# Patient Record
Sex: MALE | Race: WHITE | NOT HISPANIC OR LATINO
[De-identification: names, ages, dates, MRNs, and addresses within clinical notes are randomized per-mention and may not be internally consistent; named-entity substitution may affect disease eponyms.]

---

## 2017-02-23 ENCOUNTER — APPOINTMENT (OUTPATIENT)
Dept: NEUROSURGERY | Facility: CLINIC | Age: 72
End: 2017-02-23

## 2017-03-16 ENCOUNTER — APPOINTMENT (OUTPATIENT)
Dept: NEUROSURGERY | Facility: CLINIC | Age: 72
End: 2017-03-16

## 2017-03-16 VITALS
WEIGHT: 200 LBS | HEART RATE: 100 BPM | OXYGEN SATURATION: 96 % | DIASTOLIC BLOOD PRESSURE: 67 MMHG | SYSTOLIC BLOOD PRESSURE: 117 MMHG | HEIGHT: 66 IN | BODY MASS INDEX: 32.14 KG/M2

## 2017-03-16 DIAGNOSIS — I67.2 CEREBRAL ATHEROSCLEROSIS: ICD-10-CM

## 2017-03-16 DIAGNOSIS — I67.9 CEREBROVASCULAR DISEASE, UNSPECIFIED: ICD-10-CM

## 2017-03-16 DIAGNOSIS — I65.23 OCCLUSION AND STENOSIS OF BILATERAL CAROTID ARTERIES: ICD-10-CM

## 2017-03-16 DIAGNOSIS — Z78.9 OTHER SPECIFIED HEALTH STATUS: ICD-10-CM

## 2017-03-16 DIAGNOSIS — Z86.73 PERSONAL HISTORY OF TRANSIENT ISCHEMIC ATTACK (TIA), AND CEREBRAL INFARCTION W/OUT RESIDUAL DEFICITS: ICD-10-CM

## 2017-03-16 DIAGNOSIS — I77.9 DISORDER OF ARTERIES AND ARTERIOLES, UNSPECIFIED: ICD-10-CM

## 2017-03-20 PROBLEM — I77.9 BILATERAL CAROTID ARTERY DISEASE: Status: ACTIVE | Noted: 2017-03-20

## 2017-03-20 PROBLEM — Z78.9 NON-SMOKER: Status: ACTIVE | Noted: 2017-03-16

## 2017-03-20 PROBLEM — I65.23 BILATERAL CAROTID ARTERY STENOSIS: Status: ACTIVE | Noted: 2017-03-20

## 2017-03-20 PROBLEM — Z78.9 DOES NOT USE ILLICIT DRUGS: Status: ACTIVE | Noted: 2017-03-16

## 2017-03-20 RX ORDER — COLD-HOT PACK
125 MCG EACH MISCELLANEOUS
Refills: 0 | Status: ACTIVE | COMMUNITY

## 2017-03-20 RX ORDER — NIFEDIPINE 60 MG/1
60 TABLET, FILM COATED, EXTENDED RELEASE ORAL
Refills: 0 | Status: ACTIVE | COMMUNITY

## 2017-03-20 RX ORDER — PANTOPRAZOLE 40 MG/1
40 TABLET, DELAYED RELEASE ORAL
Refills: 0 | Status: ACTIVE | COMMUNITY

## 2017-03-20 RX ORDER — LOSARTAN POTASSIUM 50 MG/1
50 TABLET, FILM COATED ORAL
Refills: 0 | Status: ACTIVE | COMMUNITY

## 2017-03-21 PROBLEM — I67.2 INTRACRANIAL ATHEROSCLEROSIS: Status: ACTIVE | Noted: 2017-03-21

## 2017-03-21 PROBLEM — I67.9 INTRACRANIAL VASCULAR STENOSIS: Status: ACTIVE | Noted: 2017-03-21

## 2017-03-30 ENCOUNTER — OUTPATIENT (OUTPATIENT)
Dept: OUTPATIENT SERVICES | Facility: HOSPITAL | Age: 72
LOS: 1 days | Discharge: HOME | End: 2017-03-30

## 2017-04-12 ENCOUNTER — FORM ENCOUNTER (OUTPATIENT)
Age: 72
End: 2017-04-12

## 2017-04-13 ENCOUNTER — OUTPATIENT (OUTPATIENT)
Dept: OUTPATIENT SERVICES | Facility: HOSPITAL | Age: 72
LOS: 1 days | End: 2017-04-13
Payer: SELF-PAY

## 2017-04-13 ENCOUNTER — FORM ENCOUNTER (OUTPATIENT)
Age: 72
End: 2017-04-13

## 2017-04-13 PROCEDURE — 78607: CPT | Mod: 26

## 2017-04-14 PROCEDURE — 78803 RP LOCLZJ TUM SPECT 1 AREA: CPT

## 2017-04-14 PROCEDURE — A9557: CPT

## 2017-04-14 PROCEDURE — 78607: CPT | Mod: 26

## 2017-04-14 RX ORDER — ACETAZOLAMIDE 250 MG/1
1000 TABLET ORAL ONCE
Qty: 0 | Refills: 0 | Status: DISCONTINUED | OUTPATIENT
Start: 2017-04-14 | End: 2017-04-28

## 2017-05-08 ENCOUNTER — FORM ENCOUNTER (OUTPATIENT)
Age: 72
End: 2017-05-08

## 2017-05-09 ENCOUNTER — OUTPATIENT (OUTPATIENT)
Dept: OUTPATIENT SERVICES | Facility: HOSPITAL | Age: 72
LOS: 1 days | End: 2017-05-09
Payer: COMMERCIAL

## 2017-05-09 PROCEDURE — 70553 MRI BRAIN STEM W/O & W/DYE: CPT | Mod: 26

## 2017-05-09 PROCEDURE — 70553 MRI BRAIN STEM W/O & W/DYE: CPT

## 2017-05-09 PROCEDURE — 70544 MR ANGIOGRAPHY HEAD W/O DYE: CPT

## 2017-05-09 PROCEDURE — 70544 MR ANGIOGRAPHY HEAD W/O DYE: CPT | Mod: 26,59

## 2017-05-09 PROCEDURE — A9585: CPT

## 2017-05-11 RX ORDER — CHLORHEXIDINE GLUCONATE 213 G/1000ML
1 SOLUTION TOPICAL ONCE
Qty: 0 | Refills: 0 | Status: DISCONTINUED | OUTPATIENT
Start: 2017-05-12 | End: 2017-05-12

## 2017-05-12 ENCOUNTER — OUTPATIENT (OUTPATIENT)
Dept: OUTPATIENT SERVICES | Facility: HOSPITAL | Age: 72
LOS: 1 days | Discharge: NURSING, REHAB OR LONG-TERM CARE | End: 2017-05-12
Payer: COMMERCIAL

## 2017-05-12 DIAGNOSIS — I66.3 OCCLUSION AND STENOSIS OF CEREBELLAR ARTERIES: ICD-10-CM

## 2017-05-12 DIAGNOSIS — Z53.9 PROCEDURE AND TREATMENT NOT CARRIED OUT, UNSPECIFIED REASON: ICD-10-CM

## 2017-05-12 LAB
PA ADP PRP-ACNC: 160 PRU — LOW (ref 194–418)
PLATELET RESPONSE ASPIRIN RESULT: 537 ARU — SIGNIFICANT CHANGE UP (ref 350–700)

## 2017-05-12 PROCEDURE — 36224 PLACE CATH CAROTD ART: CPT | Mod: 74

## 2017-05-12 PROCEDURE — C1769: CPT

## 2017-05-12 PROCEDURE — C1725: CPT

## 2017-05-12 PROCEDURE — C1760: CPT

## 2017-05-12 PROCEDURE — 85576 BLOOD PLATELET AGGREGATION: CPT

## 2017-05-12 PROCEDURE — C1894: CPT

## 2017-05-12 RX ORDER — SODIUM CHLORIDE 9 MG/ML
1000 INJECTION INTRAMUSCULAR; INTRAVENOUS; SUBCUTANEOUS
Qty: 0 | Refills: 0 | Status: DISCONTINUED | OUTPATIENT
Start: 2017-05-12 | End: 2017-05-12

## 2017-05-12 RX ADMIN — SODIUM CHLORIDE 75 MILLILITER(S): 9 INJECTION INTRAMUSCULAR; INTRAVENOUS; SUBCUTANEOUS at 12:39

## 2017-05-12 NOTE — BRIEF OPERATIVE NOTE - OPERATION/FINDINGS
MAC sedation, using a 5 fr sheath right CFA, cerebral angiogram was performed.  Findings c/w stenosis of right ICA , B/L MCA's   Full report to follow.  Patient tolerated procedure well, no new neurological deficit, hemodynamically stable.  Right groin/vasc access site: Perclose device, manual compression.  Hemostasis obtained, no hematoma.  Patient was transferred to PACU and will go home later today.  Above d/w Dr. Rajput

## 2017-05-18 RX ORDER — FUROSEMIDE 40 MG/1
40 TABLET ORAL
Refills: 0 | Status: DISCONTINUED | COMMUNITY
End: 2017-05-18

## 2017-05-18 RX ORDER — ATORVASTATIN CALCIUM 40 MG/1
40 TABLET, FILM COATED ORAL
Refills: 0 | Status: ACTIVE | COMMUNITY

## 2017-05-18 RX ORDER — INSULIN GLARGINE 300 U/ML
INJECTION, SOLUTION SUBCUTANEOUS
Refills: 0 | Status: ACTIVE | COMMUNITY

## 2017-05-18 RX ORDER — FLUOXETINE HYDROCHLORIDE 60 MG/1
TABLET ORAL
Refills: 0 | Status: ACTIVE | COMMUNITY

## 2017-05-18 RX ORDER — METFORMIN HYDROCHLORIDE 850 MG/1
850 TABLET, COATED ORAL TWICE DAILY
Refills: 0 | Status: ACTIVE | COMMUNITY

## 2017-05-18 RX ORDER — ASPIRIN 325 MG/1
325 TABLET ORAL
Refills: 0 | Status: ACTIVE | COMMUNITY

## 2017-05-18 RX ORDER — INSULIN LISPRO 100 [IU]/ML
INJECTION, SOLUTION INTRAVENOUS; SUBCUTANEOUS
Refills: 0 | Status: ACTIVE | COMMUNITY

## 2017-05-21 ENCOUNTER — INPATIENT (INPATIENT)
Facility: HOSPITAL | Age: 72
LOS: 21 days | Discharge: HOME HEALTH | DRG: 37 | End: 2017-06-12
Attending: NEUROLOGICAL SURGERY | Admitting: NEUROLOGICAL SURGERY
Payer: COMMERCIAL

## 2017-05-21 VITALS
TEMPERATURE: 97 F | SYSTOLIC BLOOD PRESSURE: 113 MMHG | HEART RATE: 79 BPM | OXYGEN SATURATION: 98 % | RESPIRATION RATE: 18 BRPM | DIASTOLIC BLOOD PRESSURE: 61 MMHG

## 2017-05-21 DIAGNOSIS — Z96.89 PRESENCE OF OTHER SPECIFIED FUNCTIONAL IMPLANTS: Chronic | ICD-10-CM

## 2017-05-21 DIAGNOSIS — Z90.49 ACQUIRED ABSENCE OF OTHER SPECIFIED PARTS OF DIGESTIVE TRACT: Chronic | ICD-10-CM

## 2017-05-21 DIAGNOSIS — Z98.890 OTHER SPECIFIED POSTPROCEDURAL STATES: Chronic | ICD-10-CM

## 2017-05-21 LAB
ALBUMIN SERPL ELPH-MCNC: 4 G/DL — SIGNIFICANT CHANGE UP (ref 3.3–5)
ALP SERPL-CCNC: 90 U/L — SIGNIFICANT CHANGE UP (ref 40–120)
ALT FLD-CCNC: 16 U/L — SIGNIFICANT CHANGE UP (ref 10–45)
ANION GAP SERPL CALC-SCNC: 17 MMOL/L — SIGNIFICANT CHANGE UP (ref 5–17)
APPEARANCE UR: CLEAR — SIGNIFICANT CHANGE UP
APTT BLD: 32.4 SEC — SIGNIFICANT CHANGE UP (ref 27.5–37.4)
AST SERPL-CCNC: 16 U/L — SIGNIFICANT CHANGE UP (ref 10–40)
BASOPHILS NFR BLD AUTO: 0.9 % — SIGNIFICANT CHANGE UP (ref 0–2)
BILIRUB SERPL-MCNC: 0.2 MG/DL — SIGNIFICANT CHANGE UP (ref 0.2–1.2)
BILIRUB UR-MCNC: NEGATIVE — SIGNIFICANT CHANGE UP
BUN SERPL-MCNC: 23 MG/DL — SIGNIFICANT CHANGE UP (ref 7–23)
CALCIUM SERPL-MCNC: 9.4 MG/DL — SIGNIFICANT CHANGE UP (ref 8.4–10.5)
CHLORIDE SERPL-SCNC: 98 MMOL/L — SIGNIFICANT CHANGE UP (ref 96–108)
CO2 SERPL-SCNC: 22 MMOL/L — SIGNIFICANT CHANGE UP (ref 22–31)
COLOR SPEC: YELLOW — SIGNIFICANT CHANGE UP
CREAT SERPL-MCNC: 1.3 MG/DL — SIGNIFICANT CHANGE UP (ref 0.5–1.3)
DIFF PNL FLD: NEGATIVE — SIGNIFICANT CHANGE UP
EOSINOPHIL NFR BLD AUTO: 5.4 % — SIGNIFICANT CHANGE UP (ref 0–6)
GLUCOSE SERPL-MCNC: 93 MG/DL — SIGNIFICANT CHANGE UP (ref 70–99)
GLUCOSE UR QL: NEGATIVE — SIGNIFICANT CHANGE UP
HCT VFR BLD CALC: 37.7 % — LOW (ref 39–50)
HGB BLD-MCNC: 12.8 G/DL — LOW (ref 13–17)
INR BLD: 1.02 — SIGNIFICANT CHANGE UP (ref 0.88–1.16)
KETONES UR-MCNC: NEGATIVE — SIGNIFICANT CHANGE UP
LEUKOCYTE ESTERASE UR-ACNC: NEGATIVE — SIGNIFICANT CHANGE UP
LYMPHOCYTES # BLD AUTO: 18.2 % — SIGNIFICANT CHANGE UP (ref 13–44)
MCHC RBC-ENTMCNC: 29.2 PG — SIGNIFICANT CHANGE UP (ref 27–34)
MCHC RBC-ENTMCNC: 34 G/DL — SIGNIFICANT CHANGE UP (ref 32–36)
MCV RBC AUTO: 85.9 FL — SIGNIFICANT CHANGE UP (ref 80–100)
MONOCYTES NFR BLD AUTO: 10.5 % — SIGNIFICANT CHANGE UP (ref 2–14)
NEUTROPHILS NFR BLD AUTO: 65 % — SIGNIFICANT CHANGE UP (ref 43–77)
NITRITE UR-MCNC: NEGATIVE — SIGNIFICANT CHANGE UP
PH UR: 5.5 — SIGNIFICANT CHANGE UP (ref 5–8)
PLATELET # BLD AUTO: 302 K/UL — SIGNIFICANT CHANGE UP (ref 150–400)
POTASSIUM SERPL-MCNC: 3.6 MMOL/L — SIGNIFICANT CHANGE UP (ref 3.5–5.3)
POTASSIUM SERPL-SCNC: 3.6 MMOL/L — SIGNIFICANT CHANGE UP (ref 3.5–5.3)
PROT SERPL-MCNC: 7.7 G/DL — SIGNIFICANT CHANGE UP (ref 6–8.3)
PROT UR-MCNC: NEGATIVE MG/DL — SIGNIFICANT CHANGE UP
PROTHROM AB SERPL-ACNC: 11.3 SEC — SIGNIFICANT CHANGE UP (ref 9.8–12.7)
RBC # BLD: 4.39 M/UL — SIGNIFICANT CHANGE UP (ref 4.2–5.8)
RBC # FLD: 13.4 % — SIGNIFICANT CHANGE UP (ref 10.3–16.9)
SODIUM SERPL-SCNC: 137 MMOL/L — SIGNIFICANT CHANGE UP (ref 135–145)
SP GR SPEC: 1.02 — SIGNIFICANT CHANGE UP (ref 1–1.03)
UROBILINOGEN FLD QL: 0.2 E.U./DL — SIGNIFICANT CHANGE UP
WBC # BLD: 11.1 K/UL — HIGH (ref 3.8–10.5)
WBC # FLD AUTO: 11.1 K/UL — HIGH (ref 3.8–10.5)

## 2017-05-21 PROCEDURE — 71010: CPT | Mod: 26

## 2017-05-21 PROCEDURE — 93010 ELECTROCARDIOGRAM REPORT: CPT | Mod: NC

## 2017-05-21 PROCEDURE — 99285 EMERGENCY DEPT VISIT HI MDM: CPT | Mod: 25

## 2017-05-21 PROCEDURE — 70450 CT HEAD/BRAIN W/O DYE: CPT | Mod: 26

## 2017-05-21 RX ORDER — ACETAMINOPHEN 500 MG
650 TABLET ORAL EVERY 6 HOURS
Qty: 0 | Refills: 0 | Status: DISCONTINUED | OUTPATIENT
Start: 2017-05-21 | End: 2017-05-23

## 2017-05-21 RX ORDER — ATORVASTATIN CALCIUM 80 MG/1
40 TABLET, FILM COATED ORAL DAILY
Qty: 0 | Refills: 0 | Status: DISCONTINUED | OUTPATIENT
Start: 2017-05-21 | End: 2017-05-22

## 2017-05-21 RX ORDER — ASPIRIN/CALCIUM CARB/MAGNESIUM 324 MG
325 TABLET ORAL DAILY
Qty: 0 | Refills: 0 | Status: DISCONTINUED | OUTPATIENT
Start: 2017-05-21 | End: 2017-05-23

## 2017-05-21 RX ORDER — FLUOXETINE HCL 10 MG
20 CAPSULE ORAL DAILY
Qty: 0 | Refills: 0 | Status: DISCONTINUED | OUTPATIENT
Start: 2017-05-21 | End: 2017-05-23

## 2017-05-21 RX ORDER — DEXTROSE 50 % IN WATER 50 %
1 SYRINGE (ML) INTRAVENOUS ONCE
Qty: 0 | Refills: 0 | Status: DISCONTINUED | OUTPATIENT
Start: 2017-05-21 | End: 2017-05-23

## 2017-05-21 RX ORDER — DEXTROSE 50 % IN WATER 50 %
12.5 SYRINGE (ML) INTRAVENOUS ONCE
Qty: 0 | Refills: 0 | Status: DISCONTINUED | OUTPATIENT
Start: 2017-05-21 | End: 2017-05-23

## 2017-05-21 RX ORDER — SODIUM CHLORIDE 9 MG/ML
1000 INJECTION, SOLUTION INTRAVENOUS
Qty: 0 | Refills: 0 | Status: DISCONTINUED | OUTPATIENT
Start: 2017-05-21 | End: 2017-05-23

## 2017-05-21 RX ORDER — LOSARTAN POTASSIUM 100 MG/1
50 TABLET, FILM COATED ORAL DAILY
Qty: 0 | Refills: 0 | Status: DISCONTINUED | OUTPATIENT
Start: 2017-05-21 | End: 2017-05-23

## 2017-05-21 RX ORDER — GLUCAGON INJECTION, SOLUTION 0.5 MG/.1ML
1 INJECTION, SOLUTION SUBCUTANEOUS ONCE
Qty: 0 | Refills: 0 | Status: DISCONTINUED | OUTPATIENT
Start: 2017-05-21 | End: 2017-05-23

## 2017-05-21 RX ORDER — FLUOXETINE HCL 10 MG
20 CAPSULE ORAL DAILY
Qty: 0 | Refills: 0 | Status: DISCONTINUED | OUTPATIENT
Start: 2017-05-21 | End: 2017-05-21

## 2017-05-21 RX ORDER — INSULIN LISPRO 100/ML
VIAL (ML) SUBCUTANEOUS
Qty: 0 | Refills: 0 | Status: DISCONTINUED | OUTPATIENT
Start: 2017-05-21 | End: 2017-05-23

## 2017-05-21 RX ORDER — SODIUM CHLORIDE 9 MG/ML
1000 INJECTION INTRAMUSCULAR; INTRAVENOUS; SUBCUTANEOUS
Qty: 0 | Refills: 0 | Status: DISCONTINUED | OUTPATIENT
Start: 2017-05-22 | End: 2017-05-22

## 2017-05-21 RX ORDER — PANTOPRAZOLE SODIUM 20 MG/1
40 TABLET, DELAYED RELEASE ORAL
Qty: 0 | Refills: 0 | Status: DISCONTINUED | OUTPATIENT
Start: 2017-05-21 | End: 2017-05-23

## 2017-05-21 RX ADMIN — Medication 325 MILLIGRAM(S): at 22:43

## 2017-05-21 RX ADMIN — ATORVASTATIN CALCIUM 40 MILLIGRAM(S): 80 TABLET, FILM COATED ORAL at 22:44

## 2017-05-21 RX ADMIN — PANTOPRAZOLE SODIUM 40 MILLIGRAM(S): 20 TABLET, DELAYED RELEASE ORAL at 22:43

## 2017-05-21 RX ADMIN — LOSARTAN POTASSIUM 50 MILLIGRAM(S): 100 TABLET, FILM COATED ORAL at 22:42

## 2017-05-21 RX ADMIN — Medication 20 MILLIGRAM(S): at 22:43

## 2017-05-21 NOTE — ED ADULT NURSE REASSESSMENT NOTE - NS ED NURSE REASSESS COMMENT FT1
pt completed ct of head. pt nad. denies pain or discomfort. vss. R sided weakness at baseline. speech returned to baseline as per pt. pt report given to floor rn.

## 2017-05-21 NOTE — H&P ADULT - PSH
H/O umbilical hernia repair    History of appendectomy    History of lumbar surgery    History of penile implant

## 2017-05-21 NOTE — H&P ADULT - ASSESSMENT
72 y/o male multiple co morbidities and recent CVAs with known Left MCA stenosis with worsening aphasia, right hemiplegic presents to ED for Lt STA-MCA bypass procedure tomorrow  -admit to Dr. Rajput SDU [facility] bed  -NPO after midnight  -tylenol prn pain  -q2h neuro checks  -consent for procedure  -obtain pre op labs  -SCDs only for now, can continue ASA  -as above d/w Dr. Rajput 70 y/o male multiple co morbidities and recent CVAs with known Left MCA stenosis with worsening aphasia, right hemiplegic presents to ED for Lt STA-MCA bypass procedure tomorrow  -[facility]put [facility]  -obtain f/u CTH in light of reported worsening symptoms  -NPO after midnight  -tylenol prn pain  -q2h neuro checks  -consent for procedure  -obtain pre op labs  -SCDs only for now, can continue ASA  -as above d/w Dr. Rajput

## 2017-05-21 NOTE — H&P ADULT - NSHPPHYSICALEXAM_GEN_ALL_CORE
PHYSICAL EXAM:      Constitutional:    Eyes:    ENMT:    Neck:    Breasts:    Back:    Respiratory:    Cardiovascular:    Gastrointestinal:    Genitourinary:    Rectal:    Extremities:    Vascular:    Neurological:    Skin:    Lymph Nodes:    Musculoskeletal:    Psychiatric: PHYSICAL EXAM:      Constitutional: awake, alert appears comfortable    Eyes: EOMI, PERRL    ENMT: tongue midline, face symmetric    Back: old lumbar surgical scar    Respiratory: patent, comfortable on RA    Cardiovascular: RRR    Gastrointestinal: soft, NTND    Extremities: w/w/p    Vascular: distal 2+ pulses intact b/l    Neurological: oriented to self, person, hospital but unable to report exact location or date which family sts is baseline  RUE contracted with muscle atrophy, but able to flex at the wrist,  strength 1/5, RLE 4/5 throughout otherwise motor exam 5/5 throughout  sensation intact b/l  gait testing deferred

## 2017-05-21 NOTE — ED PROVIDER NOTE - MEDICAL DECISION MAKING DETAILS
pt with worsening CVA symtoms, out of stroke code window, stroke team made aware , pt admitted to neurosurgery due to plans for bypass of known stenotic MCA.

## 2017-05-21 NOTE — ED ADULT NURSE NOTE - OBJECTIVE STATEMENT
received pt to room 17. A&Ox3, presents to ed for c.o worsening slurred speech since this am. hx stroke sept 2016, R sided residual and baseline slurred speech. as per wife at bedside, pt noted to be disoriented since am with worsening speech. pt denies pain. denies recent fall or trauma. no cp, no sob. no ha, no blurry vision. nsr on cm. vss. iv placed, labs drawn. awaiting md lopez.

## 2017-05-21 NOTE — H&P ADULT - PMH
Cerebrovascular accident (CVA) due to stenosis of left middle cerebral artery    Controlled type 2 diabetes mellitus without complication, unspecified long term insulin use status    Coronary artery disease without angina pectoris, unspecified vessel or lesion type, unspecified whether native or transplanted heart    Depression, unspecified depression type    Essential hypertension

## 2017-05-21 NOTE — PROGRESS NOTE ADULT - SUBJECTIVE AND OBJECTIVE BOX
Surgery: Left STA-MCA bypass  Consent: Signed by HCP                       No Known Allergies        T(C): 36.2, Max: 36.2 ( @ 15:43)  HR: 66 (66 - 81)  BP: 139/64 (113/61 - 139/64)  RR: 18 (18 - 18)  SpO2: 95% (95% - 99%)  Wt(kg): --          137  |  98  |  23  ----------------------------<  93  3.6   |  22  |  1.30    Ca    9.4      21 May 2017 16:35    TPro  7.7  /  Alb  4.0  /  TBili  0.2  /  DBili  x   /  AST  16  /  ALT  16  /  AlkPhos  90      CBC Full  -  ( 21 May 2017 16:35 )  WBC Count : 11.1 K/uL  Hemoglobin : 12.8 g/dL  Hematocrit : 37.7 %  Platelet Count - Automated : 302 K/uL  Mean Cell Volume : 85.9 fL  Mean Cell Hemoglobin : 29.2 pg  Mean Cell Hemoglobin Concentration : 34.0 g/dL  Auto Neutrophil # : x  Auto Lymphocyte # : x  Auto Monocyte # : x  Auto Eosinophil # : x  Auto Basophil # : x  Auto Neutrophil % : 65.0 %  Auto Lymphocyte % : 18.2 %  Auto Monocyte % : 10.5 %  Auto Eosinophil % : 5.4 %  Auto Basophil % : 0.9 %    PT/INR - ( 21 May 2017 16:35 )   PT: 11.3 sec;   INR: 1.02          PTT - ( 21 May 2017 16:35 )  PTT:32.4 sec    Pregnancy test:  Type & Screen (in past 72hrs):    CXR:pending  EKG:NSR  ECHO:LVEF 55%  Medical Clearances:outpatient clearance  Other Clearances:     Last dose of antiplatelet/anticoagulation dru/21: aspirin    Implanted Devices (pacemaker, drug pump...etc):  []YES   [x] NO                  If yes --> EPS consulted to interrogate/adjust device:                 Assessment:72yo male w/ MCA stenosis preop for L STA-MCA bypass    Plan:NPO p MN  con't aspirin  f/u CXR  D/W

## 2017-05-21 NOTE — ED ADULT TRIAGE NOTE - CHIEF COMPLAINT QUOTE
Pt's spouse states "he had multiple stroke in the past, last one was 9/2016. But I noticed his speech got worse this morning."

## 2017-05-21 NOTE — H&P ADULT - NSHPREVIEWOFSYSTEMS_GEN_ALL_CORE
REVIEW OF SYSTEMS      General:	disabled, required wheel chair assistance  	  Ophthalmologic: denies blurry vision, or changes in vision  	  ENMT: denies sore throat or ear pain 	    Respiratory and Thorax: denies shortness of breath or difficulty breathing  	  Cardiovascular: denies chest pain or palpitations	    Gastrointestinal: denies nausea vomiting or diarrhea		    Musculoskeletal: denies any muscular or joint pain	    Neurological: dense right hemiplegia as stated above in HPI, denies seizures, paresthesias or new weakness	    Hematology/Lymphatics: denies anemia	    Endocrine: DM 2

## 2017-05-21 NOTE — ED PROVIDER NOTE - CONSTITUTIONAL, MLM
normal... non toxic appearing, well nourished, awake, alert, oriented to person, place, time/situation and in no apparent distress.

## 2017-05-21 NOTE — ED PROVIDER NOTE - OBJECTIVE STATEMENT
72 yo male with ho of 3 strokes in past, awoke this morning at 11 am after going to bed at 9 pm, pt and wife report he has worsening of his chronic deficits which include rt sided numbness weakness and speech difficulty, reports symptoms have been consistently worse all day, no headache, no new symptoms.

## 2017-05-21 NOTE — H&P ADULT - NSHPLABSRESULTS_GEN_ALL_CORE
1. Extensive longstanding ischemic changes to the cerebral hemispheres,   left more than right.     2. Subacute infarction of left frontal and parietal yao-Rolandic brain   with findings of Wallerian degeneration along the corticospinal tract.    3. Small more acute infarction in the right parietal periventricular   white matter. This finding was communicated to MARIA VICTORIA Rey at the   conclusion of the study.    *MRA HEAD*    1. Multiple sites of moderate to severe intracranial arterial stenosis,   presumably secondary to atherosclerotic disease. This is similar to a CTA   from 3/19/2016 given differencesin technique. The left M1 segment shows   severe stenosis; right M1 and left A2 segment with moderate to severe   stenosis, left supraclinoid ICA with moderate stenosis. Mild-moderate   narrowing of the distal basilar artery and bilateral PCAs, left more than   right.    2. NOVA results show diminished flow in the left ICA relative to the   right. There is diminished flow in the bilateral MCAs. Complete NOVA   results are published in PACS for review. 1. Extensive longstanding ischemic changes to the cerebral hemispheres,   left more than right.     2. Subacute infarction of left frontal and parietal yao-Rolandic brain   with findings of Wallerian degeneration along the corticospinal tract.    3. Small more acute infarction in the right parietal periventricular   white matter. This finding was communicated to MARIA VICTORIA Rey at the   conclusion of the study.    *MRA HEAD*    1. Multiple sites of moderate to severe intracranial arterial stenosis,   presumably secondary to atherosclerotic disease. This is similar to a CTA   from 3/19/2016 given differences in technique. The left M1 segment shows   severe stenosis; right M1 and left A2 segment with moderate to severe   stenosis, left supraclinoid ICA with moderate stenosis. Mild-moderate   narrowing of the distal basilar artery and bilateral PCAs, left more than   right.    2. NOVA results show diminished flow in the left ICA relative to the   right. There is diminished flow in the bilateral MCAs. Complete NOVA   results are published in PACS for review.

## 2017-05-21 NOTE — ED PROVIDER NOTE - NEUROLOGICAL, MLM
Alert and oriented, + pt with sensory and motor deficit at rt arm and leg, + dysarthria as well as aphasia, pt answered  when asked where he was aware it came out wrong .

## 2017-05-21 NOTE — H&P ADULT - HISTORY OF PRESENT ILLNESS
70 y/o male pmhx HTN, CAD, multiple CVA (last 3/2016) DM, known to NSX service with recent angio procedure revealing worsening carotid stenosis planned for elective Left STA-MCA bypass this week but has been experiencing worsening in his speech over the past few days. Per the patients wife, patient has expereinced a gradual decline since March 2016 with symptoms associated with increased confusion and speech difficulty. 70 y/o male pmhx HTN, CAD, multiple CVA (last 3/2016) DM, known to NSX service with recent angio procedure revealing worsening carotid stenosis planned for elective Left STA-MCA bypass this week but has been experiencing worsening in his speech over the past few days. Per the patients wife, patient has expereinced a gradual decline since March 2016 with symptoms associated with increased confusion and speech difficulty. Denies any headaches, nausea, vomiting or right side deficits. Denies any numbness tingling or decreased sensation.  Pt at baseline using hand crutch and one person assistance to ambulate. Denies any medications for pain or improvement of symptoms. Denies any recent trauma or falls.

## 2017-05-22 DIAGNOSIS — Z01.818 ENCOUNTER FOR OTHER PREPROCEDURAL EXAMINATION: ICD-10-CM

## 2017-05-22 DIAGNOSIS — I10 ESSENTIAL (PRIMARY) HYPERTENSION: ICD-10-CM

## 2017-05-22 DIAGNOSIS — E78.5 HYPERLIPIDEMIA, UNSPECIFIED: ICD-10-CM

## 2017-05-22 DIAGNOSIS — I63.9 CEREBRAL INFARCTION, UNSPECIFIED: ICD-10-CM

## 2017-05-22 DIAGNOSIS — I25.10 ATHEROSCLEROTIC HEART DISEASE OF NATIVE CORONARY ARTERY WITHOUT ANGINA PECTORIS: ICD-10-CM

## 2017-05-22 DIAGNOSIS — E11.9 TYPE 2 DIABETES MELLITUS WITHOUT COMPLICATIONS: ICD-10-CM

## 2017-05-22 LAB
CULTURE RESULTS: NO GROWTH — SIGNIFICANT CHANGE UP
EXTRA BLUE TOP TUBE: SIGNIFICANT CHANGE UP
SPECIMEN SOURCE: SIGNIFICANT CHANGE UP

## 2017-05-22 PROCEDURE — 93010 ELECTROCARDIOGRAM REPORT: CPT

## 2017-05-22 PROCEDURE — 99223 1ST HOSP IP/OBS HIGH 75: CPT | Mod: GC

## 2017-05-22 RX ORDER — ATORVASTATIN CALCIUM 80 MG/1
40 TABLET, FILM COATED ORAL AT BEDTIME
Qty: 0 | Refills: 0 | Status: DISCONTINUED | OUTPATIENT
Start: 2017-05-22 | End: 2017-05-23

## 2017-05-22 RX ADMIN — LOSARTAN POTASSIUM 50 MILLIGRAM(S): 100 TABLET, FILM COATED ORAL at 06:09

## 2017-05-22 RX ADMIN — Medication 20 MILLIGRAM(S): at 13:07

## 2017-05-22 RX ADMIN — Medication 325 MILLIGRAM(S): at 13:07

## 2017-05-22 RX ADMIN — ATORVASTATIN CALCIUM 40 MILLIGRAM(S): 80 TABLET, FILM COATED ORAL at 21:27

## 2017-05-22 RX ADMIN — Medication 2: at 19:09

## 2017-05-22 RX ADMIN — PANTOPRAZOLE SODIUM 40 MILLIGRAM(S): 20 TABLET, DELAYED RELEASE ORAL at 06:09

## 2017-05-22 RX ADMIN — Medication 2: at 13:06

## 2017-05-22 NOTE — CONSULT NOTE ADULT - PROBLEM SELECTOR RECOMMENDATION 6
The patient's medical condition is optimized for surgery.  There is no contraindication for surgery.  There is no clinical evidence neither of angina, decompensated CHF, arrhthymias, nor valvular disease.   There is no limitation of exercise capacity.  MET is 4 .  ASA class is 2 .  Farley cardiac risk factor is high  .  DVT prophylaxis is indicated.  Pain control.  Early mobilization.  Avoid fluid overload.

## 2017-05-22 NOTE — CONSULT NOTE ADULT - SUBJECTIVE AND OBJECTIVE BOX
Patient is a 71y old  Male who presents with a chief complaint of worsening slurred speech (21 May 2017 16:56)      HPI:  70 y/o male pmhx HTN, CAD, multiple CVA (last 3/2016) DM, known to NSX service with recent angio procedure revealing worsening carotid stenosis planned for elective Left STA-MCA bypass this week but has been experiencing worsening in his speech over the past few days. Per the patients wife, patient has expereinced a gradual decline since 2016 with symptoms associated with increased confusion and speech difficulty. Denies any headaches, nausea, vomiting or right side deficits. Denies any numbness tingling or decreased sensation.  Pt at baseline using hand crutch and one person assistance to ambulate. Denies any medications for pain or improvement of symptoms. Denies any recent trauma or falls. (21 May 2017 16:56)      PAST MEDICAL & SURGICAL HISTORY:  Cerebrovascular accident (CVA) due to stenosis of left middle cerebral artery  Coronary artery disease without angina pectoris, unspecified vessel or lesion type, unspecified whether native or transplanted heart  Depression, unspecified depression type  Essential hypertension  Controlled type 2 diabetes mellitus without complication, unspecified long term insulin use status  History of penile implant  History of lumbar surgery  H/O umbilical hernia repair  History of appendectomy      FAMILY HISTORY:  No pertinent family history in first degree relatives      SOCIAL HISTORY:  Smoking Status: [ ] Current, [ ] Former, [x ] Never  Pack Years:    MEDICATIONS:  Pulmonary:    Antimicrobials:    Anticoagulants:    Onc:    GI/:  pantoprazole    Tablet 40milliGRAM(s) Oral before breakfast    Endocrine:  insulin lispro (HumaLOG) corrective regimen sliding scale  SubCutaneous Before meals and at bedtime  dextrose Gel 1Dose(s) Oral once PRN  dextrose 50% Injectable 12.5Gram(s) IV Push once  glucagon  Injectable 1milliGRAM(s) IntraMuscular once PRN  atorvastatin 40milliGRAM(s) Oral at bedtime    Cardiac:  losartan 50milliGRAM(s) Oral daily    Other Medications:  aspirin 325milliGRAM(s) Oral daily  FLUoxetine 20milliGRAM(s) Oral daily  acetaminophen   Tablet. 650milliGRAM(s) Oral every 6 hours PRN  dextrose 5%. 1000milliLiter(s) IV Continuous <Continuous>      Allergies    No Known Allergies    Intolerances        Vital Signs Last 24 Hrs  T(C): 36.3, Max: 36.8 (05 @ 14:10)  T(F): 97.3, Max: 98.2 ( @ 14:10)  HR: 72 (68 - 758)  BP: 128/59 (128/59 - 181/81)  BP(mean): 83 (83 - 121)  RR: 16 (16 - 20)  SpO2: 96% (93% - 96%)  I & Os for 24h ending  @ 07:00  =============================================  IN: 850 ml / OUT: 1000 ml / NET: -150 ml    I & Os for current day (as of  @ 22:57)  =============================================  IN: 75 ml / OUT: 1000 ml / NET: -925 ml        LABS:      CBC Full  -  ( 21 May 2017 16:35 )  WBC Count : 11.1 K/uL  Hemoglobin : 12.8 g/dL  Hematocrit : 37.7 %  Platelet Count - Automated : 302 K/uL  Mean Cell Volume : 85.9 fL  Mean Cell Hemoglobin : 29.2 pg  Mean Cell Hemoglobin Concentration : 34.0 g/dL  Auto Neutrophil # : x  Auto Lymphocyte # : x  Auto Monocyte # : x  Auto Eosinophil # : x  Auto Basophil # : x  Auto Neutrophil % : 65.0 %  Auto Lymphocyte % : 18.2 %  Auto Monocyte % : 10.5 %  Auto Eosinophil % : 5.4 %  Auto Basophil % : 0.9 %        137  |  98  |  23  ----------------------------<  93  3.6   |  22  |  1.30    Ca    9.4      21 May 2017 16:35    TPro  7.7  /  Alb  4.0  /  TBili  0.2  /  DBili  x   /  AST  16  /  ALT  16  /  AlkPhos  90      PT/INR - ( 21 May 2017 16:35 )   PT: 11.3 sec;   INR: 1.02          PTT - ( 21 May 2017 16:35 )  PTT:32.4 sec      Urinalysis Basic - ( 21 May 2017 17:12 )    Color: Yellow / Appearance: Clear / S.025 / pH: x  Gluc: x / Ketone: NEGATIVE  / Bili: NEGATIVE / Urobili: 0.2 E.U./dL   Blood: x / Protein: NEGATIVE mg/dL / Nitrite: NEGATIVE   Leuk Esterase: NEGATIVE / RBC: x / WBC x   Sq Epi: x / Non Sq Epi: x / Bacteria: x      Patient is a 71y old  Male who presents with a chief complaint of worsening slurred speech (21 May 2017 16:56)      HPI:  70 y/o male pmhx HTN, CAD, multiple CVA (last 3/2016) DM, known to NSX service with recent angio procedure revealing worsening carotid stenosis planned for elective Left STA-MCA bypass this week but has been experiencing worsening in his speech over the past few days. Per the patients wife, patient has expereinced a gradual decline since 2016 with symptoms associated with increased confusion and speech difficulty. Denies any headaches, nausea, vomiting or right side deficits. Denies any numbness tingling or decreased sensation.  Pt at baseline using hand crutch and one person assistance to ambulate. Denies any medications for pain or improvement of symptoms. Denies any recent trauma or falls. (21 May 2017 16:56)      PAST MEDICAL & SURGICAL HISTORY:  Cerebrovascular accident (CVA) due to stenosis of left middle cerebral artery  Coronary artery disease without angina pectoris, unspecified vessel or lesion type, unspecified whether native or transplanted heart  Depression, unspecified depression type  Essential hypertension  Controlled type 2 diabetes mellitus without complication, unspecified long term insulin use status  History of penile implant  History of lumbar surgery  H/O umbilical hernia repair  History of appendectomy      FAMILY HISTORY:  No pertinent family history in first degree relatives      SOCIAL HISTORY:  Smoking Status: [ ] Current, [ ] Former, [ ] Never  Pack Years:    MEDICATIONS:  Pulmonary:    Antimicrobials:    Anticoagulants:    Onc:    GI/:  pantoprazole    Tablet 40milliGRAM(s) Oral before breakfast    Endocrine:  insulin lispro (HumaLOG) corrective regimen sliding scale  SubCutaneous Before meals and at bedtime  dextrose Gel 1Dose(s) Oral once PRN  dextrose 50% Injectable 12.5Gram(s) IV Push once  glucagon  Injectable 1milliGRAM(s) IntraMuscular once PRN  atorvastatin 40milliGRAM(s) Oral at bedtime    Cardiac:  losartan 50milliGRAM(s) Oral daily    Other Medications:  aspirin 325milliGRAM(s) Oral daily  FLUoxetine 20milliGRAM(s) Oral daily  acetaminophen   Tablet. 650milliGRAM(s) Oral every 6 hours PRN  dextrose 5%. 1000milliLiter(s) IV Continuous <Continuous>      Allergies    No Known Allergies    Intolerances        Vital Signs Last 24 Hrs  T(C): 36.3, Max: 36.8 ( @ 14:10)  T(F): 97.3, Max: 98.2 ( @ 14:10)  HR: 72 (68 - 758)  BP: 128/59 (128/59 - 181/81)  BP(mean): 83 (83 - 121)  RR: 16 (16 - 20)  SpO2: 96% (93% - 96%)  I & Os for 24h ending  @ 07:00  =============================================  IN: 850 ml / OUT: 1000 ml / NET: -150 ml    I & Os for current day (as of  @ 22:57)  =============================================  IN: 75 ml / OUT: 1000 ml / NET: -925 ml        LABS:      CBC Full  -  ( 21 May 2017 16:35 )  WBC Count : 11.1 K/uL  Hemoglobin : 12.8 g/dL  Hematocrit : 37.7 %  Platelet Count - Automated : 302 K/uL  Mean Cell Volume : 85.9 fL  Mean Cell Hemoglobin : 29.2 pg  Mean Cell Hemoglobin Concentration : 34.0 g/dL  Auto Neutrophil # : x  Auto Lymphocyte # : x  Auto Monocyte # : x  Auto Eosinophil # : x  Auto Basophil # : x  Auto Neutrophil % : 65.0 %  Auto Lymphocyte % : 18.2 %  Auto Monocyte % : 10.5 %  Auto Eosinophil % : 5.4 %  Auto Basophil % : 0.9 %        137  |  98  |  23  ----------------------------<  93  3.6   |  22  |  1.30    Ca    9.4      21 May 2017 16:35    TPro  7.7  /  Alb  4.0  /  TBili  0.2  /  DBili  x   /  AST  16  /  ALT  16  /  AlkPhos  90  05-21    PT/INR - ( 21 May 2017 16:35 )   PT: 11.3 sec;   INR: 1.02          PTT - ( 21 May 2017 16:35 )  PTT:32.4 sec      Urinalysis Basic - ( 21 May 2017 17:12 )    Color: Yellow / Appearance: Clear / S.025 / pH: x  Gluc: x / Ketone: NEGATIVE  / Bili: NEGATIVE / Urobili: 0.2 E.U./dL   Blood: x / Protein: NEGATIVE mg/dL / Nitrite: NEGATIVE   Leuk Esterase: NEGATIVE / RBC: x / WBC x   Sq Epi: x / Non Sq Epi: x / Bacteria: x      CXR normal    Ventricular Rate 70 BPM    Atrial Rate 70 BPM    P-R Interval 178 ms    QRS Duration 84 ms    Q-T Interval 430 ms    QTC Calculation(Bezet) 464 ms    P Axis 37 degrees    R Axis -19 degrees    T Axis 72 degrees    Diagnosis Line Normal sinus rhythm  Septal infarct , age undetermined            RADIOLOGY & ADDITIONAL STUDIES (The following images were personally reviewed):

## 2017-05-22 NOTE — PROGRESS NOTE ADULT - SUBJECTIVE AND OBJECTIVE BOX
HPI:  72 y/o male pmhx HTN, CAD, multiple CVA (last 3/2016) DM, known to NSX service with recent angio procedure revealing worsening carotid stenosis planned for elective Left STA-MCA bypass this week but has been experiencing worsening in his speech over the past few days. Per the patients wife, patient has expereinced a gradual decline since 2016 with symptoms associated with increased confusion and speech difficulty. Denies any headaches, nausea, vomiting or right side deficits. Denies any numbness tingling or decreased sensation.  Pt at baseline using hand crutch and one person assistance to ambulate. Denies any medications for pain or improvement of symptoms. Denies any recent trauma or falls. (21 May 2017 16:56)    patient seen and examined at the bedside  no acute events since admission  patient denies any new pain, headaches, weakness or worsening in speech  OR deferred today, plan for tomorrow    OVERNIGHT EVENTS:  Vital Signs Last 24 Hrs  T(C): 36.2, Max: 36.2 (05- @ 15:43)  T(F): 97.1, Max: 97.2 (05-21 @ 15:43)  HR: 758 (66 - 758)  BP: 181/81 (113/61 - 181/81)  BP(mean): --  RR: 16 (16 - 20)  SpO2: 95% (95% - 99%)    I&O's Summary  I & Os for 24h ending 22 May 2017 07:00  =============================================  IN: 850 ml / OUT: 1000 ml / NET: -150 ml    I & Os for current day (as of 22 May 2017 07:59)  =============================================  IN: 75 ml / OUT: 0 ml / NET: 75 ml      PHYSICAL EXAM:  Neurological:  awake, alert comfortable  EOMI, PERRL  aphasia stable  RUE contracted, spastic o/w at baseline motor unchanged    TUBES/LINES:  pivs      DIET:  [] NPO  [x] Mechanical  [] Tube feeds    LABS:                        12.8   11.1  )-----------( 302      ( 21 May 2017 16:35 )             37.7     05-21    137  |  98  |  23  ----------------------------<  93  3.6   |  22  |  1.30    Ca    9.4      21 May 2017 16:35    TPro  7.7  /  Alb  4.0  /  TBili  0.2  /  DBili  x   /  AST  16  /  ALT  16  /  AlkPhos  90  05-21    PT/INR - ( 21 May 2017 16:35 )   PT: 11.3 sec;   INR: 1.02          PTT - ( 21 May 2017 16:35 )  PTT:32.4 sec  Urinalysis Basic - ( 21 May 2017 17:12 )    Color: Yellow / Appearance: Clear / S.025 / pH: x  Gluc: x / Ketone: NEGATIVE  / Bili: NEGATIVE / Urobili: 0.2 E.U./dL   Blood: x / Protein: NEGATIVE mg/dL / Nitrite: NEGATIVE   Leuk Esterase: NEGATIVE / RBC: x / WBC x   Sq Epi: x / Non Sq Epi: x / Bacteria: x          CAPILLARY BLOOD GLUCOSE  124 (22 May 2017 06:40)  127 (21 May 2017 22:40)      Drug Levels: [] N/A    CSF Analysis: [] N/A      Allergies    No Known Allergies    Intolerances      MEDICATIONS:  Antibiotics:    Neuro:  aspirin 325milliGRAM(s) Oral daily  FLUoxetine 20milliGRAM(s) Oral daily  acetaminophen   Tablet. 650milliGRAM(s) Oral every 6 hours PRN    Anticoagulation:    OTHER:  losartan 50milliGRAM(s) Oral daily  atorvastatin Oral Tab/Cap - Peds 40milliGRAM(s) Oral daily  pantoprazole    Tablet 40milliGRAM(s) Oral before breakfast  insulin lispro (HumaLOG) corrective regimen sliding scale  SubCutaneous Before meals and at bedtime  dextrose Gel 1Dose(s) Oral once PRN  dextrose 50% Injectable 12.5Gram(s) IV Push once  glucagon  Injectable 1milliGRAM(s) IntraMuscular once PRN    IVF:  dextrose 5%. 1000milliLiter(s) IV Continuous <Continuous>    CULTURES:    RADIOLOGY & ADDITIONAL TESTS:      ASSESSMENT:  71y Male admitted yesterday in preparation for left crani and STA-MCA bypass procedure tentatively scheduled for tomorrow neuro stable    PLAN:  NEURO:  q2h neuro checks  prn tylenol  cont asa    CARDIOVASCULAR:  stable  f/u Dr. Araujo pre op clearance  echo and ekg reviewed in paper chart    PULMONARY:  comfortable on RA  enc IS use    RENAL:  HLIV    GI:  DM diet as tolerated   bowel regimen  PPI    HEME:  h/h stable    ID:  afeb  no abx    ENDO:  ISS     DVT PROPHYLAXIS:  [x] Venodynes                                [] Heparin/Lovenox    DISPOSITION:  SDU status  d/w Dr. Rajput  pt/ot pending

## 2017-05-23 LAB
ALBUMIN SERPL ELPH-MCNC: 3.8 G/DL — SIGNIFICANT CHANGE UP (ref 3.3–5)
ALP SERPL-CCNC: 46 U/L — SIGNIFICANT CHANGE UP (ref 40–120)
ALT FLD-CCNC: 6 U/L — LOW (ref 10–45)
ANION GAP SERPL CALC-SCNC: 16 MMOL/L — SIGNIFICANT CHANGE UP (ref 5–17)
APPEARANCE UR: CLEAR — SIGNIFICANT CHANGE UP
APTT BLD: 29.3 SEC — SIGNIFICANT CHANGE UP (ref 27.5–37.4)
AST SERPL-CCNC: 12 U/L — SIGNIFICANT CHANGE UP (ref 10–40)
BASE EXCESS BLDA CALC-SCNC: -1.8 MMOL/L — SIGNIFICANT CHANGE UP (ref -2–3)
BASE EXCESS BLDA CALC-SCNC: -4.9 MMOL/L — LOW (ref -2–3)
BASE EXCESS BLDA CALC-SCNC: -6.6 MMOL/L — LOW (ref -2–3)
BASE EXCESS BLDA CALC-SCNC: -6.7 MMOL/L — LOW (ref -2–3)
BASE EXCESS BLDA CALC-SCNC: -7.4 MMOL/L — LOW (ref -2–3)
BASE EXCESS BLDA CALC-SCNC: -7.9 MMOL/L — LOW (ref -2–3)
BASOPHILS NFR BLD AUTO: 0.4 % — SIGNIFICANT CHANGE UP (ref 0–2)
BILIRUB SERPL-MCNC: 0.3 MG/DL — SIGNIFICANT CHANGE UP (ref 0.2–1.2)
BILIRUB UR-MCNC: NEGATIVE — SIGNIFICANT CHANGE UP
BUN SERPL-MCNC: 12 MG/DL — SIGNIFICANT CHANGE UP (ref 7–23)
CA-I BLDA-SCNC: 0.88 MMOL/L — LOW (ref 1.1–1.3)
CA-I BLDA-SCNC: 0.89 MMOL/L — LOW (ref 1.1–1.3)
CA-I BLDA-SCNC: 0.95 MMOL/L — LOW (ref 1.1–1.3)
CA-I BLDA-SCNC: 0.99 MMOL/L — LOW (ref 1.1–1.3)
CA-I BLDA-SCNC: 1.05 MMOL/L — LOW (ref 1.1–1.3)
CALCIUM SERPL-MCNC: 8.3 MG/DL — LOW (ref 8.4–10.5)
CHLORIDE SERPL-SCNC: 106 MMOL/L — SIGNIFICANT CHANGE UP (ref 96–108)
CO2 SERPL-SCNC: 19 MMOL/L — LOW (ref 22–31)
COHGB MFR BLDA: 0.1 % — SIGNIFICANT CHANGE UP
COHGB MFR BLDA: 0.3 % — SIGNIFICANT CHANGE UP
COHGB MFR BLDA: 0.6 % — SIGNIFICANT CHANGE UP
COLOR SPEC: YELLOW — SIGNIFICANT CHANGE UP
CREAT SERPL-MCNC: 1.3 MG/DL — SIGNIFICANT CHANGE UP (ref 0.5–1.3)
DIFF PNL FLD: (no result)
EOSINOPHIL NFR BLD AUTO: 2.8 % — SIGNIFICANT CHANGE UP (ref 0–6)
GAS PNL BLDA: SIGNIFICANT CHANGE UP
GLUCOSE SERPL-MCNC: 160 MG/DL — HIGH (ref 70–99)
GLUCOSE UR QL: NEGATIVE — SIGNIFICANT CHANGE UP
HCO3 BLDA-SCNC: 17 MMOL/L — LOW (ref 21–28)
HCO3 BLDA-SCNC: 19 MMOL/L — LOW (ref 21–28)
HCO3 BLDA-SCNC: 20 MMOL/L — LOW (ref 21–28)
HCO3 BLDA-SCNC: 21 MMOL/L — SIGNIFICANT CHANGE UP (ref 21–28)
HCT VFR BLD CALC: 28 % — LOW (ref 39–50)
HGB BLD-MCNC: 9.5 G/DL — LOW (ref 13–17)
HGB BLDA-MCNC: 10.4 G/DL — LOW (ref 13–17)
HGB BLDA-MCNC: 10.7 G/DL — LOW (ref 13–17)
HGB BLDA-MCNC: 10.8 G/DL — LOW (ref 13–17)
HGB BLDA-MCNC: 12.5 G/DL — LOW (ref 13–17)
HGB BLDA-MCNC: 13.1 G/DL — SIGNIFICANT CHANGE UP (ref 13–17)
INR BLD: 1.28 — HIGH (ref 0.88–1.16)
KETONES UR-MCNC: NEGATIVE — SIGNIFICANT CHANGE UP
LACTATE SERPL-SCNC: 2 MMOL/L — SIGNIFICANT CHANGE UP (ref 0.5–2)
LEUKOCYTE ESTERASE UR-ACNC: NEGATIVE — SIGNIFICANT CHANGE UP
LYMPHOCYTES # BLD AUTO: 8.7 % — LOW (ref 13–44)
MAGNESIUM SERPL-MCNC: 1 MG/DL — CRITICAL LOW (ref 1.6–2.6)
MCHC RBC-ENTMCNC: 28.8 PG — SIGNIFICANT CHANGE UP (ref 27–34)
MCHC RBC-ENTMCNC: 33.9 G/DL — SIGNIFICANT CHANGE UP (ref 32–36)
MCV RBC AUTO: 84.8 FL — SIGNIFICANT CHANGE UP (ref 80–100)
METHGB MFR BLDA: 0.1 % — SIGNIFICANT CHANGE UP
METHGB MFR BLDA: 0.2 % — SIGNIFICANT CHANGE UP
METHGB MFR BLDA: 0.5 % — SIGNIFICANT CHANGE UP
MONOCYTES NFR BLD AUTO: 10.1 % — SIGNIFICANT CHANGE UP (ref 2–14)
NEUTROPHILS NFR BLD AUTO: 78 % — HIGH (ref 43–77)
NITRITE UR-MCNC: NEGATIVE — SIGNIFICANT CHANGE UP
O2 CT VFR BLDA CALC: 15.4 ML/DL — SIGNIFICANT CHANGE UP (ref 15–23)
O2 CT VFR BLDA CALC: 18.3 ML/DL — SIGNIFICANT CHANGE UP (ref 15–23)
O2 CT VFR BLDA CALC: 19.2 ML/DL — SIGNIFICANT CHANGE UP (ref 15–23)
O2 CT VFR BLDA CALC: SIGNIFICANT CHANGE UP (ref 15–23)
O2 CT VFR BLDA CALC: SIGNIFICANT CHANGE UP (ref 15–23)
OXYHGB MFR BLDA: 99 % — SIGNIFICANT CHANGE UP (ref 94–100)
PCO2 BLDA: 29 MMHG — LOW (ref 35–48)
PCO2 BLDA: 31 MMHG — LOW (ref 35–48)
PCO2 BLDA: 36 MMHG — SIGNIFICANT CHANGE UP (ref 35–48)
PCO2 BLDA: 36 MMHG — SIGNIFICANT CHANGE UP (ref 35–48)
PCO2 BLDA: 38 MMHG — SIGNIFICANT CHANGE UP (ref 35–48)
PCO2 BLDA: 44 MMHG — SIGNIFICANT CHANGE UP (ref 35–48)
PH BLDA: 7.26 — LOW (ref 7.35–7.45)
PH BLDA: 7.33 — LOW (ref 7.35–7.45)
PH BLDA: 7.33 — LOW (ref 7.35–7.45)
PH BLDA: 7.34 — LOW (ref 7.35–7.45)
PH BLDA: 7.35 — SIGNIFICANT CHANGE UP (ref 7.35–7.45)
PH BLDA: 7.47 — HIGH (ref 7.35–7.45)
PH UR: 6.5 — SIGNIFICANT CHANGE UP (ref 5–8)
PHOSPHATE SERPL-MCNC: 3.9 MG/DL — SIGNIFICANT CHANGE UP (ref 2.5–4.5)
PLATELET # BLD AUTO: 149 K/UL — LOW (ref 150–400)
PO2 BLDA: 113 MMHG — HIGH (ref 83–108)
PO2 BLDA: 328 MMHG — HIGH (ref 83–108)
PO2 BLDA: 348 MMHG — HIGH (ref 83–108)
PO2 BLDA: 351 MMHG — HIGH (ref 83–108)
PO2 BLDA: 366 MMHG — HIGH (ref 83–108)
PO2 BLDA: 396 MMHG — HIGH (ref 83–108)
POTASSIUM BLDA-SCNC: 3.4 MMOL/L — LOW (ref 3.5–4.9)
POTASSIUM BLDA-SCNC: 3.5 MMOL/L — SIGNIFICANT CHANGE UP (ref 3.5–4.9)
POTASSIUM BLDA-SCNC: 3.6 MMOL/L — SIGNIFICANT CHANGE UP (ref 3.5–4.9)
POTASSIUM BLDA-SCNC: 3.6 MMOL/L — SIGNIFICANT CHANGE UP (ref 3.5–4.9)
POTASSIUM BLDA-SCNC: 3.7 MMOL/L — SIGNIFICANT CHANGE UP (ref 3.5–4.9)
POTASSIUM SERPL-MCNC: 4 MMOL/L — SIGNIFICANT CHANGE UP (ref 3.5–5.3)
POTASSIUM SERPL-SCNC: 4 MMOL/L — SIGNIFICANT CHANGE UP (ref 3.5–5.3)
PROT SERPL-MCNC: 5.4 G/DL — LOW (ref 6–8.3)
PROT UR-MCNC: NEGATIVE MG/DL — SIGNIFICANT CHANGE UP
PROTHROM AB SERPL-ACNC: 14.3 SEC — HIGH (ref 9.8–12.7)
RBC # BLD: 3.3 M/UL — LOW (ref 4.2–5.8)
RBC # FLD: 14 % — SIGNIFICANT CHANGE UP (ref 10.3–16.9)
RH IG SCN BLD-IMP: POSITIVE — SIGNIFICANT CHANGE UP
SAO2 % BLDA: 100 % — SIGNIFICANT CHANGE UP (ref 95–100)
SAO2 % BLDA: 100 % — SIGNIFICANT CHANGE UP (ref 95–100)
SAO2 % BLDA: 98 % — SIGNIFICANT CHANGE UP (ref 95–100)
SAO2 % BLDA: 99 % — SIGNIFICANT CHANGE UP (ref 95–100)
SODIUM BLDA-SCNC: 140 MMOL/L — SIGNIFICANT CHANGE UP (ref 138–146)
SODIUM BLDA-SCNC: 141 MMOL/L — SIGNIFICANT CHANGE UP (ref 138–146)
SODIUM SERPL-SCNC: 141 MMOL/L — SIGNIFICANT CHANGE UP (ref 135–145)
SP GR SPEC: <=1.005 — SIGNIFICANT CHANGE UP (ref 1–1.03)
UROBILINOGEN FLD QL: 0.2 E.U./DL — SIGNIFICANT CHANGE UP
WBC # BLD: 10.5 K/UL — SIGNIFICANT CHANGE UP (ref 3.8–10.5)
WBC # FLD AUTO: 10.5 K/UL — SIGNIFICANT CHANGE UP (ref 3.8–10.5)

## 2017-05-23 PROCEDURE — 61711 FUSION OF SKULL ARTERIES: CPT | Mod: 22

## 2017-05-23 PROCEDURE — 64999 UNLISTED PX NERVOUS SYSTEM: CPT

## 2017-05-23 PROCEDURE — 93888 INTRACRANIAL LIMITED STUDY: CPT | Mod: 26

## 2017-05-23 PROCEDURE — 71010: CPT | Mod: 26

## 2017-05-23 PROCEDURE — 69990 MICROSURGERY ADD-ON: CPT

## 2017-05-23 RX ORDER — ACETAMINOPHEN 500 MG
650 TABLET ORAL EVERY 6 HOURS
Qty: 0 | Refills: 0 | Status: DISCONTINUED | OUTPATIENT
Start: 2017-05-23 | End: 2017-05-23

## 2017-05-23 RX ORDER — CEFAZOLIN SODIUM 1 G
1000 VIAL (EA) INJECTION EVERY 8 HOURS
Qty: 0 | Refills: 0 | Status: COMPLETED | OUTPATIENT
Start: 2017-05-23 | End: 2017-05-24

## 2017-05-23 RX ORDER — ONDANSETRON 8 MG/1
4 TABLET, FILM COATED ORAL EVERY 6 HOURS
Qty: 0 | Refills: 0 | Status: DISCONTINUED | OUTPATIENT
Start: 2017-05-23 | End: 2017-06-12

## 2017-05-23 RX ORDER — ASPIRIN/CALCIUM CARB/MAGNESIUM 324 MG
300 TABLET ORAL ONCE
Qty: 0 | Refills: 0 | Status: COMPLETED | OUTPATIENT
Start: 2017-05-23 | End: 2017-05-23

## 2017-05-23 RX ORDER — GLUCAGON INJECTION, SOLUTION 0.5 MG/.1ML
1 INJECTION, SOLUTION SUBCUTANEOUS ONCE
Qty: 0 | Refills: 0 | Status: DISCONTINUED | OUTPATIENT
Start: 2017-05-23 | End: 2017-06-12

## 2017-05-23 RX ORDER — MAGNESIUM SULFATE 500 MG/ML
2 VIAL (ML) INJECTION
Qty: 0 | Refills: 0 | Status: COMPLETED | OUTPATIENT
Start: 2017-05-23 | End: 2017-05-24

## 2017-05-23 RX ORDER — MAGNESIUM SULFATE 500 MG/ML
2 VIAL (ML) INJECTION
Qty: 0 | Refills: 0 | Status: DISCONTINUED | OUTPATIENT
Start: 2017-05-23 | End: 2017-05-24

## 2017-05-23 RX ORDER — DEXTROSE 50 % IN WATER 50 %
1 SYRINGE (ML) INTRAVENOUS ONCE
Qty: 0 | Refills: 0 | Status: DISCONTINUED | OUTPATIENT
Start: 2017-05-23 | End: 2017-06-12

## 2017-05-23 RX ORDER — LEVETIRACETAM 250 MG/1
500 TABLET, FILM COATED ORAL EVERY 12 HOURS
Qty: 0 | Refills: 0 | Status: DISCONTINUED | OUTPATIENT
Start: 2017-05-23 | End: 2017-05-23

## 2017-05-23 RX ORDER — SODIUM CHLORIDE 9 MG/ML
1000 INJECTION INTRAMUSCULAR; INTRAVENOUS; SUBCUTANEOUS
Qty: 0 | Refills: 0 | Status: DISCONTINUED | OUTPATIENT
Start: 2017-05-23 | End: 2017-05-24

## 2017-05-23 RX ORDER — ATORVASTATIN CALCIUM 80 MG/1
40 TABLET, FILM COATED ORAL AT BEDTIME
Qty: 0 | Refills: 0 | Status: DISCONTINUED | OUTPATIENT
Start: 2017-05-23 | End: 2017-06-12

## 2017-05-23 RX ORDER — SODIUM CHLORIDE 9 MG/ML
1000 INJECTION INTRAMUSCULAR; INTRAVENOUS; SUBCUTANEOUS
Qty: 0 | Refills: 0 | Status: DISCONTINUED | OUTPATIENT
Start: 2017-05-23 | End: 2017-05-23

## 2017-05-23 RX ORDER — SODIUM CHLORIDE 9 MG/ML
1000 INJECTION, SOLUTION INTRAVENOUS
Qty: 0 | Refills: 0 | Status: DISCONTINUED | OUTPATIENT
Start: 2017-05-23 | End: 2017-05-24

## 2017-05-23 RX ORDER — LEVETIRACETAM 250 MG/1
500 TABLET, FILM COATED ORAL EVERY 12 HOURS
Qty: 0 | Refills: 0 | Status: DISCONTINUED | OUTPATIENT
Start: 2017-05-23 | End: 2017-05-26

## 2017-05-23 RX ORDER — ALBUMIN HUMAN 25 %
250 VIAL (ML) INTRAVENOUS
Qty: 0 | Refills: 0 | Status: DISCONTINUED | OUTPATIENT
Start: 2017-05-23 | End: 2017-05-23

## 2017-05-23 RX ORDER — LOSARTAN POTASSIUM 100 MG/1
50 TABLET, FILM COATED ORAL DAILY
Qty: 0 | Refills: 0 | Status: DISCONTINUED | OUTPATIENT
Start: 2017-05-23 | End: 2017-05-23

## 2017-05-23 RX ORDER — DEXTROSE 50 % IN WATER 50 %
12.5 SYRINGE (ML) INTRAVENOUS ONCE
Qty: 0 | Refills: 0 | Status: DISCONTINUED | OUTPATIENT
Start: 2017-05-23 | End: 2017-06-12

## 2017-05-23 RX ORDER — PANTOPRAZOLE SODIUM 20 MG/1
40 TABLET, DELAYED RELEASE ORAL
Qty: 0 | Refills: 0 | Status: DISCONTINUED | OUTPATIENT
Start: 2017-05-23 | End: 2017-05-23

## 2017-05-23 RX ORDER — FLUOXETINE HCL 10 MG
20 CAPSULE ORAL DAILY
Qty: 0 | Refills: 0 | Status: DISCONTINUED | OUTPATIENT
Start: 2017-05-23 | End: 2017-05-31

## 2017-05-23 RX ORDER — CHLORHEXIDINE GLUCONATE 213 G/1000ML
15 SOLUTION TOPICAL
Qty: 0 | Refills: 0 | Status: DISCONTINUED | OUTPATIENT
Start: 2017-05-23 | End: 2017-05-25

## 2017-05-23 RX ORDER — INSULIN LISPRO 100/ML
VIAL (ML) SUBCUTANEOUS
Qty: 0 | Refills: 0 | Status: DISCONTINUED | OUTPATIENT
Start: 2017-05-23 | End: 2017-05-30

## 2017-05-23 RX ORDER — ALBUMIN HUMAN 25 %
50 VIAL (ML) INTRAVENOUS
Qty: 0 | Refills: 0 | Status: DISCONTINUED | OUTPATIENT
Start: 2017-05-23 | End: 2017-05-23

## 2017-05-23 RX ORDER — PANTOPRAZOLE SODIUM 20 MG/1
40 TABLET, DELAYED RELEASE ORAL DAILY
Qty: 0 | Refills: 0 | Status: DISCONTINUED | OUTPATIENT
Start: 2017-05-23 | End: 2017-05-30

## 2017-05-23 RX ORDER — ASPIRIN/CALCIUM CARB/MAGNESIUM 324 MG
325 TABLET ORAL DAILY
Qty: 0 | Refills: 0 | Status: DISCONTINUED | OUTPATIENT
Start: 2017-05-24 | End: 2017-05-25

## 2017-05-23 RX ADMIN — Medication 2: at 23:23

## 2017-05-23 RX ADMIN — LEVETIRACETAM 400 MILLIGRAM(S): 250 TABLET, FILM COATED ORAL at 22:15

## 2017-05-23 RX ADMIN — Medication 50 GRAM(S): at 22:48

## 2017-05-23 RX ADMIN — Medication 300 MILLIGRAM(S): at 22:03

## 2017-05-23 NOTE — PROGRESS NOTE ADULT - SUBJECTIVE AND OBJECTIVE BOX
HPI:  72 y/o male pmhx HTN, CAD, multiple CVA (last 3/2016) DM, known to NSX service with recent angio procedure revealing worsening carotid stenosis planned for elective Left STA-MCA bypass this week but has been experiencing worsening in his speech over the past few days. Per the patients wife, patient has expereinced a gradual decline since March 2016 with symptoms associated with increased confusion and speech difficulty. Denies any headaches, nausea, vomiting or right side deficits. Denies any numbness tingling or decreased sensation.  Pt at baseline using hand crutch and one person assistance to ambulate. Denies any medications for pain or improvement of symptoms. Denies any recent trauma or falls. (21 May 2017 16:56)      PAST MEDICAL & SURGICAL HISTORY:  Cerebrovascular accident (CVA) due to stenosis of left middle cerebral artery  Coronary artery disease without angina pectoris, unspecified vessel or lesion type, unspecified whether native or transplanted heart  Depression, unspecified depression type  Essential hypertension  Controlled type 2 diabetes mellitus without complication, unspecified long term insulin use status  History of penile implant  History of lumbar surgery  H/O umbilical hernia repair  History of appendectomy  Penile implant    Home meds:   Meds: , atorvastatin 40, fluoxetine 20, humalog, losartan 50 daily, metformin 850 daily, nifedipine XL 60 daily, pantoprazole 40 daily    ROS: See HPI    MEDICATIONS  (STANDING):  FLUoxetine 20milliGRAM(s) Oral daily  insulin lispro (HumaLOG) corrective regimen sliding scale  SubCutaneous Before meals and at bedtime  dextrose 5%. 1000milliLiter(s) IV Continuous <Continuous>  dextrose 50% Injectable 12.5Gram(s) IV Push once  atorvastatin 40milliGRAM(s) Oral at bedtime  ceFAZolin   IVPB 1000milliGRAM(s) IV Intermittent every 8 hours  aspirin 325milliGRAM(s) Oral daily  aspirin Suppository 300milliGRAM(s) Rectal once  sodium chloride 0.9%. 1000milliLiter(s) IV Continuous <Continuous>  pantoprazole  Injectable 40milliGRAM(s) IV Push daily  levETIRAcetam  IVPB 500milliGRAM(s) IV Intermittent every 12 hours  chlorhexidine 0.12% Liquid 15milliLiter(s) Swish and Spit two times a day    MEDICATIONS  (PRN):  dextrose Gel 1Dose(s) Oral once PRN Blood Glucose LESS THAN 70 milliGRAM(s)/deciliter  glucagon  Injectable 1milliGRAM(s) IntraMuscular once PRN Glucose LESS THAN 70 milligrams/deciliter  ondansetron Injectable 4milliGRAM(s) IV Push every 6 hours PRN Nausea and/or Vomiting      Allergies    No Known Allergies    Intolerances        SOCIAL HISTORY:  Smoke: Never Smoker  EtOH: occasional    FAMILY HISTORY:  No pertinent family history in first degree relatives      Vital Signs Last 24 Hrs  T(C): 36.3, Max: 36.3 (05-23 @ 03:21)  T(F): 97.4, Max: 97.4 (05-23 @ 03:21)  HR: 75 (72 - 75)  BP: 117/59 (114/51 - 126/58)  BP(mean): 85 (84 - 85)  RR: 16 (16 - 16)  SpO2: 97% (91% - 97%)    PHYSICAL EXAM    Gen: NAD   Neuro:   CV: RRR Reg s1 s2   Pulm: CTAB  Abd: Soft, NT/ND  Ext: No c/c/e     LABS:                RADIOLOGY & ADDITIONAL STUDIES:    Assessment and Plan:  70 yo M h/o HTN, T2DM, HLD, CAD, CVA admitted for elective elective Left STA-MCA  for stenosis with gradually worsening speech over the last several months    Neuro:Off sedation until baseline neuro checks then will resume light sedation s/p sta -mca bypass cont asa 325QD ( 300 stat) Prozac ordered for tomorrow after extubation, Keppra HOB @30, Zofran   CV:BP goal 120-180 lipitor 40 qhs ( home bp meds held) NS @100   Pulm:CMV: 40/540/12/5 chlorhexidene  GI:NPO NGT Protonix  : coude joe placed by urology post op  ID:Ancef( 5/23-)   Endo:ISS  PPx:SCD No SQH 2* bleed risk   Lines: KENNA Kinney( 5/23-) HPI:  70 y/o male pmhx HTN, CAD, multiple CVA (last 3/2016) DM, known to NSX service with recent angio procedure revealing worsening carotid stenosis planned for elective Left STA-MCA bypass this week but has been experiencing worsening in his speech over the past few days. Per the patients wife, patient has expereinced a gradual decline since March 2016 with symptoms associated with increased confusion and speech difficulty. Denies any headaches, nausea, vomiting or right side deficits. Denies any numbness tingling or decreased sensation.  Pt at baseline using hand crutch and one person assistance to ambulate. Denies any medications for pain or improvement of symptoms. Denies any recent trauma or falls. (21 May 2017 16:56)      PAST MEDICAL & SURGICAL HISTORY:  Cerebrovascular accident (CVA) due to stenosis of left middle cerebral artery  Coronary artery disease without angina pectoris, unspecified vessel or lesion type, unspecified whether native or transplanted heart  Depression, unspecified depression type  Essential hypertension  Controlled type 2 diabetes mellitus without complication, unspecified long term insulin use status  History of penile implant  History of lumbar surgery  H/O umbilical hernia repair  History of appendectomy  Penile implant    Home meds:   Meds: , atorvastatin 40, fluoxetine 20, humalog, losartan 50 daily, metformin 850 daily, nifedipine XL 60 daily, pantoprazole 40 daily    ROS: See HPI    MEDICATIONS  (STANDING):  FLUoxetine 20milliGRAM(s) Oral daily  insulin lispro (HumaLOG) corrective regimen sliding scale  SubCutaneous Before meals and at bedtime  dextrose 5%. 1000milliLiter(s) IV Continuous <Continuous>  dextrose 50% Injectable 12.5Gram(s) IV Push once  atorvastatin 40milliGRAM(s) Oral at bedtime  ceFAZolin   IVPB 1000milliGRAM(s) IV Intermittent every 8 hours  aspirin 325milliGRAM(s) Oral daily  aspirin Suppository 300milliGRAM(s) Rectal once  sodium chloride 0.9%. 1000milliLiter(s) IV Continuous <Continuous>  pantoprazole  Injectable 40milliGRAM(s) IV Push daily  levETIRAcetam  IVPB 500milliGRAM(s) IV Intermittent every 12 hours  chlorhexidine 0.12% Liquid 15milliLiter(s) Swish and Spit two times a day    MEDICATIONS  (PRN):  dextrose Gel 1Dose(s) Oral once PRN Blood Glucose LESS THAN 70 milliGRAM(s)/deciliter  glucagon  Injectable 1milliGRAM(s) IntraMuscular once PRN Glucose LESS THAN 70 milligrams/deciliter  ondansetron Injectable 4milliGRAM(s) IV Push every 6 hours PRN Nausea and/or Vomiting      Allergies    No Known Allergies    Intolerances        SOCIAL HISTORY:  Smoke: Never Smoker  EtOH: occasional    FAMILY HISTORY:  No pertinent family history in first degree relatives      Vital Signs Last 24 Hrs  T(C): 36.3, Max: 36.3 (05-23 @ 03:21)  T(F): 97.4, Max: 97.4 (05-23 @ 03:21)  HR: 75 (72 - 75)  BP: 117/59 (114/51 - 126/58)  BP(mean): 85 (84 - 85)  RR: 16 (16 - 16)  SpO2: 97% (91% - 97%)    PHYSICAL EXAM    Gen: NAD   Neuro: + PERRLA,+ Gag, Not responding to sternal rub, moving RUE to pain and lifting off bed with purpose moving BL LE to stimuli and at will LUExt not reacting to painful stimuli. subgaleal drain ss.   CV: RRR Reg s1 s2   Pulm: CTAB  Abd: Soft, NT/ND  Ext: No c/c/e     LABS:                RADIOLOGY & ADDITIONAL STUDIES:    Assessment and Plan:  70 yo M h/o HTN, T2DM, HLD, CAD, CVA admitted for elective elective Left STA-MCA  for stenosis with gradually worsening speech over the last several months    Neuro:Off sedation until baseline neuro checks then will resume light sedation s/p sta -mca bypass cont asa 325QD ( 300 stat) Prozac ordered for tomorrow after extubation, Keppra HOB @30, Zofran   CV:BP goal 120-180 lipitor 40 qhs ( home bp meds held) NS @100   Pulm:CMV: 40/540/12/5 chlorhexidene  GI:NPO NGT Protonix  : coude joe placed by urology post op  ID:Ancef( 5/23-)   Endo:ISS  PPx:SCD No SQH 2* bleed risk   Lines: KENNA Kinney( 5/23-)

## 2017-05-24 DIAGNOSIS — J96.90 RESPIRATORY FAILURE, UNSPECIFIED, UNSPECIFIED WHETHER WITH HYPOXIA OR HYPERCAPNIA: ICD-10-CM

## 2017-05-24 LAB
ANION GAP SERPL CALC-SCNC: 17 MMOL/L — SIGNIFICANT CHANGE UP (ref 5–17)
BUN SERPL-MCNC: 11 MG/DL — SIGNIFICANT CHANGE UP (ref 7–23)
CALCIUM SERPL-MCNC: 8.6 MG/DL — SIGNIFICANT CHANGE UP (ref 8.4–10.5)
CHLORIDE SERPL-SCNC: 103 MMOL/L — SIGNIFICANT CHANGE UP (ref 96–108)
CHOLEST SERPL-MCNC: 65 MG/DL — SIGNIFICANT CHANGE UP (ref 10–199)
CO2 SERPL-SCNC: 20 MMOL/L — LOW (ref 22–31)
CREAT SERPL-MCNC: 1.3 MG/DL — SIGNIFICANT CHANGE UP (ref 0.5–1.3)
GLUCOSE SERPL-MCNC: 226 MG/DL — HIGH (ref 70–99)
HBA1C BLD-MCNC: 6.2 % — HIGH (ref 4–5.6)
HCT VFR BLD CALC: 29.8 % — LOW (ref 39–50)
HDLC SERPL-MCNC: 27 MG/DL — LOW (ref 40–125)
HGB BLD-MCNC: 10.2 G/DL — LOW (ref 13–17)
LIPID PNL WITH DIRECT LDL SERPL: 24 MG/DL — SIGNIFICANT CHANGE UP
MAGNESIUM SERPL-MCNC: 1.8 MG/DL — SIGNIFICANT CHANGE UP (ref 1.6–2.6)
MCHC RBC-ENTMCNC: 28.7 PG — SIGNIFICANT CHANGE UP (ref 27–34)
MCHC RBC-ENTMCNC: 34.2 G/DL — SIGNIFICANT CHANGE UP (ref 32–36)
MCV RBC AUTO: 83.9 FL — SIGNIFICANT CHANGE UP (ref 80–100)
PHOSPHATE SERPL-MCNC: 4 MG/DL — SIGNIFICANT CHANGE UP (ref 2.5–4.5)
PLATELET # BLD AUTO: 156 K/UL — SIGNIFICANT CHANGE UP (ref 150–400)
POTASSIUM SERPL-MCNC: 3.4 MMOL/L — LOW (ref 3.5–5.3)
POTASSIUM SERPL-SCNC: 3.4 MMOL/L — LOW (ref 3.5–5.3)
RBC # BLD: 3.55 M/UL — LOW (ref 4.2–5.8)
RBC # FLD: 13.8 % — SIGNIFICANT CHANGE UP (ref 10.3–16.9)
SODIUM SERPL-SCNC: 140 MMOL/L — SIGNIFICANT CHANGE UP (ref 135–145)
TOTAL CHOLESTEROL/HDL RATIO MEASUREMENT: 2.4 RATIO — LOW (ref 3.4–9.6)
TRIGL SERPL-MCNC: 72 MG/DL — SIGNIFICANT CHANGE UP (ref 10–149)
WBC # BLD: 14.5 K/UL — HIGH (ref 3.8–10.5)
WBC # FLD AUTO: 14.5 K/UL — HIGH (ref 3.8–10.5)

## 2017-05-24 PROCEDURE — 70450 CT HEAD/BRAIN W/O DYE: CPT | Mod: 26

## 2017-05-24 PROCEDURE — 99291 CRITICAL CARE FIRST HOUR: CPT | Mod: 24

## 2017-05-24 PROCEDURE — 93970 EXTREMITY STUDY: CPT | Mod: 26

## 2017-05-24 PROCEDURE — 99233 SBSQ HOSP IP/OBS HIGH 50: CPT | Mod: GC

## 2017-05-24 PROCEDURE — 71010: CPT | Mod: 26

## 2017-05-24 RX ORDER — ACETAMINOPHEN 500 MG
1000 TABLET ORAL ONCE
Qty: 0 | Refills: 0 | Status: COMPLETED | OUTPATIENT
Start: 2017-05-24 | End: 2017-05-24

## 2017-05-24 RX ORDER — POTASSIUM CHLORIDE 20 MEQ
10 PACKET (EA) ORAL ONCE
Qty: 0 | Refills: 0 | Status: COMPLETED | OUTPATIENT
Start: 2017-05-24 | End: 2017-05-24

## 2017-05-24 RX ORDER — MAGNESIUM SULFATE 500 MG/ML
2 VIAL (ML) INJECTION ONCE
Qty: 0 | Refills: 0 | Status: COMPLETED | OUTPATIENT
Start: 2017-05-24 | End: 2017-05-24

## 2017-05-24 RX ORDER — SODIUM CHLORIDE 9 MG/ML
1000 INJECTION, SOLUTION INTRAVENOUS
Qty: 0 | Refills: 0 | Status: DISCONTINUED | OUTPATIENT
Start: 2017-05-24 | End: 2017-05-25

## 2017-05-24 RX ORDER — LABETALOL HCL 100 MG
10 TABLET ORAL
Qty: 0 | Refills: 0 | Status: DISCONTINUED | OUTPATIENT
Start: 2017-05-24 | End: 2017-06-04

## 2017-05-24 RX ORDER — DEXMEDETOMIDINE HYDROCHLORIDE IN 0.9% SODIUM CHLORIDE 4 UG/ML
0.01 INJECTION INTRAVENOUS
Qty: 200 | Refills: 0 | Status: DISCONTINUED | OUTPATIENT
Start: 2017-05-24 | End: 2017-05-24

## 2017-05-24 RX ORDER — INSULIN GLARGINE 100 [IU]/ML
10 INJECTION, SOLUTION SUBCUTANEOUS AT BEDTIME
Qty: 0 | Refills: 0 | Status: DISCONTINUED | OUTPATIENT
Start: 2017-05-24 | End: 2017-05-26

## 2017-05-24 RX ORDER — SODIUM CHLORIDE 9 MG/ML
1000 INJECTION, SOLUTION INTRAVENOUS
Qty: 0 | Refills: 0 | Status: DISCONTINUED | OUTPATIENT
Start: 2017-05-24 | End: 2017-05-24

## 2017-05-24 RX ADMIN — Medication 100 MILLIGRAM(S): at 00:15

## 2017-05-24 RX ADMIN — SODIUM CHLORIDE 50 MILLILITER(S): 9 INJECTION, SOLUTION INTRAVENOUS at 16:16

## 2017-05-24 RX ADMIN — PANTOPRAZOLE SODIUM 40 MILLIGRAM(S): 20 TABLET, DELAYED RELEASE ORAL at 10:17

## 2017-05-24 RX ADMIN — Medication 4: at 05:38

## 2017-05-24 RX ADMIN — Medication 400 MILLIGRAM(S): at 14:40

## 2017-05-24 RX ADMIN — Medication 50 GRAM(S): at 05:46

## 2017-05-24 RX ADMIN — Medication 400 MILLIGRAM(S): at 23:09

## 2017-05-24 RX ADMIN — SODIUM CHLORIDE 50 MILLILITER(S): 9 INJECTION, SOLUTION INTRAVENOUS at 22:02

## 2017-05-24 RX ADMIN — CHLORHEXIDINE GLUCONATE 15 MILLILITER(S): 213 SOLUTION TOPICAL at 05:38

## 2017-05-24 RX ADMIN — INSULIN GLARGINE 10 UNIT(S): 100 INJECTION, SOLUTION SUBCUTANEOUS at 22:02

## 2017-05-24 RX ADMIN — CHLORHEXIDINE GLUCONATE 15 MILLILITER(S): 213 SOLUTION TOPICAL at 17:03

## 2017-05-24 RX ADMIN — DEXMEDETOMIDINE HYDROCHLORIDE IN 0.9% SODIUM CHLORIDE 0.24 MICROGRAM(S)/KG/HR: 4 INJECTION INTRAVENOUS at 10:20

## 2017-05-24 RX ADMIN — LEVETIRACETAM 400 MILLIGRAM(S): 250 TABLET, FILM COATED ORAL at 22:03

## 2017-05-24 RX ADMIN — Medication 1000 MILLIGRAM(S): at 15:30

## 2017-05-24 RX ADMIN — Medication 100 MILLIEQUIVALENT(S): at 07:09

## 2017-05-24 RX ADMIN — Medication 50 GRAM(S): at 00:15

## 2017-05-24 RX ADMIN — Medication 2: at 16:14

## 2017-05-24 RX ADMIN — ONDANSETRON 4 MILLIGRAM(S): 8 TABLET, FILM COATED ORAL at 06:20

## 2017-05-24 RX ADMIN — Medication 1000 MILLIGRAM(S): at 23:25

## 2017-05-24 RX ADMIN — ONDANSETRON 4 MILLIGRAM(S): 8 TABLET, FILM COATED ORAL at 22:09

## 2017-05-24 RX ADMIN — Medication 10 MILLIGRAM(S): at 22:45

## 2017-05-24 RX ADMIN — SODIUM CHLORIDE 100 MILLILITER(S): 9 INJECTION, SOLUTION INTRAVENOUS at 08:54

## 2017-05-24 RX ADMIN — Medication: at 22:03

## 2017-05-24 RX ADMIN — Medication 100 MILLIGRAM(S): at 09:00

## 2017-05-24 RX ADMIN — Medication 4: at 11:33

## 2017-05-24 RX ADMIN — LEVETIRACETAM 400 MILLIGRAM(S): 250 TABLET, FILM COATED ORAL at 10:00

## 2017-05-24 NOTE — PROGRESS NOTE ADULT - SUBJECTIVE AND OBJECTIVE BOX
Neurosurgery Post-Op Check    HPI:70 y/o male pmhx HTN, CAD, multiple CVA (last 3/2016) DM, known to NSX service with recent angio procedure revealing worsening carotid stenosis planned for elective Left STA-MCA bypass this week but has been experiencing worsening in his speech over the past few days. Per the patients wife, patient has expereinced a gradual decline since March 2016 with symptoms associated with increased confusion and speech difficulty. Denies any headaches, nausea, vomiting or right side deficits. Denies any numbness tingling or decreased sensation.  Pt at baseline using hand crutch and one person assistance to ambulate. Denies any medications for pain or improvement of symptoms. Denies any recent trauma or falls.     Sub: Patient remains comatose off sedation.     T(C): 38.1, Max: 38.1 (05-24 @ 00:38)  HR: 82 (73 - 92)  BP: 170/71 (114/51 - 184/85)  RR: 17 (14 - 17)  SpO2: 96% (91% - 99%)  Wt(kg): --    Exam: NAD, Comatose. Intubated on ventilator.  PERRL. EOMI. Left scalp dressings C/D/I w/ minimal spotting. +SAMUEL.  Lungs clear b/l  Heart S1, S2.  Abd soft, non-distended. Hypoactive BS.  Pulses 2+ throughout  Cns limited. Right UE spasticity. Attempting to localize w/ LUE. Not following commands. No eye opening. Some withdrawal in b/l LEs.    CBC Full  -  ( 23 May 2017 21:50 )  WBC Count : 10.5 K/uL  Hemoglobin : 9.5 g/dL  Hematocrit : 28.0 %  Platelet Count - Automated : 149 K/uL  Mean Cell Volume : 84.8 fL  Mean Cell Hemoglobin : 28.8 pg  Mean Cell Hemoglobin Concentration : 33.9 g/dL      05-23    141  |  106  |  12  ----------------------------<  160<H>  4.0   |  19<L>  |  1.30    Ca    8.3<L>      23 May 2017 21:50  Phos  3.9     05-23  Mg     1.0     05-23    TPro  5.4<L>  /  Alb  3.8  /  TBili  0.3  /  DBili  x   /  AST  12  /  ALT  6<L>  /  AlkPhos  46  05-23    PT/INR - ( 23 May 2017 21:49 )   PT: 14.3 sec;   INR: 1.28          PTT - ( 23 May 2017 21:49 )  PTT:29.3 sec    MEDICATIONS  (STANDING):  FLUoxetine 20milliGRAM(s) Oral daily  insulin lispro (HumaLOG) corrective regimen sliding scale  SubCutaneous Before meals and at bedtime  dextrose 5%. 1000milliLiter(s) IV Continuous <Continuous>  dextrose 50% Injectable 12.5Gram(s) IV Push once  atorvastatin 40milliGRAM(s) Oral at bedtime  ceFAZolin   IVPB 1000milliGRAM(s) IV Intermittent every 8 hours  aspirin 325milliGRAM(s) Oral daily  sodium chloride 0.9%. 1000milliLiter(s) IV Continuous <Continuous>  pantoprazole  Injectable 40milliGRAM(s) IV Push daily  levETIRAcetam  IVPB 500milliGRAM(s) IV Intermittent every 12 hours  chlorhexidine 0.12% Liquid 15milliLiter(s) Swish and Spit two times a day    MEDICATIONS  (PRN):  dextrose Gel 1Dose(s) Oral once PRN Blood Glucose LESS THAN 70 milliGRAM(s)/deciliter  glucagon  Injectable 1milliGRAM(s) IntraMuscular once PRN Glucose LESS THAN 70 milligrams/deciliter  ondansetron Injectable 4milliGRAM(s) IV Push every 6 hours PRN Nausea and/or Vomiting        Assessment/Plan: 71 year old male w/ multiple medical problems now s/p Left STA to MCA bypass POD #0    Neuro: Continue Keppra, monitor close neuro checks,  Tylenol for pain PRN,  CT Head in AM    CV: -180s,  Labs reviewed, Magnesium replaced, AM labs    Pulm: Continue ventilator support, AM CXR    GI: NPO/IVF, PPI, gi prophylaxis    : joe placed in SICU, >1100c output.    ID: afebrile; ancef x2 doses    Endo: ISS    PPX: SCDs    Dispo: Continue close neuro monitoring,  CT Head in AM or sooner if indicated  D/w Dr. Fulton, Dr. Rajput

## 2017-05-24 NOTE — PROGRESS NOTE ADULT - SUBJECTIVE AND OBJECTIVE BOX
=================================  NEUROCRITICAL CARE ATTENDING NOTE  =================================    RISHI HERRERA   MRN-[id number]  Summary:  71M with Hypertension CAD multiple CVA Diabetes Mellitus, recent angio noted worsening carotid stenosis.  Presented with worsening speech, confusion.  Admitted 05/21, underwent L STA-MCA bypass on 05/23, given 2 units PRBC intraoperatively; marginal flow at end of bypass procedure  Overnight Events: admitted to NSICU after procedure    PAST MEDICAL & SURGICAL HISTORY: Cerebrovascular accident (CVA) due to stenosis of left middle cerebral artery Coronary artery disease without angina pectoris, unspecified vessel or lesion type, unspecified whether native or transplanted heart Depression, unspecified depression type Essential hypertension Controlled type 2 diabetes mellitus without complication, unspecified long term insulin use status History of penile implant History of lumbar surgery H/O umbilical hernia repair History of appendectomy  NKDA  Home meds?    PHYSICAL EXAMINATION  T(C): , Max: 38.1 (05-24 @ 00:38) HR: 66 (66 - 98) BP: 189/78 (145/70 - 189/78) RR: 20 (10 - 22) SpO2: 97% (95% - 99%)  NEUROLOGIC EXAMINATION:  Patient is   GENERAL:  intubated, not in cardiorespiratory distress  EENT: anicteric  CARDIOVASC:  (+) S1 S2, normal rate and regular rhythm  PULMONARY:  clear to auscultation bilaterally  ABDOMEN:  soft, nontender, with normoactive bowel sounds  EXTREMITIES:  no edema  SKIN:  no rash    I & Os for 24h ending 05-24-17  IN: 1207.3 ml / OUT: 4815 ml / NET: -3607.7 ml    LABS:  CAPILLARY BLOOD GLUCOSE 220 (24 May 2017 05:00) 181 (23 May 2017 23:00) 135 (23 May 2017 17:38) 137 (23 May 2017 14:53) 146 (23 May 2017 11:32) 138 (23 May 2017 08:53)                        10.2   14.5  )-----------( 156      ( 24 May 2017 04:13 )             29.8     140  |  103  |  11  ----------------------------<  226<H>  3.4<L>   |  20<L>  |  1.30    Ca    8.6      24 May 2017 04:13  Phos  4.0     05-24  Mg     1.8     05-24    TPro  5.4<L>  /  Alb  3.8  /  TBili  0.3  /  DBili  x   /  AST  12  /  ALT  6<L>  /  AlkPhos  46  05-23    Bacteriology:  CSF studies:  EEG:  Neuroimaging: CT 05/21 chronic WM changes.  05/09 MRI head - extensive longstanding ischemic changes, L>R, subacute infarction L frontal and parietal periRolandic brain, acute infarction R parietal perivent WM.  Multiple sites of mod to severe intracranial arterial stenosis, severe L M1 stenosis, mod to severe R M1 and LA2 stenosis, mod L supraclinoid ICA stenosis, mildto mod narrowing distal basilar artery and bilateral PCAs L>R; NOVA: diminished flow L ICA bilateral MCAs  Other imaging:    MEDICATIONS: fluoxetine 20mg daily mod ISS atorvastatin 40mg HS cefazolin 1g q8h asa 325 daily pantoprazole 40 IV daily levetiracetam 500 IV q12h peridex     IV FLUIDS:  DRIPS: dexmedetomidine  DIET:  Lines / Drains: subgaleal SAMUEL drain    CODE STATUS:  full code                       GOALS OF CARE:  aggressive                      DISPOSITION:  ICU =================================  NEUROCRITICAL CARE ATTENDING NOTE  =================================    RISHI HERRERA   [id number]  Summary:  71M with Hypertension CAD multiple CVA Diabetes Mellitus, recent angio noted worsening carotid stenosis.  Presented with worsening speech, confusion.  Admitted 05/21, underwent L STA-MCA bypass on 05/23, given 2 units PRBC intraoperatively; marginal flow at end of bypass procedure  Overnight Events: admitted to NSICU after procedure    PAST MEDICAL & SURGICAL HISTORY: Cerebrovascular accident (CVA) due to stenosis of left middle cerebral artery Coronary artery disease without angina pectoris, unspecified vessel or lesion type, unspecified whether native or transplanted heart Depression, unspecified depression type Essential hypertension Controlled type 2 diabetes mellitus without complication, unspecified long term insulin use status History of penile implant History of lumbar surgery H/O umbilical hernia repair History of appendectomy  NKDA  Home meds?    PHYSICAL EXAMINATION  T(C): , Max: 38.1 (05-24 @ 00:38) HR: 66 (66 - 98) BP: 189/78 (145/70 - 189/78) RR: 20 (10 - 22) SpO2: 97% (95% - 99%)  NEUROLOGIC EXAMINATION:  Patient is sedated, off sedation agitated, restless, not following commands, pupils 3mm reactive to light, R UE spastic, ?localizes L UE, withdraws to noxious  B LE, R UE 0/5  GENERAL:  intubated, not in cardiorespiratory distress  EENT: anicteric  CARDIOVASC:  (+) S1 S2, normal rate and regular rhythm  PULMONARY:  clear to auscultation bilaterally  ABDOMEN:  soft, nontender, with normoactive bowel sounds  EXTREMITIES:  no edema  SKIN:  no rash    I & Os for 24h ending 05-24-17  IN: 1207.3 ml / OUT: 4815 ml / NET: -3607.7 ml    LABS:  CAPILLARY BLOOD GLUCOSE 220 (24 May 2017 05:00) 181 (23 May 2017 23:00) 135 (23 May 2017 17:38) 137 (23 May 2017 14:53) 146 (23 May 2017 11:32) 138 (23 May 2017 08:53)                        10.2   14.5  )-----------( 156      ( 24 May 2017 04:13 )             29.8     140  |  103  |  11  ----------------------------<  226<H>  3.4<L>   |  20<L>  |  1.30    Ca    8.6      24 May 2017 04:13  Phos  4.0     05-24  Mg     1.8     05-24    TPro  5.4<L>  /  Alb  3.8  /  TBili  0.3  /  DBili  x   /  AST  12  /  ALT  6<L>  /  AlkPhos  46  05-23    Bacteriology:  CSF studies:  EEG:  Neuroimaging: CT 05/21 chronic WM changes.  05/09 MRI head - extensive longstanding ischemic changes, L>R, subacute infarction L frontal and parietal periRolandic brain, acute infarction R parietal perivent WM.  Multiple sites of mod to severe intracranial arterial stenosis, severe L M1 stenosis, mod to severe R M1 and LA2 stenosis, mod L supraclinoid ICA stenosis, mildto mod narrowing distal basilar artery and bilateral PCAs L>R; NOVA: diminished flow L ICA bilateral MCAs  Other imaging:    MEDICATIONS: fluoxetine 20mg daily mod ISS atorvastatin 40mg HS cefazolin 1g q8h asa 325 daily pantoprazole 40 IV daily levetiracetam 500 IV q12h peridex     IV FLUIDS:  DRIPS: dexmedetomidine  DIET:  Lines / Drains: subgaleal SAMUEL drain, restraints, Guerrero    CODE STATUS:  full code                       GOALS OF CARE:  aggressive                      DISPOSITION:  ICU

## 2017-05-24 NOTE — DIETITIAN INITIAL EVALUATION ADULT. - NS AS NUTRI INTERV ENTERAL NUTRITION
Glucerna 1.2 at 70 mL x 24 hr (route per MD) = 1680 mL TV, 2016 kcal, 101 g protein, & 1352 mL fluid

## 2017-05-24 NOTE — PROGRESS NOTE ADULT - SUBJECTIVE AND OBJECTIVE BOX
Interval Events: reviewed  Patient seen and examined at bedside.    Patient is a 71y old  Male who presents with a chief complaint of worsening slurred speech (21 May 2017 16:56)    Patient was agitated overnight on Precedex. Patient is on present and it agitated  PAST MEDICAL & SURGICAL HISTORY:  Cerebrovascular accident (CVA) due to stenosis of left middle cerebral artery  Coronary artery disease without angina pectoris, unspecified vessel or lesion type, unspecified whether native or transplanted heart  Depression, unspecified depression type  Essential hypertension  Controlled type 2 diabetes mellitus without complication, unspecified long term insulin use status  History of penile implant  History of lumbar surgery  H/O umbilical hernia repair  History of appendectomy      MEDICATIONS:  Pulmonary:    Antimicrobials:    Anticoagulants:    Cardiac:      Allergies    No Known Allergies    Intolerances        Vital Signs Last 24 Hrs  T(C): 37.5, Max: 38.1 (05-24 @ 00:38)  T(F): 99.5, Max: 100.5 (05-24 @ 00:38)  HR: 109 (58 - 118)  BP: 141/85 (124/57 - 189/78)  BP(mean): 107 (87 - 132)  RR: 25 (10 - 27)  SpO2: 96% (94% - 100%)  I & Os for 24h ending 05-24 @ 07:00  =============================================  IN: 1207.3 ml / OUT: 4815 ml / NET: -3607.7 ml    I & Os for current day (as of 05-24 @ 21:31)  =============================================  IN: 1170.9 ml / OUT: 2155 ml / NET: -984.1 ml    Mode: CPAP with PS  FiO2: 40  PEEP: 5  MAP: 7.7  PIP: 16      LABS:  ABG - ( 23 May 2017 21:48 )  pH: 7.34  /  pCO2: 38    /  pO2: 113   / HCO3: 20    / Base Excess: -4.9  /  SaO2: 98                  CBC Full  -  ( 24 May 2017 04:13 )  WBC Count : 14.5 K/uL  Hemoglobin : 10.2 g/dL  Hematocrit : 29.8 %  Platelet Count - Automated : 156 K/uL  Mean Cell Volume : 83.9 fL  Mean Cell Hemoglobin : 28.7 pg  Mean Cell Hemoglobin Concentration : 34.2 g/dL  Auto Neutrophil # : x  Auto Lymphocyte # : x  Auto Monocyte # : x  Auto Eosinophil # : x  Auto Basophil # : x  Auto Neutrophil % : x  Auto Lymphocyte % : x  Auto Monocyte % : x  Auto Eosinophil % : x  Auto Basophil % : x    05-24    140  |  103  |  11  ----------------------------<  226<H>  3.4<L>   |  20<L>  |  1.30    Ca    8.6      24 May 2017 04:13  Phos  4.0     05-24  Mg     1.8     05-24    TPro  5.4<L>  /  Alb  3.8  /  TBili  0.3  /  DBili  x   /  AST  12  /  ALT  6<L>  /  AlkPhos  46  05-23    PT/INR - ( 23 May 2017 21:49 )   PT: 14.3 sec;   INR: 1.28          PTT - ( 23 May 2017 21:49 )  PTT:29.3 sec      Urinalysis Basic - ( 23 May 2017 21:50 )    Color: Yellow / Appearance: Clear / SG: <=1.005 / pH: x  Gluc: x / Ketone: NEGATIVE  / Bili: NEGATIVE / Urobili: 0.2 E.U./dL   Blood: x / Protein: NEGATIVE mg/dL / Nitrite: NEGATIVE   Leuk Esterase: NEGATIVE / RBC: Many /HPF / WBC < 5 /HPF   Sq Epi: x / Non Sq Epi: Rare /HPF / Bacteria: Present /HPF                  RADIOLOGY & ADDITIONAL STUDIES (The following images were personally reviewed):  Guerrero:                            Yes         Urine output:               Yes          DVT prophylaxis:         Yes          Flattus:                          Yes         Bowel movement:               No

## 2017-05-24 NOTE — PROGRESS NOTE ADULT - SUBJECTIVE AND OBJECTIVE BOX
HPI:  72 y/o male pmhx HTN, CAD, multiple CVA (last 3/2016) DM, known to NSX service with recent angio procedure revealing worsening carotid stenosis planned for elective Left STA-MCA bypass this week but has been experiencing worsening in his speech over the past few days. Per the patients wife, patient has expereinced a gradual decline since March 2016 with symptoms associated with increased confusion and speech difficulty. Denies any headaches, nausea, vomiting or right side deficits. Denies any numbness tingling or decreased sensation.  Pt at baseline using hand crutch and one person assistance to ambulate. Denies any medications for pain or improvement of symptoms. Denies any recent trauma or falls. (21 May 2017 16:56)    OVERNIGHT EVENTS: Yesterday, underwent L STA-MCA bypass, given 2 units PRBC intraoperatively, marginal flow at end of bypass procedure. Overnight, urinary retention overnight, cuday placed. Pt remains intubated.    Vital Signs Last 24 Hrs  T(C): 37.5, Max: 38.1 (05-24 @ 00:38)  T(F): 99.5, Max: 100.5 (05-24 @ 00:38)  HR: 68 (66 - 98)  BP: 124/57 (124/57 - 189/78)  BP(mean): 91 (87 - 132)  RR: 18 (10 - 22)  SpO2: 97% (95% - 99%)    I&O's Detail  I & Os for 24h ending 24 May 2017 07:00  =============================================  IN:    sodium chloride 0.9%: 800 ml    IV PiggyBack: 400 ml    dexmedetomidine Infusion: 7.3 ml    Total IN: 1207.3 ml  ---------------------------------------------  OUT:    Indwelling Catheter - Urethral: 4605 ml    Bulb: 210 ml    Total OUT: 4815 ml  ---------------------------------------------  Total NET: -3607.7 ml    I & Os for current day (as of 24 May 2017 08:48)  =============================================  IN:    dexmedetomidine Infusion: 4.9 ml    Total IN: 4.9 ml  ---------------------------------------------  OUT:    Indwelling Catheter - Urethral: 250 ml    Bulb: 30 ml    Total OUT: 280 ml  ---------------------------------------------  Total NET: -275.1 ml    I&O's Summary  I & Os for 24h ending 24 May 2017 07:00  =============================================  IN: 1207.3 ml / OUT: 4815 ml / NET: -3607.7 ml    I & Os for current day (as of 24 May 2017 08:48)  =============================================  IN: 4.9 ml / OUT: 280 ml / NET: -275.1 ml      PHYSICAL EXAM:  Neurological: Intubated, opens eyes spontaneously  CNII-XII: Pupils 3mm reactive to light, EOM intact  Motor: Right UE spastic. Attempting to localize w/ LUE, withdrawal in B/L LE, RUE 0/5 no withdrawal to noxious stimuli         [x] warm well perfused; capillary refill <3 seconds   Incision/Wound: Scalp incision site C/D/I    TUBES/LINES:  [] CVC  [] A-line  [] Lumbar Drain  [] Ventriculostomy  [x] Other: Subgaleal SAMUEL draining serosanguinous fluid     DIET:  [x] NPO  [] Mechanical  [] Tube feeds    LABS:                        10.2   14.5  )-----------( 156      ( 24 May 2017 04:13 )             29.8     05-24    140  |  103  |  11  ----------------------------<  226<H>  3.4<L>   |  20<L>  |  1.30    Ca    8.6      24 May 2017 04:13  Phos  4.0     05-24  Mg     1.8     05-24    TPro  5.4<L>  /  Alb  3.8  /  TBili  0.3  /  DBili  x   /  AST  12  /  ALT  6<L>  /  AlkPhos  46  05-23    PT/INR - ( 23 May 2017 21:49 )   PT: 14.3 sec;   INR: 1.28          PTT - ( 23 May 2017 21:49 )  PTT:29.3 sec  Urinalysis Basic - ( 23 May 2017 21:50 )    Color: Yellow / Appearance: Clear / SG: <=1.005 / pH: x  Gluc: x / Ketone: NEGATIVE  / Bili: NEGATIVE / Urobili: 0.2 E.U./dL   Blood: x / Protein: NEGATIVE mg/dL / Nitrite: NEGATIVE   Leuk Esterase: NEGATIVE / RBC: Many /HPF / WBC < 5 /HPF   Sq Epi: x / Non Sq Epi: Rare /HPF / Bacteria: Present /HPF          CAPILLARY BLOOD GLUCOSE  220 (24 May 2017 05:00)  181 (23 May 2017 23:00)  135 (23 May 2017 17:38)  137 (23 May 2017 14:53)  146 (23 May 2017 11:32)  138 (23 May 2017 08:53)      Drug Levels: [] N/A    CSF Analysis: [] N/A      Allergies    No Known Allergies    Intolerances      MEDICATIONS:  Antibiotics:  ceFAZolin   IVPB 1000milliGRAM(s) IV Intermittent every 8 hours    Neuro:  FLUoxetine 20milliGRAM(s) Oral daily  aspirin 325milliGRAM(s) Oral daily  ondansetron Injectable 4milliGRAM(s) IV Push every 6 hours PRN  levETIRAcetam  IVPB 500milliGRAM(s) IV Intermittent every 12 hours  dexmedetomidine Infusion 0.01MICROgram(s)/kG/Hr IV Continuous <Continuous>    Anticoagulation:    OTHER:  insulin lispro (HumaLOG) corrective regimen sliding scale  SubCutaneous Before meals and at bedtime  dextrose Gel 1Dose(s) Oral once PRN  dextrose 50% Injectable 12.5Gram(s) IV Push once  glucagon  Injectable 1milliGRAM(s) IntraMuscular once PRN  atorvastatin 40milliGRAM(s) Oral at bedtime  pantoprazole  Injectable 40milliGRAM(s) IV Push daily  chlorhexidine 0.12% Liquid 15milliLiter(s) Swish and Spit two times a day    IVF:  sodium chloride 0.9% 1000milliLiter(s) IV Continuous <Continuous>    CULTURES:  Culture Results:   No growth (05-21 @ 21:08)    RADIOLOGY & ADDITIONAL TESTS:    ASSESSMENT:  71y Male s/p left STA (both parietal and frontal branches of L STA) to MCA bypass for intracranial atherosclerosis POD#1     PLAN:  NEURO:  -neuro checks  -pain control  -continue aspirin  -continue keppra 500 BID    CARDIOVASCULAR:  -SBP goal 120-180    PULMONARY:  -Full ventilatory support    RENAL:  -IVF while NPO    GI:  -NPO  -docusate/senna  -PPI while intubated    HEME:  -H/H stable, no issues    ID:  -afebrile, post-op ancef    ENDO:  -ISS    DVT PROPHYLAXIS:  [x] Venodynes                                [] Heparin/Lovenox    -D/w Dr. Rajput HPI:  70 y/o male pmhx HTN, CAD, multiple CVA (last 3/2016) DM, known to NSX service with recent angio procedure revealing worsening carotid stenosis planned for elective Left STA-MCA bypass this week but has been experiencing worsening in his speech over the past few days. Per the patients wife, patient has expereinced a gradual decline since March 2016 with symptoms associated with increased confusion and speech difficulty. Denies any headaches, nausea, vomiting or right side deficits. Denies any numbness tingling or decreased sensation.  Pt at baseline using hand crutch and one person assistance to ambulate. Denies any medications for pain or improvement of symptoms. Denies any recent trauma or falls. (21 May 2017 16:56)    OVERNIGHT EVENTS: Yesterday, underwent L STA-MCA bypass, given 2 units PRBC intraoperatively, marginal flow at end of bypass procedure. Overnight, urinary retention overnight, cuday placed. Pt remains intubated.    Vital Signs Last 24 Hrs  T(C): 37.5, Max: 38.1 (05-24 @ 00:38)  T(F): 99.5, Max: 100.5 (05-24 @ 00:38)  HR: 68 (66 - 98)  BP: 124/57 (124/57 - 189/78)  BP(mean): 91 (87 - 132)  RR: 18 (10 - 22)  SpO2: 97% (95% - 99%)    I&O's Detail  I & Os for 24h ending 24 May 2017 07:00  =============================================  IN:    sodium chloride 0.9%: 800 ml    IV PiggyBack: 400 ml    dexmedetomidine Infusion: 7.3 ml    Total IN: 1207.3 ml  ---------------------------------------------  OUT:    Indwelling Catheter - Urethral: 4605 ml    Bulb: 210 ml    Total OUT: 4815 ml  ---------------------------------------------  Total NET: -3607.7 ml    I & Os for current day (as of 24 May 2017 08:48)  =============================================  IN:    dexmedetomidine Infusion: 4.9 ml    Total IN: 4.9 ml  ---------------------------------------------  OUT:    Indwelling Catheter - Urethral: 250 ml    Bulb: 30 ml    Total OUT: 280 ml  ---------------------------------------------  Total NET: -275.1 ml    I&O's Summary  I & Os for 24h ending 24 May 2017 07:00  =============================================  IN: 1207.3 ml / OUT: 4815 ml / NET: -3607.7 ml    I & Os for current day (as of 24 May 2017 08:48)  =============================================  IN: 4.9 ml / OUT: 280 ml / NET: -275.1 ml      PHYSICAL EXAM:  Neurological: Intubated, opens eyes spontaneously  CNII-XII: Pupils 3mm reactive to light, EOM intact  Motor: Right UE spastic. Attempting to localize w/ LUE, withdrawal in B/L LE, RUE 0/5 no withdrawal to noxious stimuli         [x] warm well perfused; capillary refill <3 seconds   Incision/Wound: Scalp incision site C/D/I    TUBES/LINES:  [] CVC  [] A-line  [] Lumbar Drain  [] Ventriculostomy  [x] Other: Subgaleal SAMUEL draining serosanguinous fluid     DIET:  [x] NPO  [] Mechanical  [] Tube feeds    LABS:                        10.2   14.5  )-----------( 156      ( 24 May 2017 04:13 )             29.8     05-24    140  |  103  |  11  ----------------------------<  226<H>  3.4<L>   |  20<L>  |  1.30    Ca    8.6      24 May 2017 04:13  Phos  4.0     05-24  Mg     1.8     05-24    TPro  5.4<L>  /  Alb  3.8  /  TBili  0.3  /  DBili  x   /  AST  12  /  ALT  6<L>  /  AlkPhos  46  05-23    PT/INR - ( 23 May 2017 21:49 )   PT: 14.3 sec;   INR: 1.28          PTT - ( 23 May 2017 21:49 )  PTT:29.3 sec  Urinalysis Basic - ( 23 May 2017 21:50 )    Color: Yellow / Appearance: Clear / SG: <=1.005 / pH: x  Gluc: x / Ketone: NEGATIVE  / Bili: NEGATIVE / Urobili: 0.2 E.U./dL   Blood: x / Protein: NEGATIVE mg/dL / Nitrite: NEGATIVE   Leuk Esterase: NEGATIVE / RBC: Many /HPF / WBC < 5 /HPF   Sq Epi: x / Non Sq Epi: Rare /HPF / Bacteria: Present /HPF          CAPILLARY BLOOD GLUCOSE  220 (24 May 2017 05:00)  181 (23 May 2017 23:00)  135 (23 May 2017 17:38)  137 (23 May 2017 14:53)  146 (23 May 2017 11:32)  138 (23 May 2017 08:53)      Drug Levels: [] N/A    CSF Analysis: [] N/A      Allergies    No Known Allergies    Intolerances      MEDICATIONS:  Antibiotics:  ceFAZolin   IVPB 1000milliGRAM(s) IV Intermittent every 8 hours    Neuro:  FLUoxetine 20milliGRAM(s) Oral daily  aspirin 325milliGRAM(s) Oral daily  ondansetron Injectable 4milliGRAM(s) IV Push every 6 hours PRN  levETIRAcetam  IVPB 500milliGRAM(s) IV Intermittent every 12 hours  dexmedetomidine Infusion 0.01MICROgram(s)/kG/Hr IV Continuous <Continuous>    Anticoagulation:    OTHER:  insulin lispro (HumaLOG) corrective regimen sliding scale  SubCutaneous Before meals and at bedtime  dextrose Gel 1Dose(s) Oral once PRN  dextrose 50% Injectable 12.5Gram(s) IV Push once  glucagon  Injectable 1milliGRAM(s) IntraMuscular once PRN  atorvastatin 40milliGRAM(s) Oral at bedtime  pantoprazole  Injectable 40milliGRAM(s) IV Push daily  chlorhexidine 0.12% Liquid 15milliLiter(s) Swish and Spit two times a day    IVF:  sodium chloride 0.9% 1000milliLiter(s) IV Continuous <Continuous>    CULTURES:  Culture Results:   No growth (05-21 @ 21:08)    RADIOLOGY & ADDITIONAL TESTS:    ASSESSMENT:  71y Male s/p left STA (both parietal and frontal branches of L STA) to MCA bypass for intracranial atherosclerosis POD#1     PLAN:  NEURO:  -neuro checks  -pain control  -continue aspirin  -continue keppra 500 BID    CARDIOVASCULAR:  -SBP goal 120-180    PULMONARY:  -Full ventilatory support  -wean to extubate today    RENAL:  -IVF while NPO    GI:  -NPO  -docusate/senna  -PPI while intubated    HEME:  -H/H stable, no issues    ID:  -afebrile, post-op ancef    ENDO:  -ISS    DVT PROPHYLAXIS:  [x] Venodynes                                [] Heparin/Lovenox    -D/w Dr. Rajput

## 2017-05-24 NOTE — CONSULT NOTE ADULT - SUBJECTIVE AND OBJECTIVE BOX
Vascular Attending: Yogi      HPI:  72 y/o male pmhx HTN, CAD, multiple CVA (last 3/2016) DM, known to NSX service with recent angio procedure revealing worsening carotid stenosis planned for elective Left STA-MCA bypass this week but has been experiencing worsening in his speech over the past few days. Per the patients wife, patient has expereinced a gradual decline since March 2016 with symptoms associated with increased confusion and speech difficulty. Denies any headaches, nausea, vomiting or right side deficits. Denies any numbness tingling or decreased sensation.  Pt at baseline using hand crutch and one person assistance to ambulate. Denies any medications for pain or improvement of symptoms. Denies any recent trauma or falls. (21 May 2017 16:56)    Pt. found to have RLE DVT in femoral, popliteal and post. tibial veins. Given contraindication to AC, Vascular consulted for IVC filter placement      PAST MEDICAL & SURGICAL HISTORY:  Cerebrovascular accident (CVA) due to stenosis of left middle cerebral artery  Coronary artery disease without angina pectoris, unspecified vessel or lesion type, unspecified whether native or transplanted heart  Depression, unspecified depression type  Essential hypertension  Controlled type 2 diabetes mellitus without complication, unspecified long term insulin use status  History of penile implant  History of lumbar surgery  H/O umbilical hernia repair  History of appendectomy      REVIEW OF SYSTEMS: see HPI  	    MEDICATIONS  (STANDING):  FLUoxetine 20milliGRAM(s) Oral daily  insulin lispro (HumaLOG) corrective regimen sliding scale  SubCutaneous Before meals and at bedtime  dextrose 50% Injectable 12.5Gram(s) IV Push once  atorvastatin 40milliGRAM(s) Oral at bedtime  aspirin 325milliGRAM(s) Oral daily  pantoprazole  Injectable 40milliGRAM(s) IV Push daily  levETIRAcetam  IVPB 500milliGRAM(s) IV Intermittent every 12 hours  chlorhexidine 0.12% Liquid 15milliLiter(s) Swish and Spit two times a day  sodium chloride 0.9% 1000milliLiter(s) IV Continuous <Continuous>  dexmedetomidine Infusion 0.01MICROgram(s)/kG/Hr IV Continuous <Continuous>    MEDICATIONS  (PRN):  dextrose Gel 1Dose(s) Oral once PRN Blood Glucose LESS THAN 70 milliGRAM(s)/deciliter  glucagon  Injectable 1milliGRAM(s) IntraMuscular once PRN Glucose LESS THAN 70 milligrams/deciliter  ondansetron Injectable 4milliGRAM(s) IV Push every 6 hours PRN Nausea and/or Vomiting      Allergies    No Known Allergies    Intolerances        SOCIAL HISTORY:    FAMILY HISTORY:  No pertinent family history in first degree relatives      Vital Signs Last 24 Hrs  T(C): 37.3, Max: 38.1 (05-24 @ 00:38)  T(F): 99.2, Max: 100.5 (05-24 @ 00:38)  HR: 101 (58 - 102)  BP: 170/71 (124/57 - 189/78)  BP(mean): 98 (87 - 132)  RR: 26 (10 - 26)  SpO2: 98% (94% - 99%)    PHYSICAL EXAM:      Constitutional: NAD, extubated, restless in bed, moving all extremities spontaneously    Extremities: RLE: not enlarged compared to LLE, superficial veins engorged, no erythema, non-tender to palpation            LABS:                        10.2   14.5  )-----------( 156      ( 24 May 2017 04:13 )             29.8     05-24    140  |  103  |  11  ----------------------------<  226<H>  3.4<L>   |  20<L>  |  1.30    Ca    8.6      24 May 2017 04:13  Phos  4.0     05-24  Mg     1.8     05-24    TPro  5.4<L>  /  Alb  3.8  /  TBili  0.3  /  DBili  x   /  AST  12  /  ALT  6<L>  /  AlkPhos  46  05-23    PT/INR - ( 23 May 2017 21:49 )   PT: 14.3 sec;   INR: 1.28          PTT - ( 23 May 2017 21:49 )  PTT:29.3 sec  Urinalysis Basic - ( 23 May 2017 21:50 )    Color: Yellow / Appearance: Clear / SG: <=1.005 / pH: x  Gluc: x / Ketone: NEGATIVE  / Bili: NEGATIVE / Urobili: 0.2 E.U./dL   Blood: x / Protein: NEGATIVE mg/dL / Nitrite: NEGATIVE   Leuk Esterase: NEGATIVE / RBC: Many /HPF / WBC < 5 /HPF   Sq Epi: x / Non Sq Epi: Rare /HPF / Bacteria: Present /HPF        RADIOLOGY & ADDITIONAL STUDIES    A/P: 72 yo M h/o HTN, T2DM, HLD, CAD, CVA admitted for elective elective Left STA-MCA  for stenosis with gradually worsening speech over the last several months, found to have RLE DVT, contraindication to AC, vascular consulted for IVC filter placement    -Add on to OR and Cath lab for IVC filter placement 5/25  -NPO@MN  -Consent obtained from wife at bedside  -Discussed with chief resident on call

## 2017-05-24 NOTE — PROGRESS NOTE ADULT - SUBJECTIVE AND OBJECTIVE BOX
PRE OPERATIVE NOTE    Pre-op Diagnosis: RLE DVT  Procedure: IVC filter  Surgeon: Yogi    Consent pending                          10.2   14.5  )-----------( 156      ( 24 May 2017 04:13 )             29.8     05-    140  |  103  |  11  ----------------------------<  226<H>  3.4<L>   |  20<L>  |  1.30    Ca    8.6      24 May 2017 04:13  Phos  4.0     -  Mg     1.8         TPro  5.4<L>  /  Alb  3.8  /  TBili  0.3  /  DBili  x   /  AST  12  /  ALT  6<L>  /  AlkPhos  46      PT/INR - ( 23 May 2017 21:49 )   PT: 14.3 sec;   INR: 1.28          PTT - ( 23 May 2017 21:49 )  PTT:29.3 sec  Urinalysis Basic - ( 23 May 2017 21:50 )    Color: Yellow / Appearance: Clear / SG: <=1.005 / pH: x  Gluc: x / Ketone: NEGATIVE  / Bili: NEGATIVE / Urobili: 0.2 E.U./dL   Blood: x / Protein: NEGATIVE mg/dL / Nitrite: NEGATIVE   Leuk Esterase: NEGATIVE / RBC: Many /HPF / WBC < 5 /HPF   Sq Epi: x / Non Sq Epi: Rare /HPF / Bacteria: Present /HPF        Type & Screen: A+  CXR: Improvement in linear atelectasis at both lung bases. No other interval   findings. No pneumothorax.  EK/22 Normal sinus rhythm  Septal infarct , age undetermined        A/P: 71yMale planned for above procedure  1. NPO past midnight, except medications  2. IVFsodium chloride 0.9% 1000milliLiter(s) IV Continuous <Continuous>  3. f/u am labs

## 2017-05-24 NOTE — PROGRESS NOTE ADULT - ATTENDING COMMENTS
PLAN:   NEURO: neurochecks q1h, PRN pain meds with tylenol, no opiates / benzos for now  s/p STA MCA bypass: CT this AM, continue statins, asa  depression: continue fluoxetine  seizure prophylaxis: levetiracetam 500mg BID x 5 days (stop date 05/28)  REHAB:  physical therapy evaluation and management    EARLY MOB:      PULM:  CPAP wean to extubate  CARDIO:  SBP goal mm Hg  ENDO:  Blood sugar goals 140-180 mg/dL, continue insulin sliding scale  GI:  PPI for GI prophylaxis, d/c once extubated  DIET: NPO for now  RENAL:  IVF while NPO  HEM/ONC: Hb stable, given 2 unit pRBC  VTE Prophylaxis: SCDs only, no DVT chemoprophylaxis as patient is fresh post-op (long case)  ID: afebrile, leukocytosis likely reactive, will trend; periop ancef then d/c  Social: will update family    Patient at high risk for neurological deterioration or death due to:  strokes, seizures, ICU delirium, aspiration PNA.  Critical care time, excluding procedures: 60 minutes. PLAN:   NEURO: neurochecks q1h, PRN pain meds with tylenol, no opiates / benzos for now  s/p STA MCA bypass: CT this AM, continue statins, asa  depression: continue fluoxetine  subgaleal SAMUEL - 210 ccs overnight, keep for now  seizure prophylaxis: levetiracetam 500mg BID x 5 days (stop date 05/28)  REHAB:  physical therapy evaluation and management    EARLY MOB:  HOB up    PULM:  CPAP wean to extubate  CARDIO:  SBP goal mm Hg  ENDO:  Blood sugar goals 140-180 mg/dL, continue insulin sliding scale, check A1C, lipid profile  GI:  PPI for GI prophylaxis, d/c once extubated  DIET: NPO for now  RENAL:  IVF while NPO; Guerrero inserted due to retention, Crudet cath by urology, also has penile implants, will not start BPH meds due to BP concerns with the bypass graft, trial of void in 3 days (05/26)  HEM/ONC: Hb stable, given 2 unit pRBC  VTE Prophylaxis: SCDs only, no DVT chemoprophylaxis as patient is fresh post-op (long case), baseline Dopler for DVT suspected on admission  ID: afebrile, leukocytosis likely reactive, will trend; periop ancef then d/c  Social: will update family    Patient at high risk for neurological deterioration or death due to:  strokes, seizures, ICU delirium, aspiration PNA.  Critical care time, excluding procedures: 60 minutes. PLAN:   NEURO: neurochecks q1h, PRN pain meds with tylenol, no opiates / benzos for now  s/p STA MCA bypass: CT this AM, continue statins, asa  depression: continue fluoxetine  subgaleal SAMUEL - 210 ccs overnight, keep for now  seizure prophylaxis: levetiracetam 500mg BID x 5 days (stop date 05/28)  REHAB:  physical therapy evaluation and management    EARLY MOB:  HOB up    PULM:  CPAP wean to extubate  CARDIO:  SBP goal 120-180mm Hg  ENDO:  Blood sugar goals 140-180 mg/dL, continue insulin sliding scale, check A1C, lipid profile  GI:  PPI for GI prophylaxis, d/c once extubated  DIET: NPO for now  RENAL:  IVF while NPO; Guerrero inserted due to retention, Crudet cath by urology, also has penile implants, will not start BPH meds due to BP concerns with the bypass graft, trial of void in 3 days (05/26)  HEM/ONC: Hb stable, given 2 unit pRBC  VTE Prophylaxis: SCDs only, no DVT chemoprophylaxis as patient is fresh post-op (long case), baseline Dopler for DVT suspected on admission  ID: afebrile, leukocytosis likely reactive, will trend; periop ancef then d/c  Social: will update family    Patient at high risk for neurological deterioration or death due to:  strokes, seizures, ICU delirium, aspiration PNA.  Critical care time, excluding procedures: 60 minutes. PLAN:   NEURO: neurochecks q1h, PRN pain meds with tylenol, no opiates / benzos for now  s/p STA MCA bypass: CT this AM, continue statins, asa; repeat Angio on Friday  depression: continue fluoxetine  subgaleal SAMUEL - 210 ccs overnight, keep for now  seizure prophylaxis: levetiracetam 500mg BID x 5 days (stop date 05/28)  REHAB:  No PT for now;  EARLY MOB:  HOB up    PULM:  CPAP wean to extubate  CARDIO:  SBP goal 120-180mm Hg  ENDO:  Blood sugar goals 140-180 mg/dL, continue insulin sliding scale, check A1C, lipid profile  GI:  PPI for GI prophylaxis, d/c once extubated  DIET: NPO for now  RENAL:  IVF while NPO; Guerrero inserted due to retention, Crudet cath by urology, also has penile implants, will not start BPH meds due to BP concerns with the bypass graft, trial of void in 3 days (05/26)  HEM/ONC: Hb stable, given 2 unit pRBC  VTE Prophylaxis: SCDs only, no DVT chemoprophylaxis as patient is fresh post-op (long case), baseline Dopler for DVT suspected on admission  ID: afebrile, leukocytosis likely reactive, will trend; periop ancef then d/c  Social: will update family    Patient at high risk for neurological deterioration or death due to:  strokes, seizures, ICU delirium, aspiration PNA.  Critical care time, excluding procedures: 60 minutes.

## 2017-05-24 NOTE — PROGRESS NOTE ADULT - PROBLEM SELECTOR PLAN 4
The blood sugars elevated and he is to in sliding-scale that my need to be adjusted. He my need pre-meal insulin

## 2017-05-25 LAB
ANION GAP SERPL CALC-SCNC: 14 MMOL/L — SIGNIFICANT CHANGE UP (ref 5–17)
APTT BLD: 27.3 SEC — LOW (ref 27.5–37.4)
BLD GP AB SCN SERPL QL: NEGATIVE — SIGNIFICANT CHANGE UP
BUN SERPL-MCNC: 10 MG/DL — SIGNIFICANT CHANGE UP (ref 7–23)
CALCIUM SERPL-MCNC: 9 MG/DL — SIGNIFICANT CHANGE UP (ref 8.4–10.5)
CHLORIDE SERPL-SCNC: 107 MMOL/L — SIGNIFICANT CHANGE UP (ref 96–108)
CO2 SERPL-SCNC: 22 MMOL/L — SIGNIFICANT CHANGE UP (ref 22–31)
CREAT SERPL-MCNC: 1.1 MG/DL — SIGNIFICANT CHANGE UP (ref 0.5–1.3)
GLUCOSE SERPL-MCNC: 188 MG/DL — HIGH (ref 70–99)
HCT VFR BLD CALC: 30.4 % — LOW (ref 39–50)
HGB BLD-MCNC: 10.3 G/DL — LOW (ref 13–17)
INR BLD: 1.26 — HIGH (ref 0.88–1.16)
MAGNESIUM SERPL-MCNC: 1.8 MG/DL — SIGNIFICANT CHANGE UP (ref 1.6–2.6)
MCHC RBC-ENTMCNC: 28.7 PG — SIGNIFICANT CHANGE UP (ref 27–34)
MCHC RBC-ENTMCNC: 33.9 G/DL — SIGNIFICANT CHANGE UP (ref 32–36)
MCV RBC AUTO: 84.7 FL — SIGNIFICANT CHANGE UP (ref 80–100)
PHOSPHATE SERPL-MCNC: 2.6 MG/DL — SIGNIFICANT CHANGE UP (ref 2.5–4.5)
PLATELET # BLD AUTO: 181 K/UL — SIGNIFICANT CHANGE UP (ref 150–400)
POTASSIUM SERPL-MCNC: 3.4 MMOL/L — LOW (ref 3.5–5.3)
POTASSIUM SERPL-SCNC: 3.4 MMOL/L — LOW (ref 3.5–5.3)
PROTHROM AB SERPL-ACNC: 14 SEC — HIGH (ref 9.8–12.7)
RBC # BLD: 3.59 M/UL — LOW (ref 4.2–5.8)
RBC # FLD: 13.4 % — SIGNIFICANT CHANGE UP (ref 10.3–16.9)
RH IG SCN BLD-IMP: POSITIVE — SIGNIFICANT CHANGE UP
SODIUM SERPL-SCNC: 143 MMOL/L — SIGNIFICANT CHANGE UP (ref 135–145)
WBC # BLD: 20.8 K/UL — HIGH (ref 3.8–10.5)
WBC # FLD AUTO: 20.8 K/UL — HIGH (ref 3.8–10.5)

## 2017-05-25 PROCEDURE — 37191 INS ENDOVAS VENA CAVA FILTR: CPT | Mod: GC

## 2017-05-25 PROCEDURE — 99291 CRITICAL CARE FIRST HOUR: CPT | Mod: 24

## 2017-05-25 PROCEDURE — 71010: CPT | Mod: 26

## 2017-05-25 PROCEDURE — 74000: CPT | Mod: 26

## 2017-05-25 RX ORDER — ASPIRIN/CALCIUM CARB/MAGNESIUM 324 MG
300 TABLET ORAL DAILY
Qty: 0 | Refills: 0 | Status: DISCONTINUED | OUTPATIENT
Start: 2017-05-25 | End: 2017-05-26

## 2017-05-25 RX ORDER — MAGNESIUM SULFATE 500 MG/ML
2 VIAL (ML) INJECTION ONCE
Qty: 0 | Refills: 0 | Status: COMPLETED | OUTPATIENT
Start: 2017-05-25 | End: 2017-05-25

## 2017-05-25 RX ORDER — POTASSIUM CHLORIDE 20 MEQ
10 PACKET (EA) ORAL
Qty: 0 | Refills: 0 | Status: COMPLETED | OUTPATIENT
Start: 2017-05-25 | End: 2017-05-25

## 2017-05-25 RX ORDER — ALBUTEROL 90 UG/1
2.5 AEROSOL, METERED ORAL ONCE
Qty: 0 | Refills: 0 | Status: COMPLETED | OUTPATIENT
Start: 2017-05-25 | End: 2017-05-25

## 2017-05-25 RX ORDER — ENOXAPARIN SODIUM 100 MG/ML
40 INJECTION SUBCUTANEOUS AT BEDTIME
Qty: 0 | Refills: 0 | Status: DISCONTINUED | OUTPATIENT
Start: 2017-05-25 | End: 2017-06-01

## 2017-05-25 RX ORDER — POTASSIUM PHOSPHATE, MONOBASIC POTASSIUM PHOSPHATE, DIBASIC 236; 224 MG/ML; MG/ML
21 INJECTION, SOLUTION INTRAVENOUS ONCE
Qty: 0 | Refills: 0 | Status: COMPLETED | OUTPATIENT
Start: 2017-05-25 | End: 2017-05-25

## 2017-05-25 RX ORDER — FUROSEMIDE 40 MG
20 TABLET ORAL ONCE
Qty: 0 | Refills: 0 | Status: COMPLETED | OUTPATIENT
Start: 2017-05-25 | End: 2017-05-25

## 2017-05-25 RX ADMIN — Medication 10 MILLIGRAM(S): at 14:15

## 2017-05-25 RX ADMIN — Medication 100 MILLIEQUIVALENT(S): at 11:48

## 2017-05-25 RX ADMIN — Medication 100 MILLIEQUIVALENT(S): at 11:55

## 2017-05-25 RX ADMIN — ENOXAPARIN SODIUM 40 MILLIGRAM(S): 100 INJECTION SUBCUTANEOUS at 22:04

## 2017-05-25 RX ADMIN — Medication 100 MILLIEQUIVALENT(S): at 09:29

## 2017-05-25 RX ADMIN — LEVETIRACETAM 400 MILLIGRAM(S): 250 TABLET, FILM COATED ORAL at 22:04

## 2017-05-25 RX ADMIN — CHLORHEXIDINE GLUCONATE 15 MILLILITER(S): 213 SOLUTION TOPICAL at 17:32

## 2017-05-25 RX ADMIN — CHLORHEXIDINE GLUCONATE 15 MILLILITER(S): 213 SOLUTION TOPICAL at 05:29

## 2017-05-25 RX ADMIN — Medication 50 GRAM(S): at 09:29

## 2017-05-25 RX ADMIN — Medication 10 MILLIGRAM(S): at 16:19

## 2017-05-25 RX ADMIN — Medication 20 MILLIGRAM(S): at 12:06

## 2017-05-25 RX ADMIN — Medication 2: at 13:11

## 2017-05-25 RX ADMIN — Medication 2: at 16:29

## 2017-05-25 RX ADMIN — LEVETIRACETAM 400 MILLIGRAM(S): 250 TABLET, FILM COATED ORAL at 09:29

## 2017-05-25 RX ADMIN — Medication 300 MILLIGRAM(S): at 14:15

## 2017-05-25 RX ADMIN — ONDANSETRON 4 MILLIGRAM(S): 8 TABLET, FILM COATED ORAL at 04:10

## 2017-05-25 RX ADMIN — POTASSIUM PHOSPHATE, MONOBASIC POTASSIUM PHOSPHATE, DIBASIC 64.25 MILLIMOLE(S): 236; 224 INJECTION, SOLUTION INTRAVENOUS at 11:56

## 2017-05-25 RX ADMIN — ALBUTEROL 2.5 MILLIGRAM(S): 90 AEROSOL, METERED ORAL at 05:00

## 2017-05-25 RX ADMIN — PANTOPRAZOLE SODIUM 40 MILLIGRAM(S): 20 TABLET, DELAYED RELEASE ORAL at 11:56

## 2017-05-25 RX ADMIN — Medication 2: at 06:47

## 2017-05-25 RX ADMIN — Medication 2: at 22:04

## 2017-05-25 NOTE — PROGRESS NOTE ADULT - SUBJECTIVE AND OBJECTIVE BOX
=================================  NEUROCRITICAL CARE ATTENDING NOTE  =================================    RISHI HERRERA   [id number]  Summary:  71M with Hypertension CAD multiple CVA Diabetes Mellitus, recent angio noted worsening carotid stenosis.  Presented with worsening speech, confusion.  Admitted , underwent L STA-MCA bypass on , given 2 units PRBC intraoperatively; marginal flow at end of bypass procedure  Overnight Events: quads fasciculations this AM?    PAST MEDICAL & SURGICAL HISTORY: Cerebrovascular accident (CVA) due to stenosis of left middle cerebral artery Coronary artery disease without angina pectoris, unspecified vessel or lesion type, unspecified whether native or transplanted heart Depression, unspecified depression type Essential hypertension Controlled type 2 diabetes mellitus without complication, unspecified long term insulin use status History of penile implant History of lumbar surgery H/O umbilical hernia repair History of appendectomy  NKDA  Home meds?    PHYSICAL EXAMINATION  T(C): , Max: 37.6 ( @ 01:06) HR: 107 (58 - 118) BP: 164/72 (124/57 - 186/82) RR: 25 (18 - 35) SpO2: 95% (90% - 100%)  NEUROLOGIC EXAMINATION:  Patient is sedated, off sedation agitated, restless, not following commands, pupils 3mm reactive to light, R UE spastic, ?localizes L UE, withdraws to noxious  B LE, R UE 0/5  GENERAL:  intubated, not in cardiorespiratory distress  EENT: anicteric  CARDIOVASC:  (+) S1 S2, normal rate and regular rhythm  PULMONARY:  clear to auscultation bilaterally  ABDOMEN:  soft, nontender, with normoactive bowel sounds  EXTREMITIES:  no edema  SKIN:  no rash    LABS:  CAPILLARY BLOOD GLUCOSE 188 (25 May 2017 06:00) 178 (24 May 2017 22:00) 179 (24 May 2017 16:00) 215 (24 May 2017 11:00)                        10.3   20.8  )-----------( 181      ( 25 May 2017 05:49 )             30.4     143  |  107  |  10  ----------------------------<  188<H>  3.4<L>   |  22  |  1.10    Ca    9.0      25 May 2017 05:45  Phos  2.6       Mg     1.8         TPro  5.4<L>  /  Alb  3.8  /  TBili  0.3  /  DBili  x   /  AST  12  /  ALT  6<L>  /  AlkPhos  46      I & Os for 24h ending  @ 07:00  IN: 1920.9 ml / OUT: 2680 ml / NET: -759.1 ml    Bacteriology:  CSF studies:  EEG:  Neuroimaging: CT head : post-op changes (subarachnoid mostly L sided), small SDH deep to craniotomy;  CT  chronic WM changes.   MRI head - extensive longstanding ischemic changes, L>R, subacute infarction L frontal and parietal periRolandic brain, acute infarction R parietal perivent WM.  Multiple sites of mod to severe intracranial arterial stenosis, severe L M1 stenosis, mod to severe R M1 and LA2 stenosis, mod L supraclinoid ICA stenosis, mildto mod narrowing distal basilar artery and bilateral PCAs L>R; NOVA: diminished flow L ICA bilateral MCAs  Other imagin/24 Doppler DVT R mid/distal femoral vein / popliteal and posterior tibial veins    MEDICATIONS: fluoxetine 20mg daily mod ISS atorvastatin 40mg HS cefazolin 1g q8h asa 325 daily pantoprazole 40 IV daily levetiracetam 500 IV q12h peridex     IV FLUIDS:  DRIPS:   DIET:  Lines / Drains: subgaleal SAMUEL drain, restraints, Guerrero    CODE STATUS:  full code                       GOALS OF CARE:  aggressive                      DISPOSITION:  ICU =================================  NEUROCRITICAL CARE ATTENDING NOTE  =================================    RISHI HERRERA   [id number]  Summary:  71M with Hypertension CAD multiple CVA Diabetes Mellitus, recent angio noted worsening carotid stenosis.  Presented with worsening speech, confusion.  Admitted , underwent L STA-MCA bypass on , given 2 units PRBC intraoperatively; marginal flow at end of bypass procedure  Overnight Events: quads fasciculations this AM?; spitting bright green fluid - AXR done unremarkable; this morning - hypoxic requiring NRB    PAST MEDICAL & SURGICAL HISTORY: Cerebrovascular accident (CVA) due to stenosis of left middle cerebral artery Coronary artery disease without angina pectoris, unspecified vessel or lesion type, unspecified whether native or transplanted heart Depression, unspecified depression type Essential hypertension Controlled type 2 diabetes mellitus without complication, unspecified long term insulin use status History of penile implant History of lumbar surgery H/O umbilical hernia repair History of appendectomy  NKDA  Home meds?    PHYSICAL EXAMINATION  T(C): , Max: 37.6 (05-25 @ 01:06) HR: 107 (58 - 118) BP: 164/72 (124/57 - 186/82) RR: 25 (18 - 35) SpO2: 95% (90% - 100%)  NEUROLOGIC EXAMINATION:  Patient is awake, aphasic, nods to questioning but no verbal output, pupils 3mm reactive to light, R UE spastic, moves L UE /LE spontaneously, R LE (+) clonus (+) babinski R   GENERAL:  not intubated, not in cardiorespiratory distress  EENT: anicteric  CARDIOVASC:  (+) S1 S2, normal rate and regular rhythm  PULMONARY:  clear to auscultation bilaterally  ABDOMEN:  soft, nontender, with normoactive bowel sounds  EXTREMITIES:  no edema  SKIN:  no rash    LABS:  CAPILLARY BLOOD GLUCOSE 188 (25 May 2017 06:00) 178 (24 May 2017 22:00) 179 (24 May 2017 16:00) 215 (24 May 2017 11:00)             (14.5)    10.3   20.8  )-----------( 181      ( 25 May 2017 05:49 )             30.4     143  |  107  |  10  ----------------------------<  188<H>  3.4<L>   |  22  |  1.10 (1.3)    Ca    9.0      25 May 2017 05:45  Phos  2.6     -  Mg     1.8         TPro  5.4<L>  /  Alb  3.8  /  TBili  0.3  /  DBili  x   /  AST  12  /  ALT  6<L>  /  AlkPhos  46      I & Os for 24h ending  @ 07:00  IN: 1920.9 ml / OUT: 2680 ml / NET: -759.1 ml  65 A1C 6.2  TG 72 HDL 27 LDL     Bacteriology:  CSF studies:  EEG:  Neuroimaging: CT head : post-op changes (subarachnoid mostly L sided), small SDH deep to craniotomy;  CT  chronic WM changes.   MRI head - extensive longstanding ischemic changes, L>R, subacute infarction L frontal and parietal periRolandic brain, acute infarction R parietal perivent WM.  Multiple sites of mod to severe intracranial arterial stenosis, severe L M1 stenosis, mod to severe R M1 and LA2 stenosis, mod L supraclinoid ICA stenosis, mildto mod narrowing distal basilar artery and bilateral PCAs L>R; NOVA: diminished flow L ICA bilateral MCAs  Other imagin/24 Doppler DVT R mid/distal femoral vein / popliteal and posterior tibial veins    MEDICATIONS: fluoxetine 20mg daily mod ISS atorvastatin 40mg HS asa 325 daily pantoprazole 40 IV daily levetiracetam 500 IV q12h peridex  lantus 10    IV FLUIDS: NS 50cc/hr  DRIPS:   DIET: NPO  Lines / Drains: subgaleal SAMUEL drain (50cc/hr), restraints, Guerrero     CODE STATUS:  full code                       GOALS OF CARE:  aggressive                      DISPOSITION:  ICU

## 2017-05-25 NOTE — BRIEF OPERATIVE NOTE - POST-OP DX
DVT (deep venous thrombosis)  05/25/2017    Active  Jennifer Sauer  Intracranial atherosclerosis  05/23/2017    Active  Kavin Fulton
Intracranial atherosclerosis  05/23/2017    Active  Kavin Fulton

## 2017-05-25 NOTE — PROCEDURE NOTE - ADDITIONAL PROCEDURE DETAILS
Left SAMUEL drain removed at bedside. Using sterile precautions patient's drain site was cleaned with chlorexidine. [place] suture was cut and drain removed. One staple place to close the drain site. Small dressing applied. Minimal bloody discharge persists. Will observe.

## 2017-05-25 NOTE — PROGRESS NOTE ADULT - SUBJECTIVE AND OBJECTIVE BOX
Procedure: IVC filter placement  Surgeon: Yogi    S: Pt has no complaints. Denies CP, SOB, DILLARD, calf tenderness. Pain controlled with medication.    O:  T(C): 37.2, Max: 37.2 (05-25 @ 14:00)  T(F): 99, Max: 99 (05-25 @ 14:00)  HR: 77 (77 - 112)  BP: 171/84 (171/84 - 189/82)  RR: 14 (14 - 28)  SpO2: 96% (94% - 96%)  Wt(kg): --                        10.3   20.8  )-----------( 181      ( 25 May 2017 05:49 )             30.4     05-25    143  |  107  |  10  ----------------------------<  188<H>  3.4<L>   |  22  |  1.10    Ca    9.0      25 May 2017 05:45  Phos  2.6     05-25  Mg     1.8     05-25    TPro  5.4<L>  /  Alb  3.8  /  TBili  0.3  /  DBili  x   /  AST  12  /  ALT  6<L>  /  AlkPhos  46  05-23      Gen: NAD, resting comfortably in bed  C/V: NSR  Pulm: Nonlabored breathing, no respiratory distress  Abd: soft, NT/ND  Extrem: WWP, R groin soft, no hematoma      A/P: 71yMale s/p IVC filter placement  -groin soft, no hematoma  -care per primary team  -will sign off, reconsult as needed

## 2017-05-25 NOTE — PROGRESS NOTE ADULT - ATTENDING COMMENTS
PLAN:   NEURO: neurochecks q1h, PRN pain meds with tylenol, no opiates / benzos for now  s/p STA MCA bypass: CT this AM, continue statins, asa; repeat Angio on Friday  depression: continue fluoxetine  subgaleal SAMUEL - 210 ccs overnight, keep for now  seizure prophylaxis: levetiracetam 500mg BID x 5 days (stop date 05/28)  REHAB:  No PT for now;  EARLY MOB:  HOB up    PULM:  CPAP wean to extubate  CARDIO:  SBP goal 120-180mm Hg  ENDO:  Blood sugar goals 140-180 mg/dL, continue insulin sliding scale, check A1C, lipid profile  GI:  PPI for GI prophylaxis, d/c once extubated  DIET: NPO for now  RENAL:  IVF while NPO; Guerrero inserted due to retention, Crudet cath by urology, also has penile implants, will not start BPH meds due to BP concerns with the bypass graft, trial of void in 3 days (05/26)  HEM/ONC: Hb stable, given 2 unit pRBC  VTE Prophylaxis: SCDs only, no DVT chemoprophylaxis as patient is fresh post-op (long case), baseline Dopler for DVT suspected on admission  ID: afebrile, leukocytosis likely reactive, will trend; periop ancef then d/c  Social: will update family    Patient at high risk for neurological deterioration or death due to:  strokes, seizures, ICU delirium, aspiration PNA.  Critical care time, excluding procedures: 60 minutes. PLAN:   NEURO: neurochecks q1h, PRN pain meds with tylenol, no opiates / benzos for now  s/p STA MCA bypass: continue statins, asa; repeat Angio on Friday  depression: continue fluoxetine  subgaleal SAMUEL - 50 ccs overnight, possibly discontinue  seizure prophylaxis: levetiracetam 500mg BID x 5 days (stop date 05/28)  REHAB:  No PT for now;  EARLY MOB:  HOB up    PULM:  CXR now, r/o aspiraiton; if no definite infiltrates on CXR and still requiring NRB - chest CT PE protocol  CARDIO:  SBP goal 120-180mm Hg  ENDO:  Blood sugar goals 140-180 mg/dL, continue insulin sliding scale, check A1C, lipid profile  GI:  PPI for GI prophylaxis (home meds)  DIET: NPO for now  RENAL:  cont NS@50cc/yr; Guerrero inserted due to retention, Crudet cath by urology, also has penile implants, will not start BPH meds due to BP concerns with the bypass graft, trial of void in 3 days (05/26)  HEM/ONC: Hb stable  VTE Prophylaxis: SCDs only, for IVC filter insertion - inquire from neurosurg when ok to start full anticoagulation; if not yet - will start chemoprophylaxis with SQL tonight if ok with NS  ID: afebrile, leukocytosis likely reactive, will trend  Social: updated wife yesterday    Patient at high risk for neurological deterioration or death due to:  strokes, seizures, ICU delirium, aspiration PNA.  Critical care time, excluding procedures: 45 minutes.

## 2017-05-25 NOTE — BRIEF OPERATIVE NOTE - OPERATION/FINDINGS
IVC filter placement Cook celect
PROCEDURE  Double-barrel L STA-MCA bypass    Technically successful bypass using both parietal and frontal branches of L STA  However, toward the end of procedure, there was gradual drop in flow, indicating likely marginal flow demand  No complications

## 2017-05-25 NOTE — PROGRESS NOTE ADULT - SUBJECTIVE AND OBJECTIVE BOX
HPI:  70 y/o male pmhx HTN, CAD, multiple CVA (last 3/2016) DM, known to NSX service with recent angio procedure revealing worsening carotid stenosis planned for elective Left STA-MCA bypass this week but has been experiencing worsening in his speech over the past few days. Per the patients wife, patient has expereinced a gradual decline since March 2016 with symptoms associated with increased confusion and speech difficulty. Denies any headaches, nausea, vomiting or right side deficits. Denies any numbness tingling or decreased sensation.  Pt at baseline using hand crutch and one person assistance to ambulate. Denies any medications for pain or improvement of symptoms. Denies any recent trauma or falls. (21 May 2017 16:56)    OVERNIGHT EVENTS: Several episodes of bilious vomit/spit-up. Restless overnight, on restraints. 50cc SAMUEL output overnight.    Vital Signs Last 24 Hrs  T(C): 37.2, Max: 37.6 (05-25 @ 01:06)  T(F): 98.9, Max: 99.7 (05-25 @ 01:06)  HR: 98 (65 - 118)  BP: 179/83 (141/85 - 186/82)  BP(mean): 110 (86 - 127)  RR: 28 (18 - 35)  SpO2: 93% (90% - 100%)    I&O's Detail  I & Os for 24h ending 25 May 2017 07:00  =============================================  IN:    sodium chloride 0.9%: 750 ml    sodium chloride 0.9%: 700 ml    IV PiggyBack: 450 ml    dexmedetomidine Infusion: 20.9 ml    Total IN: 1920.9 ml  ---------------------------------------------  OUT:    Indwelling Catheter - Urethral: 2460 ml    Bulb: 220 ml    Total OUT: 2680 ml  ---------------------------------------------  Total NET: -759.1 ml    I & Os for current day (as of 25 May 2017 10:42)  =============================================  IN:    IV PiggyBack: 350 ml    sodium chloride 0.9%: 50 ml    Total IN: 400 ml  ---------------------------------------------  OUT:    Indwelling Catheter - Urethral: 95 ml    Bulb: 20 ml    Total OUT: 115 ml  ---------------------------------------------  Total NET: 285 ml    I&O's Summary  I & Os for 24h ending 25 May 2017 07:00  =============================================  IN: 1920.9 ml / OUT: 2680 ml / NET: -759.1 ml    I & Os for current day (as of 25 May 2017 10:42)  =============================================  IN: 400 ml / OUT: 115 ml / NET: 285 ml      PHYSICAL EXAM:  Gen: NAD, restless. Awake, not alert/oriented.  HEENT: PERRL. EOMI. Unable to assess VFs. Left scalp incision w/ dressing C/D/I. +SAMUEL.  Lungs: Clear b/l  Heart: S1, S2. Tachycardic.  Abd: Soft, NT/ND. +BS  Exts: Pulses 2+ throughout.  Neuro: CNs II-XII grossly symmetrical. Right hemiparesis, RUE 1/5, RLE 2/5. +Spasticity. Moving left side with good strength. Not following simple commands. +Aphasic.      TUBES/LINES:  [] CVC  [x] A-line  [] Lumbar Drain  [] Ventriculostomy  [x] Other - SAMUEL    DIET:  [x] NPO  [] Mechanical  [] Tube feeds    LABS:                        10.3   20.8  )-----------( 181      ( 25 May 2017 05:49 )             30.4     05-25    143  |  107  |  10  ----------------------------<  188<H>  3.4<L>   |  22  |  1.10    Ca    9.0      25 May 2017 05:45  Phos  2.6     05-25  Mg     1.8     05-25    TPro  5.4<L>  /  Alb  3.8  /  TBili  0.3  /  DBili  x   /  AST  12  /  ALT  6<L>  /  AlkPhos  46  05-23    PT/INR - ( 25 May 2017 05:49 )   PT: 14.0 sec;   INR: 1.26          PTT - ( 25 May 2017 05:49 )  PTT:27.3 sec  Urinalysis Basic - ( 23 May 2017 21:50 )    Color: Yellow / Appearance: Clear / SG: <=1.005 / pH: x  Gluc: x / Ketone: NEGATIVE  / Bili: NEGATIVE / Urobili: 0.2 E.U./dL   Blood: x / Protein: NEGATIVE mg/dL / Nitrite: NEGATIVE   Leuk Esterase: NEGATIVE / RBC: Many /HPF / WBC < 5 /HPF   Sq Epi: x / Non Sq Epi: Rare /HPF / Bacteria: Present /HPF          CAPILLARY BLOOD GLUCOSE  188 (25 May 2017 06:00)  178 (24 May 2017 22:00)  179 (24 May 2017 16:00)  215 (24 May 2017 11:00)      Drug Levels: [] N/A    CSF Analysis: [] N/A      Allergies    No Known Allergies    Intolerances      MEDICATIONS:  Antibiotics:    Neuro:  FLUoxetine 20milliGRAM(s) Oral daily  aspirin 325milliGRAM(s) Oral daily  ondansetron Injectable 4milliGRAM(s) IV Push every 6 hours PRN  levETIRAcetam  IVPB 500milliGRAM(s) IV Intermittent every 12 hours    Anticoagulation:    OTHER:  insulin lispro (HumaLOG) corrective regimen sliding scale  SubCutaneous Before meals and at bedtime  dextrose Gel 1Dose(s) Oral once PRN  dextrose 50% Injectable 12.5Gram(s) IV Push once  glucagon  Injectable 1milliGRAM(s) IntraMuscular once PRN  atorvastatin 40milliGRAM(s) Oral at bedtime  pantoprazole  Injectable 40milliGRAM(s) IV Push daily  chlorhexidine 0.12% Liquid 15milliLiter(s) Swish and Spit two times a day  insulin glargine Injectable (LANTUS) 10Unit(s) SubCutaneous at bedtime  labetalol Injectable 10milliGRAM(s) IV Push every 1 hour PRN    IVF:  sodium chloride 0.9% 1000milliLiter(s) IV Continuous <Continuous>  potassium phosphate IVPB 21milliMole(s) IV Intermittent once  potassium chloride  10 mEq/100 mL IVPB 10milliEquivalent(s) IV Intermittent every 1 hour    CULTURES:  Culture Results:   No growth (05-21 @ 21:08)    RADIOLOGY & ADDITIONAL TESTS:      ASSESSMENT:  71y Male s/p Left STA-MCA bypass POD#2.    PLAN:  NEURO:    CARDIOVASCULAR:    PULMONARY:    RENAL:    GI:    HEME:    ID: Elevated WBCs, afebrile. No Abx.    ENDO: ISS, Lantus    DISPOSITION: HPI:  70 y/o male pmhx HTN, CAD, multiple CVA (last 3/2016) DM, known to NSX service with recent angio procedure revealing worsening carotid stenosis planned for elective Left STA-MCA bypass this week but has been experiencing worsening in his speech over the past few days. Per the patients wife, patient has expereinced a gradual decline since March 2016 with symptoms associated with increased confusion and speech difficulty. Denies any headaches, nausea, vomiting or right side deficits. Denies any numbness tingling or decreased sensation.  Pt at baseline using hand crutch and one person assistance to ambulate. Denies any medications for pain or improvement of symptoms. Denies any recent trauma or falls. (21 May 2017 16:56)    OVERNIGHT EVENTS: Several episodes of bilious vomit/spit-up. Restless overnight, on restraints. 50cc SAMUEL output overnight.    Vital Signs Last 24 Hrs  T(C): 37.2, Max: 37.6 (05-25 @ 01:06)  T(F): 98.9, Max: 99.7 (05-25 @ 01:06)  HR: 98 (65 - 118)  BP: 179/83 (141/85 - 186/82)  BP(mean): 110 (86 - 127)  RR: 28 (18 - 35)  SpO2: 93% (90% - 100%)    I&O's Detail  I & Os for 24h ending 25 May 2017 07:00  =============================================  IN:    sodium chloride 0.9%: 750 ml    sodium chloride 0.9%: 700 ml    IV PiggyBack: 450 ml    dexmedetomidine Infusion: 20.9 ml    Total IN: 1920.9 ml  ---------------------------------------------  OUT:    Indwelling Catheter - Urethral: 2460 ml    Bulb: 220 ml    Total OUT: 2680 ml  ---------------------------------------------  Total NET: -759.1 ml    I & Os for current day (as of 25 May 2017 10:42)  =============================================  IN:    IV PiggyBack: 350 ml    sodium chloride 0.9%: 50 ml    Total IN: 400 ml  ---------------------------------------------  OUT:    Indwelling Catheter - Urethral: 95 ml    Bulb: 20 ml    Total OUT: 115 ml  ---------------------------------------------  Total NET: 285 ml    I&O's Summary  I & Os for 24h ending 25 May 2017 07:00  =============================================  IN: 1920.9 ml / OUT: 2680 ml / NET: -759.1 ml    I & Os for current day (as of 25 May 2017 10:42)  =============================================  IN: 400 ml / OUT: 115 ml / NET: 285 ml      PHYSICAL EXAM:  Gen: NAD, restless. Awake, not alert/oriented.  HEENT: PERRL. EOMI. Unable to assess VFs. Left scalp incision w/ dressing C/D/I. +SAMUEL.  Lungs: Clear b/l  Heart: S1, S2. Tachycardic.  Abd: Soft, NT/ND. +BS  Exts: Pulses 2+ throughout.  Neuro: CNs II-XII grossly symmetrical. Right hemiparesis, RUE 1/5, RLE 2/5. +Spasticity. Moving left side with good strength. Not following simple commands. +Aphasic.      TUBES/LINES:  [] CVC  [x] A-line  [] Lumbar Drain  [] Ventriculostomy  [x] Other - SAMUEL    DIET:  [x] NPO  [] Mechanical  [] Tube feeds    LABS:                        10.3   20.8  )-----------( 181      ( 25 May 2017 05:49 )             30.4     05-25    143  |  107  |  10  ----------------------------<  188<H>  3.4<L>   |  22  |  1.10    Ca    9.0      25 May 2017 05:45  Phos  2.6     05-25  Mg     1.8     05-25    TPro  5.4<L>  /  Alb  3.8  /  TBili  0.3  /  DBili  x   /  AST  12  /  ALT  6<L>  /  AlkPhos  46  05-23    PT/INR - ( 25 May 2017 05:49 )   PT: 14.0 sec;   INR: 1.26          PTT - ( 25 May 2017 05:49 )  PTT:27.3 sec  Urinalysis Basic - ( 23 May 2017 21:50 )    Color: Yellow / Appearance: Clear / SG: <=1.005 / pH: x  Gluc: x / Ketone: NEGATIVE  / Bili: NEGATIVE / Urobili: 0.2 E.U./dL   Blood: x / Protein: NEGATIVE mg/dL / Nitrite: NEGATIVE   Leuk Esterase: NEGATIVE / RBC: Many /HPF / WBC < 5 /HPF   Sq Epi: x / Non Sq Epi: Rare /HPF / Bacteria: Present /HPF          CAPILLARY BLOOD GLUCOSE  188 (25 May 2017 06:00)  178 (24 May 2017 22:00)  179 (24 May 2017 16:00)  215 (24 May 2017 11:00)      Drug Levels: [] N/A    CSF Analysis: [] N/A      Allergies    No Known Allergies    Intolerances      MEDICATIONS:  Antibiotics:    Neuro:  FLUoxetine 20milliGRAM(s) Oral daily  aspirin 325milliGRAM(s) Oral daily  ondansetron Injectable 4milliGRAM(s) IV Push every 6 hours PRN  levETIRAcetam  IVPB 500milliGRAM(s) IV Intermittent every 12 hours    Anticoagulation:    OTHER:  insulin lispro (HumaLOG) corrective regimen sliding scale  SubCutaneous Before meals and at bedtime  dextrose Gel 1Dose(s) Oral once PRN  dextrose 50% Injectable 12.5Gram(s) IV Push once  glucagon  Injectable 1milliGRAM(s) IntraMuscular once PRN  atorvastatin 40milliGRAM(s) Oral at bedtime  pantoprazole  Injectable 40milliGRAM(s) IV Push daily  chlorhexidine 0.12% Liquid 15milliLiter(s) Swish and Spit two times a day  insulin glargine Injectable (LANTUS) 10Unit(s) SubCutaneous at bedtime  labetalol Injectable 10milliGRAM(s) IV Push every 1 hour PRN    IVF:  sodium chloride 0.9% 1000milliLiter(s) IV Continuous <Continuous>  potassium phosphate IVPB 21milliMole(s) IV Intermittent once  potassium chloride  10 mEq/100 mL IVPB 10milliEquivalent(s) IV Intermittent every 1 hour    CULTURES:  Culture Results:   No growth (05-21 @ 21:08)    RADIOLOGY & ADDITIONAL TESTS:      ASSESSMENT:  71y Male s/p Left STA-MCA bypass POD#2.    PLAN:  NEURO: Continue neuro checks,  Plan for cerebral angiogram tomorrow, will speak to wife for consent,  Plan to remove left SAMUEL drain,  Tylenol for pain, no narcotics at this time,  Continue Keppra    CARDIOVASCULAR: Goal of -180  AM labs, electrolyte repletion  On ASA s/p bypass    PULMONARY: Desaturation w/ agonal breathing - will give Lasix 20mg. CXR repeat done with some congestion,  If no improvement will consider PE protocol study    RENAL: Continue joe for I&Os, consider dc tomorrow    GI: Continue NPO, stop IVF, PPI. 'Vomiting' overnight, will observe. Will need NGT for feeds/meds.    HEME: s/p IVC filter for new right DVT. Will f/u Dr. Rajput for timing of Full AC.    ID: Elevated WBCs, afebrile. No Abx.    ENDO: ISS, Lantus    DISPOSITION: Continue SICU care, hemodynamic monitoring.  D/w Dr. Rajput, Dr. Vargas

## 2017-05-26 LAB
ANION GAP SERPL CALC-SCNC: 15 MMOL/L — SIGNIFICANT CHANGE UP (ref 5–17)
ANION GAP SERPL CALC-SCNC: 16 MMOL/L — SIGNIFICANT CHANGE UP (ref 5–17)
ANION GAP SERPL CALC-SCNC: 18 MMOL/L — HIGH (ref 5–17)
BUN SERPL-MCNC: 14 MG/DL — SIGNIFICANT CHANGE UP (ref 7–23)
BUN SERPL-MCNC: 18 MG/DL — SIGNIFICANT CHANGE UP (ref 7–23)
BUN SERPL-MCNC: 18 MG/DL — SIGNIFICANT CHANGE UP (ref 7–23)
CALCIUM SERPL-MCNC: 8.7 MG/DL — SIGNIFICANT CHANGE UP (ref 8.4–10.5)
CALCIUM SERPL-MCNC: 8.8 MG/DL — SIGNIFICANT CHANGE UP (ref 8.4–10.5)
CALCIUM SERPL-MCNC: 8.9 MG/DL — SIGNIFICANT CHANGE UP (ref 8.4–10.5)
CHLORIDE SERPL-SCNC: 100 MMOL/L — SIGNIFICANT CHANGE UP (ref 96–108)
CHLORIDE SERPL-SCNC: 105 MMOL/L — SIGNIFICANT CHANGE UP (ref 96–108)
CHLORIDE SERPL-SCNC: 107 MMOL/L — SIGNIFICANT CHANGE UP (ref 96–108)
CO2 SERPL-SCNC: 25 MMOL/L — SIGNIFICANT CHANGE UP (ref 22–31)
CO2 SERPL-SCNC: 25 MMOL/L — SIGNIFICANT CHANGE UP (ref 22–31)
CO2 SERPL-SCNC: 30 MMOL/L — SIGNIFICANT CHANGE UP (ref 22–31)
CREAT SERPL-MCNC: 1 MG/DL — SIGNIFICANT CHANGE UP (ref 0.5–1.3)
CREAT SERPL-MCNC: 1.1 MG/DL — SIGNIFICANT CHANGE UP (ref 0.5–1.3)
CREAT SERPL-MCNC: 1.1 MG/DL — SIGNIFICANT CHANGE UP (ref 0.5–1.3)
GLUCOSE SERPL-MCNC: 187 MG/DL — HIGH (ref 70–99)
GLUCOSE SERPL-MCNC: 205 MG/DL — HIGH (ref 70–99)
GLUCOSE SERPL-MCNC: 232 MG/DL — HIGH (ref 70–99)
HCT VFR BLD CALC: 28.6 % — LOW (ref 39–50)
HCT VFR BLD CALC: 29.4 % — LOW (ref 39–50)
HGB BLD-MCNC: 9.8 G/DL — LOW (ref 13–17)
HGB BLD-MCNC: 9.9 G/DL — LOW (ref 13–17)
MAGNESIUM SERPL-MCNC: 1.8 MG/DL — SIGNIFICANT CHANGE UP (ref 1.6–2.6)
MAGNESIUM SERPL-MCNC: 1.8 MG/DL — SIGNIFICANT CHANGE UP (ref 1.6–2.6)
MAGNESIUM SERPL-MCNC: 2.1 MG/DL — SIGNIFICANT CHANGE UP (ref 1.6–2.6)
MCHC RBC-ENTMCNC: 28.8 PG — SIGNIFICANT CHANGE UP (ref 27–34)
MCHC RBC-ENTMCNC: 28.9 PG — SIGNIFICANT CHANGE UP (ref 27–34)
MCHC RBC-ENTMCNC: 33.7 G/DL — SIGNIFICANT CHANGE UP (ref 32–36)
MCHC RBC-ENTMCNC: 34.3 G/DL — SIGNIFICANT CHANGE UP (ref 32–36)
MCV RBC AUTO: 84.4 FL — SIGNIFICANT CHANGE UP (ref 80–100)
MCV RBC AUTO: 85.5 FL — SIGNIFICANT CHANGE UP (ref 80–100)
PHOSPHATE SERPL-MCNC: 2.5 MG/DL — SIGNIFICANT CHANGE UP (ref 2.5–4.5)
PHOSPHATE SERPL-MCNC: 2.6 MG/DL — SIGNIFICANT CHANGE UP (ref 2.5–4.5)
PHOSPHATE SERPL-MCNC: 2.9 MG/DL — SIGNIFICANT CHANGE UP (ref 2.5–4.5)
PLATELET # BLD AUTO: 184 K/UL — SIGNIFICANT CHANGE UP (ref 150–400)
PLATELET # BLD AUTO: 206 K/UL — SIGNIFICANT CHANGE UP (ref 150–400)
POTASSIUM SERPL-MCNC: 2.8 MMOL/L — CRITICAL LOW (ref 3.5–5.3)
POTASSIUM SERPL-MCNC: 3.1 MMOL/L — LOW (ref 3.5–5.3)
POTASSIUM SERPL-MCNC: 3.4 MMOL/L — LOW (ref 3.5–5.3)
POTASSIUM SERPL-SCNC: 2.8 MMOL/L — CRITICAL LOW (ref 3.5–5.3)
POTASSIUM SERPL-SCNC: 3.1 MMOL/L — LOW (ref 3.5–5.3)
POTASSIUM SERPL-SCNC: 3.4 MMOL/L — LOW (ref 3.5–5.3)
PROCALCITONIN SERPL-MCNC: 0.16 NG/ML — HIGH (ref 0–0.05)
RBC # BLD: 3.39 M/UL — LOW (ref 4.2–5.8)
RBC # BLD: 3.44 M/UL — LOW (ref 4.2–5.8)
RBC # FLD: 13.3 % — SIGNIFICANT CHANGE UP (ref 10.3–16.9)
RBC # FLD: 13.6 % — SIGNIFICANT CHANGE UP (ref 10.3–16.9)
SODIUM SERPL-SCNC: 145 MMOL/L — SIGNIFICANT CHANGE UP (ref 135–145)
SODIUM SERPL-SCNC: 146 MMOL/L — HIGH (ref 135–145)
SODIUM SERPL-SCNC: 150 MMOL/L — HIGH (ref 135–145)
WBC # BLD: 17.6 K/UL — HIGH (ref 3.8–10.5)
WBC # BLD: 18.8 K/UL — HIGH (ref 3.8–10.5)
WBC # FLD AUTO: 17.6 K/UL — HIGH (ref 3.8–10.5)
WBC # FLD AUTO: 18.8 K/UL — HIGH (ref 3.8–10.5)

## 2017-05-26 PROCEDURE — 99291 CRITICAL CARE FIRST HOUR: CPT | Mod: 24

## 2017-05-26 PROCEDURE — 71010: CPT | Mod: 26,76

## 2017-05-26 PROCEDURE — 99233 SBSQ HOSP IP/OBS HIGH 50: CPT | Mod: GC

## 2017-05-26 RX ORDER — POTASSIUM CHLORIDE 20 MEQ
10 PACKET (EA) ORAL
Qty: 0 | Refills: 0 | Status: COMPLETED | OUTPATIENT
Start: 2017-05-26 | End: 2017-05-26

## 2017-05-26 RX ORDER — MAGNESIUM SULFATE 500 MG/ML
1 VIAL (ML) INJECTION ONCE
Qty: 0 | Refills: 0 | Status: COMPLETED | OUTPATIENT
Start: 2017-05-26 | End: 2017-05-26

## 2017-05-26 RX ORDER — FUROSEMIDE 40 MG
20 TABLET ORAL ONCE
Qty: 0 | Refills: 0 | Status: COMPLETED | OUTPATIENT
Start: 2017-05-26 | End: 2017-05-26

## 2017-05-26 RX ORDER — SODIUM CHLORIDE 9 MG/ML
1000 INJECTION INTRAMUSCULAR; INTRAVENOUS; SUBCUTANEOUS
Qty: 0 | Refills: 0 | Status: DISCONTINUED | OUTPATIENT
Start: 2017-05-26 | End: 2017-05-26

## 2017-05-26 RX ORDER — VANCOMYCIN HCL 1 G
1250 VIAL (EA) INTRAVENOUS ONCE
Qty: 0 | Refills: 0 | Status: COMPLETED | OUTPATIENT
Start: 2017-05-26 | End: 2017-05-26

## 2017-05-26 RX ORDER — INSULIN GLARGINE 100 [IU]/ML
10 INJECTION, SOLUTION SUBCUTANEOUS EVERY MORNING
Qty: 0 | Refills: 0 | Status: DISCONTINUED | OUTPATIENT
Start: 2017-05-26 | End: 2017-05-28

## 2017-05-26 RX ORDER — ASPIRIN/CALCIUM CARB/MAGNESIUM 324 MG
325 TABLET ORAL DAILY
Qty: 0 | Refills: 0 | Status: DISCONTINUED | OUTPATIENT
Start: 2017-05-27 | End: 2017-06-11

## 2017-05-26 RX ORDER — POTASSIUM PHOSPHATE, MONOBASIC POTASSIUM PHOSPHATE, DIBASIC 236; 224 MG/ML; MG/ML
30 INJECTION, SOLUTION INTRAVENOUS ONCE
Qty: 0 | Refills: 0 | Status: DISCONTINUED | OUTPATIENT
Start: 2017-05-26 | End: 2017-05-26

## 2017-05-26 RX ORDER — POTASSIUM CHLORIDE 20 MEQ
40 PACKET (EA) ORAL EVERY 4 HOURS
Qty: 0 | Refills: 0 | Status: COMPLETED | OUTPATIENT
Start: 2017-05-26 | End: 2017-05-26

## 2017-05-26 RX ORDER — ASPIRIN/CALCIUM CARB/MAGNESIUM 324 MG
325 TABLET ORAL ONCE
Qty: 0 | Refills: 0 | Status: COMPLETED | OUTPATIENT
Start: 2017-05-26 | End: 2017-05-26

## 2017-05-26 RX ORDER — POTASSIUM CHLORIDE 20 MEQ
40 PACKET (EA) ORAL
Qty: 0 | Refills: 0 | Status: DISCONTINUED | OUTPATIENT
Start: 2017-05-26 | End: 2017-05-26

## 2017-05-26 RX ORDER — VANCOMYCIN HCL 1 G
1250 VIAL (EA) INTRAVENOUS EVERY 12 HOURS
Qty: 0 | Refills: 0 | Status: DISCONTINUED | OUTPATIENT
Start: 2017-05-26 | End: 2017-05-28

## 2017-05-26 RX ORDER — POTASSIUM PHOSPHATE, MONOBASIC POTASSIUM PHOSPHATE, DIBASIC 236; 224 MG/ML; MG/ML
21 INJECTION, SOLUTION INTRAVENOUS ONCE
Qty: 0 | Refills: 0 | Status: COMPLETED | OUTPATIENT
Start: 2017-05-26 | End: 2017-05-26

## 2017-05-26 RX ORDER — LEVETIRACETAM 250 MG/1
500 TABLET, FILM COATED ORAL
Qty: 0 | Refills: 0 | Status: DISCONTINUED | OUTPATIENT
Start: 2017-05-26 | End: 2017-05-27

## 2017-05-26 RX ORDER — VANCOMYCIN HCL 1 G
VIAL (EA) INTRAVENOUS
Qty: 0 | Refills: 0 | Status: DISCONTINUED | OUTPATIENT
Start: 2017-05-26 | End: 2017-05-28

## 2017-05-26 RX ORDER — POTASSIUM CHLORIDE 20 MEQ
20 PACKET (EA) ORAL
Qty: 0 | Refills: 0 | Status: DISCONTINUED | OUTPATIENT
Start: 2017-05-26 | End: 2017-05-26

## 2017-05-26 RX ORDER — PIPERACILLIN AND TAZOBACTAM 4; .5 G/20ML; G/20ML
3.38 INJECTION, POWDER, LYOPHILIZED, FOR SOLUTION INTRAVENOUS EVERY 6 HOURS
Qty: 0 | Refills: 0 | Status: DISCONTINUED | OUTPATIENT
Start: 2017-05-26 | End: 2017-05-28

## 2017-05-26 RX ADMIN — INSULIN GLARGINE 10 UNIT(S): 100 INJECTION, SOLUTION SUBCUTANEOUS at 10:00

## 2017-05-26 RX ADMIN — ATORVASTATIN CALCIUM 40 MILLIGRAM(S): 80 TABLET, FILM COATED ORAL at 22:11

## 2017-05-26 RX ADMIN — ENOXAPARIN SODIUM 40 MILLIGRAM(S): 100 INJECTION SUBCUTANEOUS at 22:12

## 2017-05-26 RX ADMIN — Medication 20 MILLIGRAM(S): at 11:20

## 2017-05-26 RX ADMIN — LEVETIRACETAM 400 MILLIGRAM(S): 250 TABLET, FILM COATED ORAL at 10:01

## 2017-05-26 RX ADMIN — Medication 20 MILLIGRAM(S): at 13:29

## 2017-05-26 RX ADMIN — Medication 10 MILLIGRAM(S): at 05:07

## 2017-05-26 RX ADMIN — Medication 100 MILLIEQUIVALENT(S): at 01:42

## 2017-05-26 RX ADMIN — Medication 10 MILLIGRAM(S): at 23:11

## 2017-05-26 RX ADMIN — Medication: at 17:23

## 2017-05-26 RX ADMIN — PIPERACILLIN AND TAZOBACTAM 200 GRAM(S): 4; .5 INJECTION, POWDER, LYOPHILIZED, FOR SOLUTION INTRAVENOUS at 16:28

## 2017-05-26 RX ADMIN — POTASSIUM PHOSPHATE, MONOBASIC POTASSIUM PHOSPHATE, DIBASIC 64.25 MILLIMOLE(S): 236; 224 INJECTION, SOLUTION INTRAVENOUS at 02:42

## 2017-05-26 RX ADMIN — Medication 40 MILLIEQUIVALENT(S): at 17:45

## 2017-05-26 RX ADMIN — Medication 100 MILLIEQUIVALENT(S): at 03:41

## 2017-05-26 RX ADMIN — Medication 100 MILLIEQUIVALENT(S): at 18:05

## 2017-05-26 RX ADMIN — PIPERACILLIN AND TAZOBACTAM 200 GRAM(S): 4; .5 INJECTION, POWDER, LYOPHILIZED, FOR SOLUTION INTRAVENOUS at 22:10

## 2017-05-26 RX ADMIN — Medication 166.67 MILLIGRAM(S): at 11:25

## 2017-05-26 RX ADMIN — Medication 2: at 23:00

## 2017-05-26 RX ADMIN — Medication 100 GRAM(S): at 01:41

## 2017-05-26 RX ADMIN — Medication 300 MILLIGRAM(S): at 12:30

## 2017-05-26 RX ADMIN — Medication: at 11:25

## 2017-05-26 RX ADMIN — LEVETIRACETAM 500 MILLIGRAM(S): 250 TABLET, FILM COATED ORAL at 22:14

## 2017-05-26 RX ADMIN — Medication 100 MILLIEQUIVALENT(S): at 02:40

## 2017-05-26 RX ADMIN — Medication: at 07:41

## 2017-05-26 RX ADMIN — Medication 40 MILLIEQUIVALENT(S): at 22:12

## 2017-05-26 RX ADMIN — PANTOPRAZOLE SODIUM 40 MILLIGRAM(S): 20 TABLET, DELAYED RELEASE ORAL at 11:25

## 2017-05-26 RX ADMIN — PIPERACILLIN AND TAZOBACTAM 200 GRAM(S): 4; .5 INJECTION, POWDER, LYOPHILIZED, FOR SOLUTION INTRAVENOUS at 09:59

## 2017-05-26 RX ADMIN — POTASSIUM PHOSPHATE, MONOBASIC POTASSIUM PHOSPHATE, DIBASIC 64.25 MILLIMOLE(S): 236; 224 INJECTION, SOLUTION INTRAVENOUS at 22:00

## 2017-05-26 RX ADMIN — Medication 10 MILLIGRAM(S): at 07:22

## 2017-05-26 RX ADMIN — Medication 166.67 MILLIGRAM(S): at 23:01

## 2017-05-26 RX ADMIN — SODIUM CHLORIDE 50 MILLILITER(S): 9 INJECTION INTRAMUSCULAR; INTRAVENOUS; SUBCUTANEOUS at 04:42

## 2017-05-26 NOTE — PROGRESS NOTE ADULT - ATTENDING COMMENTS
PLAN:   NEURO: neurochecks q1h, PRN pain meds with tylenol, no opiates / benzos for now  s/p STA MCA bypass: continue statins, asa; repeat Angio on Friday  depression: continue fluoxetine  subgaleal SAMUEL - 50 ccs overnight, possibly discontinue  seizure prophylaxis: levetiracetam 500mg BID x 5 days (stop date 05/28)  REHAB:  No PT for now;  EARLY MOB:  HOB up    PULM:  CXR now, r/o aspiraiton; if no definite infiltrates on CXR and still requiring NRB - chest CT PE protocol  CARDIO:  SBP goal 120-180mm Hg  ENDO:  Blood sugar goals 140-180 mg/dL, continue insulin sliding scale, check A1C, lipid profile  GI:  PPI for GI prophylaxis (home meds)  DIET: NPO for now  RENAL:  cont NS@50cc/yr; Guerrero inserted due to retention, Crudet cath by urology, also has penile implants, will not start BPH meds due to BP concerns with the bypass graft, trial of void in 3 days (05/26)  HEM/ONC: Hb stable  VTE Prophylaxis: SCDs only, for IVC filter insertion - inquire from neurosurg when ok to start full anticoagulation; if not yet - will start chemoprophylaxis with SQL tonight if ok with NS  ID: afebrile, leukocytosis likely reactive, will trend  Social: updated wife yesterday    Patient at high risk for neurological deterioration or death due to:  strokes, seizures, ICU delirium, aspiration PNA.  Critical care time, excluding procedures: 45 minutes. PLAN:   NEURO: neurochecks q2h, VS q1, PRN pain meds with tylenol, no opiates / benzos for now  s/p STA MCA bypass: continue statins, asa; Angio today  depression: continue fluoxetine  seizure prophylaxis: levetiracetam 500mg BID x 5 days (stop date 05/28)  REHAB:  No PT for now;  EARLY MOB:  HOB up    PULM:  continue high flow O2; US lungs - if congested - furosemide 20mg IV  CARDIO:  SBP goal 120-180mm Hg  ENDO:  Blood sugar goals 140-180 mg/dL, continue insulin sliding scale  GI:  PPI for GI prophylaxis (home meds)  DIET: NPO for now  RENAL:  IV locked ; trial of void  today  HEM/ONC: Hb stable  VTE Prophylaxis: SCDs only, s/p IVC filter insertion, cont SQL   ID: afebrile, leukocytosis; start zosyn vanco, vanc trough prior to 4th dose; (7 days stop date: June 1); send sputum / UA; check procalcitonin  Social: updated wife yesterday    Patient at high risk for neurological deterioration or death due to:  strokes, seizures, ICU delirium, aspiration PNA.  Critical care time, excluding procedures: 45 minutes. PLAN:   NEURO: neurochecks q2h, VS q1, PRN pain meds with tylenol, no opiates / benzos for now  s/p STA MCA bypass: continue statins, asa; Angio today - deferred as per neurosurgery  seizure prophylaxis: levetiracetam 500mg BID x 5 days (stop date 05/28)  r/o seizures hook to VEEG x 24 hours  depression: continue fluoxetine  REHAB:  No PT for now;  EARLY MOB:  HOB up    PULM:  continue high flow O2; mod B lines on US, (+) NVE, give furosemide 20mg IV  CARDIO:  SBP goal 120-180mm Hg  ENDO:  Blood sugar goals 140-180 mg/dL, continue insulin sliding scale  GI:  PPI for GI prophylaxis (home meds)  DIET: NPO for now, high aspiration risk, will re-evaluate tomorrow, start tube feeds once off high flow and less dyspneic  RENAL:  IV locked ; trial of void  today; hypokalemic, and will receive further furosemide doses - recheck BMP this pm  HEM/ONC: Hb stable  VTE Prophylaxis: SCDs only, s/p IVC filter insertion, cont SQL   ID: afebrile, leukocytosis; start zosyn vanco, vanc trough prior to 4th dose; (7 days stop date: June 1); send sputum / UA; check procalcitonin  Social: updated wife yesterday    Patient at high risk for neurological deterioration or death due to:  strokes, seizures, ICU delirium, aspiration PNA.  Critical care time, excluding procedures: 45 minutes.

## 2017-05-26 NOTE — PROGRESS NOTE ADULT - SUBJECTIVE AND OBJECTIVE BOX
HPI:  70 y/o male pmhx HTN, CAD, multiple CVA (last 3/2016) DM, known to NSX service with recent angio procedure revealing worsening carotid stenosis planned for elective Left STA-MCA bypass this week but has been experiencing worsening in his speech over the past few days. Per the patients wife, patient has expereinced a gradual decline since March 2016 with symptoms associated with increased confusion and speech difficulty. Denies any headaches, nausea, vomiting or right side deficits. Denies any numbness tingling or decreased sensation.  Pt at baseline using hand crutch and one person assistance to ambulate. Denies any medications for pain or improvement of symptoms. Denies any recent trauma or falls. (21 May 2017 16:56)    OVERNIGHT EVENTS: No significant events overnight. Patient on wrist restraints, restless at times.    Vital Signs Last 24 Hrs  T(C): 37.6, Max: 38.2 (05-25 @ 22:00)  T(F): 99.7, Max: 100.7 (05-25 @ 22:00)  HR: 88 (73 - 112)  BP: 180/84 (147/86 - 196/87)  BP(mean): 121 (105 - 148)  RR: 25 (14 - 31)  SpO2: 96% (90% - 99%)    I&O's Detail  I & Os for 24h ending 26 May 2017 07:00  =============================================  IN:    IV PiggyBack: 1200 ml    Solution: 321 ml    sodium chloride 0.9%: 50 ml    sodium chloride 0.9%: 50 ml    Total IN: 1621 ml  ---------------------------------------------  OUT:    Indwelling Catheter - Urethral: 2776 ml    Bulb: 20 ml    Total OUT: 2796 ml  ---------------------------------------------  Total NET: -1175 ml    I & Os for current day (as of 26 May 2017 09:50)  =============================================  IN:    Total IN: 0 ml  ---------------------------------------------  OUT:    Indwelling Catheter - Urethral: 35 ml    Total OUT: 35 ml  ---------------------------------------------  Total NET: -35 ml    I&O's Summary  I & Os for 24h ending 26 May 2017 07:00  =============================================  IN: 1621 ml / OUT: 2796 ml / NET: -1175 ml    I & Os for current day (as of 26 May 2017 09:50)  =============================================  IN: 0 ml / OUT: 35 ml / NET: -35 ml      PHYSICAL EXAM:  Gen: Awake, alert. NAD.  HEENT: PERRL. EOMI. Left scalp incision C/D/I. +high flow NC.  Neck: +JVD on left  Lungs: Decreased BS in b/l lung bases  Heart: S1, S2. NSR.  Abd: Soft, NT/ND. +BS  Exts: Pulses 2+ throughout  Neuro: CNs grossly symmetrical. Moving left side well. Attempting to follow some simple commands. Right side 2/5 in RUE, 3/5 in RLE, + spasticity. +Aphasia. Sensation to LT grossly intact.    TUBES/LINES:  [] CVC  [] A-line  [] Lumbar Drain  [] Ventriculostomy  [] Other    DIET:  [x] NPO  [] Mechanical  [] Tube feeds    LABS:                        9.9    17.6  )-----------( 184      ( 26 May 2017 00:34 )             29.4     05-26    146<H>  |  105  |  14  ----------------------------<  205<H>  3.1<L>   |  25  |  1.10    Ca    8.7      26 May 2017 00:34  Phos  2.6     05-26  Mg     1.8     05-26      PT/INR - ( 25 May 2017 05:49 )   PT: 14.0 sec;   INR: 1.26          PTT - ( 25 May 2017 05:49 )  PTT:27.3 sec        CAPILLARY BLOOD GLUCOSE  173 (25 May 2017 16:00)  152 (25 May 2017 13:17)      Drug Levels: [] N/A    CSF Analysis: [] N/A      Allergies    No Known Allergies    Intolerances      MEDICATIONS:  Antibiotics:  vancomycin  IVPB  IV Intermittent   piperacillin/tazobactam IVPB. 3.375Gram(s) IV Intermittent every 6 hours  vancomycin  IVPB 1250milliGRAM(s) IV Intermittent once  vancomycin  IVPB 1250milliGRAM(s) IV Intermittent every 12 hours    Neuro:  FLUoxetine 20milliGRAM(s) Oral daily  ondansetron Injectable 4milliGRAM(s) IV Push every 6 hours PRN  levETIRAcetam  IVPB 500milliGRAM(s) IV Intermittent every 12 hours  aspirin Suppository 300milliGRAM(s) Rectal daily    Anticoagulation:  enoxaparin Injectable 40milliGRAM(s) SubCutaneous at bedtime    OTHER:  insulin lispro (HumaLOG) corrective regimen sliding scale  SubCutaneous Before meals and at bedtime  dextrose Gel 1Dose(s) Oral once PRN  dextrose 50% Injectable 12.5Gram(s) IV Push once  glucagon  Injectable 1milliGRAM(s) IntraMuscular once PRN  atorvastatin 40milliGRAM(s) Oral at bedtime  pantoprazole  Injectable 40milliGRAM(s) IV Push daily  labetalol Injectable 10milliGRAM(s) IV Push every 1 hour PRN  insulin glargine Injectable (LANTUS) 10Unit(s) SubCutaneous every morning    IVF:    CULTURES:  Culture Results:   No growth (05-21 @ 21:08)    RADIOLOGY & ADDITIONAL TESTS:      ASSESSMENT:  71y Male s/p left STA-MCA bypass POD#3, s/p IVC filter POD#1    PLAN:  NEURO: Hold Angio for today, will f/u Dr. Rajput on future date  Continue neuro checks.  Keppra x5 days total (5/28)  Tylenol PRN pain    CARDIOVASCULAR: SBP goal 120-180, treat HTN PRN >190 systolic.  AM labs, electrolyte repletion PRN    PULMONARY: high flow NC, +congestion on CXR.    RENAL: D/C joe for TOV    GI: NPO, hold IVF. No TFs yet. PPI    HEME: Left LE SCD, s/p IVC filter for RLE DVT. SQL.    ID: Panculture for fever 100.7, start Vanco/Zosyn    ENDO: ISS, Lantus (to get this morning)    DISPOSITION: Continue SICU care during fever work-up  D/w Dr. Rajput, Dr. Mina

## 2017-05-26 NOTE — PROGRESS NOTE ADULT - SUBJECTIVE AND OBJECTIVE BOX
Interval Events:  Patient seen and examined at bedside.    Patient is a 71y old  Male who presents with a chief complaint of worsening slurred speech (21 May 2017 16:56)      PAST MEDICAL & SURGICAL HISTORY:  Cerebrovascular accident (CVA) due to stenosis of left middle cerebral artery  Coronary artery disease without angina pectoris, unspecified vessel or lesion type, unspecified whether native or transplanted heart  Depression, unspecified depression type  Essential hypertension  Controlled type 2 diabetes mellitus without complication, unspecified long term insulin use status  History of penile implant  History of lumbar surgery  H/O umbilical hernia repair  History of appendectomy      MEDICATIONS:  Pulmonary:    Antimicrobials:  vancomycin  IVPB  IV Intermittent   piperacillin/tazobactam IVPB. 3.375Gram(s) IV Intermittent every 6 hours  vancomycin  IVPB 1250milliGRAM(s) IV Intermittent every 12 hours    Anticoagulants:  enoxaparin Injectable 40milliGRAM(s) SubCutaneous at bedtime    Cardiac:  labetalol Injectable 10milliGRAM(s) IV Push every 1 hour PRN      Allergies    No Known Allergies    Intolerances        Vital Signs Last 24 Hrs  T(C): 37.7, Max: 37.7 (05-26 @ 02:43)  T(F): 99.9, Max: 99.9 (05-26 @ 21:16)  HR: 96 (65 - 106)  BP: 162/109 (147/86 - 187/87)  BP(mean): 119 (97 - 148)  RR: 21 (17 - 40)  SpO2: 98% (93% - 99%)  I & Os for 24h ending 05-26 @ 07:00  =============================================  IN: 1621 ml / OUT: 2796 ml / NET: -1175 ml    I & Os for current day (as of 05-26 @ 22:48)  =============================================  IN: 886.7 ml / OUT: 2365 ml / NET: -1478.3 ml        LABS:      CBC Full  -  ( 26 May 2017 10:03 )  WBC Count : 18.8 K/uL  Hemoglobin : 9.8 g/dL  Hematocrit : 28.6 %  Platelet Count - Automated : 206 K/uL  Mean Cell Volume : 84.4 fL  Mean Cell Hemoglobin : 28.9 pg  Mean Cell Hemoglobin Concentration : 34.3 g/dL  Auto Neutrophil # : x  Auto Lymphocyte # : x  Auto Monocyte # : x  Auto Eosinophil # : x  Auto Basophil # : x  Auto Neutrophil % : x  Auto Lymphocyte % : x  Auto Monocyte % : x  Auto Eosinophil % : x  Auto Basophil % : x    05-26    145  |  100  |  18  ----------------------------<  187<H>  2.8<LL>   |  30  |  1.10    Ca    8.9      26 May 2017 16:54  Phos  2.5     05-26  Mg     1.8     05-26      PT/INR - ( 25 May 2017 05:49 )   PT: 14.0 sec;   INR: 1.26          PTT - ( 25 May 2017 05:49 )  PTT:27.3 sec                    RADIOLOGY & ADDITIONAL STUDIES (The following images were personally reviewed):  Guerrero:                            Yes        No  Urine output:               Yes        No  DVT prophylaxis:         Yes        No  Flattus:                          Yes        No  Bowel movement:       Yes        No

## 2017-05-26 NOTE — PROGRESS NOTE ADULT - SUBJECTIVE AND OBJECTIVE BOX
=================================  NEUROCRITICAL CARE ATTENDING NOTE  =================================    RISHI HERRERA   [id number]  Summary:  71M with Hypertension CAD multiple CVA Diabetes Mellitus, recent angio noted worsening carotid stenosis.  Presented with worsening speech, confusion.  Admitted , underwent L STA-MCA bypass on , given 2 units PRBC intraoperatively; marginal flow at end of bypass procedure  Overnight Events: quads fasciculations this AM?; spitting bright green fluid - AXR done unremarkable; this morning - hypoxic requiring NRB    PAST MEDICAL & SURGICAL HISTORY: Cerebrovascular accident (CVA) due to stenosis of left middle cerebral artery Coronary artery disease without angina pectoris, unspecified vessel or lesion type, unspecified whether native or transplanted heart Depression, unspecified depression type Essential hypertension Controlled type 2 diabetes mellitus without complication, unspecified long term insulin use status History of penile implant History of lumbar surgery H/O umbilical hernia repair History of appendectomy  NKDA  Home meds?    PHYSICAL EXAMINATION  T(C): , Max: 38.2 (05-25 @ 22:00) HR: 78 (77 - 112) BP: 172/78 (147/86 - 196/87) RR: 24 (14 - 34) SpO2: 95% (90% - 99%)  NEUROLOGIC EXAMINATION:  Patient is awake, aphasic, nods to questioning but no verbal output, pupils 3mm reactive to light, R UE spastic, moves L UE /LE spontaneously, R LE (+) clonus (+) babinski R   GENERAL:  not intubated, not in cardiorespiratory distress  EENT: anicteric  CARDIOVASC:  (+) S1 S2, normal rate and regular rhythm  PULMONARY:  clear to auscultation bilaterally  ABDOMEN:  soft, nontender, with normoactive bowel sounds  EXTREMITIES:  no edema  SKIN:  no rash    LABS:  CAPILLARY BLOOD GLUCOSE 173 (25 May 2017 16:00) 152 (25 May 2017 13:17)                        9.9    17.6  )-----------( 184      ( 26 May 2017 00:34 )             29.4     146<H>  |  105  |  14  ----------------------------<  205<H>  3.1<L>   |  25  |  1.10    Ca    8.7      26 May 2017 00:34  Phos  2.6       Mg     1.8         I & Os for current day (as of  @ 07:24)  IN: 1621 ml / OUT: 2796 ml / NET: -1175 ml    I & Os for 24h ending  @ 07:00  IN: 1920.9 ml / OUT: 2680 ml / NET: -759.1 ml  65 A1C 6.2  TG 72 HDL 27 LDL     Bacteriology:  CSF studies:  EEG:  Neuroimaging: CT head : post-op changes (subarachnoid mostly L sided), small SDH deep to craniotomy;  CT  chronic WM changes.   MRI head - extensive longstanding ischemic changes, L>R, subacute infarction L frontal and parietal periRolandic brain, acute infarction R parietal perivent WM.  Multiple sites of mod to severe intracranial arterial stenosis, severe L M1 stenosis, mod to severe R M1 and LA2 stenosis, mod L supraclinoid ICA stenosis, mildto mod narrowing distal basilar artery and bilateral PCAs L>R; NOVA: diminished flow L ICA bilateral MCAs  Other imagin/24 Doppler DVT R mid/distal femoral vein / popliteal and posterior tibial veins    MEDICATIONS: fluoxetine 20mg daily mod ISS atorvastatin 40mg HS asa 325 daily pantoprazole 40 IV daily levetiracetam 500 IV q12h lantus 10 asa 300 ND daily SQL    IV FLUIDS: NS 50cc/hr  DRIPS:   DIET: NPO  Lines / Drains: subgaleal SAMUEL drain (50cc/hr), restraints, Guerrero     CODE STATUS:  full code                       GOALS OF CARE:  aggressive                      DISPOSITION:  ICU =================================  NEUROCRITICAL CARE ATTENDING NOTE  =================================    RISHI HERRERA   [id number]  Summary:  71M with Hypertension CAD multiple CVA Diabetes Mellitus, recent angio noted worsening carotid stenosis.  Presented with worsening speech, confusion.  Admitted , underwent L STA-MCA bypass on , given 2 units PRBC intraoperatively; marginal flow at end of bypass procedure   hypoxic requiring NRB, diuresed  Overnight Events: stable respiratory-wise  overnight, kept on high flow overnight; low grade temps; subgaleal SAMUEL removed yesterday     PAST MEDICAL & SURGICAL HISTORY: Cerebrovascular accident (CVA) due to stenosis of left middle cerebral artery Coronary artery disease without angina pectoris, unspecified vessel or lesion type, unspecified whether native or transplanted heart Depression, unspecified depression type Essential hypertension Controlled type 2 diabetes mellitus without complication, unspecified long term insulin use status History of penile implant History of lumbar surgery H/O umbilical hernia repair History of appendectomy  NKDA  Home meds?    PHYSICAL EXAMINATION  T(C): , Max: 38.2 (- @ 22:00) HR: 78 (77 - 112) BP: 172/78 (147/86 - 196/87) RR: 24 (14 - 20s) SpO2: 95% (90% - 99%)  NEUROLOGIC EXAMINATION:  Patient is awake, aphasic, nods to questioning but no verbal output, follows commands intermittently;  pupils 3mm reactive to light, R UE spastic, moves L UE /LE spontaneously, R LE (+) clonus (+) babinski R   GENERAL:  not intubated, on high flow O2  EENT: anicteric  CARDIOVASC:  (+) S1 S2, normal rate and regular rhythm  PULMONARY:  decreased breath sounds bilaterally  ABDOMEN:  soft, nontender, with normoactive bowel sounds  EXTREMITIES:  no edema  SKIN:  no rash    LABS:  CAPILLARY BLOOD GLUCOSE 173 (25 May 2017 16:00) 152 (25 May 2017 13:17)             (20)       9.9    17.6  )-----------( 184      ( 26 May 2017 00:34 )             29.4     146<H>  |  105  |  14  ----------------------------<  205<H>  3.1<L>   |  25  |  1.10    Ca    8.7      26 May 2017 00:34  Phos  2.6     -  Mg     1.8         I & Os for current day (as of  @ 07:24)  IN: 1621 ml / OUT: 2796 ml / NET: -1175 ml    I & Os for 24h ending  @ 07:00  IN: 1920.9 ml / OUT: 2680 ml / NET: -759.1 ml    65 A1C 6.2  TG 72 HDL 27 LDL     Bacteriology:  CSF studies:  EEG:  Neuroimaging: CT head : post-op changes (subarachnoid mostly L sided), small SDH deep to craniotomy;  CT  chronic WM changes.   MRI head - extensive longstanding ischemic changes, L>R, subacute infarction L frontal and parietal periRolandic brain, acute infarction R parietal perivent WM.  Multiple sites of mod to severe intracranial arterial stenosis, severe L M1 stenosis, mod to severe R M1 and LA2 stenosis, mod L supraclinoid ICA stenosis, mildto mod narrowing distal basilar artery and bilateral PCAs L>R; NOVA: diminished flow L ICA bilateral MCAs  Other imagin/26 CXR bilateral infiltrates  L>R';  Doppler DVT R mid/distal femoral vein / popliteal and posterior tibial veins    MEDICATIONS: fluoxetine 20mg daily mod ISS atorvastatin 40mg HS asa 325 daily pantoprazole 40 IV daily levetiracetam 500 IV q12h lantus 10 asa 300 TN daily SQL    IV FLUIDS: IV lock  DRIPS:   DIET: NPO  Lines / Drains: restraints, Guerrero     CODE STATUS:  full code                       GOALS OF CARE:  aggressive                      DISPOSITION:  ICU =================================  NEUROCRITICAL CARE ATTENDING NOTE  =================================    RISHI HERRERA   [id number]  Summary:  71M with Hypertension CAD multiple CVA Diabetes Mellitus, recent angio noted worsening carotid stenosis.  Presented with worsening speech, confusion.  Admitted , underwent L STA-MCA bypass on , given 2 units PRBC intraoperatively; marginal flow at end of bypass procedure   hypoxic requiring NRB, diuresed  Overnight Events: stable respiratory-wise  overnight, kept on high flow overnight; low grade temps; subgaleal SAMUEL removed yesterday     PAST MEDICAL & SURGICAL HISTORY: Cerebrovascular accident (CVA) due to stenosis of left middle cerebral artery Coronary artery disease without angina pectoris, unspecified vessel or lesion type, unspecified whether native or transplanted heart Depression, unspecified depression type Essential hypertension Controlled type 2 diabetes mellitus without complication, unspecified long term insulin use status History of penile implant History of lumbar surgery H/O umbilical hernia repair History of appendectomy  NKDA  Home meds?    PHYSICAL EXAMINATION  T(C): , Max: 38.2 (- @ 22:00) HR: 78 (77 - 112) BP: 172/78 (147/86 - 196/87) RR: 24 (14 - 20s) SpO2: 95% (90% - 99%)  NEUROLOGIC EXAMINATION:  Patient is awake, aphasic, nods to questioning but no verbal output, follows commands intermittently;  pupils 3mm reactive to light, R UE spastic, moves L UE /LE spontaneously, R LE (+) clonus (+) babinski R   GENERAL:  not intubated, on high flow O2  EENT: anicteric, (+) neck vein distention  CARDIOVASC:  (+) S1 S2, normal rate and regular rhythm  PULMONARY:  decreased breath sounds bilaterally, with bibasal crackles  ABDOMEN:  soft, nontender, with normoactive bowel sounds  EXTREMITIES:  (+) edema - R hand - infiltrated IV? - will remove  SKIN:  no rash    LABS:  CAPILLARY BLOOD GLUCOSE 173 (25 May 2017 16:00) 152 (25 May 2017 13:17)             (20)       9.9    17.6  )-----------( 184      ( 26 May 2017 00:34 )             29.4     146<H>  |  105  |  14  ----------------------------<  205<H>  3.1<L>   |  25  |  1.10    Ca    8.7      26 May 2017 00:34  Phos  2.6       Mg     1.8         I & Os for current day (as of  @ 07:24)  IN: 1621 ml / OUT: 2796 ml / NET: -1175 ml    I & Os for 24h ending  @ 07:00  IN: 1920.9 ml / OUT: 2680 ml / NET: -759.1 ml    65 A1C 6.2  TG 72 HDL 27 LDL     Bacteriology:  CSF studies:  EEG:  Neuroimaging: CT head : post-op changes (subarachnoid mostly L sided), small SDH deep to craniotomy;  CT  chronic WM changes.   MRI head - extensive longstanding ischemic changes, L>R, subacute infarction L frontal and parietal periRolandic brain, acute infarction R parietal perivent WM.  Multiple sites of mod to severe intracranial arterial stenosis, severe L M1 stenosis, mod to severe R M1 and LA2 stenosis, mod L supraclinoid ICA stenosis, mildto mod narrowing distal basilar artery and bilateral PCAs L>R; NOVA: diminished flow L ICA bilateral MCAs  Other imagin/26 CXR bilateral infiltrates  L>R';  Doppler DVT R mid/distal femoral vein / popliteal and posterior tibial veins    MEDICATIONS: fluoxetine 20mg daily mod ISS atorvastatin 40mg HS asa 325 daily pantoprazole 40 IV daily levetiracetam 500 IV q12h lantus 10 asa 300 NE daily SQL    IV FLUIDS: IV lock  DRIPS:   DIET: NPO  Lines / Drains: restraints, Guerrero     CODE STATUS:  full code                       GOALS OF CARE:  aggressive                      DISPOSITION:  ICU

## 2017-05-27 LAB
ANION GAP SERPL CALC-SCNC: 16 MMOL/L — SIGNIFICANT CHANGE UP (ref 5–17)
ANION GAP SERPL CALC-SCNC: 18 MMOL/L — HIGH (ref 5–17)
BUN SERPL-MCNC: 21 MG/DL — SIGNIFICANT CHANGE UP (ref 7–23)
BUN SERPL-MCNC: 22 MG/DL — SIGNIFICANT CHANGE UP (ref 7–23)
CALCIUM SERPL-MCNC: 8.5 MG/DL — SIGNIFICANT CHANGE UP (ref 8.4–10.5)
CALCIUM SERPL-MCNC: 8.8 MG/DL — SIGNIFICANT CHANGE UP (ref 8.4–10.5)
CHLORIDE SERPL-SCNC: 102 MMOL/L — SIGNIFICANT CHANGE UP (ref 96–108)
CHLORIDE SERPL-SCNC: 103 MMOL/L — SIGNIFICANT CHANGE UP (ref 96–108)
CO2 SERPL-SCNC: 27 MMOL/L — SIGNIFICANT CHANGE UP (ref 22–31)
CO2 SERPL-SCNC: 28 MMOL/L — SIGNIFICANT CHANGE UP (ref 22–31)
CREAT SERPL-MCNC: 1.1 MG/DL — SIGNIFICANT CHANGE UP (ref 0.5–1.3)
CREAT SERPL-MCNC: 1.1 MG/DL — SIGNIFICANT CHANGE UP (ref 0.5–1.3)
GLUCOSE SERPL-MCNC: 211 MG/DL — HIGH (ref 70–99)
GLUCOSE SERPL-MCNC: 213 MG/DL — HIGH (ref 70–99)
HCT VFR BLD CALC: 29.1 % — LOW (ref 39–50)
HGB BLD-MCNC: 9.7 G/DL — LOW (ref 13–17)
MAGNESIUM SERPL-MCNC: 1.9 MG/DL — SIGNIFICANT CHANGE UP (ref 1.6–2.6)
MAGNESIUM SERPL-MCNC: 2.2 MG/DL — SIGNIFICANT CHANGE UP (ref 1.6–2.6)
MCHC RBC-ENTMCNC: 28.3 PG — SIGNIFICANT CHANGE UP (ref 27–34)
MCHC RBC-ENTMCNC: 33.3 G/DL — SIGNIFICANT CHANGE UP (ref 32–36)
MCV RBC AUTO: 84.8 FL — SIGNIFICANT CHANGE UP (ref 80–100)
PHOSPHATE SERPL-MCNC: 2.7 MG/DL — SIGNIFICANT CHANGE UP (ref 2.5–4.5)
PHOSPHATE SERPL-MCNC: 3 MG/DL — SIGNIFICANT CHANGE UP (ref 2.5–4.5)
PLATELET # BLD AUTO: 241 K/UL — SIGNIFICANT CHANGE UP (ref 150–400)
POTASSIUM SERPL-MCNC: 3 MMOL/L — LOW (ref 3.5–5.3)
POTASSIUM SERPL-MCNC: 3.5 MMOL/L — SIGNIFICANT CHANGE UP (ref 3.5–5.3)
POTASSIUM SERPL-SCNC: 3 MMOL/L — LOW (ref 3.5–5.3)
POTASSIUM SERPL-SCNC: 3.5 MMOL/L — SIGNIFICANT CHANGE UP (ref 3.5–5.3)
RBC # BLD: 3.43 M/UL — LOW (ref 4.2–5.8)
RBC # FLD: 13.6 % — SIGNIFICANT CHANGE UP (ref 10.3–16.9)
SODIUM SERPL-SCNC: 146 MMOL/L — HIGH (ref 135–145)
SODIUM SERPL-SCNC: 148 MMOL/L — HIGH (ref 135–145)
VANCOMYCIN TROUGH SERPL-MCNC: 13.7 UG/ML — SIGNIFICANT CHANGE UP (ref 10–20)
WBC # BLD: 15.6 K/UL — HIGH (ref 3.8–10.5)
WBC # FLD AUTO: 15.6 K/UL — HIGH (ref 3.8–10.5)

## 2017-05-27 PROCEDURE — 71010: CPT | Mod: 26

## 2017-05-27 PROCEDURE — 99233 SBSQ HOSP IP/OBS HIGH 50: CPT | Mod: GC

## 2017-05-27 RX ORDER — LEVETIRACETAM 250 MG/1
1000 TABLET, FILM COATED ORAL
Qty: 0 | Refills: 0 | Status: DISCONTINUED | OUTPATIENT
Start: 2017-05-27 | End: 2017-05-31

## 2017-05-27 RX ORDER — ACETAMINOPHEN 500 MG
650 TABLET ORAL EVERY 6 HOURS
Qty: 0 | Refills: 0 | Status: DISCONTINUED | OUTPATIENT
Start: 2017-05-27 | End: 2017-06-12

## 2017-05-27 RX ORDER — LEVETIRACETAM 250 MG/1
1000 TABLET, FILM COATED ORAL ONCE
Qty: 0 | Refills: 0 | Status: COMPLETED | OUTPATIENT
Start: 2017-05-27 | End: 2017-05-27

## 2017-05-27 RX ORDER — INSULIN HUMAN 100 [IU]/ML
3 INJECTION, SOLUTION SUBCUTANEOUS EVERY 6 HOURS
Qty: 0 | Refills: 0 | Status: DISCONTINUED | OUTPATIENT
Start: 2017-05-27 | End: 2017-05-28

## 2017-05-27 RX ORDER — POTASSIUM CHLORIDE 20 MEQ
40 PACKET (EA) ORAL ONCE
Qty: 0 | Refills: 0 | Status: COMPLETED | OUTPATIENT
Start: 2017-05-27 | End: 2017-05-28

## 2017-05-27 RX ORDER — MAGNESIUM SULFATE 500 MG/ML
1 VIAL (ML) INJECTION ONCE
Qty: 0 | Refills: 0 | Status: COMPLETED | OUTPATIENT
Start: 2017-05-27 | End: 2017-05-27

## 2017-05-27 RX ORDER — POTASSIUM CHLORIDE 20 MEQ
40 PACKET (EA) ORAL
Qty: 0 | Refills: 0 | Status: COMPLETED | OUTPATIENT
Start: 2017-05-27 | End: 2017-05-27

## 2017-05-27 RX ORDER — POTASSIUM PHOSPHATE, MONOBASIC POTASSIUM PHOSPHATE, DIBASIC 236; 224 MG/ML; MG/ML
21 INJECTION, SOLUTION INTRAVENOUS ONCE
Qty: 0 | Refills: 0 | Status: COMPLETED | OUTPATIENT
Start: 2017-05-27 | End: 2017-05-27

## 2017-05-27 RX ADMIN — INSULIN HUMAN 3 UNIT(S): 100 INJECTION, SOLUTION SUBCUTANEOUS at 17:12

## 2017-05-27 RX ADMIN — Medication 4: at 07:19

## 2017-05-27 RX ADMIN — ENOXAPARIN SODIUM 40 MILLIGRAM(S): 100 INJECTION SUBCUTANEOUS at 22:31

## 2017-05-27 RX ADMIN — Medication 10 MILLIGRAM(S): at 08:00

## 2017-05-27 RX ADMIN — Medication 650 MILLIGRAM(S): at 14:45

## 2017-05-27 RX ADMIN — PIPERACILLIN AND TAZOBACTAM 200 GRAM(S): 4; .5 INJECTION, POWDER, LYOPHILIZED, FOR SOLUTION INTRAVENOUS at 14:01

## 2017-05-27 RX ADMIN — Medication 325 MILLIGRAM(S): at 11:06

## 2017-05-27 RX ADMIN — PIPERACILLIN AND TAZOBACTAM 200 GRAM(S): 4; .5 INJECTION, POWDER, LYOPHILIZED, FOR SOLUTION INTRAVENOUS at 22:29

## 2017-05-27 RX ADMIN — LEVETIRACETAM 500 MILLIGRAM(S): 250 TABLET, FILM COATED ORAL at 06:11

## 2017-05-27 RX ADMIN — Medication 40 MILLIEQUIVALENT(S): at 08:24

## 2017-05-27 RX ADMIN — Medication 2: at 23:34

## 2017-05-27 RX ADMIN — Medication 2: at 17:11

## 2017-05-27 RX ADMIN — Medication 100 GRAM(S): at 08:24

## 2017-05-27 RX ADMIN — PANTOPRAZOLE SODIUM 40 MILLIGRAM(S): 20 TABLET, DELAYED RELEASE ORAL at 11:06

## 2017-05-27 RX ADMIN — INSULIN HUMAN 3 UNIT(S): 100 INJECTION, SOLUTION SUBCUTANEOUS at 11:05

## 2017-05-27 RX ADMIN — Medication 650 MILLIGRAM(S): at 14:01

## 2017-05-27 RX ADMIN — Medication 40 MILLIEQUIVALENT(S): at 10:12

## 2017-05-27 RX ADMIN — LEVETIRACETAM 500 MILLIGRAM(S): 250 TABLET, FILM COATED ORAL at 18:31

## 2017-05-27 RX ADMIN — ATORVASTATIN CALCIUM 40 MILLIGRAM(S): 80 TABLET, FILM COATED ORAL at 22:29

## 2017-05-27 RX ADMIN — Medication 2: at 11:05

## 2017-05-27 RX ADMIN — POTASSIUM PHOSPHATE, MONOBASIC POTASSIUM PHOSPHATE, DIBASIC 64.25 MILLIMOLE(S): 236; 224 INJECTION, SOLUTION INTRAVENOUS at 10:11

## 2017-05-27 RX ADMIN — INSULIN GLARGINE 10 UNIT(S): 100 INJECTION, SOLUTION SUBCUTANEOUS at 07:19

## 2017-05-27 RX ADMIN — LEVETIRACETAM 400 MILLIGRAM(S): 250 TABLET, FILM COATED ORAL at 20:00

## 2017-05-27 RX ADMIN — Medication 20 MILLIGRAM(S): at 11:06

## 2017-05-27 RX ADMIN — Medication 166.67 MILLIGRAM(S): at 10:11

## 2017-05-27 RX ADMIN — Medication 40 MILLIEQUIVALENT(S): at 22:28

## 2017-05-27 RX ADMIN — Medication 10 MILLIGRAM(S): at 06:11

## 2017-05-27 RX ADMIN — Medication 40 MILLIEQUIVALENT(S): at 18:15

## 2017-05-27 RX ADMIN — PIPERACILLIN AND TAZOBACTAM 200 GRAM(S): 4; .5 INJECTION, POWDER, LYOPHILIZED, FOR SOLUTION INTRAVENOUS at 10:09

## 2017-05-27 RX ADMIN — PIPERACILLIN AND TAZOBACTAM 200 GRAM(S): 4; .5 INJECTION, POWDER, LYOPHILIZED, FOR SOLUTION INTRAVENOUS at 03:22

## 2017-05-27 NOTE — PROGRESS NOTE ADULT - SUBJECTIVE AND OBJECTIVE BOX
S: No new issues/events overnight, no new med c/o    O: ICU Vital Signs Last 24 Hrs  T(F): 100.5, Max: 100.5 (05-27 @ 05:59)  HR: 76 (65 - 102)  BP: 189/81 (155/67 - 193/93)  BP(mean): 125 (91 - 154)  ABP: --  RR: 22 (17 - 40)  SpO2: 99% (93% - 99%)  Wt(kg): --    PHYSICAL EXAM:     Neurological: AAOx3, CNII-XII intact,  strength 5/5 b/l  Cardiovascular: RRR  Respiratory: CTA  Gastrointestinal: soft, NT, ND, BS+  Extremities: warm, no dependent edema  Vascular: no cyanosis/erythema    LABS:    05-27    148<H>  |  102  |  21  ----------------------------<  211<H>  3.0<L>   |  28  |  1.10    Ca    8.8      27 May 2017 05:03  Phos  2.7     05-27  Mg     1.9     05-27                          9.7    15.6  )-----------( 241      ( 27 May 2017 05:03 )             29.1     CAPILLARY BLOOD GLUCOSE  225 (27 May 2017 07:00)  156 (26 May 2017 23:00)  181 (26 May 2017 16:45)  200 (26 May 2017 11:00)    MEDICATIONS  (STANDING):  FLUoxetine 20milliGRAM(s) Oral daily  insulin lispro (HumaLOG) corrective regimen sliding scale  SubCutaneous Before meals and at bedtime  dextrose 50% Injectable 12.5Gram(s) IV Push once  atorvastatin 40milliGRAM(s) Oral at bedtime  pantoprazole  Injectable 40milliGRAM(s) IV Push daily  enoxaparin Injectable 40milliGRAM(s) SubCutaneous at bedtime  insulin glargine Injectable (LANTUS) 10Unit(s) SubCutaneous every morning  vancomycin  IVPB  IV Intermittent   piperacillin/tazobactam IVPB. 3.375Gram(s) IV Intermittent every 6 hours  vancomycin  IVPB 1250milliGRAM(s) IV Intermittent every 12 hours  aspirin 325milliGRAM(s) Enteral Tube daily  levETIRAcetam  Solution 500milliGRAM(s) Oral two times a day  magnesium sulfate  IVPB 1Gram(s) IV Intermittent once  potassium chloride   Powder 40milliEquivalent(s) Oral every 2 hours  potassium phosphate IVPB 21milliMole(s) IV Intermittent once    MEDICATIONS  (PRN):  dextrose Gel 1Dose(s) Oral once PRN Blood Glucose LESS THAN 70 milliGRAM(s)/deciliter  glucagon  Injectable 1milliGRAM(s) IntraMuscular once PRN Glucose LESS THAN 70 milligrams/deciliter  ondansetron Injectable 4milliGRAM(s) IV Push every 6 hours PRN Nausea and/or Vomiting  labetalol Injectable 10milliGRAM(s) IV Push every 1 hour PRN SBP>180      Guerrero:	  [ ] None	[ ] Daily Guerrero Order Placed	   Indication:	  [ ] Strict I and O's    [ ] Obstruction     [ ] Incontinence + Stage 3 or 4 Decubitus  Central Line:  [ ] None	   [ ]  Medication / TPN Administration     [ ] No Peripheral IV S: No new issues/events overnight, no new med c/o    O: ICU Vital Signs Last 24 Hrs  T(F): 100.5, Max: 100.5 (05-27 @ 05:59)  HR: 76 (65 - 102)  BP: 189/81 (155/67 - 193/93)  BP(mean): 125 (91 - 154)  ABP: --  RR: 22 (17 - 40)  SpO2: 99% (93% - 99%)  Wt(kg): --    PHYSICAL EXAM:     Neurological: arousible, awake alert, nods his head to questions, but not oriented fully, weaker on right than left side. does not follow commands consistently.   Cardiovascular: RRR  Respiratory: CTA  Gastrointestinal: soft, NT, ND, BS+  Extremities: warm, no dependent edema  Vascular: no cyanosis/erythema    LABS:    05-27    148<H>  |  102  |  21  ----------------------------<  211<H>  3.0<L>   |  28  |  1.10    Ca    8.8      27 May 2017 05:03  Phos  2.7     05-27  Mg     1.9     05-27                          9.7    15.6  )-----------( 241      ( 27 May 2017 05:03 )             29.1     CAPILLARY BLOOD GLUCOSE  225 (27 May 2017 07:00)  156 (26 May 2017 23:00)  181 (26 May 2017 16:45)  200 (26 May 2017 11:00)    MEDICATIONS  (STANDING):  FLUoxetine 20milliGRAM(s) Oral daily  insulin lispro (HumaLOG) corrective regimen sliding scale  SubCutaneous Before meals and at bedtime  atorvastatin 40milliGRAM(s) Oral at bedtime  pantoprazole  Injectable 40milliGRAM(s) IV Push daily  enoxaparin Injectable 40milliGRAM(s) SubCutaneous at bedtime  insulin glargine Injectable (LANTUS) 10Unit(s) SubCutaneous every morning  vancomycin  IVPB  IV Intermittent   piperacillin/tazobactam IVPB. 3.375Gram(s) IV Intermittent every 6 hours  vancomycin  IVPB 1250milliGRAM(s) IV Intermittent every 12 hours  aspirin 325milliGRAM(s) Enteral Tube daily  levETIRAcetam  Solution 500milliGRAM(s) Oral two times a day  magnesium sulfate  IVPB 1Gram(s) IV Intermittent once  potassium chloride   Powder 40milliEquivalent(s) Oral every 2 hours  potassium phosphate IVPB 21milliMole(s) IV Intermittent once    MEDICATIONS  (PRN):  dextrose Gel 1Dose(s) Oral once PRN Blood Glucose LESS THAN 70 milliGRAM(s)/deciliter  glucagon  Injectable 1milliGRAM(s) IntraMuscular once PRN Glucose LESS THAN 70 milligrams/deciliter  ondansetron Injectable 4milliGRAM(s) IV Push every 6 hours PRN Nausea and/or Vomiting  labetalol Injectable 10milliGRAM(s) IV Push every 1 hour PRN SBP>180      Guerrero:	  [x ] None	[ ] Daily Guerrero Order Placed	   Indication:	  [ ] Strict I and O's    [ ] Obstruction     [ ] Incontinence + Stage 3 or 4 Decubitus  Central Line:  [ x] None	   [ ]  Medication / TPN Administration     [ ] No Peripheral IV

## 2017-05-27 NOTE — PROGRESS NOTE ADULT - ATTENDING COMMENTS
Patient seen and examined with house-staff during bedside rounds.  Resident note read, including vitals, physical findings, laboratory data, and radiological reports.   Revisions included below.  Direct personal management at bed side and extensive interpretation of the data.  Plan was outlined and discussed in details with the housestaff.  Decision making of high complexity  CAD HTN HLP DM.  The cardiac status is stable.  The BS is uncontrolled and tube feed will be advance.  He is on Lantus and start premeal insulin.  The BP is at target.  No need for diuretics today.  The K and K+Mg were replaced due to hypokalemia.  Continue statin.  He is on Keppra and on EEG.  On Lovenox.  He has a joe cath

## 2017-05-28 LAB
ANION GAP SERPL CALC-SCNC: 11 MMOL/L — SIGNIFICANT CHANGE UP (ref 5–17)
BUN SERPL-MCNC: 23 MG/DL — SIGNIFICANT CHANGE UP (ref 7–23)
C DIFF GDH STL QL: NEGATIVE — SIGNIFICANT CHANGE UP
C DIFF GDH STL QL: SIGNIFICANT CHANGE UP
CALCIUM SERPL-MCNC: 8.8 MG/DL — SIGNIFICANT CHANGE UP (ref 8.4–10.5)
CHLORIDE SERPL-SCNC: 108 MMOL/L — SIGNIFICANT CHANGE UP (ref 96–108)
CO2 SERPL-SCNC: 28 MMOL/L — SIGNIFICANT CHANGE UP (ref 22–31)
CREAT SERPL-MCNC: 1 MG/DL — SIGNIFICANT CHANGE UP (ref 0.5–1.3)
GLUCOSE SERPL-MCNC: 233 MG/DL — HIGH (ref 70–99)
HCT VFR BLD CALC: 32.7 % — LOW (ref 39–50)
HGB BLD-MCNC: 10.5 G/DL — LOW (ref 13–17)
MAGNESIUM SERPL-MCNC: 2.2 MG/DL — SIGNIFICANT CHANGE UP (ref 1.6–2.6)
MCHC RBC-ENTMCNC: 28.4 PG — SIGNIFICANT CHANGE UP (ref 27–34)
MCHC RBC-ENTMCNC: 32.1 G/DL — SIGNIFICANT CHANGE UP (ref 32–36)
MCV RBC AUTO: 88.4 FL — SIGNIFICANT CHANGE UP (ref 80–100)
PHOSPHATE SERPL-MCNC: 2 MG/DL — LOW (ref 2.5–4.5)
PLATELET # BLD AUTO: 266 K/UL — SIGNIFICANT CHANGE UP (ref 150–400)
POTASSIUM SERPL-MCNC: 3.8 MMOL/L — SIGNIFICANT CHANGE UP (ref 3.5–5.3)
POTASSIUM SERPL-SCNC: 3.8 MMOL/L — SIGNIFICANT CHANGE UP (ref 3.5–5.3)
RBC # BLD: 3.7 M/UL — LOW (ref 4.2–5.8)
RBC # FLD: 13.6 % — SIGNIFICANT CHANGE UP (ref 10.3–16.9)
SODIUM SERPL-SCNC: 147 MMOL/L — HIGH (ref 135–145)
WBC # BLD: 12.5 K/UL — HIGH (ref 3.8–10.5)
WBC # FLD AUTO: 12.5 K/UL — HIGH (ref 3.8–10.5)

## 2017-05-28 PROCEDURE — 71010: CPT | Mod: 26

## 2017-05-28 PROCEDURE — 99233 SBSQ HOSP IP/OBS HIGH 50: CPT | Mod: GC

## 2017-05-28 RX ORDER — VANCOMYCIN HCL 1 G
VIAL (EA) INTRAVENOUS
Qty: 0 | Refills: 0 | Status: DISCONTINUED | OUTPATIENT
Start: 2017-05-28 | End: 2017-05-28

## 2017-05-28 RX ORDER — PIPERACILLIN AND TAZOBACTAM 4; .5 G/20ML; G/20ML
3.38 INJECTION, POWDER, LYOPHILIZED, FOR SOLUTION INTRAVENOUS EVERY 6 HOURS
Qty: 0 | Refills: 0 | Status: DISCONTINUED | OUTPATIENT
Start: 2017-05-28 | End: 2017-06-02

## 2017-05-28 RX ORDER — VANCOMYCIN HCL 1 G
1500 VIAL (EA) INTRAVENOUS
Qty: 0 | Refills: 0 | Status: DISCONTINUED | OUTPATIENT
Start: 2017-05-28 | End: 2017-05-28

## 2017-05-28 RX ORDER — POTASSIUM PHOSPHATE, MONOBASIC POTASSIUM PHOSPHATE, DIBASIC 236; 224 MG/ML; MG/ML
21 INJECTION, SOLUTION INTRAVENOUS ONCE
Qty: 0 | Refills: 0 | Status: COMPLETED | OUTPATIENT
Start: 2017-05-28 | End: 2017-05-28

## 2017-05-28 RX ORDER — INSULIN GLARGINE 100 [IU]/ML
4 INJECTION, SOLUTION SUBCUTANEOUS ONCE
Qty: 0 | Refills: 0 | Status: COMPLETED | OUTPATIENT
Start: 2017-05-28 | End: 2017-05-28

## 2017-05-28 RX ORDER — INSULIN GLARGINE 100 [IU]/ML
8 INJECTION, SOLUTION SUBCUTANEOUS ONCE
Qty: 0 | Refills: 0 | Status: DISCONTINUED | OUTPATIENT
Start: 2017-05-28 | End: 2017-05-28

## 2017-05-28 RX ORDER — INSULIN GLARGINE 100 [IU]/ML
14 INJECTION, SOLUTION SUBCUTANEOUS EVERY MORNING
Qty: 0 | Refills: 0 | Status: DISCONTINUED | OUTPATIENT
Start: 2017-05-29 | End: 2017-05-29

## 2017-05-28 RX ORDER — INSULIN GLARGINE 100 [IU]/ML
4 INJECTION, SOLUTION SUBCUTANEOUS ONCE
Qty: 0 | Refills: 0 | Status: DISCONTINUED | OUTPATIENT
Start: 2017-05-28 | End: 2017-05-28

## 2017-05-28 RX ORDER — INSULIN HUMAN 100 [IU]/ML
4 INJECTION, SOLUTION SUBCUTANEOUS EVERY 6 HOURS
Qty: 0 | Refills: 0 | Status: DISCONTINUED | OUTPATIENT
Start: 2017-05-28 | End: 2017-05-29

## 2017-05-28 RX ORDER — SODIUM,POTASSIUM PHOSPHATES 278-250MG
1 POWDER IN PACKET (EA) ORAL
Qty: 0 | Refills: 0 | Status: DISCONTINUED | OUTPATIENT
Start: 2017-05-28 | End: 2017-05-28

## 2017-05-28 RX ORDER — VANCOMYCIN HCL 1 G
1500 VIAL (EA) INTRAVENOUS ONCE
Qty: 0 | Refills: 0 | Status: COMPLETED | OUTPATIENT
Start: 2017-05-28 | End: 2017-05-28

## 2017-05-28 RX ORDER — ACETAMINOPHEN 500 MG
650 TABLET ORAL EVERY 6 HOURS
Qty: 0 | Refills: 0 | Status: DISCONTINUED | OUTPATIENT
Start: 2017-05-28 | End: 2017-06-12

## 2017-05-28 RX ORDER — INSULIN HUMAN 100 [IU]/ML
5 INJECTION, SOLUTION SUBCUTANEOUS EVERY 6 HOURS
Qty: 0 | Refills: 0 | Status: DISCONTINUED | OUTPATIENT
Start: 2017-05-28 | End: 2017-05-28

## 2017-05-28 RX ADMIN — INSULIN HUMAN 4 UNIT(S): 100 INJECTION, SOLUTION SUBCUTANEOUS at 17:16

## 2017-05-28 RX ADMIN — INSULIN GLARGINE 4 UNIT(S): 100 INJECTION, SOLUTION SUBCUTANEOUS at 10:38

## 2017-05-28 RX ADMIN — LEVETIRACETAM 1000 MILLIGRAM(S): 250 TABLET, FILM COATED ORAL at 06:42

## 2017-05-28 RX ADMIN — Medication 10 MILLIGRAM(S): at 10:39

## 2017-05-28 RX ADMIN — INSULIN HUMAN 4 UNIT(S): 100 INJECTION, SOLUTION SUBCUTANEOUS at 23:01

## 2017-05-28 RX ADMIN — LEVETIRACETAM 1000 MILLIGRAM(S): 250 TABLET, FILM COATED ORAL at 21:42

## 2017-05-28 RX ADMIN — INSULIN HUMAN 3 UNIT(S): 100 INJECTION, SOLUTION SUBCUTANEOUS at 01:35

## 2017-05-28 RX ADMIN — Medication 40 MILLIEQUIVALENT(S): at 13:00

## 2017-05-28 RX ADMIN — PIPERACILLIN AND TAZOBACTAM 200 GRAM(S): 4; .5 INJECTION, POWDER, LYOPHILIZED, FOR SOLUTION INTRAVENOUS at 22:18

## 2017-05-28 RX ADMIN — PIPERACILLIN AND TAZOBACTAM 200 GRAM(S): 4; .5 INJECTION, POWDER, LYOPHILIZED, FOR SOLUTION INTRAVENOUS at 17:15

## 2017-05-28 RX ADMIN — Medication 1 TABLET(S): at 07:04

## 2017-05-28 RX ADMIN — Medication 300 MILLIGRAM(S): at 01:34

## 2017-05-28 RX ADMIN — PIPERACILLIN AND TAZOBACTAM 200 GRAM(S): 4; .5 INJECTION, POWDER, LYOPHILIZED, FOR SOLUTION INTRAVENOUS at 10:05

## 2017-05-28 RX ADMIN — PANTOPRAZOLE SODIUM 40 MILLIGRAM(S): 20 TABLET, DELAYED RELEASE ORAL at 11:03

## 2017-05-28 RX ADMIN — POTASSIUM PHOSPHATE, MONOBASIC POTASSIUM PHOSPHATE, DIBASIC 42.83 MILLIMOLE(S): 236; 224 INJECTION, SOLUTION INTRAVENOUS at 10:38

## 2017-05-28 RX ADMIN — Medication 325 MILLIGRAM(S): at 11:03

## 2017-05-28 RX ADMIN — Medication 2: at 22:11

## 2017-05-28 RX ADMIN — Medication 20 MILLIGRAM(S): at 11:03

## 2017-05-28 RX ADMIN — PIPERACILLIN AND TAZOBACTAM 200 GRAM(S): 4; .5 INJECTION, POWDER, LYOPHILIZED, FOR SOLUTION INTRAVENOUS at 04:12

## 2017-05-28 RX ADMIN — Medication 2: at 17:16

## 2017-05-28 RX ADMIN — Medication 650 MILLIGRAM(S): at 10:00

## 2017-05-28 RX ADMIN — INSULIN HUMAN 4 UNIT(S): 100 INJECTION, SOLUTION SUBCUTANEOUS at 11:02

## 2017-05-28 RX ADMIN — Medication 6: at 13:08

## 2017-05-28 RX ADMIN — Medication 4: at 07:03

## 2017-05-28 RX ADMIN — Medication 650 MILLIGRAM(S): at 23:43

## 2017-05-28 RX ADMIN — ATORVASTATIN CALCIUM 40 MILLIGRAM(S): 80 TABLET, FILM COATED ORAL at 21:41

## 2017-05-28 RX ADMIN — ENOXAPARIN SODIUM 40 MILLIGRAM(S): 100 INJECTION SUBCUTANEOUS at 21:41

## 2017-05-28 RX ADMIN — Medication 10 MILLIGRAM(S): at 06:43

## 2017-05-28 RX ADMIN — Medication 10 MILLIGRAM(S): at 22:13

## 2017-05-28 RX ADMIN — INSULIN HUMAN 3 UNIT(S): 100 INJECTION, SOLUTION SUBCUTANEOUS at 06:43

## 2017-05-28 NOTE — PROGRESS NOTE ADULT - ATTENDING COMMENTS
Patient seen and examined with house-staff during bedside rounds.  Resident note read, including vitals, physical findings, laboratory data, and radiological reports.   Revisions included below.  Direct personal management at bed side and extensive interpretation of the data.  Plan was outlined and discussed in details with the housestaff.  Decision making of high complexity  he is clinically stable with possible seizure activity.  Keppra was increased.  He is on antibiotics. Blood culture negative.  DC vancomycin. He has diarrhea on Glucerna and was changed back Osmolite.  BS uncontrolled and Lantus increased and added premeals.  BP is at target.  No change in MS

## 2017-05-28 NOTE — PROGRESS NOTE ADULT - SUBJECTIVE AND OBJECTIVE BOX
HPI:  72 y/o male pmhx HTN, CAD, multiple CVA (last 3/2016) DM, known to NSX service with recent angio procedure revealing worsening carotid stenosis planned for elective Left STA-MCA bypass this week but has been experiencing worsening in his speech over the past few days. Per the patients wife, patient has expereinced a gradual decline since March 2016 with symptoms associated with increased confusion and speech difficulty. Denies any headaches, nausea, vomiting or right side deficits. Denies any numbness tingling or decreased sensation.  Pt at baseline using hand crutch and one person assistance to ambulate. Denies any medications for pain or improvement of symptoms. Denies any recent trauma or falls. (21 May 2017 16:56)    OVERNIGHT EVENTS:  Febrile yesterday, started vanco and zosyn. Also right facial twitching, EEG revealed no epileptiform discharges, keppra increased to 1000mg BID. Loose bowel movements overnight, f/u c-diff, in isolation.      Vital Signs Last 24 Hrs  T(C): 37.9, Max: 37.9 (05-28 @ 07:01)  T(F): 100.3, Max: 100.3 (05-28 @ 07:01)  HR: 76 (58 - 80)  BP: 190/80 (117/61 - 190/80)  BP(mean): 118 (78 - 118)  RR: 22 (16 - 27)  SpO2: 94% (91% - 100%)    I&O's Detail    I & Os for current day (as of 28 May 2017 08:04)  =============================================  IN:    IV PiggyBack: 750 ml    Oral Fluid: 600 ml    Osmolite: 320 ml    Solution: 311.6 ml    Glucerna: 240 ml    Total IN: 2221.6 ml  ---------------------------------------------  OUT:    Incontinent per Condom Catheter: 400 ml    Total OUT: 400 ml  ---------------------------------------------  Total NET: 1821.6 ml    I&O's Summary    I & Os for current day (as of 28 May 2017 08:04)  =============================================  IN: 2221.6 ml / OUT: 400 ml / NET: 1821.6 ml      PHYSICAL EXAM:  Neurological: Awake, alert, tracking eyes, FC (shows 2 fingers, moves legs on command), aphasic  CNII-XII: EOM intact, PERRL  Motor: RUE 3/5, spastic, LUE 4/5, RLE/LLE 4/5, RLE +babinski, +clonus         [x] warm well perfused; capillary refill <3 seconds   Sensation: [x] intact to light touch  [] decreased:   Incision/Wound: Scalp incision site, C/D/I     TUBES/LINES:  [] CVC  [] A-line  [] Lumbar Drain  [] Ventriculostomy  [] Other     DIET:  [] NPO  [] Mechanical  [x] Tube feeds    LABS:                        10.5   12.5  )-----------( 266      ( 28 May 2017 05:36 )             32.7     05-28    147<H>  |  108  |  23  ----------------------------<  233<H>  3.8   |  28  |  1.00    Ca    8.8      28 May 2017 05:37  Phos  2.0     05-28  Mg     2.2     05-28    CAPILLARY BLOOD GLUCOSE  175 (27 May 2017 22:00)  191 (27 May 2017 11:00)    Drug Levels: [] N/A  Vancomycin Level, Trough: 13.7 ug/mL (05-27 @ 23:19)    CSF Analysis: [] N/A      Allergies    No Known Allergies    Intolerances      MEDICATIONS:  Antibiotics:  piperacillin/tazobactam IVPB. 3.375Gram(s) IV Intermittent every 6 hours  vancomycin  IVPB  IV Intermittent     Neuro:  FLUoxetine 20milliGRAM(s) Oral daily  ondansetron Injectable 4milliGRAM(s) IV Push every 6 hours PRN  aspirin 325milliGRAM(s) Enteral Tube daily  acetaminophen    Suspension. 650milliGRAM(s) Enteral Tube every 6 hours PRN  levETIRAcetam  Solution 1000milliGRAM(s) Oral two times a day    Anticoagulation:  enoxaparin Injectable 40milliGRAM(s) SubCutaneous at bedtime    OTHER:  insulin lispro (HumaLOG) corrective regimen sliding scale  SubCutaneous Before meals and at bedtime  dextrose Gel 1Dose(s) Oral once PRN  dextrose 50% Injectable 12.5Gram(s) IV Push once  glucagon  Injectable 1milliGRAM(s) IntraMuscular once PRN  atorvastatin 40milliGRAM(s) Oral at bedtime  pantoprazole  Injectable 40milliGRAM(s) IV Push daily  labetalol Injectable 10milliGRAM(s) IV Push every 1 hour PRN  insulin regular  human recombinant 4Unit(s) SubCutaneous every 6 hours  insulin glargine Injectable (LANTUS) 4Unit(s) SubCutaneous once    IVF:  potassium chloride   Powder 40milliEquivalent(s) Enteral Tube once  potassium phosphate IVPB 21milliMole(s) IV Intermittent once    CULTURES:  Culture Results:   No growth at 1 day. (05-26 @ 10:45)  Culture Results:   No growth at 1 day. (05-26 @ 10:45)    RADIOLOGY & ADDITIONAL TESTS:      ASSESSMENT:  71y Male s/p left STA-MCA bypass POD#5, s/p IVC filter POD#1    PLAN:  NEURO:  -neuro checks  -continue aspirin 325mg  -continue keppra 1000mg BID  -EEG monitoring, f/u neurology recs    CARDIOVASCULAR:  --180    PULMONARY:  -CXR: Congestion and/or infiltrates    RENAL:  -IVL  -replete electrolytes    GI:  -docusate/senna  -PPI    HEME:  -H/H stable, no issues    ID:  -afebrile, continue vanco/zosyn  -f/y vanc trough  -f/u blood culture    ENDO:  -ISS    DVT PROPHYLAXIS:  [x] Venodynes                                [x] Heparin/Lovenox: SQL    -D/w Dr. Rajput HPI:  70 y/o male pmhx HTN, CAD, multiple CVA (last 3/2016) DM, known to NSX service with recent angio procedure revealing worsening carotid stenosis planned for elective Left STA-MCA bypass this week but has been experiencing worsening in his speech over the past few days. Per the patients wife, patient has expereinced a gradual decline since March 2016 with symptoms associated with increased confusion and speech difficulty. Denies any headaches, nausea, vomiting or right side deficits. Denies any numbness tingling or decreased sensation.  Pt at baseline using hand crutch and one person assistance to ambulate. Denies any medications for pain or improvement of symptoms. Denies any recent trauma or falls. (21 May 2017 16:56)    OVERNIGHT EVENTS:  Febrile yesterday, started vanco and zosyn. Also right facial twitching, EEG revealed no epileptiform discharges, keppra increased to 1000mg BID. Loose bowel movements overnight, f/u c-diff, in isolation.      Vital Signs Last 24 Hrs  T(C): 37.9, Max: 37.9 (05-28 @ 07:01)  T(F): 100.3, Max: 100.3 (05-28 @ 07:01)  HR: 76 (58 - 80)  BP: 190/80 (117/61 - 190/80)  BP(mean): 118 (78 - 118)  RR: 22 (16 - 27)  SpO2: 94% (91% - 100%)    I&O's Detail    I & Os for current day (as of 28 May 2017 08:04)  =============================================  IN:    IV PiggyBack: 750 ml    Oral Fluid: 600 ml    Osmolite: 320 ml    Solution: 311.6 ml    Glucerna: 240 ml    Total IN: 2221.6 ml  ---------------------------------------------  OUT:    Incontinent per Condom Catheter: 400 ml    Total OUT: 400 ml  ---------------------------------------------  Total NET: 1821.6 ml    I&O's Summary    I & Os for current day (as of 28 May 2017 08:04)  =============================================  IN: 2221.6 ml / OUT: 400 ml / NET: 1821.6 ml      PHYSICAL EXAM:  Neurological: Awake, alert, tracking eyes, FC (shows 2 fingers, moves legs on command), aphasic  CNII-XII: EOM intact, PERRL  Motor: RUE 3/5, spastic, LUE 4/5, RLE/LLE 4/5, RLE +babinski, +clonus         [x] warm well perfused; capillary refill <3 seconds   Sensation: [x] intact to light touch  [] decreased:   Incision/Wound: Scalp incision site, C/D/I     TUBES/LINES:  [] CVC  [] A-line  [] Lumbar Drain  [] Ventriculostomy  [x] Other: rectal tube    DIET:  [] NPO  [] Mechanical  [x] Tube feeds    LABS:                        10.5   12.5  )-----------( 266      ( 28 May 2017 05:36 )             32.7     05-28    147<H>  |  108  |  23  ----------------------------<  233<H>  3.8   |  28  |  1.00    Ca    8.8      28 May 2017 05:37  Phos  2.0     05-28  Mg     2.2     05-28    CAPILLARY BLOOD GLUCOSE  175 (27 May 2017 22:00)  191 (27 May 2017 11:00)    Drug Levels: [] N/A  Vancomycin Level, Trough: 13.7 ug/mL (05-27 @ 23:19)    CSF Analysis: [] N/A      Allergies    No Known Allergies    Intolerances      MEDICATIONS:  Antibiotics:  piperacillin/tazobactam IVPB. 3.375Gram(s) IV Intermittent every 6 hours  vancomycin  IVPB  IV Intermittent     Neuro:  FLUoxetine 20milliGRAM(s) Oral daily  ondansetron Injectable 4milliGRAM(s) IV Push every 6 hours PRN  aspirin 325milliGRAM(s) Enteral Tube daily  acetaminophen    Suspension. 650milliGRAM(s) Enteral Tube every 6 hours PRN  levETIRAcetam  Solution 1000milliGRAM(s) Oral two times a day    Anticoagulation:  enoxaparin Injectable 40milliGRAM(s) SubCutaneous at bedtime    OTHER:  insulin lispro (HumaLOG) corrective regimen sliding scale  SubCutaneous Before meals and at bedtime  dextrose Gel 1Dose(s) Oral once PRN  dextrose 50% Injectable 12.5Gram(s) IV Push once  glucagon  Injectable 1milliGRAM(s) IntraMuscular once PRN  atorvastatin 40milliGRAM(s) Oral at bedtime  pantoprazole  Injectable 40milliGRAM(s) IV Push daily  labetalol Injectable 10milliGRAM(s) IV Push every 1 hour PRN  insulin regular  human recombinant 4Unit(s) SubCutaneous every 6 hours  insulin glargine Injectable (LANTUS) 4Unit(s) SubCutaneous once    IVF:  potassium chloride   Powder 40milliEquivalent(s) Enteral Tube once  potassium phosphate IVPB 21milliMole(s) IV Intermittent once    CULTURES:  Culture Results:   No growth at 1 day. (05-26 @ 10:45)  Culture Results:   No growth at 1 day. (05-26 @ 10:45)    RADIOLOGY & ADDITIONAL TESTS:      ASSESSMENT:  71y Male s/p left STA-MCA bypass POD#5, s/p IVC filter POD#1    PLAN:  NEURO:  -neuro checks  -continue aspirin 325mg  -continue keppra 1000mg BID  -EEG monitoring, f/u neurology recs    CARDIOVASCULAR:  --180    PULMONARY:  -CXR: Congestion and/or infiltrates    RENAL:  -IVL  -replete electrolytes    GI:  -docusate/senna  -PPI    HEME:  -H/H stable, no issues    ID:  -afebrile, continue vanco/zosyn  -f/y vanc trough  -f/u blood culture, f/u c-diff, continue isolation    ENDO:  -ISS    DVT PROPHYLAXIS:  [x] Venodynes                                [x] Heparin/Lovenox: SQL    -D/w Dr. Rajput

## 2017-05-28 NOTE — PROGRESS NOTE ADULT - SUBJECTIVE AND OBJECTIVE BOX
INTERVAL HPI/OVERNIGHT EVENTS: C diff sent yesterday for diarrhea. No result yet. Given extra dose of keppra for facial twitching overnight. Vanc dose adjusted.     PRESSORS: [ ] YES [X] NO  WHICH:  DOSE:    ANTIBIOTICS:        Zosyn          DATE STARTED: 5/26  ANTIBIOTICS:        Vanc          DATE STARTED: 5/26  ANTIBIOTICS:                  DATE STARTED:    MEDICATIONS  (STANDING):  FLUoxetine 20milliGRAM(s) Oral daily  insulin lispro (HumaLOG) corrective regimen sliding scale  SubCutaneous Before meals and at bedtime  dextrose 50% Injectable 12.5Gram(s) IV Push once  atorvastatin 40milliGRAM(s) Oral at bedtime  pantoprazole  Injectable 40milliGRAM(s) IV Push daily  enoxaparin Injectable 40milliGRAM(s) SubCutaneous at bedtime  piperacillin/tazobactam IVPB. 3.375Gram(s) IV Intermittent every 6 hours  aspirin 325milliGRAM(s) Enteral Tube daily  potassium chloride   Powder 40milliEquivalent(s) Enteral Tube once  levETIRAcetam  Solution 1000milliGRAM(s) Oral two times a day  insulin regular  human recombinant 4Unit(s) SubCutaneous every 6 hours  insulin glargine Injectable (LANTUS) 4Unit(s) SubCutaneous once  potassium phosphate IVPB 21milliMole(s) IV Intermittent once  vancomycin  IVPB 1500milliGRAM(s) IV Intermittent <User Schedule>    MEDICATIONS  (PRN):  dextrose Gel 1Dose(s) Oral once PRN Blood Glucose LESS THAN 70 milliGRAM(s)/deciliter  glucagon  Injectable 1milliGRAM(s) IntraMuscular once PRN Glucose LESS THAN 70 milligrams/deciliter  ondansetron Injectable 4milliGRAM(s) IV Push every 6 hours PRN Nausea and/or Vomiting  labetalol Injectable 10milliGRAM(s) IV Push every 1 hour PRN SBP>180  acetaminophen    Suspension. 650milliGRAM(s) Enteral Tube every 6 hours PRN Moderate Pain (4 - 6)  acetaminophen    Suspension 650milliGRAM(s) Enteral Tube every 6 hours PRN For Temp greater than 100F      Drug Dosing Weight  Height (cm): 172.7 (23 May 2017 06:07)  Weight (kg): 97.5 (23 May 2017 06:10)  BMI (kg/m2): 32.7 (23 May 2017 06:10)  BSA (m2): 2.11 (23 May 2017 06:10)    CENTRAL LINE: [ ] YES [X] NO  LOCATION:   DATE INSERTED:  REMOVE: [ ] YES [ ] NO  EXPLAIN:    GARCIA: [ ] YES [X] NO    DATE INSERTED:  REMOVE: [ ] YES [ ] NO  EXPLAIN:    A-LINE: [ ] YES [X] NO  LOCATION:   DATE INSERTED:  REMOVE: [ ] YES [ ] NO  EXPLAIN:    PAST MEDICAL & SURGICAL HISTORY:  Cerebrovascular accident (CVA) due to stenosis of left middle cerebral artery  Coronary artery disease without angina pectoris, unspecified vessel or lesion type, unspecified whether native or transplanted heart  Depression, unspecified depression type  Essential hypertension  Controlled type 2 diabetes mellitus without complication, unspecified long term insulin use status  History of penile implant  History of lumbar surgery  H/O umbilical hernia repair  History of appendectomy      ICU Vital Signs Last 24 Hrs  T(C): 38, Max: 38 (05-28 @ 09:11)  T(F): 100.4, Max: 100.4 (05-28 @ 09:11)  HR: 80 (58 - 80)  BP: 180/81 (117/61 - 190/80)  BP(mean): 116 (78 - 121)  ABP: --  ABP(mean): --  RR: 24 (16 - 27)  SpO2: 95% (91% - 100%)          I & Os for 24h ending 28 May 2017 07:00  =============================================  IN:    IV PiggyBack: 750 ml    Oral Fluid: 600 ml    Osmolite: 560 ml    Solution: 311.6 ml    Glucerna: 240 ml    Total IN: 2461.6 ml  ---------------------------------------------  OUT:    Incontinent per Condom Catheter: 550 ml    Indwelling Catheter - Urethral: 2 ml    Total OUT: 552 ml  ---------------------------------------------  Total NET: 1909.6 ml    I & Os for current day (as of 28 May 2017 09:43)  =============================================  IN:    Osmolite: 40 ml    Total IN: 40 ml  ---------------------------------------------  OUT:    Indwelling Catheter - Urethral: 1 ml    Total OUT: 1 ml  ---------------------------------------------  Total NET: 39 ml    PHYSICAL EXAM:    Constitutional: Alert, NAD    Neuro: Saying some select words, communicating effectively nonverbally. Following commands. AAOx3. Hemiparetic on R, moves extremities on L 5/5    Respiratory: CTA    Cardiovascular: RRR    Gastrointestinal: soft, nontender, nondistended    Extremities: Contracted slightly on R          LABS:  CBC Full  -  ( 28 May 2017 05:36 )  WBC Count : 12.5 K/uL  Hemoglobin : 10.5 g/dL  Hematocrit : 32.7 %  Platelet Count - Automated : 266 K/uL  Mean Cell Volume : 88.4 fL  Mean Cell Hemoglobin : 28.4 pg  Mean Cell Hemoglobin Concentration : 32.1 g/dL  Auto Neutrophil # : x  Auto Lymphocyte # : x  Auto Monocyte # : x  Auto Eosinophil # : x  Auto Basophil # : x  Auto Neutrophil % : x  Auto Lymphocyte % : x  Auto Monocyte % : x  Auto Eosinophil % : x  Auto Basophil % : x    05-28    147<H>  |  108  |  23  ----------------------------<  233<H>  3.8   |  28  |  1.00    Ca    8.8      28 May 2017 05:37  Phos  2.0     05-28  Mg     2.2     05-28

## 2017-05-29 LAB
ANION GAP SERPL CALC-SCNC: 14 MMOL/L — SIGNIFICANT CHANGE UP (ref 5–17)
BUN SERPL-MCNC: 18 MG/DL — SIGNIFICANT CHANGE UP (ref 7–23)
CALCIUM SERPL-MCNC: 8.6 MG/DL — SIGNIFICANT CHANGE UP (ref 8.4–10.5)
CHLORIDE SERPL-SCNC: 108 MMOL/L — SIGNIFICANT CHANGE UP (ref 96–108)
CO2 SERPL-SCNC: 26 MMOL/L — SIGNIFICANT CHANGE UP (ref 22–31)
CREAT SERPL-MCNC: 1 MG/DL — SIGNIFICANT CHANGE UP (ref 0.5–1.3)
GLUCOSE SERPL-MCNC: 298 MG/DL — HIGH (ref 70–99)
HCT VFR BLD CALC: 31.8 % — LOW (ref 39–50)
HGB BLD-MCNC: 10 G/DL — LOW (ref 13–17)
MAGNESIUM SERPL-MCNC: 2.1 MG/DL — SIGNIFICANT CHANGE UP (ref 1.6–2.6)
MCHC RBC-ENTMCNC: 28.1 PG — SIGNIFICANT CHANGE UP (ref 27–34)
MCHC RBC-ENTMCNC: 31.4 G/DL — LOW (ref 32–36)
MCV RBC AUTO: 89.3 FL — SIGNIFICANT CHANGE UP (ref 80–100)
PHOSPHATE SERPL-MCNC: 3.1 MG/DL — SIGNIFICANT CHANGE UP (ref 2.5–4.5)
PLATELET # BLD AUTO: 320 K/UL — SIGNIFICANT CHANGE UP (ref 150–400)
POTASSIUM SERPL-MCNC: 3.5 MMOL/L — SIGNIFICANT CHANGE UP (ref 3.5–5.3)
POTASSIUM SERPL-SCNC: 3.5 MMOL/L — SIGNIFICANT CHANGE UP (ref 3.5–5.3)
RBC # BLD: 3.56 M/UL — LOW (ref 4.2–5.8)
RBC # FLD: 13.7 % — SIGNIFICANT CHANGE UP (ref 10.3–16.9)
SODIUM SERPL-SCNC: 148 MMOL/L — HIGH (ref 135–145)
WBC # BLD: 11.3 K/UL — HIGH (ref 3.8–10.5)
WBC # FLD AUTO: 11.3 K/UL — HIGH (ref 3.8–10.5)

## 2017-05-29 PROCEDURE — 71010: CPT | Mod: 26

## 2017-05-29 PROCEDURE — 99233 SBSQ HOSP IP/OBS HIGH 50: CPT | Mod: GC

## 2017-05-29 RX ORDER — INSULIN HUMAN 100 [IU]/ML
5 INJECTION, SOLUTION SUBCUTANEOUS EVERY 6 HOURS
Qty: 0 | Refills: 0 | Status: DISCONTINUED | OUTPATIENT
Start: 2017-05-29 | End: 2017-05-30

## 2017-05-29 RX ORDER — LOSARTAN POTASSIUM 100 MG/1
25 TABLET, FILM COATED ORAL DAILY
Qty: 0 | Refills: 0 | Status: DISCONTINUED | OUTPATIENT
Start: 2017-05-29 | End: 2017-05-30

## 2017-05-29 RX ORDER — INSULIN GLARGINE 100 [IU]/ML
4 INJECTION, SOLUTION SUBCUTANEOUS ONCE
Qty: 0 | Refills: 0 | Status: COMPLETED | OUTPATIENT
Start: 2017-05-29 | End: 2017-05-29

## 2017-05-29 RX ORDER — POTASSIUM CHLORIDE 20 MEQ
40 PACKET (EA) ORAL ONCE
Qty: 0 | Refills: 0 | Status: COMPLETED | OUTPATIENT
Start: 2017-05-29 | End: 2017-05-29

## 2017-05-29 RX ORDER — INSULIN GLARGINE 100 [IU]/ML
18 INJECTION, SOLUTION SUBCUTANEOUS
Qty: 0 | Refills: 0 | Status: DISCONTINUED | OUTPATIENT
Start: 2017-05-30 | End: 2017-05-30

## 2017-05-29 RX ORDER — INSULIN GLARGINE 100 [IU]/ML
18 INJECTION, SOLUTION SUBCUTANEOUS EVERY MORNING
Qty: 0 | Refills: 0 | Status: DISCONTINUED | OUTPATIENT
Start: 2017-05-29 | End: 2017-05-29

## 2017-05-29 RX ORDER — INSULIN LISPRO 100/ML
5 VIAL (ML) SUBCUTANEOUS EVERY 6 HOURS
Qty: 0 | Refills: 0 | Status: DISCONTINUED | OUTPATIENT
Start: 2017-05-29 | End: 2017-05-29

## 2017-05-29 RX ADMIN — INSULIN HUMAN 5 UNIT(S): 100 INJECTION, SOLUTION SUBCUTANEOUS at 18:34

## 2017-05-29 RX ADMIN — Medication 40 MILLIEQUIVALENT(S): at 08:05

## 2017-05-29 RX ADMIN — PIPERACILLIN AND TAZOBACTAM 200 GRAM(S): 4; .5 INJECTION, POWDER, LYOPHILIZED, FOR SOLUTION INTRAVENOUS at 15:32

## 2017-05-29 RX ADMIN — LEVETIRACETAM 1000 MILLIGRAM(S): 250 TABLET, FILM COATED ORAL at 18:33

## 2017-05-29 RX ADMIN — Medication 10 MILLIGRAM(S): at 11:00

## 2017-05-29 RX ADMIN — Medication 10 MILLIGRAM(S): at 17:06

## 2017-05-29 RX ADMIN — INSULIN HUMAN 4 UNIT(S): 100 INJECTION, SOLUTION SUBCUTANEOUS at 05:32

## 2017-05-29 RX ADMIN — INSULIN GLARGINE 4 UNIT(S): 100 INJECTION, SOLUTION SUBCUTANEOUS at 09:45

## 2017-05-29 RX ADMIN — Medication 10 MILLIGRAM(S): at 05:31

## 2017-05-29 RX ADMIN — Medication 325 MILLIGRAM(S): at 11:35

## 2017-05-29 RX ADMIN — Medication 10 MILLIGRAM(S): at 13:16

## 2017-05-29 RX ADMIN — Medication 20 MILLIGRAM(S): at 11:35

## 2017-05-29 RX ADMIN — Medication 10 MILLIGRAM(S): at 08:18

## 2017-05-29 RX ADMIN — PIPERACILLIN AND TAZOBACTAM 200 GRAM(S): 4; .5 INJECTION, POWDER, LYOPHILIZED, FOR SOLUTION INTRAVENOUS at 21:00

## 2017-05-29 RX ADMIN — INSULIN GLARGINE 14 UNIT(S): 100 INJECTION, SOLUTION SUBCUTANEOUS at 05:34

## 2017-05-29 RX ADMIN — Medication 6: at 05:34

## 2017-05-29 RX ADMIN — Medication 650 MILLIGRAM(S): at 03:06

## 2017-05-29 RX ADMIN — ATORVASTATIN CALCIUM 40 MILLIGRAM(S): 80 TABLET, FILM COATED ORAL at 21:03

## 2017-05-29 RX ADMIN — INSULIN HUMAN 5 UNIT(S): 100 INJECTION, SOLUTION SUBCUTANEOUS at 11:47

## 2017-05-29 RX ADMIN — PANTOPRAZOLE SODIUM 40 MILLIGRAM(S): 20 TABLET, DELAYED RELEASE ORAL at 11:35

## 2017-05-29 RX ADMIN — PIPERACILLIN AND TAZOBACTAM 200 GRAM(S): 4; .5 INJECTION, POWDER, LYOPHILIZED, FOR SOLUTION INTRAVENOUS at 09:14

## 2017-05-29 RX ADMIN — INSULIN HUMAN 5 UNIT(S): 100 INJECTION, SOLUTION SUBCUTANEOUS at 23:59

## 2017-05-29 RX ADMIN — LEVETIRACETAM 1000 MILLIGRAM(S): 250 TABLET, FILM COATED ORAL at 05:32

## 2017-05-29 RX ADMIN — Medication 10 MILLIGRAM(S): at 12:10

## 2017-05-29 RX ADMIN — ENOXAPARIN SODIUM 40 MILLIGRAM(S): 100 INJECTION SUBCUTANEOUS at 22:00

## 2017-05-29 RX ADMIN — LOSARTAN POTASSIUM 25 MILLIGRAM(S): 100 TABLET, FILM COATED ORAL at 15:30

## 2017-05-29 RX ADMIN — Medication 6: at 15:44

## 2017-05-29 RX ADMIN — PIPERACILLIN AND TAZOBACTAM 200 GRAM(S): 4; .5 INJECTION, POWDER, LYOPHILIZED, FOR SOLUTION INTRAVENOUS at 04:00

## 2017-05-29 RX ADMIN — Medication 6: at 11:30

## 2017-05-29 NOTE — PROGRESS NOTE ADULT - SUBJECTIVE AND OBJECTIVE BOX
S: No new issues/events overnight, no new med c/o    O: ICU Vital Signs Last 24 Hrs  T(F): 96.8, Max: 100.4 (05-28 @ 09:11)  HR: 74 (66 - 82)  BP: 174/77 (139/62 - 185/82)  BP(mean): 108 (90 - 128)  ABP: --  RR: 24 (19 - 24)  SpO2: 99% (93% - 99%)  Wt(kg): --    PHYSICAL EXAM:     Neurological: AAOx3, CNII-XII intact,  strength 5/5 b/l  Cardiovascular: RRR  Respiratory: CTA  Gastrointestinal: soft, NT, ND, BS+  Extremities: warm, no dependent edema  Vascular: no cyanosis/erythema    LABS:    05-29    148<H>  |  108  |  18  ----------------------------<  298<H>  3.5   |  26  |  1.00    Ca    8.6      29 May 2017 05:29  Phos  3.1     05-29  Mg     2.1     05-29                          10.0   11.3  )-----------( 320      ( 29 May 2017 05:29 )             31.8     CAPILLARY BLOOD GLUCOSE  188 (28 May 2017 22:00)    MEDICATIONS  (STANDING):  FLUoxetine 20milliGRAM(s) Oral daily  insulin lispro (HumaLOG) corrective regimen sliding scale  SubCutaneous Before meals and at bedtime  dextrose 50% Injectable 12.5Gram(s) IV Push once  atorvastatin 40milliGRAM(s) Oral at bedtime  pantoprazole  Injectable 40milliGRAM(s) IV Push daily  enoxaparin Injectable 40milliGRAM(s) SubCutaneous at bedtime  aspirin 325milliGRAM(s) Enteral Tube daily  levETIRAcetam  Solution 1000milliGRAM(s) Oral two times a day  piperacillin/tazobactam IVPB. 3.375Gram(s) IV Intermittent every 6 hours  insulin regular  human recombinant 5Unit(s) SubCutaneous every 6 hours  potassium chloride   Powder 40milliEquivalent(s) Enteral Tube once  insulin glargine Injectable (LANTUS) 4Unit(s) SubCutaneous once    MEDICATIONS  (PRN):  dextrose Gel 1Dose(s) Oral once PRN Blood Glucose LESS THAN 70 milliGRAM(s)/deciliter  glucagon  Injectable 1milliGRAM(s) IntraMuscular once PRN Glucose LESS THAN 70 milligrams/deciliter  ondansetron Injectable 4milliGRAM(s) IV Push every 6 hours PRN Nausea and/or Vomiting  labetalol Injectable 10milliGRAM(s) IV Push every 1 hour PRN SBP>180  acetaminophen    Suspension. 650milliGRAM(s) Enteral Tube every 6 hours PRN Moderate Pain (4 - 6)  acetaminophen    Suspension 650milliGRAM(s) Enteral Tube every 6 hours PRN For Temp greater than 100F      Guerrero:	  [ ] None	[ ] Daily Guerrero Order Placed	   Indication:	  [ ] Strict I and O's    [ ] Obstruction     [ ] Incontinence + Stage 3 or 4 Decubitus  Central Line:  [ ] None	   [ ]  Medication / TPN Administration     [ ] No Peripheral IV S: No new issues/events overnight, no new med c/o    O: ICU Vital Signs Last 24 Hrs  T(F): 96.8, Max: 100.4 (05-28 @ 09:11)  HR: 74 (66 - 82)  BP: 174/77 (139/62 - 185/82)  BP(mean): 108 (90 - 128)  ABP: --  RR: 24 (19 - 24)  SpO2: 99% (93% - 99%)  Wt(kg): --    PHYSICAL EXAM:     Neurological: AAOx3 by nodding his head to questions, slurred speech. follow commands. does not move right hand, moves left arm and b/l LE. weaker on right LE than LLE.   Cardiovascular: RRR  Respiratory: CTA  Gastrointestinal: soft, NT, ND, BS+  Extremities: warm, no dependent edema  Vascular: no cyanosis/erythema      LABS:    05-29    148<H>  |  108  |  18  ----------------------------<  298<H>  3.5   |  26  |  1.00    Ca    8.6      29 May 2017 05:29  Phos  3.1     05-29  Mg     2.1     05-29                          10.0   11.3  )-----------( 320      ( 29 May 2017 05:29 )             31.8     CAPILLARY BLOOD GLUCOSE  188 (28 May 2017 22:00)    MEDICATIONS  (STANDING):  FLUoxetine 20milliGRAM(s) Oral daily  insulin lispro (HumaLOG) corrective regimen sliding scale  SubCutaneous Before meals and at bedtime  dextrose 50% Injectable 12.5Gram(s) IV Push once  atorvastatin 40milliGRAM(s) Oral at bedtime  pantoprazole  Injectable 40milliGRAM(s) IV Push daily  enoxaparin Injectable 40milliGRAM(s) SubCutaneous at bedtime  aspirin 325milliGRAM(s) Enteral Tube daily  levETIRAcetam  Solution 1000milliGRAM(s) Oral two times a day  piperacillin/tazobactam IVPB. 3.375Gram(s) IV Intermittent every 6 hours  insulin regular  human recombinant 5Unit(s) SubCutaneous every 6 hours  potassium chloride   Powder 40milliEquivalent(s) Enteral Tube once  insulin glargine Injectable (LANTUS) 4Unit(s) SubCutaneous once    MEDICATIONS  (PRN):  dextrose Gel 1Dose(s) Oral once PRN Blood Glucose LESS THAN 70 milliGRAM(s)/deciliter  glucagon  Injectable 1milliGRAM(s) IntraMuscular once PRN Glucose LESS THAN 70 milligrams/deciliter  ondansetron Injectable 4milliGRAM(s) IV Push every 6 hours PRN Nausea and/or Vomiting  labetalol Injectable 10milliGRAM(s) IV Push every 1 hour PRN SBP>180  acetaminophen    Suspension. 650milliGRAM(s) Enteral Tube every 6 hours PRN Moderate Pain (4 - 6)  acetaminophen    Suspension 650milliGRAM(s) Enteral Tube every 6 hours PRN For Temp greater than 100F      Guerrero:	  [x ] None	[ ] Daily Guerrero Order Placed	   Indication:	  [ ] Strict I and O's    [ ] Obstruction     [ ] Incontinence + Stage 3 or 4 Decubitus  Central Line:  [x ] None	   [ ]  Medication / TPN Administration     [ ] No Peripheral IV S: No new issues/events overnight, no new med c/o  diarrhea is not as bad - rectal tube was inserted saturday night, only total of ~300cc since rectal tube insertion saturday night.   no observable signs of twitching/shaking yesterday.     O: ICU Vital Signs Last 24 Hrs  T(F): 96.8, Max: 100.4 (05-28 @ 09:11)  HR: 74 (66 - 82)  BP: 174/77 (139/62 - 185/82)  BP(mean): 108 (90 - 128)  ABP: --  RR: 24 (19 - 24)  SpO2: 99% (93% - 99%)  Wt(kg): --    PHYSICAL EXAM:     Neurological: AAOx3 by nodding his head to questions, slurred speech. follow commands. does not move right hand, moves left arm and b/l LE. weaker on right LE than LLE.   Cardiovascular: RRR  Respiratory: CTA  Gastrointestinal: soft, NT, ND, BS+  Extremities: warm, no dependent edema  Vascular: no cyanosis/erythema      LABS:    05-29    148<H>  |  108  |  18  ----------------------------<  298<H>  3.5   |  26  |  1.00    Ca    8.6      29 May 2017 05:29  Phos  3.1     05-29  Mg     2.1     05-29                          10.0   11.3  )-----------( 320      ( 29 May 2017 05:29 )             31.8     CAPILLARY BLOOD GLUCOSE  188 (28 May 2017 22:00)    MEDICATIONS  (STANDING):  FLUoxetine 20milliGRAM(s) Oral daily  insulin lispro (HumaLOG) corrective regimen sliding scale  SubCutaneous Before meals and at bedtime  dextrose 50% Injectable 12.5Gram(s) IV Push once  atorvastatin 40milliGRAM(s) Oral at bedtime  pantoprazole  Injectable 40milliGRAM(s) IV Push daily  enoxaparin Injectable 40milliGRAM(s) SubCutaneous at bedtime  aspirin 325milliGRAM(s) Enteral Tube daily  levETIRAcetam  Solution 1000milliGRAM(s) Oral two times a day  piperacillin/tazobactam IVPB. 3.375Gram(s) IV Intermittent every 6 hours  insulin regular  human recombinant 5Unit(s) SubCutaneous every 6 hours  potassium chloride   Powder 40milliEquivalent(s) Enteral Tube once  insulin glargine Injectable (LANTUS) 4Unit(s) SubCutaneous once    MEDICATIONS  (PRN):  dextrose Gel 1Dose(s) Oral once PRN Blood Glucose LESS THAN 70 milliGRAM(s)/deciliter  glucagon  Injectable 1milliGRAM(s) IntraMuscular once PRN Glucose LESS THAN 70 milligrams/deciliter  ondansetron Injectable 4milliGRAM(s) IV Push every 6 hours PRN Nausea and/or Vomiting  labetalol Injectable 10milliGRAM(s) IV Push every 1 hour PRN SBP>180  acetaminophen    Suspension. 650milliGRAM(s) Enteral Tube every 6 hours PRN Moderate Pain (4 - 6)  acetaminophen    Suspension 650milliGRAM(s) Enteral Tube every 6 hours PRN For Temp greater than 100F      Guerrero:	  [x ] None	[ ] Daily Guerrero Order Placed	   Indication:	  [ ] Strict I and O's    [ ] Obstruction     [ ] Incontinence + Stage 3 or 4 Decubitus  Central Line:  [x ] None	   [ ]  Medication / TPN Administration     [ ] No Peripheral IV

## 2017-05-29 NOTE — PROGRESS NOTE ADULT - SUBJECTIVE AND OBJECTIVE BOX
72 y/o male pmhx HTN, CAD, multiple CVA (last 3/2016) DM, known to NSX service with recent angio procedure revealing worsening carotid stenosis planned for elective Left STA-MCA bypass this week but has been experiencing worsening in his speech over the past few days. Per the patients wife, patient has expereinced a gradual decline since March 2016 with symptoms associated with increased confusion and speech difficulty. Denies any headaches, nausea, vomiting or right side deficits. Denies any numbness tingling or decreased sensation.  Pt at baseline using hand crutch and one person assistance to ambulate. Denies any medications for pain or improvement of symptoms. Denies any recent trauma or falls.     Subjective: No issues reported overnight. On wrist restraints to prevent line removal. Patient denies any pain.    T(C): 36, Max: 38 (05-28 @ 09:11)  HR: 78 (66 - 82)  BP: 185/76 (139/62 - 190/82)  RR: 21 (19 - 24)  SpO2: 99% (94% - 99%)  Wt(kg): --    Exam: NAD, AAOx3 but w/ expressive aphasia.  HEENT: PERRL. Left scalp incision C/D/I. VFs grossly intact. EOMI.  Neck: FROM, nontender  Lungs: Clear b/l  Heart: S1, S2. NSR.  Abd: Soft, NT/ND. +BS  Exts: Pulses 2+ throughout  Neuro: Right facial weakness. Right hemiparesis w/ contraction of UE, 2/5. RLE 3/5. Left UE/LE 4+/5. +Aphasia but following commands. Sensation grossly intact.    CBC Full  -  ( 29 May 2017 05:29 )  WBC Count : 11.3 K/uL  Hemoglobin : 10.0 g/dL  Hematocrit : 31.8 %  Platelet Count - Automated : 320 K/uL  Mean Cell Volume : 89.3 fL  Mean Cell Hemoglobin : 28.1 pg  Mean Cell Hemoglobin Concentration : 31.4 g/dL    05-29    148<H>  |  108  |  18  ----------------------------<  298<H>  3.5   |  26  |  1.00    Ca    8.6      29 May 2017 05:29  Phos  3.1     05-29  Mg     2.1     05-29    MEDICATIONS  (STANDING):  FLUoxetine 20milliGRAM(s) Oral daily  insulin lispro (HumaLOG) corrective regimen sliding scale  SubCutaneous Before meals and at bedtime  dextrose 50% Injectable 12.5Gram(s) IV Push once  atorvastatin 40milliGRAM(s) Oral at bedtime  pantoprazole  Injectable 40milliGRAM(s) IV Push daily  enoxaparin Injectable 40milliGRAM(s) SubCutaneous at bedtime  aspirin 325milliGRAM(s) Enteral Tube daily  levETIRAcetam  Solution 1000milliGRAM(s) Oral two times a day  piperacillin/tazobactam IVPB. 3.375Gram(s) IV Intermittent every 6 hours  insulin regular  human recombinant 5Unit(s) SubCutaneous every 6 hours  insulin glargine Injectable (LANTUS) 4Unit(s) SubCutaneous once    MEDICATIONS  (PRN):  dextrose Gel 1Dose(s) Oral once PRN Blood Glucose LESS THAN 70 milliGRAM(s)/deciliter  glucagon  Injectable 1milliGRAM(s) IntraMuscular once PRN Glucose LESS THAN 70 milligrams/deciliter  ondansetron Injectable 4milliGRAM(s) IV Push every 6 hours PRN Nausea and/or Vomiting  labetalol Injectable 10milliGRAM(s) IV Push every 1 hour PRN SBP>180  acetaminophen    Suspension. 650milliGRAM(s) Enteral Tube every 6 hours PRN Moderate Pain (4 - 6)  acetaminophen    Suspension 650milliGRAM(s) Enteral Tube every 6 hours PRN For Temp greater than 100F        Assessment/Plan: 71 year old male w/ multiple medical problems including multiple CVAs now s/p Left STA-MCA bypass POD#6, also s/p IVC filter for acute right DVT.    Neuro: Continue ASA,  Neuro checks,  on vEEG, will f/u epilepsy (reported facial twitching)  Tylenol PRN pain    CV: SBP goal 120-180  Daily labs, electrolyte repletion.    Pulm: nasal canula    GI: NPO, TF vis NGT. PPI. Bowel meds.    : vvoiding, condom cath    ID: [id number]/vanco for PNA x7 days, afebrile    Endo: ISS, lantus, premeal insulin    PPx: SCD ,SQL, s/o filter for DVT    Dispo: Advance activity/OOB/PT  S&S evaluation  D/w Dr. Rajput

## 2017-05-29 NOTE — PROGRESS NOTE ADULT - ATTENDING COMMENTS
Patient seen and examined with house-staff during bedside rounds.  Resident note read, including vitals, physical findings, laboratory data, and radiological reports.   Revisions included below.  Direct personal management at bed side and extensive interpretation of the data.  Plan was outlined and discussed in details with the housestaff.  Decision making of high complexity  status post respiratory failure status post aspiration pneumonia coronary artery disease hypertension diabetes hypercholesteremia CVA. Patient was clinically stable is more alert and communicating.  her blood pressure is at target and maintain on the hard side for cerebral perfusion. Blood sugars not controlled. Insulin was adjusted. The tooth and will change back to Osmolite Q2 to diarrhea. Patient is on antibiotic duration total of 7 days. No evidence of seizure patient is on EEG monitor

## 2017-05-30 ENCOUNTER — TRANSCRIPTION ENCOUNTER (OUTPATIENT)
Age: 72
End: 2017-05-30

## 2017-05-30 LAB
ANION GAP SERPL CALC-SCNC: 10 MMOL/L — SIGNIFICANT CHANGE UP (ref 5–17)
BUN SERPL-MCNC: 17 MG/DL — SIGNIFICANT CHANGE UP (ref 7–23)
CALCIUM SERPL-MCNC: 8.9 MG/DL — SIGNIFICANT CHANGE UP (ref 8.4–10.5)
CHLORIDE SERPL-SCNC: 108 MMOL/L — SIGNIFICANT CHANGE UP (ref 96–108)
CO2 SERPL-SCNC: 28 MMOL/L — SIGNIFICANT CHANGE UP (ref 22–31)
CREAT SERPL-MCNC: 1.1 MG/DL — SIGNIFICANT CHANGE UP (ref 0.5–1.3)
GLUCOSE SERPL-MCNC: 257 MG/DL — HIGH (ref 70–99)
HCT VFR BLD CALC: 30.4 % — LOW (ref 39–50)
HGB BLD-MCNC: 9.6 G/DL — LOW (ref 13–17)
MAGNESIUM SERPL-MCNC: 2 MG/DL — SIGNIFICANT CHANGE UP (ref 1.6–2.6)
MCHC RBC-ENTMCNC: 28.2 PG — SIGNIFICANT CHANGE UP (ref 27–34)
MCHC RBC-ENTMCNC: 31.6 G/DL — LOW (ref 32–36)
MCV RBC AUTO: 89.1 FL — SIGNIFICANT CHANGE UP (ref 80–100)
PHOSPHATE SERPL-MCNC: 3.3 MG/DL — SIGNIFICANT CHANGE UP (ref 2.5–4.5)
PLATELET # BLD AUTO: 346 K/UL — SIGNIFICANT CHANGE UP (ref 150–400)
POTASSIUM SERPL-MCNC: 3.5 MMOL/L — SIGNIFICANT CHANGE UP (ref 3.5–5.3)
POTASSIUM SERPL-SCNC: 3.5 MMOL/L — SIGNIFICANT CHANGE UP (ref 3.5–5.3)
RBC # BLD: 3.41 M/UL — LOW (ref 4.2–5.8)
RBC # FLD: 14 % — SIGNIFICANT CHANGE UP (ref 10.3–16.9)
SODIUM SERPL-SCNC: 146 MMOL/L — HIGH (ref 135–145)
WBC # BLD: 15 K/UL — HIGH (ref 3.8–10.5)
WBC # FLD AUTO: 15 K/UL — HIGH (ref 3.8–10.5)

## 2017-05-30 PROCEDURE — 99291 CRITICAL CARE FIRST HOUR: CPT | Mod: 24

## 2017-05-30 PROCEDURE — 71010: CPT | Mod: 26,76

## 2017-05-30 RX ORDER — NIFEDIPINE 30 MG
30 TABLET, EXTENDED RELEASE 24 HR ORAL DAILY
Qty: 0 | Refills: 0 | Status: DISCONTINUED | OUTPATIENT
Start: 2017-05-30 | End: 2017-05-30

## 2017-05-30 RX ORDER — AMLODIPINE BESYLATE 2.5 MG/1
5 TABLET ORAL DAILY
Qty: 0 | Refills: 0 | Status: DISCONTINUED | OUTPATIENT
Start: 2017-05-30 | End: 2017-05-31

## 2017-05-30 RX ORDER — LOSARTAN POTASSIUM 100 MG/1
50 TABLET, FILM COATED ORAL DAILY
Qty: 0 | Refills: 0 | Status: DISCONTINUED | OUTPATIENT
Start: 2017-05-31 | End: 2017-06-12

## 2017-05-30 RX ORDER — POTASSIUM CHLORIDE 20 MEQ
40 PACKET (EA) ORAL ONCE
Qty: 0 | Refills: 0 | Status: COMPLETED | OUTPATIENT
Start: 2017-05-30 | End: 2017-05-30

## 2017-05-30 RX ORDER — PANTOPRAZOLE SODIUM 20 MG/1
40 TABLET, DELAYED RELEASE ORAL DAILY
Qty: 0 | Refills: 0 | Status: DISCONTINUED | OUTPATIENT
Start: 2017-05-30 | End: 2017-05-31

## 2017-05-30 RX ORDER — INSULIN LISPRO 100/ML
6 VIAL (ML) SUBCUTANEOUS EVERY 6 HOURS
Qty: 0 | Refills: 0 | Status: DISCONTINUED | OUTPATIENT
Start: 2017-05-30 | End: 2017-05-31

## 2017-05-30 RX ORDER — LOSARTAN POTASSIUM 100 MG/1
25 TABLET, FILM COATED ORAL ONCE
Qty: 0 | Refills: 0 | Status: COMPLETED | OUTPATIENT
Start: 2017-05-30 | End: 2017-05-30

## 2017-05-30 RX ORDER — INSULIN LISPRO 100/ML
VIAL (ML) SUBCUTANEOUS EVERY 6 HOURS
Qty: 0 | Refills: 0 | Status: DISCONTINUED | OUTPATIENT
Start: 2017-05-30 | End: 2017-06-12

## 2017-05-30 RX ORDER — INSULIN GLARGINE 100 [IU]/ML
20 INJECTION, SOLUTION SUBCUTANEOUS
Qty: 0 | Refills: 0 | Status: DISCONTINUED | OUTPATIENT
Start: 2017-05-30 | End: 2017-06-12

## 2017-05-30 RX ORDER — HYDRALAZINE HCL 50 MG
10 TABLET ORAL ONCE
Qty: 0 | Refills: 0 | Status: COMPLETED | OUTPATIENT
Start: 2017-05-30 | End: 2017-05-30

## 2017-05-30 RX ADMIN — Medication 6 UNIT(S): at 18:12

## 2017-05-30 RX ADMIN — LOSARTAN POTASSIUM 25 MILLIGRAM(S): 100 TABLET, FILM COATED ORAL at 05:04

## 2017-05-30 RX ADMIN — Medication 10 MILLIGRAM(S): at 22:20

## 2017-05-30 RX ADMIN — Medication 10 MILLIGRAM(S): at 13:41

## 2017-05-30 RX ADMIN — Medication 10 MILLIGRAM(S): at 05:04

## 2017-05-30 RX ADMIN — PANTOPRAZOLE SODIUM 40 MILLIGRAM(S): 20 TABLET, DELAYED RELEASE ORAL at 12:38

## 2017-05-30 RX ADMIN — ATORVASTATIN CALCIUM 40 MILLIGRAM(S): 80 TABLET, FILM COATED ORAL at 22:40

## 2017-05-30 RX ADMIN — Medication 325 MILLIGRAM(S): at 12:37

## 2017-05-30 RX ADMIN — LOSARTAN POTASSIUM 25 MILLIGRAM(S): 100 TABLET, FILM COATED ORAL at 09:21

## 2017-05-30 RX ADMIN — AMLODIPINE BESYLATE 5 MILLIGRAM(S): 2.5 TABLET ORAL at 18:40

## 2017-05-30 RX ADMIN — Medication 10 MILLIGRAM(S): at 11:54

## 2017-05-30 RX ADMIN — Medication 10 MILLIGRAM(S): at 04:10

## 2017-05-30 RX ADMIN — PIPERACILLIN AND TAZOBACTAM 200 GRAM(S): 4; .5 INJECTION, POWDER, LYOPHILIZED, FOR SOLUTION INTRAVENOUS at 17:17

## 2017-05-30 RX ADMIN — PIPERACILLIN AND TAZOBACTAM 200 GRAM(S): 4; .5 INJECTION, POWDER, LYOPHILIZED, FOR SOLUTION INTRAVENOUS at 03:00

## 2017-05-30 RX ADMIN — PIPERACILLIN AND TAZOBACTAM 200 GRAM(S): 4; .5 INJECTION, POWDER, LYOPHILIZED, FOR SOLUTION INTRAVENOUS at 08:38

## 2017-05-30 RX ADMIN — Medication 10 MILLIGRAM(S): at 12:42

## 2017-05-30 RX ADMIN — Medication 10 MILLIGRAM(S): at 01:02

## 2017-05-30 RX ADMIN — Medication 20 MILLIGRAM(S): at 12:37

## 2017-05-30 RX ADMIN — Medication 6 UNIT(S): at 12:37

## 2017-05-30 RX ADMIN — LEVETIRACETAM 1000 MILLIGRAM(S): 250 TABLET, FILM COATED ORAL at 18:11

## 2017-05-30 RX ADMIN — Medication 10 MILLIGRAM(S): at 09:12

## 2017-05-30 RX ADMIN — INSULIN HUMAN 5 UNIT(S): 100 INJECTION, SOLUTION SUBCUTANEOUS at 06:04

## 2017-05-30 RX ADMIN — Medication 4: at 06:03

## 2017-05-30 RX ADMIN — Medication 10 MILLIGRAM(S): at 07:37

## 2017-05-30 RX ADMIN — INSULIN GLARGINE 20 UNIT(S): 100 INJECTION, SOLUTION SUBCUTANEOUS at 10:04

## 2017-05-30 RX ADMIN — Medication 40 MILLIEQUIVALENT(S): at 05:53

## 2017-05-30 RX ADMIN — LEVETIRACETAM 1000 MILLIGRAM(S): 250 TABLET, FILM COATED ORAL at 05:03

## 2017-05-30 RX ADMIN — Medication 6: at 12:37

## 2017-05-30 RX ADMIN — PIPERACILLIN AND TAZOBACTAM 200 GRAM(S): 4; .5 INJECTION, POWDER, LYOPHILIZED, FOR SOLUTION INTRAVENOUS at 22:40

## 2017-05-30 RX ADMIN — ENOXAPARIN SODIUM 40 MILLIGRAM(S): 100 INJECTION SUBCUTANEOUS at 22:40

## 2017-05-30 NOTE — SPEECH LANGUAGE PATHOLOGY EVALUATION - SLP ORIENTATION
Pt was oriented to self. He was unable to state where he was despite choices. For the date, he stated, "[place]" and for the year, he stated, "1958."

## 2017-05-30 NOTE — SPEECH LANGUAGE PATHOLOGY EVALUATION - YES/NO QUESTIONS: COMPLEX
no/40% accuracy. Unable to answer questions such as "Will a cork sink in water?" He often replied, "I don't know."

## 2017-05-30 NOTE — OCCUPATIONAL THERAPY INITIAL EVALUATION ADULT - GENERAL OBSERVATIONS, REHAB EVAL
Left hand dominant (RUE paresis). Chart reviewed, patient cleared for OT eval by HAILEY Villalta. Received semi-supine, NAD, +SCDs, +rectal tube, +[place], +tele, +3L O2 NC, +NGT, +EEG, +left wrist restraint, denies pain.

## 2017-05-30 NOTE — SPEECH LANGUAGE PATHOLOGY EVALUATION - COMMENTS
70 yo M with h/o HTN, CAD, multiple CVAs, DM and recent angio revealing worsening carotid stenosis. Pt p/w worsening speech and confusion. Pt is now s/p L STA-MCA bypass and IVC filter placement.

## 2017-05-30 NOTE — DISCHARGE NOTE ADULT - NS AS ACTIVITY OBS
Showering allowed/Do not drive or operate machinery/No Heavy lifting/straining/Walking-Indoors allowed/Walking-Outdoors allowed

## 2017-05-30 NOTE — OCCUPATIONAL THERAPY INITIAL EVALUATION ADULT - RANGE OF MOTION EXAMINATION, UPPER EXTREMITY
Left UE Active ROM was WFL (within functional limits)/right shoulder approximately 10 degrees active flexion, 20 degrees active abduction. Right elbow PROM 20-90 degrees, no passive wrist extension.

## 2017-05-30 NOTE — SPEECH LANGUAGE PATHOLOGY EVALUATION - OPEN ENDED QUESTIONS
He was unable to answer questions such as "Where are you right now?" He provided an unintelligible answer. When provided with choices, he stated, "I don't know."/impaired

## 2017-05-30 NOTE — DISCHARGE NOTE ADULT - PRINCIPAL DIAGNOSIS
Cerebrovascular accident (CVA) due to stenosis of left middle cerebral artery Intracranial atherosclerosis

## 2017-05-30 NOTE — PROGRESS NOTE ADULT - SUBJECTIVE AND OBJECTIVE BOX
=================================  NEUROCRITICAL CARE ATTENDING NOTE  =================================    RISHI HERRERA   [id number]  Summary:  71M with Hypertension CAD multiple CVA Diabetes Mellitus, recent angio noted worsening carotid stenosis.  Presented with worsening speech, confusion.  Admitted , underwent L STA-MCA bypass on , given 2 units PRBC intraoperatively; marginal flow at end of bypass procedure   hypoxic requiring NRB, diuresed.   subgaleal SAMUEL removed   Overnight Events: No significant events overnight    PAST MEDICAL & SURGICAL HISTORY: Cerebrovascular accident (CVA) due to stenosis of left middle cerebral artery Coronary artery disease without angina pectoris, unspecified vessel or lesion type, unspecified whether native or transplanted heart Depression, unspecified depression type Essential hypertension Controlled type 2 diabetes mellitus without complication, unspecified long term insulin use status History of penile implant History of lumbar surgery H/O umbilical hernia repair History of appendectomy  NKDA  Home meds?    PHYSICAL EXAMINATION  T(C): , Max: 37.7 (-29 @ 10:00) HR: 74 (60 - 78) BP: 176/81 (149/62 - 195/84) RR: 19 (4 - 25) SpO2: 97% (92% - 100%)  NEUROLOGIC EXAMINATION:  Patient is awake, aphasic, nods to questioning but no verbal output, follows commands intermittently;  pupils 3mm reactive to light, R UE spastic, moves L UE /LE spontaneously, R LE (+) clonus (+) babinski R   GENERAL:  not intubated, on high flow O2  EENT: anicteric, (+) neck vein distention  CARDIOVASC:  (+) S1 S2, normal rate and regular rhythm  PULMONARY:  decreased breath sounds bilaterally, with bibasal crackles  ABDOMEN:  soft, nontender, with normoactive bowel sounds  EXTREMITIES:  (+) edema - R hand - infiltrated IV? - will remove  SKIN:  no rash    LABS:  CAPILLARY BLOOD GLUCOSE 284 (30 May 2017 06:00) 144 (29 May 2017 22:00) 297 (29 May 2017 16:00) 265 (29 May 2017 11:00)                        9.6    15.0  )-----------( 346      ( 30 May 2017 03:43 )             30.4     146<H>  |  108  |  17  ----------------------------<  257<H>  3.5   |  28  |  1.10    Ca    8.9      30 May 2017 03:43  Phos  3.3       Mg     2.0         I & Os for 24h ending  @ 07:00  IN: 1807 ml / OUT: 1610 ml / NET: 197 ml  65 A1C 6.2  TG 72 HDL 27 LDL     Bacteriology:  CSF studies:  EEG:  Neuroimaging: CT head : post-op changes (subarachnoid mostly L sided), small SDH deep to craniotomy;  CT  chronic WM changes.   MRI head - extensive longstanding ischemic changes, L>R, subacute infarction L frontal and parietal periRolandic brain, acute infarction R parietal perivent WM.  Multiple sites of mod to severe intracranial arterial stenosis, severe L M1 stenosis, mod to severe R M1 and LA2 stenosis, mod L supraclinoid ICA stenosis, mildto mod narrowing distal basilar artery and bilateral PCAs L>R; NOVA: diminished flow L ICA bilateral MCAs  Other imagin/26 CXR bilateral infiltrates  L>R';  Doppler DVT R mid/distal femoral vein / popliteal and posterior tibial veins    MEDICATIONS: fluoxetine 20mg daily mod ISS atorvastatin 40mg HS pantoprazole 40 IV daily SQL HS asa 325 daily levetiracetam 1g BID zosyn 3.375 q6h lantus 18 daily losartan 25 daily    IV FLUIDS: IV lock  DRIPS:   DIET: NPO  Lines / Drains: restraints, [place]ro     CODE STATUS:  full code                       GOALS OF CARE:  aggressive                      DISPOSITION:  ICU =================================  NEUROCRITICAL CARE ATTENDING NOTE  =================================    RISHI HERRERA   [id number]  Summary:  71M with Hypertension CAD multiple CVA Diabetes Mellitus, recent angio noted worsening carotid stenosis.  Presented with worsening speech, confusion.  Admitted , underwent L STA-MCA bypass on , given 2 units PRBC intraoperatively; marginal flow at end of bypass procedure   hypoxic requiring NRB, diuresed.   subgaleal SAMUEL removed   Overnight Events: (+) facial twitching saturday night - back on EEG, levetiracetam increased to 1G on saturday    PAST MEDICAL & SURGICAL HISTORY: Cerebrovascular accident (CVA) due to stenosis of left middle cerebral artery Coronary artery disease without angina pectoris, unspecified vessel or lesion type, unspecified whether native or transplanted heart Depression, unspecified depression type Essential hypertension Controlled type 2 diabetes mellitus without complication, unspecified long term insulin use status History of penile implant History of lumbar surgery H/O umbilical hernia repair History of appendectomy  NKDA  Home meds?    PHYSICAL EXAMINATION  T(C): , Max: 37.7 (- @ 10:00) HR: 74 (60 - 78) BP: 176/81 (149/62 - 195/84) RR: 19 (4 - 25) SpO2: 97% (92% - 100%)  NEUROLOGIC EXAMINATION:  Patient is awake, aphasic, oriented x 3 with choices; follows commands intermittently;  pupils 3mm reactive to light, R UE antigravity; moves L UE /LE spontaneously, R LE (+) clonus (+) babinski R   GENERAL:  not intubated, on high flow O2  EENT: anicteric, (+) neck vein distention  CARDIOVASC:  (+) S1 S2, normal rate and regular rhythm  PULMONARY:  clear to auscultatoin  ABDOMEN:  soft, nontender, with normoactive bowel sounds  EXTREMITIES:  (+) edema   SKIN:  no rash    LABS:  CAPILLARY BLOOD GLUCOSE 284 (30 May 2017 06:00) 144 (29 May 2017 22:00) 297 (29 May 2017 16:00) 265 (29 May 2017 11:00)             (11.3)    9.6  (10)  15.0  )-----------( 346      ( 30 May 2017 03:43 )             30.4     146<H>  |  108  |  17  ----------------------------<  257<H>  3.5   |  28  |  1.10    Ca    8.9      30 May 2017 03:43  Phos  3.3       Mg     2.0         I & Os for 24h ending  @ 07:00  IN: 1807 ml / OUT: 1610 ml / NET: 197 ml  65 A1C 6.2  TG 72 HDL 27 LDL     Bacteriology:   .Blood Blood-Peripheral XXXX XXXX  No growth at 3 days.    CSF studies:  EEG:  Neuroimaging: CT head : post-op changes (subarachnoid mostly L sided), small SDH deep to craniotomy;  CT  chronic WM changes.   MRI head - extensive longstanding ischemic changes, L>R, subacute infarction L frontal and parietal periRolandic brain, acute infarction R parietal perivent WM.  Multiple sites of mod to severe intracranial arterial stenosis, severe L M1 stenosis, mod to severe R M1 and LA2 stenosis, mod L supraclinoid ICA stenosis, mildto mod narrowing distal basilar artery and bilateral PCAs L>R; NOVA: diminished flow L ICA bilateral MCAs  Other imagin/26 CXR bilateral infiltrates  L>R';  Doppler DVT R mid/distal femoral vein / popliteal and posterior tibial veins    MEDICATIONS: fluoxetine 20mg daily mod ISS atorvastatin 40mg HS pantoprazole 40 IV daily SQL HS asa 325 daily levetiracetam 1g BID zosyn 3.375 q6h lantus 18 daily losartan 25 daily    IV FLUIDS: IV lock  DRIPS:   DIET: Osmolite @ 67cc/hr  Lines / Drains: restraints, [place]    CODE STATUS:  full code                       GOALS OF CARE:  aggressive                      DISPOSITION:  ICU / stepdown

## 2017-05-30 NOTE — SPEECH LANGUAGE PATHOLOGY EVALUATION - OBJECTS: FIELD OF 2
50% accuracy. Unable to identify items such as "fork" in a field of 2. He proceeded to point to both items.

## 2017-05-30 NOTE — PHYSICAL THERAPY INITIAL EVALUATION ADULT - GENERAL OBSERVATIONS, REHAB EVAL
Rec'd pt supine in bed, +EEG, +EKG, +heplock, +rectal tube, +joe, +LLE SCD, +LUE wrist restraint, cleared for PT and OOB activtiy by RN (Pawan) and Neurosx Selin. Rec'd pt supine in bed, +EEG, +EKG, +heplock, +rectal tube, o2 NC 3L with humidification, +joe, +LLE SCD, +LUE wrist restraint, cleared for PT and OOB activtiy by RN (Pawan) and Neurosx Selin.

## 2017-05-30 NOTE — SPEECH LANGUAGE PATHOLOGY EVALUATION - SLP DIAGNOSIS
Pt p/w Pt p/w severe receptive and expressive aphasia (expressive worse than receptive) c/b difficulty answering questions, identifying objects, and confrontational naming. Pt also appeared to  have cognitive-lingusitic deficits c/b flat affect and poor initiation. Pt would benefit from speech-language therapy.

## 2017-05-30 NOTE — PHYSICAL THERAPY INITIAL EVALUATION ADULT - PASSIVE RANGE OF MOTION EXAMINATION, REHAB EVAL
right shoulder flex ~5-10 degrees and abd ~10 degrees; elbow extension limited by ~25 degrees; +wrist flex unable to attain neutral; right dh and digits flexion/bilateral lower extremity Passive ROM was WFL (within functional limits) bilateral lower extremity Passive ROM was WFL (within functional limits)/right shoulder approximately 10 degrees active flexion, 20 degrees active abduction. Right elbow PROM 20-90 degrees, no passive wrist extension.and digits flexion

## 2017-05-30 NOTE — PROGRESS NOTE ADULT - SUBJECTIVE AND OBJECTIVE BOX
HPI:  70 y/o male pmhx HTN, CAD, multiple CVA (last 3/2016) DM, known to NSX service with recent angio procedure revealing worsening carotid stenosis planned for elective Left STA-MCA bypass this week but has been experiencing worsening in his speech over the past few days. Per the patients wife, patient has expereinced a gradual decline since March 2016 with symptoms associated with increased confusion and speech difficulty. Denies any headaches, nausea, vomiting or right side deficits. Denies any numbness tingling or decreased sensation.  Pt at baseline using hand crutch and one person assistance to ambulate. Denies any medications for pain or improvement of symptoms. Denies any recent trauma or falls. (21 May 2017 16:56)    OVERNIGHT EVENTS: No acute events overnight. Pt resting comfortably at bedside.    Vital Signs Last 24 Hrs  T(C): 37.2, Max: 37.7 (05-29 @ 10:00)  T(F): 98.9, Max: 99.8 (05-29 @ 10:00)  HR: 74 (60 - 78)  BP: 176/81 (149/62 - 195/84)  BP(mean): 126 (85 - 145)  RR: 19 (4 - 25)  SpO2: 97% (92% - 100%)    I&O's Detail  I & Os for 24h ending 30 May 2017 07:00  =============================================  IN:    Osmolite: 1407 ml    IV PiggyBack: 400 ml    Total IN: 1807 ml  ---------------------------------------------  OUT:    Incontinent per Condom Catheter: 1510 ml    Rectal Tube: 100 ml    Total OUT: 1610 ml  ---------------------------------------------  Total NET: 197 ml    I & Os for current day (as of 30 May 2017 08:09)  =============================================  IN:    Osmolite: 67 ml    Total IN: 67 ml  ---------------------------------------------  OUT:    Total OUT: 0 ml  ---------------------------------------------  Total NET: 67 ml    I&O's Summary  I & Os for 24h ending 30 May 2017 07:00  =============================================  IN: 1807 ml / OUT: 1610 ml / NET: 197 ml    I & Os for current day (as of 30 May 2017 08:09)  =============================================  IN: 67 ml / OUT: 0 ml / NET: 67 ml    PHYSICAL EXAM:  Neurological: AAOx3 (with choices), expressive aphasia, FC (shows 2 fingers, moves legs on command)  CNII-XII: EOM intact, PERRL  Motor: RUE 3/5, spastic, LUE 4/5, RLE/LLE 4/5, RLE +babinski, +clonus         [x] warm well perfused; capillary refill <3 seconds   Sensation: [x] intact to light touch  [] decreased:   Incision/Wound: Scalp incision site, C/D/I     TUBES/LINES:  [] CVC  [] A-line  [] Lumbar Drain  [] Ventriculostomy  [] Other    DIET:  [] NPO  [] Mechanical  [x] Tube feeds    LABS:                        9.6    15.0  )-----------( 346      ( 30 May 2017 03:43 )             30.4     05-30    146<H>  |  108  |  17  ----------------------------<  257<H>  3.5   |  28  |  1.10    Ca    8.9      30 May 2017 03:43  Phos  3.3     05-30  Mg     2.0     05-30              CAPILLARY BLOOD GLUCOSE  284 (30 May 2017 06:00)  144 (29 May 2017 22:00)  297 (29 May 2017 16:00)  265 (29 May 2017 11:00)      Drug Levels: [] N/A  Vancomycin Level, Trough: 13.7 ug/mL (05-27 @ 23:19)    CSF Analysis: [] N/A      Allergies    No Known Allergies    Intolerances      MEDICATIONS:  Antibiotics:  piperacillin/tazobactam IVPB. 3.375Gram(s) IV Intermittent every 6 hours    Neuro:  FLUoxetine 20milliGRAM(s) Oral daily  ondansetron Injectable 4milliGRAM(s) IV Push every 6 hours PRN  aspirin 325milliGRAM(s) Enteral Tube daily  acetaminophen    Suspension. 650milliGRAM(s) Enteral Tube every 6 hours PRN  levETIRAcetam  Solution 1000milliGRAM(s) Oral two times a day  acetaminophen    Suspension 650milliGRAM(s) Enteral Tube every 6 hours PRN    Anticoagulation:  enoxaparin Injectable 40milliGRAM(s) SubCutaneous at bedtime    OTHER:  insulin lispro (HumaLOG) corrective regimen sliding scale  SubCutaneous Before meals and at bedtime  dextrose Gel 1Dose(s) Oral once PRN  dextrose 50% Injectable 12.5Gram(s) IV Push once  glucagon  Injectable 1milliGRAM(s) IntraMuscular once PRN  atorvastatin 40milliGRAM(s) Oral at bedtime  pantoprazole  Injectable 40milliGRAM(s) IV Push daily  labetalol Injectable 10milliGRAM(s) IV Push every 1 hour PRN  insulin regular  human recombinant 5Unit(s) SubCutaneous every 6 hours  insulin glargine Injectable (LANTUS) 18Unit(s) SubCutaneous <User Schedule>  losartan 25milliGRAM(s) Oral daily    IVF:    CULTURES:  Culture Results:   No growth at 3 days. (05-26 @ 10:45)  Culture Results:   No growth at 3 days. (05-26 @ 10:45)    RADIOLOGY & ADDITIONAL TESTS:    ASSESSMENT:  71y Male s/p left STA-MCA bypass POD#7, s/p IVC filter     PLAN:  NEURO:  -neuro checks  -continue aspirin 325mg  -continue keppra 1000mg BID  -EEG monitoring, f/u neurology recs    CARDIOVASCULAR:  --180    PULMONARY:  -room air    RENAL:  -IVL    GI:  -docusate/senna  -PPI    HEME:  -H/H stable, no issues    ID:  -afebrile, continue zosyn    ENDO:  -ISS    DVT PROPHYLAXIS:  [x] Venodynes                                [x] Heparin/Lovenox: SQL    -D/w Dr. Rajput HPI:  70 y/o male pmhx HTN, CAD, multiple CVA (last 3/2016) DM, known to NSX service with recent angio procedure revealing worsening carotid stenosis planned for elective Left STA-MCA bypass this week but has been experiencing worsening in his speech over the past few days. Per the patients wife, patient has expereinced a gradual decline since March 2016 with symptoms associated with increased confusion and speech difficulty. Denies any headaches, nausea, vomiting or right side deficits. Denies any numbness tingling or decreased sensation.  Pt at baseline using hand crutch and one person assistance to ambulate. Denies any medications for pain or improvement of symptoms. Denies any recent trauma or falls. (21 May 2017 16:56)    OVERNIGHT EVENTS: No acute events overnight. Pt resting comfortably at bedside.    Vital Signs Last 24 Hrs  T(C): 37.2, Max: 37.7 (05-29 @ 10:00)  T(F): 98.9, Max: 99.8 (05-29 @ 10:00)  HR: 74 (60 - 78)  BP: 176/81 (149/62 - 195/84)  BP(mean): 126 (85 - 145)  RR: 19 (4 - 25)  SpO2: 97% (92% - 100%)    I&O's Detail  I & Os for 24h ending 30 May 2017 07:00  =============================================  IN:    Osmolite: 1407 ml    IV PiggyBack: 400 ml    Total IN: 1807 ml  ---------------------------------------------  OUT:    Incontinent per Condom Catheter: 1510 ml    Rectal Tube: 100 ml    Total OUT: 1610 ml  ---------------------------------------------  Total NET: 197 ml    I & Os for current day (as of 30 May 2017 08:09)  =============================================  IN:    Osmolite: 67 ml    Total IN: 67 ml  ---------------------------------------------  OUT:    Total OUT: 0 ml  ---------------------------------------------  Total NET: 67 ml    I&O's Summary  I & Os for 24h ending 30 May 2017 07:00  =============================================  IN: 1807 ml / OUT: 1610 ml / NET: 197 ml    I & Os for current day (as of 30 May 2017 08:09)  =============================================  IN: 67 ml / OUT: 0 ml / NET: 67 ml    PHYSICAL EXAM:  Neurological: AAOx3 (with choices), expressive aphasia, FC (shows 2 fingers, moves legs on command)  CNII-XII: EOM intact, PERRL  Motor: RUE 3/5, spastic, LUE 4/5, RLE/LLE 4/5, RLE +babinski, +clonus         [x] warm well perfused; capillary refill <3 seconds   Sensation: [x] intact to light touch  [] decreased:   Incision/Wound: Scalp incision site, C/D/I     TUBES/LINES:  [] CVC  [] A-line  [] Lumbar Drain  [] Ventriculostomy  [] Other    DIET:  [] NPO  [] Mechanical  [x] Tube feeds    LABS:                        9.6    15.0  )-----------( 346      ( 30 May 2017 03:43 )             30.4     05-30    146<H>  |  108  |  17  ----------------------------<  257<H>  3.5   |  28  |  1.10    Ca    8.9      30 May 2017 03:43  Phos  3.3     05-30  Mg     2.0     05-30              CAPILLARY BLOOD GLUCOSE  284 (30 May 2017 06:00)  144 (29 May 2017 22:00)  297 (29 May 2017 16:00)  265 (29 May 2017 11:00)      Drug Levels: [] N/A  Vancomycin Level, Trough: 13.7 ug/mL (05-27 @ 23:19)    CSF Analysis: [] N/A      Allergies    No Known Allergies    Intolerances      MEDICATIONS:  Antibiotics:  piperacillin/tazobactam IVPB. 3.375Gram(s) IV Intermittent every 6 hours    Neuro:  FLUoxetine 20milliGRAM(s) Oral daily  ondansetron Injectable 4milliGRAM(s) IV Push every 6 hours PRN  aspirin 325milliGRAM(s) Enteral Tube daily  acetaminophen    Suspension. 650milliGRAM(s) Enteral Tube every 6 hours PRN  levETIRAcetam  Solution 1000milliGRAM(s) Oral two times a day  acetaminophen    Suspension 650milliGRAM(s) Enteral Tube every 6 hours PRN    Anticoagulation:  enoxaparin Injectable 40milliGRAM(s) SubCutaneous at bedtime    OTHER:  insulin lispro (HumaLOG) corrective regimen sliding scale  SubCutaneous Before meals and at bedtime  dextrose Gel 1Dose(s) Oral once PRN  dextrose 50% Injectable 12.5Gram(s) IV Push once  glucagon  Injectable 1milliGRAM(s) IntraMuscular once PRN  atorvastatin 40milliGRAM(s) Oral at bedtime  pantoprazole  Injectable 40milliGRAM(s) IV Push daily  labetalol Injectable 10milliGRAM(s) IV Push every 1 hour PRN  insulin regular  human recombinant 5Unit(s) SubCutaneous every 6 hours  insulin glargine Injectable (LANTUS) 18Unit(s) SubCutaneous <User Schedule>  losartan 25milliGRAM(s) Oral daily    IVF:    CULTURES:  Culture Results:   No growth at 3 days. (05-26 @ 10:45)  Culture Results:   No growth at 3 days. (05-26 @ 10:45)    RADIOLOGY & ADDITIONAL TESTS:    ASSESSMENT:  71y Male s/p left STA-MCA bypass POD#7, s/p IVC filter     PLAN:  NEURO:  -neuro checks  -continue aspirin 325mg  -continue keppra 1000mg BID  -EEG monitoring, f/u neurology recs  -f/u Speech and swallow    CARDIOVASCULAR:  --180    PULMONARY:  -room air    RENAL:  -IVL    GI:  -docusate/senna  -PPI    HEME:  -H/H stable, no issues    ID:  -afebrile, continue zosyn    ENDO:  -ISS    DVT PROPHYLAXIS:  [x] Venodynes                                [x] Heparin/Lovenox: SQL    -PT/OT/OOB    -D/w Dr. Rajput

## 2017-05-30 NOTE — SPEECH LANGUAGE PATHOLOGY EVALUATION - YES/NO QUESTIONS: BASIC
no/71% accuracy. Unable to answer questions such as "Is the TV on?" with prompting to look at the television.

## 2017-05-30 NOTE — DISCHARGE NOTE ADULT - ADDITIONAL INSTRUCTIONS
B. ACTIVITY LEVEL  1. Fatigue is common following brain surgery. Listen to your body and rest if  you feel tired.  2. You should get up and walk around every hour during the daytime.  3. No bending, lifting or twisting for the first 3 weeks, but walking is  recommended.  4. Drink plenty of water.  5. In summer, stay out of direct sun and heat; remain in the shade when  outdoors.   Pain Medications: You may be given a narcotic pain reliever such as Percocet  (oxycodone-acetaminophen). These are for use only for a short time after  surgery. They may cause drowsiness, nausea, and constipation. Take after food   and drink plenty of water to help reduce side effects. Do not drink alcohol with  these medications. B. ACTIVITY LEVEL  1. Fatigue is common following brain surgery. Listen to your body and rest if  you feel tired.  2. You should get up and walk around every hour during the daytime.  3. No bending, lifting or twisting for the first 3 weeks, but walking is  recommended.  4. Drink plenty of water.  5. In summer, stay out of direct sun and heat; remain in the shade when  outdoors.   Pain Medications: You may be given a narcotic pain reliever such as Percocet  (oxycodone-acetaminophen). These are for use only for a short time after  surgery. They may cause drowsiness, nausea, and constipation. Take after food   and drink plenty of water to help reduce side effects. Do not drink alcohol with  these medications. You need to repeat Head Cat Scan on friday6/16/2017  The office will get the authoriation

## 2017-05-30 NOTE — OCCUPATIONAL THERAPY INITIAL EVALUATION ADULT - PLANNED THERAPY INTERVENTIONS, OT EVAL
cognitive, visual perceptual/fine motor coordination training/IADL retraining/parent/caregiver training.../bed mobility training/strengthening/stretching/ADL retraining/transfer training/balance training

## 2017-05-30 NOTE — DISCHARGE NOTE ADULT - CARE PLAN
Principal Discharge DX:	Cerebrovascular accident (CVA) due to stenosis of left middle cerebral artery  Goal:	Resume normal activities of daily living  Instructions for follow-up, activity and diet:	1. You should wash your hair starting 24 hours after being home. Use your normal  shampoo. During the shampoo, massage the scalp to remove any dried blood  or crusting. This keeps your wound clean and helps healing. you should shampoo everyday.  2. Pat the wound dry with a clean towel afterwards and leave it open to air. Do not  apply creams or ointments to the wound. Mild swelling around the incision is  common.  3. Follow-up with one of the Nurse Practitioners for suture or staple removal 10  days after surgery if not yet removed. Call our office at [phone] to set up the appointment  once you get home.  4. Inform the doctor immediately if you have a fever (above 101), chills, night  sweats, wound drainage, increasing wound redness or pain, nausea, vomiting, change in neurological status or worsening headache. Principal Discharge DX:	Intracranial atherosclerosis  Goal:	Resume normal activities of daily living  Instructions for follow-up, activity and diet:	1. You should wash your hair starting 24 hours after being home. Use your normal  shampoo. During the shampoo, massage the scalp to remove any dried blood  or crusting. This keeps your wound clean and helps healing. you should shampoo everyday.  2. Pat the wound dry with a clean towel afterwards and leave it open to air. Do not  apply creams or ointments to the wound. Mild swelling around the incision is  common.  3. Follow-up with one of the Nurse Practitioners for suture or staple removal 10  days after surgery if not yet removed. Call our office at [phone] to set up the appointment  once you get home.  4. Inform the doctor immediately if you have a fever (above 101), chills, night  sweats, wound drainage, increasing wound redness or pain, nausea, vomiting, change in neurological status or worsening headache. Principal Discharge DX:	Intracranial atherosclerosis  Goal:	Resume normal activities of daily living  Instructions for follow-up, activity and diet:	1. You should wash your hair starting 24 hours after being home. Use your normal  shampoo. During the shampoo, massage the scalp to remove any dried blood  or crusting. This keeps your wound clean and helps healing. you should shampoo everyday.  2. Pat the wound dry with a clean towel afterwards and leave it open to air. Do not  apply creams or ointments to the wound. Mild swelling around the incision is  common.  3. Follow-up with one of the Nurse Practitioners for suture or staple removal 10  days after surgery if not yet removed. Call our office at [phone] to set up the appointment  once you get home.  4. Inform the doctor immediately if you have a fever (above 101), chills, night  sweats, wound drainage, increasing wound redness or pain, nausea, vomiting, change in neurological status or worsening headache. Principal Discharge DX:	Intracranial atherosclerosis  Goal:	Resume normal activities of daily living  Instructions for follow-up, activity and diet:	1. You should wash your hair starting 24 hours after being home. Use your normal  shampoo. During the shampoo, massage the scalp to remove any dried blood  or crusting. This keeps your wound clean and helps healing. you should shampoo everyday.  2. Pat the wound dry with a clean towel afterwards and leave it open to air. Do not  apply creams or ointments to the wound. Mild swelling around the incision is  common.  3. Inform the doctor immediately if you have a fever (above 101), chills, night  sweats, wound drainage, increasing wound redness or pain, nausea, vomiting, change in neurological status or worsening headache. Principal Discharge DX:	Intracranial atherosclerosis  Goal:	Resume normal activities of daily living  Instructions for follow-up, activity and diet:	1. You should wash your hair after being home. Use your normal  shampoo. During the shampoo, massage the scalp to remove any dried blood  or crusting. This keeps your wound clean and helps healing. you should shampoo everyday.  2. Pat the wound dry with a clean towel afterwards and leave it open to air. Do not  apply creams or ointments to the wound. Mild swelling around the incision is  common.  3. Inform the doctor immediately if you have a fever (above 101), chills, night  sweats, wound drainage, increasing wound redness or pain, nausea, vomiting, change in neurological status or worsening headache.

## 2017-05-30 NOTE — SWALLOW BEDSIDE ASSESSMENT ADULT - PHARYNGEAL PHASE
Suspect inconsistent delay of swallow initiation. Inconsistent throat clear noted. Wet voice after trials, which pt made no attempt to clear; pt was not stimuable for throat-clear. Desat to 92% from 98% with PO trials and mildly increased WOB observed. Suspect inconsistent delay of swallow initiation. Inconsistent throat clear noted. Wet voice after trials, which pt did not consistently attempt to clear; pt was not stimuable for throat-clear. Desat to 92% from 98% with PO trials and mildly increased WOB observed, indicating possible difficulty coordinating respiration with deglutition.

## 2017-05-30 NOTE — DISCHARGE NOTE ADULT - PLAN OF CARE
Resume normal activities of daily living 1. You should wash your hair starting 24 hours after being home. Use your normal  shampoo. During the shampoo, massage the scalp to remove any dried blood  or crusting. This keeps your wound clean and helps healing. you should shampoo everyday.  2. Pat the wound dry with a clean towel afterwards and leave it open to air. Do not  apply creams or ointments to the wound. Mild swelling around the incision is  common.  3. Follow-up with one of the Nurse Practitioners for suture or staple removal 10  days after surgery if not yet removed. Call our office at [phone] to set up the appointment  once you get home.  4. Inform the doctor immediately if you have a fever (above 101), chills, night  sweats, wound drainage, increasing wound redness or pain, nausea, vomiting, change in neurological status or worsening headache. 1. You should wash your hair starting 24 hours after being home. Use your normal  shampoo. During the shampoo, massage the scalp to remove any dried blood  or crusting. This keeps your wound clean and helps healing. you should shampoo everyday.  2. Pat the wound dry with a clean towel afterwards and leave it open to air. Do not  apply creams or ointments to the wound. Mild swelling around the incision is  common.  3. Inform the doctor immediately if you have a fever (above 101), chills, night  sweats, wound drainage, increasing wound redness or pain, nausea, vomiting, change in neurological status or worsening headache. 1. You should wash your hair after being home. Use your normal  shampoo. During the shampoo, massage the scalp to remove any dried blood  or crusting. This keeps your wound clean and helps healing. you should shampoo everyday.  2. Pat the wound dry with a clean towel afterwards and leave it open to air. Do not  apply creams or ointments to the wound. Mild swelling around the incision is  common.  3. Inform the doctor immediately if you have a fever (above 101), chills, night  sweats, wound drainage, increasing wound redness or pain, nausea, vomiting, change in neurological status or worsening headache.

## 2017-05-30 NOTE — PHYSICAL THERAPY INITIAL EVALUATION ADULT - MANUAL MUSCLE TESTING RESULTS, REHAB EVAL
right UE increased tone 0/5; LUE >3+/5 ad LLE >4-/5; RLE >3+/5 Left upper extremity grossly 5/5. Right shoulder 3-5, elbow/wrist/hand 1/5; LUE 5/5 ad LLE >4-/5; RLE >3+/5

## 2017-05-30 NOTE — DISCHARGE NOTE ADULT - HOSPITAL COURSE
70 y/o male PMHx HTN, CAD, multiple CVA (last 3/2016) DM, known to neurosurgery service with recent angio procedure revealing worsening carotid stenosis planned for elective Left STA-MCA bypass but has been experiencing worsening in his speech. Per the patients wife, patient has experienced a gradual decline since March 2016 with symptoms associated with increased confusion and speech difficulty. Denies any headaches, nausea, vomiting or right side deficits, numbness, tingling or decreased sensation, recent trauma/falls. On 70 y/o male PMHx HTN, CAD, multiple CVA (last 3/2016) DM, known to neurosurgery service with recent angio procedure revealing worsening carotid stenosis planned for elective Left STA-MCA bypass but has been experiencing worsening in his speech. Per the patients wife, patient has experienced a gradual decline since March 2016 with symptoms associated with increased confusion and speech difficulty. Denies any headaches, nausea, vomiting or right side deficits, numbness, tingling or decreased sensation, recent trauma/falls. On 5/23/2017 Pt underwent bypass using both parietal and frontal branches of left STA. However, toward the end of procedure, there was gradual drop in flow, indicating likely marginal flow demand. Post-operative surveillance lower extremity dopplers revealed deep venous thrombosis is seen within the right mid/distal femoral vein, popliteal, and posterior tibial veins. Pt underwent IVC filter placement on 5/25/17, without any complications. Pt aphasic post-operatively , however improving. 5/27 Pt febrile, started on zosyn and vancomycin for pneumonia. Right quadricept and right facial twitching noted over the weekend, EEG revealed no epileptiform discharges, however keppra dose increased to 1g BID. 70 y/o male PMHx HTN, CAD, multiple CVA (last 3/2016) DM, known to neurosurgery service with recent angio procedure revealing worsening carotid stenosis planned for elective Left STA-MCA bypass but has been experiencing worsening in his speech. Per the patients wife, patient has experienced a gradual decline since March 2016 with symptoms associated with increased confusion and speech difficulty. Denies any headaches, nausea, vomiting or right side deficits, numbness, tingling or decreased sensation, recent trauma/falls. On 5/23/2017 Pt underwent bypass using both parietal and frontal branches of left STA. However, toward the end of procedure, there was gradual drop in flow, indicating likely marginal flow demand. Post-operative surveillance lower extremity dopplers revealed deep venous thrombosis is seen within the right mid/distal femoral vein, popliteal, and posterior tibial veins. Pt underwent IVC filter placement on 5/25/17, without any complications. Pt aphasic post-operatively , however improving. 5/27 Pt febrile, started on zosyn and vancomycin for pneumonia. Right quadricept and right facial twitching noted over the weekend, EEG revealed no epileptiform discharges, however keppra dose increased to 1g BID. POD # 9 lovenox therapeutic dose started secondary DVt as recommended. Pt doing well F/U Head CT on 6/6/2017 revealed   Impression: Status post left frontal parietal craniotomy. Interval   appearance of a small subdural left frontal parietal fluid collection.   Also interval appearance of acute hemorrhage underlying the craniotomy   site. Interval resolution of pneumocephalus. Interval appearance of a   mild amount of left lateral temporal acute intraparenchymal hemorrhage.   Improvement in acute subarachnoid hemorrhage.  6/7 Follow up head CT stable 6/9/2017 Lovenox discontinued and follow up Head CT showed slight increase in hemorrhage. Pt neurologically stable. Will continue to monitor 72 y/o male PMHx HTN, CAD, multiple CVA (last 3/2016) DM, known to neurosurgery service with recent angio procedure revealing worsening carotid stenosis planned for elective Left STA-MCA bypass but has been experiencing worsening in his speech. Per the patients wife, patient has experienced a gradual decline since March 2016 with symptoms associated with increased confusion and speech difficulty. Denies any headaches, nausea, vomiting or right side deficits, numbness, tingling or decreased sensation, recent trauma/falls. On 5/23/2017 Pt underwent bypass using both parietal and frontal branches of left STA. However, toward the end of procedure, there was gradual drop in flow, indicating likely marginal flow demand. Post-operative surveillance lower extremity dopplers revealed deep venous thrombosis is seen within the right mid/distal femoral vein, popliteal, and posterior tibial veins. Pt underwent IVC filter placement on 5/25/17, without any complications. Pt aphasic post-operatively , however improving. 5/27 Pt febrile, started on zosyn and vancomycin for pneumonia. Right quadricept and right facial twitching noted over the weekend, EEG revealed no epileptiform discharges, however keppra dose increased to 1g BID. POD # 9 lovenox therapeutic dose started secondary DVt as recommended. Pt doing well F/U Head CT on 6/6/2017 revealed   Impression: Status post left frontal parietal craniotomy. Interval   appearance of a small subdural left frontal parietal fluid collection.   Also interval appearance of acute hemorrhage underlying the craniotomy   site. Interval resolution of pneumocephalus. Interval appearance of a   mild amount of left lateral temporal acute intraparenchymal hemorrhage.   Improvement in acute subarachnoid hemorrhage.  6/7 Follow up head CT stable 6/9/2017 Lovenox discontinued and follow up Head CT showed slight increase in hemorrhage. Pt neurologically stable. Will continue to monitor   Repeat head CT stable pt cleared for discharge to home with services

## 2017-05-30 NOTE — PROGRESS NOTE ADULT - ASSESSMENT
71M with subacute multifocal L>R infarctions (L frontal, parietal, R parietal), intracranial atherosclerosis with severe L M1 stenosis mod-sev R M1 L A2 stenosis, mod L supraclinoid ICA stenosis, cerebral ischemia secondary to diffuse atherosclerotic disease; s/p L STA-MCA bypass (05/23/17, Dr. Rajput, Dr. Chatterjee); Hypertension Diabetes Mellitus poorly controlled CAD, depression
71M with subacute multifocal L>R infarctions (L frontal, parietal, R parietal), intracranial atherosclerosis with severe L M1 stenosis mod-sev R M1 L A2 stenosis, mod L supraclinoid ICA stenosis, cerebral ischemia secondary to diffuse atherosclerotic disease; s/p L STA-MCA bypass (05/23/17, Dr. Rajput, Dr. Chatterjee); Hypertension Diabetes Mellitus poorly controlled CAD, depression;  DVT R mid/distal femoral vein / popliteal and posterior tibial veins
72 yo M h/o HTN, T2DM, HLD, CAD, CVA admitted for elective elective Left STA-MCA  for stenosis with gradually worsening speech over the last several months    Neuro: asa 325qd, Prozac Keppra HOB @30, Zofran   CV:BP goal 120-180 lipitor 40 qhs (home bp meds held)   Pulm:High flow NC    GI:NPO, dobhoff TF glucerna to goal , Protonix  : voids CKD (Cr 1.3, penile implant)  ID:PNA abx x 7d: zosyn (5/26--), vanco (5/26--)  Endo:ISS, lantus 10 qAM, premeal regular q6h with TF.   PPx:SCD ,SQL  Lines: PIV  [place]( 5/23-5/25)   Drains: none
See plan above
See plan above
See plan above.
70 yo M h/o HTN, T2DM, HLD, CAD, CVA admitted for elective elective Left STA-MCA  for stenosis with gradually worsening speech over the last several months    Neuro: asa 325qd, Prozac Keppra 1g bid, HOB @30, Zofran, f/u EEG result  CV:BP goal 120-180 lipitor 40 qhs (home bp meds held)   Pulm: nasal canula, no resp distress.   GI:NPO, dobhoff TF, had diarrhea with glucerna, rectal tube was inserted saturday night, TF changed to Osmolite yesterday morning, diarrhea had improved, remove rectal tube if diarrhea resolved. Protonix,  : voids CKD (Cr 1.3, penile implant)  ID:PNA abx x 7d: zosyn (5/26--), ///dc: vanco - no GPC's in cultures (5/26-5/28)  Endo: ISS,FS high -- increase to lantus 18 qAM, increase premeal regular to 5q6h.   PPx:SCD ,SQL  Lines: PIV    Drains: none  OOB to chair only as per neurosurgery. No ambulating yet per neurosurg.
70 yo M h/o HTN, T2DM, HLD, CAD, CVA admitted for elective elective Left STA-MCA  for stenosis with gradually worsening speech over the last several months    Neuro: asa 325qd, Prozac Keppra HOB @30, Zofran, currently on vEEG - will dc EEG if results negative.   CV:BP goal 120-180 lipitor 40 qhs (home bp meds held)   Pulm:High flow NC  -- respiratory status improved compared with yesterday. CXR consistent with PNA - on zosyn/vanco (started yesterday),   GI:NPO, dobhoff TF glucerna - advance TF to goal. Protonix  : voids, CKD (Cr 1.3, penile implant)  ID:PNA abx x 7d: zosyn (5/26--), vanco (5/26--) - check vanc trough tonight 10pm   Endo:ISS, lantus 10 qAM, - fingerstick high, started TF yesterday. will add premeal regular insulin with TF.   PPx:SCD ,SQL  Lines: KENNA Kin[facility]( 5/23-5/25)   Drains: none

## 2017-05-30 NOTE — PHYSICAL THERAPY INITIAL EVALUATION ADULT - ADDITIONAL COMMENTS
History obtained from spouse merry via telephone. Pt lives at home woth spouse and son, with 5STE and 1 flight inside, no elevator. Prior to admission: amb with Cecilio walker on Left side. descended stairs backwards with 1 person assist and ascending with 1 person assist. uses Left HR. as per spouse amb min x 1 with cecilio walker short distances. has w/c for longer distance. when spouse works, pt is home with his son who assists him during the day. Right hand dominant. At baseline pt A+O x 2, and "mixes up' the current date as per spouse.

## 2017-05-30 NOTE — DISCHARGE NOTE ADULT - CARE PROVIDER_API CALL
Trev Rajput), Neurological Surgery  [address] [address]  Phone: [phone]  Fax: [phone]

## 2017-05-30 NOTE — DISCHARGE NOTE ADULT - PATIENT PORTAL LINK FT
“You can access the [facility] Patient Portal, offered by [facility], by registering with the following website: [email or web address]”

## 2017-05-30 NOTE — SPEECH LANGUAGE PATHOLOGY EVALUATION - CONFRONTATIONAL NAMING
0% accuracy for familiar items such as "pen." Pt responded "I don't know" when provided with 2 choices./impaired

## 2017-05-30 NOTE — OCCUPATIONAL THERAPY INITIAL EVALUATION ADULT - ADDITIONAL COMMENTS
Per wife Bonita patient assist with ADL PTA , required two person assist for tub bench transfer, 1 person for all other transfers/ambulation with linda-walker.

## 2017-05-30 NOTE — SWALLOW BEDSIDE ASSESSMENT ADULT - SWALLOW EVAL: DIAGNOSIS
Pt p/w pharyngeal dysphagia c/b suspected delay of swallow initiation and decreased sensation resulting in inconsistent throat clear & wet voice after PO trials. Pt made no attempt to clear voice and was unable to follow directions for a throat-clear. Pt is at high risk of asp and PO is premature at this time. Pt p/w pharyngeal dysphagia c/b suspected delay of swallow initiation and decreased sensation resulting in inconsistent throat clear & wet voice after PO trials. Pt did not consistently attempt to clear voice and was unable to follow directions for a throat-clear. Pt is at high risk of asp and PO is premature at this time.

## 2017-05-30 NOTE — DISCHARGE NOTE ADULT - HOME CARE AGENCY
[phone] [facility] [phone]  requested  Skilled nursing , Physical therapy , occupational therapy ,  , Start of care  6/13/2016 .

## 2017-05-30 NOTE — OCCUPATIONAL THERAPY INITIAL EVALUATION ADULT - MD ORDER
72 y/o male pmhx HTN, CAD, multiple CVA (last 3/2016) DM, known to NSX service with recent angio procedure revealing worsening carotid stenosis planned for elective Left STA-MCA bypass this week but has been experiencing worsening in his speech over the past few days. Per the patients wife, patient has experienced a gradual decline since March 2016 with symptoms associated with increased confusion and speech difficulty.

## 2017-05-30 NOTE — SWALLOW BEDSIDE ASSESSMENT ADULT - SLP GENERAL OBSERVATIONS
Pt was received alert in bed. He was positioned upright for PO trials. Oral care provided prior to PO trials. Per RN, pt failed dysphagia screen 2/2 coughing with PO trials.

## 2017-05-30 NOTE — SPEECH LANGUAGE PATHOLOGY EVALUATION - 1-STEP
67% accuracy. He required modeling for directions such as "Point to the ceiling" but was unable to follow directions such as "stick out your tongue" despite modeling./no

## 2017-05-30 NOTE — PHYSICAL THERAPY INITIAL EVALUATION ADULT - PERTINENT HX OF CURRENT PROBLEM, REHAB EVAL
70 y/o M  known to NSX service with recent angio procedure revealing worsening carotid stenosis planned for elective Left STA-MCA bypass this week but has been experiencing worsening in his speech over the past few days. Per the patients wife, patient has expereinced a gradual decline since March 2016 with symptoms associated with increased confusion and speech difficulty. Denies any headaches, nausea, vomiting or right side deficits.

## 2017-05-30 NOTE — PROGRESS NOTE ADULT - ATTENDING COMMENTS
PLAN:   NEURO: neurochecks q2h, VS q1, PRN pain meds with tylenol, no opiates / benzos for now  s/p STA MCA bypass: continue statins, asa; Angio today - deferred as per neurosurgery  seizure prophylaxis: levetiracetam 500mg BID x 5 days (stop date 05/28)  r/o seizures hook to VEEG x 24 hours  depression: continue fluoxetine  REHAB:  No PT for now;  EARLY MOB:  HOB up    PULM:  continue high flow O2; mod B lines on US, (+) NVE, give furosemide 20mg IV  CARDIO:  SBP goal 120-180mm Hg  ENDO:  Blood sugar goals 140-180 mg/dL, continue insulin sliding scale  GI:  PPI for GI prophylaxis (home meds)  DIET: NPO for now, high aspiration risk, will re-evaluate tomorrow, start tube feeds once off high flow and less dyspneic  RENAL:  IV locked ; trial of void  today; hypokalemic, and will receive further furosemide doses - recheck BMP this pm  HEM/ONC: Hb stable  VTE Prophylaxis: SCDs only, s/p IVC filter insertion, cont SQL   ID: afebrile, leukocytosis; start zosyn vanco, vanc trough prior to 4th dose; (7 days stop date: June 1); send sputum / UA; check procalcitonin  Social: updated wife yesterday    Patient at high risk for neurological deterioration or death due to:  strokes, seizures, ICU delirium, aspiration PNA.  Critical care time, excluding procedures: 45 minutes. PLAN:   NEURO: neurochecks q2-4h, PRN pain meds with tylenol, no opiates / benzos for now  s/p STA MCA bypass: continue statins, asa  seizure prophylaxis: continue levetiracetam, f/u VEEG   depression: continue fluoxetine  REHAB:  PT management EARLY MOB:  OOB to chair, ambulate    PULM:  Room air, incentive spirometry   CARDIO:  SBP goal 120-180mm Hg; increase losartan to 50 daily  ENDO:  Blood sugar goals 140-180 mg/dL, continue insulin sliding scale; lantus to 20 and HR 6 q6h, cont mod ISS  GI:  PPI for GI prophylaxis (home meds)  DIET: high aspiration risk, switch tube feeds to Glucerna; formal s/s   RENAL:  IV locked   HEM/ONC: Hb stable  VTE Prophylaxis: SCDs, s/p IVC filter insertion, SQL   ID: afebrile, leukocytosis; cont zosyn (7 days stop date: June 1)  Social: updated wife yesterday    Patient at high risk for neurological deterioration or death due to:  strokes, seizures, ICU delirium, aspiration PNA.  Critical care time, excluding procedures: 45 minutes.

## 2017-05-31 LAB
ANION GAP SERPL CALC-SCNC: 15 MMOL/L — SIGNIFICANT CHANGE UP (ref 5–17)
APPEARANCE UR: CLEAR — SIGNIFICANT CHANGE UP
BILIRUB UR-MCNC: NEGATIVE — SIGNIFICANT CHANGE UP
BUN SERPL-MCNC: 19 MG/DL — SIGNIFICANT CHANGE UP (ref 7–23)
CALCIUM SERPL-MCNC: 9 MG/DL — SIGNIFICANT CHANGE UP (ref 8.4–10.5)
CHLORIDE SERPL-SCNC: 106 MMOL/L — SIGNIFICANT CHANGE UP (ref 96–108)
CO2 SERPL-SCNC: 23 MMOL/L — SIGNIFICANT CHANGE UP (ref 22–31)
COLOR SPEC: YELLOW — SIGNIFICANT CHANGE UP
CREAT SERPL-MCNC: 1 MG/DL — SIGNIFICANT CHANGE UP (ref 0.5–1.3)
CULTURE RESULTS: SIGNIFICANT CHANGE UP
CULTURE RESULTS: SIGNIFICANT CHANGE UP
DIFF PNL FLD: NEGATIVE — SIGNIFICANT CHANGE UP
GLUCOSE SERPL-MCNC: 193 MG/DL — HIGH (ref 70–99)
GLUCOSE UR QL: NEGATIVE — SIGNIFICANT CHANGE UP
HCT VFR BLD CALC: 30.1 % — LOW (ref 39–50)
HGB BLD-MCNC: 9.8 G/DL — LOW (ref 13–17)
KETONES UR-MCNC: NEGATIVE — SIGNIFICANT CHANGE UP
LEUKOCYTE ESTERASE UR-ACNC: NEGATIVE — SIGNIFICANT CHANGE UP
MAGNESIUM SERPL-MCNC: 1.9 MG/DL — SIGNIFICANT CHANGE UP (ref 1.6–2.6)
MCHC RBC-ENTMCNC: 28.7 PG — SIGNIFICANT CHANGE UP (ref 27–34)
MCHC RBC-ENTMCNC: 32.6 G/DL — SIGNIFICANT CHANGE UP (ref 32–36)
MCV RBC AUTO: 88.3 FL — SIGNIFICANT CHANGE UP (ref 80–100)
NITRITE UR-MCNC: NEGATIVE — SIGNIFICANT CHANGE UP
PH UR: 7 — SIGNIFICANT CHANGE UP (ref 5–8)
PHOSPHATE SERPL-MCNC: 3.7 MG/DL — SIGNIFICANT CHANGE UP (ref 2.5–4.5)
PLATELET # BLD AUTO: 397 K/UL — SIGNIFICANT CHANGE UP (ref 150–400)
POTASSIUM SERPL-MCNC: 3.9 MMOL/L — SIGNIFICANT CHANGE UP (ref 3.5–5.3)
POTASSIUM SERPL-SCNC: 3.9 MMOL/L — SIGNIFICANT CHANGE UP (ref 3.5–5.3)
PROT UR-MCNC: 30 MG/DL
RBC # BLD: 3.41 M/UL — LOW (ref 4.2–5.8)
RBC # FLD: 13.9 % — SIGNIFICANT CHANGE UP (ref 10.3–16.9)
SODIUM SERPL-SCNC: 144 MMOL/L — SIGNIFICANT CHANGE UP (ref 135–145)
SP GR SPEC: 1.01 — SIGNIFICANT CHANGE UP (ref 1–1.03)
SPECIMEN SOURCE: SIGNIFICANT CHANGE UP
SPECIMEN SOURCE: SIGNIFICANT CHANGE UP
UROBILINOGEN FLD QL: 0.2 E.U./DL — SIGNIFICANT CHANGE UP
WBC # BLD: 16.9 K/UL — HIGH (ref 3.8–10.5)
WBC # FLD AUTO: 16.9 K/UL — HIGH (ref 3.8–10.5)

## 2017-05-31 PROCEDURE — 99233 SBSQ HOSP IP/OBS HIGH 50: CPT | Mod: GC

## 2017-05-31 PROCEDURE — 93970 EXTREMITY STUDY: CPT | Mod: 26

## 2017-05-31 PROCEDURE — 71010: CPT | Mod: 26

## 2017-05-31 RX ORDER — PANTOPRAZOLE SODIUM 20 MG/1
40 TABLET, DELAYED RELEASE ORAL DAILY
Qty: 0 | Refills: 0 | Status: DISCONTINUED | OUTPATIENT
Start: 2017-05-31 | End: 2017-06-12

## 2017-05-31 RX ORDER — FLUOXETINE HCL 10 MG
20 CAPSULE ORAL DAILY
Qty: 0 | Refills: 0 | Status: DISCONTINUED | OUTPATIENT
Start: 2017-05-31 | End: 2017-06-11

## 2017-05-31 RX ORDER — HYDRALAZINE HCL 50 MG
10 TABLET ORAL ONCE
Qty: 0 | Refills: 0 | Status: COMPLETED | OUTPATIENT
Start: 2017-05-31 | End: 2017-05-31

## 2017-05-31 RX ORDER — AMLODIPINE BESYLATE 2.5 MG/1
10 TABLET ORAL DAILY
Qty: 0 | Refills: 0 | Status: DISCONTINUED | OUTPATIENT
Start: 2017-05-31 | End: 2017-06-05

## 2017-05-31 RX ORDER — LEVETIRACETAM 250 MG/1
1000 TABLET, FILM COATED ORAL
Qty: 0 | Refills: 0 | Status: DISCONTINUED | OUTPATIENT
Start: 2017-05-31 | End: 2017-06-12

## 2017-05-31 RX ORDER — HYDRALAZINE HCL 50 MG
10 TABLET ORAL ONCE
Qty: 0 | Refills: 0 | Status: DISCONTINUED | OUTPATIENT
Start: 2017-05-31 | End: 2017-05-31

## 2017-05-31 RX ORDER — AMLODIPINE BESYLATE 2.5 MG/1
5 TABLET ORAL ONCE
Qty: 0 | Refills: 0 | Status: COMPLETED | OUTPATIENT
Start: 2017-05-31 | End: 2017-05-31

## 2017-05-31 RX ADMIN — Medication 325 MILLIGRAM(S): at 12:25

## 2017-05-31 RX ADMIN — AMLODIPINE BESYLATE 5 MILLIGRAM(S): 2.5 TABLET ORAL at 06:04

## 2017-05-31 RX ADMIN — ATORVASTATIN CALCIUM 40 MILLIGRAM(S): 80 TABLET, FILM COATED ORAL at 21:56

## 2017-05-31 RX ADMIN — Medication 6 UNIT(S): at 18:03

## 2017-05-31 RX ADMIN — Medication 6 UNIT(S): at 06:04

## 2017-05-31 RX ADMIN — PIPERACILLIN AND TAZOBACTAM 200 GRAM(S): 4; .5 INJECTION, POWDER, LYOPHILIZED, FOR SOLUTION INTRAVENOUS at 11:27

## 2017-05-31 RX ADMIN — LEVETIRACETAM 1000 MILLIGRAM(S): 250 TABLET, FILM COATED ORAL at 06:17

## 2017-05-31 RX ADMIN — PIPERACILLIN AND TAZOBACTAM 200 GRAM(S): 4; .5 INJECTION, POWDER, LYOPHILIZED, FOR SOLUTION INTRAVENOUS at 23:02

## 2017-05-31 RX ADMIN — Medication 10 MILLIGRAM(S): at 17:31

## 2017-05-31 RX ADMIN — AMLODIPINE BESYLATE 10 MILLIGRAM(S): 2.5 TABLET ORAL at 16:57

## 2017-05-31 RX ADMIN — LOSARTAN POTASSIUM 50 MILLIGRAM(S): 100 TABLET, FILM COATED ORAL at 06:04

## 2017-05-31 RX ADMIN — Medication 4: at 12:03

## 2017-05-31 RX ADMIN — PIPERACILLIN AND TAZOBACTAM 200 GRAM(S): 4; .5 INJECTION, POWDER, LYOPHILIZED, FOR SOLUTION INTRAVENOUS at 06:03

## 2017-05-31 RX ADMIN — ENOXAPARIN SODIUM 40 MILLIGRAM(S): 100 INJECTION SUBCUTANEOUS at 21:56

## 2017-05-31 RX ADMIN — AMLODIPINE BESYLATE 5 MILLIGRAM(S): 2.5 TABLET ORAL at 12:25

## 2017-05-31 RX ADMIN — PANTOPRAZOLE SODIUM 40 MILLIGRAM(S): 20 TABLET, DELAYED RELEASE ORAL at 12:25

## 2017-05-31 RX ADMIN — INSULIN GLARGINE 20 UNIT(S): 100 INJECTION, SOLUTION SUBCUTANEOUS at 10:23

## 2017-05-31 RX ADMIN — PIPERACILLIN AND TAZOBACTAM 200 GRAM(S): 4; .5 INJECTION, POWDER, LYOPHILIZED, FOR SOLUTION INTRAVENOUS at 18:01

## 2017-05-31 RX ADMIN — Medication 4: at 06:04

## 2017-05-31 RX ADMIN — Medication 6 UNIT(S): at 11:37

## 2017-05-31 RX ADMIN — Medication 20 MILLIGRAM(S): at 14:37

## 2017-05-31 RX ADMIN — Medication 6 UNIT(S): at 00:55

## 2017-05-31 RX ADMIN — LEVETIRACETAM 1000 MILLIGRAM(S): 250 TABLET, FILM COATED ORAL at 18:02

## 2017-05-31 NOTE — PROGRESS NOTE ADULT - SUBJECTIVE AND OBJECTIVE BOX
Interval Events: reviewed  Patient seen and examined at bedside.    Patient is a 71y old  Male who presents with a chief complaint of worsening slurred speech (30 May 2017 09:49)    he is doing well and moving hid right leg  PAST MEDICAL & SURGICAL HISTORY:  Cerebrovascular accident (CVA) due to stenosis of left middle cerebral artery  Coronary artery disease without angina pectoris, unspecified vessel or lesion type, unspecified whether native or transplanted heart  Depression, unspecified depression type  Essential hypertension  Controlled type 2 diabetes mellitus without complication, unspecified long term insulin use status  History of penile implant  History of lumbar surgery  H/O umbilical hernia repair  History of appendectomy      MEDICATIONS:  Pulmonary:    Antimicrobials:  piperacillin/tazobactam IVPB. 3.375Gram(s) IV Intermittent every 6 hours    Anticoagulants:  enoxaparin Injectable 40milliGRAM(s) SubCutaneous at bedtime    Cardiac:  labetalol Injectable 10milliGRAM(s) IV Push every 1 hour PRN  losartan 50milliGRAM(s) Oral daily  amLODIPine   Tablet 10milliGRAM(s) Oral daily      Allergies    No Known Allergies    Intolerances        Vital Signs Last 24 Hrs  T(C): 36.4, Max: 37.5 ( @ 14:53)  T(F): 97.6, Max: 99.5 ( @ 14:53)  HR: 82 (68 - 82)  BP: 170/72 (149/67 - 184/76)  BP(mean): 103 (96 - 122)  RR: 24 (13 - 24)  SpO2: 97% (93% - 97%)  I & Os for 24h ending  @ 07:00  =============================================  IN: 2505 ml / OUT: 955 ml / NET: 1550 ml    I & Os for current day (as of  @ 19:06)  =============================================  IN: 1060 ml / OUT: 850 ml / NET: 210 ml        LABS:      CBC Full  -  ( 31 May 2017 06:06 )  WBC Count : 16.9 K/uL  Hemoglobin : 9.8 g/dL  Hematocrit : 30.1 %  Platelet Count - Automated : 397 K/uL  Mean Cell Volume : 88.3 fL  Mean Cell Hemoglobin : 28.7 pg  Mean Cell Hemoglobin Concentration : 32.6 g/dL  Auto Neutrophil # : x  Auto Lymphocyte # : x  Auto Monocyte # : x  Auto Eosinophil # : x  Auto Basophil # : x  Auto Neutrophil % : x  Auto Lymphocyte % : x  Auto Monocyte % : x  Auto Eosinophil % : x  Auto Basophil % : x        144  |  106  |  19  ----------------------------<  193<H>  3.9   |  23  |  1.00    Ca    9.0      31 May 2017 06:06  Phos  3.7       Mg     1.9                 Urinalysis Basic - ( 31 May 2017 12:35 )    Color: Yellow / Appearance: Clear / S.015 / pH: x  Gluc: x / Ketone: NEGATIVE  / Bili: NEGATIVE / Urobili: 0.2 E.U./dL   Blood: x / Protein: 30 mg/dL / Nitrite: NEGATIVE   Leuk Esterase: NEGATIVE / RBC: < 5 /HPF / WBC < 5 /HPF   Sq Epi: x / Non Sq Epi: Rare /HPF / Bacteria: Present /HPF                  RADIOLOGY & ADDITIONAL STUDIES (The following images were personally reviewed):  Guerrero:                            Yes        No  Urine output:               Yes        No  DVT prophylaxis:         Yes        No  Flattus:                          Yes        No  Bowel movement:       Yes        No

## 2017-05-31 NOTE — PROGRESS NOTE ADULT - SUBJECTIVE AND OBJECTIVE BOX
Subjective: Seen/evaluated at bedside.  Elevated BP overnight, received IVP medication x 1 with good effect.  No other acute events.      T(C): 37, Max: 37.1 (05-30 @ 21:57)  HR: 70 (58 - 82)  BP: 149/67 (149/67 - 202/91)  RR: 22 (13 - 24)  SpO2: 93% (93% - 100%)  Wt(kg): --    Exam: Alert/awake, follows commands, expressive aphasia/dysarthria  Moves left side spontaneously 5/5  Right LE withdraws, Right UE spastic  PERRL    CBC Full  -  ( 31 May 2017 06:06 )  WBC Count : 16.9 K/uL  Hemoglobin : 9.8 g/dL  Hematocrit : 30.1 %  Platelet Count - Automated : 397 K/uL  Mean Cell Volume : 88.3 fL  Mean Cell Hemoglobin : 28.7 pg  Mean Cell Hemoglobin Concentration : 32.6 g/dL  Auto Neutrophil # : x  Auto Lymphocyte # : x  Auto Monocyte # : x  Auto Eosinophil # : x  Auto Basophil # : x  Auto Neutrophil % : x  Auto Lymphocyte % : x  Auto Monocyte % : x  Auto Eosinophil % : x  Auto Basophil % : x    05-31    144  |  106  |  19  ----------------------------<  193<H>  3.9   |  23  |  1.00    Ca    9.0      31 May 2017 06:06  Phos  3.7     05-31  Mg     1.9     05-31            Wound: C/D/I    Assessment/Plan: s/p Left STA-MCA bypass x 3  -Continue neuro checks  -BP control 110-160  -Continue ASA  -Elevated WBC, etiology unclear.  Afebrile.  Check UA and repeat Doppler  -Failed S/S-consider repeat eval since speech slowly improving vs. PEG  -DVT/GI ppx  -D/W Atiya Mon

## 2017-06-01 LAB
ANION GAP SERPL CALC-SCNC: 15 MMOL/L — SIGNIFICANT CHANGE UP (ref 5–17)
BUN SERPL-MCNC: 16 MG/DL — SIGNIFICANT CHANGE UP (ref 7–23)
CALCIUM SERPL-MCNC: 8.8 MG/DL — SIGNIFICANT CHANGE UP (ref 8.4–10.5)
CHLORIDE SERPL-SCNC: 106 MMOL/L — SIGNIFICANT CHANGE UP (ref 96–108)
CO2 SERPL-SCNC: 23 MMOL/L — SIGNIFICANT CHANGE UP (ref 22–31)
CREAT SERPL-MCNC: 1 MG/DL — SIGNIFICANT CHANGE UP (ref 0.5–1.3)
GLUCOSE SERPL-MCNC: 138 MG/DL — HIGH (ref 70–99)
HCT VFR BLD CALC: 33.5 % — LOW (ref 39–50)
HGB BLD-MCNC: 10.7 G/DL — LOW (ref 13–17)
MCHC RBC-ENTMCNC: 28.2 PG — SIGNIFICANT CHANGE UP (ref 27–34)
MCHC RBC-ENTMCNC: 31.9 G/DL — LOW (ref 32–36)
MCV RBC AUTO: 88.2 FL — SIGNIFICANT CHANGE UP (ref 80–100)
PLATELET # BLD AUTO: 414 K/UL — HIGH (ref 150–400)
POTASSIUM SERPL-MCNC: 3.8 MMOL/L — SIGNIFICANT CHANGE UP (ref 3.5–5.3)
POTASSIUM SERPL-SCNC: 3.8 MMOL/L — SIGNIFICANT CHANGE UP (ref 3.5–5.3)
RBC # BLD: 3.8 M/UL — LOW (ref 4.2–5.8)
RBC # FLD: 13.7 % — SIGNIFICANT CHANGE UP (ref 10.3–16.9)
SODIUM SERPL-SCNC: 144 MMOL/L — SIGNIFICANT CHANGE UP (ref 135–145)
WBC # BLD: 16.2 K/UL — HIGH (ref 3.8–10.5)
WBC # FLD AUTO: 16.2 K/UL — HIGH (ref 3.8–10.5)

## 2017-06-01 PROCEDURE — 99233 SBSQ HOSP IP/OBS HIGH 50: CPT | Mod: GC

## 2017-06-01 RX ORDER — ENOXAPARIN SODIUM 100 MG/ML
90 INJECTION SUBCUTANEOUS DAILY
Qty: 0 | Refills: 0 | Status: DISCONTINUED | OUTPATIENT
Start: 2017-06-01 | End: 2017-06-01

## 2017-06-01 RX ORDER — ENOXAPARIN SODIUM 100 MG/ML
90 INJECTION SUBCUTANEOUS
Qty: 0 | Refills: 0 | Status: DISCONTINUED | OUTPATIENT
Start: 2017-06-01 | End: 2017-06-09

## 2017-06-01 RX ADMIN — PIPERACILLIN AND TAZOBACTAM 200 GRAM(S): 4; .5 INJECTION, POWDER, LYOPHILIZED, FOR SOLUTION INTRAVENOUS at 11:49

## 2017-06-01 RX ADMIN — PANTOPRAZOLE SODIUM 40 MILLIGRAM(S): 20 TABLET, DELAYED RELEASE ORAL at 11:49

## 2017-06-01 RX ADMIN — Medication 20 MILLIGRAM(S): at 11:49

## 2017-06-01 RX ADMIN — LOSARTAN POTASSIUM 50 MILLIGRAM(S): 100 TABLET, FILM COATED ORAL at 06:32

## 2017-06-01 RX ADMIN — INSULIN GLARGINE 20 UNIT(S): 100 INJECTION, SOLUTION SUBCUTANEOUS at 12:07

## 2017-06-01 RX ADMIN — Medication 10 MILLIGRAM(S): at 21:54

## 2017-06-01 RX ADMIN — Medication: at 12:08

## 2017-06-01 RX ADMIN — ENOXAPARIN SODIUM 90 MILLIGRAM(S): 100 INJECTION SUBCUTANEOUS at 18:15

## 2017-06-01 RX ADMIN — ATORVASTATIN CALCIUM 40 MILLIGRAM(S): 80 TABLET, FILM COATED ORAL at 21:39

## 2017-06-01 RX ADMIN — Medication: at 18:15

## 2017-06-01 RX ADMIN — LEVETIRACETAM 1000 MILLIGRAM(S): 250 TABLET, FILM COATED ORAL at 06:32

## 2017-06-01 RX ADMIN — PIPERACILLIN AND TAZOBACTAM 200 GRAM(S): 4; .5 INJECTION, POWDER, LYOPHILIZED, FOR SOLUTION INTRAVENOUS at 23:25

## 2017-06-01 RX ADMIN — Medication 325 MILLIGRAM(S): at 11:49

## 2017-06-01 RX ADMIN — LEVETIRACETAM 1000 MILLIGRAM(S): 250 TABLET, FILM COATED ORAL at 18:15

## 2017-06-01 RX ADMIN — AMLODIPINE BESYLATE 10 MILLIGRAM(S): 2.5 TABLET ORAL at 06:32

## 2017-06-01 RX ADMIN — PIPERACILLIN AND TAZOBACTAM 200 GRAM(S): 4; .5 INJECTION, POWDER, LYOPHILIZED, FOR SOLUTION INTRAVENOUS at 18:15

## 2017-06-01 RX ADMIN — PIPERACILLIN AND TAZOBACTAM 200 GRAM(S): 4; .5 INJECTION, POWDER, LYOPHILIZED, FOR SOLUTION INTRAVENOUS at 06:32

## 2017-06-01 NOTE — PROGRESS NOTE ADULT - PROBLEM SELECTOR PLAN 4
The blood sugars elevated and he is to in sliding-scale N. insulin regimen The blood sugars elevated and he is to in sliding-scale N. insulin regimen.  The last two readings increased and will adjust insulin in am

## 2017-06-01 NOTE — PROGRESS NOTE ADULT - SUBJECTIVE AND OBJECTIVE BOX
HPI:  72 y/o male pmhx HTN, CAD, multiple CVA (last 3/2016) DM, known to NSX service with recent angio procedure revealing worsening carotid stenosis planned for elective Left STA-MCA bypass this week but has been experiencing worsening in his speech over the past few days. Per the patients wife, patient has expereinced a gradual decline since 2016 with symptoms associated with increased confusion and speech difficulty. Denies any headaches, nausea, vomiting or right side deficits. Denies any numbness tingling or decreased sensation.  Pt at baseline using hand crutch and one person assistance to ambulate. Denies any medications for pain or improvement of symptoms. Denies any recent trauma or falls. (21 May 2017 16:56)    OVERNIGHT EVENTS:  Pt seen and examined at bedside. No acute events overnight. BP well controlled.    Vital Signs Last 24 Hrs  T(C): 36.5, Max: 37.5 (- @ 14:53)  T(F): 97.7, Max: 99.5 ( @ 14:53)  HR: 76 (68 - 90)  BP: 145/66 (145/66 - 181/78)  BP(mean): 95 (95 - 122)  RR: 18 (18 - 24)  SpO2: 95% (93% - 97%)    I&O's Summary    I & Os for current day (as of 2017 09:11)  =============================================  IN: 1060 ml / OUT: 1350 ml / NET: -290 ml      PHYSICAL EXAM:  Neurological:  Awae and alert, expressive aphasia and dysarthria, FC  CNII-XII grossly intact, PERRL, EOMI, mild left facial  MAEx4, RUE 3/5 and spastic, o/w 5/5 throughout   SILT throughout b/l  Incision/wound: clean, dry and intact; staples in place       TUBES/LINES:  [] Guerrero  [] Lumbar Drain  [] Wound Drains  [] Others      DIET:  [] NPO  [x] Mechanical  [] Tube feeds    LABS:                        10.7   16.2  )-----------( 414      ( 2017 06:59 )             33.5     06-01    144  |  106  |  16  ----------------------------<  138<H>  3.8   |  23  |  1.00    Ca    8.8      2017 06:59  Phos  3.7     -  Mg     1.9             Urinalysis Basic - ( 31 May 2017 12:35 )    Color: Yellow / Appearance: Clear / S.015 / pH: x  Gluc: x / Ketone: NEGATIVE  / Bili: NEGATIVE / Urobili: 0.2 E.U./dL   Blood: x / Protein: 30 mg/dL / Nitrite: NEGATIVE   Leuk Esterase: NEGATIVE / RBC: < 5 /HPF / WBC < 5 /HPF   Sq Epi: x / Non Sq Epi: Rare /HPF / Bacteria: Present /HPF          CAPILLARY BLOOD GLUCOSE  105 (2017 05:54)  105 (2017 00:52)  122 (31 May 2017 21:27)  132 (31 May 2017 16:29)  217 (31 May 2017 11:20)  183 (31 May 2017 10:20)      Drug Levels: [] N/A  Vancomycin Level, Trough: 13.7 ug/mL ( @ 23:19)    CSF Analysis: [] N/A      Allergies    No Known Allergies    Intolerances      MEDICATIONS:  Antibiotics:  piperacillin/tazobactam IVPB. 3.375Gram(s) IV Intermittent every 6 hours    Neuro:  ondansetron Injectable 4milliGRAM(s) IV Push every 6 hours PRN  aspirin 325milliGRAM(s) Enteral Tube daily  acetaminophen    Suspension. 650milliGRAM(s) Enteral Tube every 6 hours PRN  acetaminophen    Suspension 650milliGRAM(s) Enteral Tube every 6 hours PRN  FLUoxetine 20milliGRAM(s) Oral daily  levETIRAcetam 1000milliGRAM(s) Oral two times a day    Anticoagulation:  enoxaparin Injectable 40milliGRAM(s) SubCutaneous at bedtime    OTHER:  dextrose Gel 1Dose(s) Oral once PRN  dextrose 50% Injectable 12.5Gram(s) IV Push once  glucagon  Injectable 1milliGRAM(s) IntraMuscular once PRN  atorvastatin 40milliGRAM(s) Oral at bedtime  labetalol Injectable 10milliGRAM(s) IV Push every 1 hour PRN  losartan 50milliGRAM(s) Oral daily  insulin glargine Injectable (LANTUS) 20Unit(s) SubCutaneous <User Schedule>  insulin lispro (HumaLOG) corrective regimen sliding scale  SubCutaneous every 6 hours  pantoprazole  Injectable 40milliGRAM(s) IV Push daily  amLODIPine   Tablet 10milliGRAM(s) Oral daily    IVF:    CULTURES:  Culture Results:   No growth at 5 days. ( @ 10:45)  Culture Results:   No growth at 5 days. ( @ 10:45)    RADIOLOGY & ADDITIONAL TESTS:      ASSESSMENT:  71y Male s/p left STA-MCA bypass, s/p IVC filter     PLAN:  -neuro checks q2hrs  -pain control   -BP goal 120-160  -continue ASA   -continue keppra   -continue zosyn   -passed s/s yesterday, puree-nectar diet  -d/c NGT if tolerates diet   -d/c lantus while off standing tube feeds   -OOB/PT/OT  -DVT/GI ppx  -d/w Dr. Rajput HPI:  72 y/o male pmhx HTN, CAD, multiple CVA (last 3/2016) DM, known to NSX service with recent angio procedure revealing worsening carotid stenosis planned for elective Left STA-MCA bypass this week but has been experiencing worsening in his speech over the past few days. Per the patients wife, patient has expereinced a gradual decline since 2016 with symptoms associated with increased confusion and speech difficulty. Denies any headaches, nausea, vomiting or right side deficits. Denies any numbness tingling or decreased sensation.  Pt at baseline using hand crutch and one person assistance to ambulate. Denies any medications for pain or improvement of symptoms. Denies any recent trauma or falls. (21 May 2017 16:56)    OVERNIGHT EVENTS:  Pt seen and examined at bedside. No acute events overnight. BP well controlled.    Vital Signs Last 24 Hrs  T(C): 36.5, Max: 37.5 (- @ 14:53)  T(F): 97.7, Max: 99.5 ( @ 14:53)  HR: 76 (68 - 90)  BP: 145/66 (145/66 - 181/78)  BP(mean): 95 (95 - 122)  RR: 18 (18 - 24)  SpO2: 95% (93% - 97%)    I&O's Summary    I & Os for current day (as of 2017 09:11)  =============================================  IN: 1060 ml / OUT: 1350 ml / NET: -290 ml      PHYSICAL EXAM:  Neurological:  Awae and alert, expressive aphasia and dysarthria, FC  CNII-XII grossly intact, PERRL, EOMI, mild left facial  MAEx4, RUE 3/5 and spastic, o/w 5/5 throughout   SILT throughout b/l  Incision/wound: clean, dry and intact; staples in place       TUBES/LINES:  [] Guerrero  [] Lumbar Drain  [] Wound Drains  [] Others      DIET:  [] NPO  [x] Mechanical  [] Tube feeds    LABS:                        10.7   16.2  )-----------( 414      ( 2017 06:59 )             33.5     06-01    144  |  106  |  16  ----------------------------<  138<H>  3.8   |  23  |  1.00    Ca    8.8      2017 06:59  Phos  3.7     -  Mg     1.9             Urinalysis Basic - ( 31 May 2017 12:35 )    Color: Yellow / Appearance: Clear / S.015 / pH: x  Gluc: x / Ketone: NEGATIVE  / Bili: NEGATIVE / Urobili: 0.2 E.U./dL   Blood: x / Protein: 30 mg/dL / Nitrite: NEGATIVE   Leuk Esterase: NEGATIVE / RBC: < 5 /HPF / WBC < 5 /HPF   Sq Epi: x / Non Sq Epi: Rare /HPF / Bacteria: Present /HPF          CAPILLARY BLOOD GLUCOSE  105 (2017 05:54)  105 (2017 00:52)  122 (31 May 2017 21:27)  132 (31 May 2017 16:29)  217 (31 May 2017 11:20)  183 (31 May 2017 10:20)      Drug Levels: [] N/A  Vancomycin Level, Trough: 13.7 ug/mL ( @ 23:19)    CSF Analysis: [] N/A      Allergies    No Known Allergies    Intolerances      MEDICATIONS:  Antibiotics:  piperacillin/tazobactam IVPB. 3.375Gram(s) IV Intermittent every 6 hours    Neuro:  ondansetron Injectable 4milliGRAM(s) IV Push every 6 hours PRN  aspirin 325milliGRAM(s) Enteral Tube daily  acetaminophen    Suspension. 650milliGRAM(s) Enteral Tube every 6 hours PRN  acetaminophen    Suspension 650milliGRAM(s) Enteral Tube every 6 hours PRN  FLUoxetine 20milliGRAM(s) Oral daily  levETIRAcetam 1000milliGRAM(s) Oral two times a day    Anticoagulation:  enoxaparin Injectable 40milliGRAM(s) SubCutaneous at bedtime    OTHER:  dextrose Gel 1Dose(s) Oral once PRN  dextrose 50% Injectable 12.5Gram(s) IV Push once  glucagon  Injectable 1milliGRAM(s) IntraMuscular once PRN  atorvastatin 40milliGRAM(s) Oral at bedtime  labetalol Injectable 10milliGRAM(s) IV Push every 1 hour PRN  losartan 50milliGRAM(s) Oral daily  insulin glargine Injectable (LANTUS) 20Unit(s) SubCutaneous <User Schedule>  insulin lispro (HumaLOG) corrective regimen sliding scale  SubCutaneous every 6 hours  pantoprazole  Injectable 40milliGRAM(s) IV Push daily  amLODIPine   Tablet 10milliGRAM(s) Oral daily    IVF:    CULTURES:  Culture Results:   No growth at 5 days. ( @ 10:45)  Culture Results:   No growth at 5 days. ( @ 10:45)    RADIOLOGY & ADDITIONAL TESTS:      ASSESSMENT:  71y Male s/p left STA-MCA bypass, s/p IVC filter     PLAN:  -neuro checks q2hrs  -pain control   -BP goal 120-160  -continue ASA   -continue keppra   -continue zosyn   -passed s/s yesterday, puree-nectar diet  -d/c NGT if tolerates diet   -d/c lantus while off standing tube feeds, f/u FS   -OOB/PT/OT  -DVT/GI ppx  -d/w Dr. Rajput

## 2017-06-02 LAB
ANION GAP SERPL CALC-SCNC: 13 MMOL/L — SIGNIFICANT CHANGE UP (ref 5–17)
APPEARANCE UR: CLEAR — SIGNIFICANT CHANGE UP
BILIRUB UR-MCNC: NEGATIVE — SIGNIFICANT CHANGE UP
BUN SERPL-MCNC: 14 MG/DL — SIGNIFICANT CHANGE UP (ref 7–23)
CALCIUM SERPL-MCNC: 8.6 MG/DL — SIGNIFICANT CHANGE UP (ref 8.4–10.5)
CHLORIDE SERPL-SCNC: 102 MMOL/L — SIGNIFICANT CHANGE UP (ref 96–108)
CO2 SERPL-SCNC: 25 MMOL/L — SIGNIFICANT CHANGE UP (ref 22–31)
COLOR SPEC: YELLOW — SIGNIFICANT CHANGE UP
CREAT SERPL-MCNC: 1.1 MG/DL — SIGNIFICANT CHANGE UP (ref 0.5–1.3)
DIFF PNL FLD: NEGATIVE — SIGNIFICANT CHANGE UP
GLUCOSE SERPL-MCNC: 106 MG/DL — HIGH (ref 70–99)
GLUCOSE UR QL: 100
HCT VFR BLD CALC: 30.3 % — LOW (ref 39–50)
HGB BLD-MCNC: 10.1 G/DL — LOW (ref 13–17)
KETONES UR-MCNC: (no result) MG/DL
LEUKOCYTE ESTERASE UR-ACNC: NEGATIVE — SIGNIFICANT CHANGE UP
MCHC RBC-ENTMCNC: 28.8 PG — SIGNIFICANT CHANGE UP (ref 27–34)
MCHC RBC-ENTMCNC: 33.3 G/DL — SIGNIFICANT CHANGE UP (ref 32–36)
MCV RBC AUTO: 86.3 FL — SIGNIFICANT CHANGE UP (ref 80–100)
NITRITE UR-MCNC: NEGATIVE — SIGNIFICANT CHANGE UP
PH UR: 6 — SIGNIFICANT CHANGE UP (ref 5–8)
PLATELET # BLD AUTO: 397 K/UL — SIGNIFICANT CHANGE UP (ref 150–400)
POTASSIUM SERPL-MCNC: 3.4 MMOL/L — LOW (ref 3.5–5.3)
POTASSIUM SERPL-SCNC: 3.4 MMOL/L — LOW (ref 3.5–5.3)
PROT UR-MCNC: NEGATIVE MG/DL — SIGNIFICANT CHANGE UP
RBC # BLD: 3.51 M/UL — LOW (ref 4.2–5.8)
RBC # FLD: 13.6 % — SIGNIFICANT CHANGE UP (ref 10.3–16.9)
SODIUM SERPL-SCNC: 140 MMOL/L — SIGNIFICANT CHANGE UP (ref 135–145)
SP GR SPEC: 1.01 — SIGNIFICANT CHANGE UP (ref 1–1.03)
UROBILINOGEN FLD QL: 0.2 E.U./DL — SIGNIFICANT CHANGE UP
WBC # BLD: 19.2 K/UL — HIGH (ref 3.8–10.5)
WBC # FLD AUTO: 19.2 K/UL — HIGH (ref 3.8–10.5)

## 2017-06-02 PROCEDURE — 99233 SBSQ HOSP IP/OBS HIGH 50: CPT | Mod: GC

## 2017-06-02 PROCEDURE — 71010: CPT | Mod: 26

## 2017-06-02 RX ORDER — POTASSIUM CHLORIDE 20 MEQ
10 PACKET (EA) ORAL
Qty: 0 | Refills: 0 | Status: COMPLETED | OUTPATIENT
Start: 2017-06-02 | End: 2017-06-02

## 2017-06-02 RX ORDER — POTASSIUM CHLORIDE 20 MEQ
40 PACKET (EA) ORAL EVERY 4 HOURS
Qty: 0 | Refills: 0 | Status: DISCONTINUED | OUTPATIENT
Start: 2017-06-02 | End: 2017-06-02

## 2017-06-02 RX ADMIN — Medication 2: at 13:05

## 2017-06-02 RX ADMIN — Medication 100 MILLIEQUIVALENT(S): at 16:54

## 2017-06-02 RX ADMIN — INSULIN GLARGINE 20 UNIT(S): 100 INJECTION, SOLUTION SUBCUTANEOUS at 11:50

## 2017-06-02 RX ADMIN — Medication 20 MILLIGRAM(S): at 12:07

## 2017-06-02 RX ADMIN — AMLODIPINE BESYLATE 10 MILLIGRAM(S): 2.5 TABLET ORAL at 06:41

## 2017-06-02 RX ADMIN — LOSARTAN POTASSIUM 50 MILLIGRAM(S): 100 TABLET, FILM COATED ORAL at 06:40

## 2017-06-02 RX ADMIN — PANTOPRAZOLE SODIUM 40 MILLIGRAM(S): 20 TABLET, DELAYED RELEASE ORAL at 11:51

## 2017-06-02 RX ADMIN — Medication 325 MILLIGRAM(S): at 12:07

## 2017-06-02 RX ADMIN — ATORVASTATIN CALCIUM 40 MILLIGRAM(S): 80 TABLET, FILM COATED ORAL at 22:58

## 2017-06-02 RX ADMIN — ENOXAPARIN SODIUM 90 MILLIGRAM(S): 100 INJECTION SUBCUTANEOUS at 06:41

## 2017-06-02 RX ADMIN — ENOXAPARIN SODIUM 90 MILLIGRAM(S): 100 INJECTION SUBCUTANEOUS at 18:10

## 2017-06-02 RX ADMIN — LEVETIRACETAM 1000 MILLIGRAM(S): 250 TABLET, FILM COATED ORAL at 18:10

## 2017-06-02 RX ADMIN — Medication 2: at 18:10

## 2017-06-02 RX ADMIN — LEVETIRACETAM 1000 MILLIGRAM(S): 250 TABLET, FILM COATED ORAL at 06:41

## 2017-06-02 RX ADMIN — Medication 100 MILLIEQUIVALENT(S): at 18:09

## 2017-06-02 RX ADMIN — Medication 100 MILLIEQUIVALENT(S): at 15:25

## 2017-06-02 NOTE — PROGRESS NOTE ADULT - SUBJECTIVE AND OBJECTIVE BOX
HPI:  70 y/o male pmhx HTN, CAD, multiple CVA (last 3/2016) DM, known to NSX service with recent angio procedure revealing worsening carotid stenosis planned for elective Left STA-MCA bypass this week but has been experiencing worsening in his speech over the past few days. Per the patients wife, patient has expereinced a gradual decline since 2016 with symptoms associated with increased confusion and speech difficulty. Denies any headaches, nausea, vomiting or right side deficits. Denies any numbness tingling or decreased sensation.  Pt at baseline using hand crutch and one person assistance to ambulate. Denies any medications for pain or improvement of symptoms. Denies any recent trauma or falls. (21 May 2017 16:56)    OVERNIGHT EVENTS:  Vital Signs Last 24 Hrs  T(C): 36.6, Max: 36.7 ( @ 22:04)  T(F): 97.9, Max: 98 ( @ 22:04)  HR: 76 (60 - 88)  BP: 168/71 (141/68 - 176/81)  BP(mean): 102 (94 - 120)  RR: 18 (15 - 22)  SpO2: 98% (95% - 98%)    I&O's Summary  I & Os for 24h ending 2017 07:00  =============================================  IN: 250 ml / OUT: 1500 ml / NET: -1250 ml    I & Os for current day (as of 2017 10:13)  =============================================  IN: 240 ml / OUT: 0 ml / NET: 240 ml      PHYSICAL EXAM:  Neurological::    Awae and alert, expressive aphasia and dysarthria, FC  CNII-XII grossly intact, PERRL, EOMI, mild left facial  MAEx4, RUE 3/5 and spastic, o/w 5/5 throughout   SILT throughout b/l  Incision/wound: clean, dry and intact; staples in place       Cardiovascular: RRR  Respiratory: Lungs CTAB  Gastrointestinal: +BM  Genitourinary: Voiding  Extremities: warm and dry      DIET: Soft diet  [LABS:                        10.1   19.2  )-----------( 397      ( 2017 05:40 )             30.3     06-02    140  |  102  |  14  ----------------------------<  106<H>  3.4<L>   |  25  |  1.10    Ca    8.6      2017 05:40        Urinalysis Basic - ( 31 May 2017 12:35 )    Color: Yellow / Appearance: Clear / S.015 / pH: x  Gluc: x / Ketone: NEGATIVE  / Bili: NEGATIVE / Urobili: 0.2 E.U./dL   Blood: x / Protein: 30 mg/dL / Nitrite: NEGATIVE   Leuk Esterase: NEGATIVE / RBC: < 5 /HPF / WBC < 5 /HPF   Sq Epi: x / Non Sq Epi: Rare /HPF / Bacteria: Present /HPF          CAPILLARY BLOOD GLUCOSE  100 (2017 06:00)  87 (2017 01:00)  201 (2017 16:08)  212 (2017 11:32)      Drug Levels: [] N/A  Vancomycin Level, Trough: 13.7 ug/mL ( @ 23:19)    CSF Analysis: [] N/A      Allergies    No Known Allergies    Intolerances      MEDICATIONS:  Antibiotics:    Neuro:  ondansetron Injectable 4milliGRAM(s) IV Push every 6 hours PRN  aspirin 325milliGRAM(s) Enteral Tube daily  acetaminophen    Suspension. 650milliGRAM(s) Enteral Tube every 6 hours PRN  acetaminophen    Suspension 650milliGRAM(s) Enteral Tube every 6 hours PRN  FLUoxetine 20milliGRAM(s) Oral daily  levETIRAcetam 1000milliGRAM(s) Oral two times a day    Anticoagulation:  enoxaparin Injectable 90milliGRAM(s) SubCutaneous two times a day    OTHER:  dextrose Gel 1Dose(s) Oral once PRN  dextrose 50% Injectable 12.5Gram(s) IV Push once  glucagon  Injectable 1milliGRAM(s) IntraMuscular once PRN  atorvastatin 40milliGRAM(s) Oral at bedtime  labetalol Injectable 10milliGRAM(s) IV Push every 1 hour PRN  losartan 50milliGRAM(s) Oral daily  insulin glargine Injectable (LANTUS) 20Unit(s) SubCutaneous <User Schedule>  insulin lispro (HumaLOG) corrective regimen sliding scale  SubCutaneous every 6 hours  pantoprazole  Injectable 40milliGRAM(s) IV Push daily  amLODIPine   Tablet 10milliGRAM(s) Oral daily    IVF:    CULTURES:  Culture Results:   No growth at 5 days. ( @ 10:45)  Culture Results:   No growth at 5 days. ( @ 10:45)    RADIOLOGY & ADDITIONAL TESTS:      ASSESSMENT:    71y Male s/p left STA-MCA bypass, s/p IVC filter     PLAN:  NEURO:  Monitor neuro status  OT/PT  DC Keofeed tube  Continue current medical regime     Dispo: Discussed with attending

## 2017-06-02 NOTE — PROVIDER CONTACT NOTE (OTHER) - BACKGROUND
Pt admitted for stroke and had LSTA-MCA procedure. As per Neuro Team Systolic should be maintained between 120-160systolic.

## 2017-06-02 NOTE — PROGRESS NOTE ADULT - SUBJECTIVE AND OBJECTIVE BOX
HPI:  72 y/o male pmhx HTN, CAD, multiple CVA (last 3/2016) DM, known to NSX service with recent angio procedure revealing worsening carotid stenosis planned for elective Left STA-MCA bypass this week but has been experiencing worsening in his speech over the past few days. Per the patients wife, patient has expereinced a gradual decline since 2016 with symptoms associated with increased confusion and speech difficulty. Denies any headaches, nausea, vomiting or right side deficits. Denies any numbness tingling or decreased sensation.  Pt at baseline using hand crutch and one person assistance to ambulate. Denies any medications for pain or improvement of symptoms. Denies any recent trauma or falls. (21 May 2017 16:56)    OVERNIGHT EVENTS:  Vital Signs Last 24 Hrs  T(C): 36.2, Max: 36.7 ( @ 09:24)  T(F): 97.1, Max: 98.1 ( @ 09:24)  HR: 74 (60 - 88)  BP: 175/84 (141/68 - 180/81)  BP(mean): 120 (94 - 120)  RR: 16 (14 - 23)  SpO2: 95% (95% - 96%)    I&O's Summary    I & Os for current day (as of 2017 07:58)  =============================================  IN: 250 ml / OUT: 1500 ml / NET: -1250 ml      PHYSICAL EXAM:  :  Neurological:  Awae and alert, expressive aphasia and dysarthria, FC  CNII-XII grossly intact, PERRL, EOMI, mild left facial  MAEx4, RUE 3/5 and spastic, o/w 5/5 throughout   SILT throughout b/l  Incision/wound: clean, dry and intact; staples in place         Cardiovascular: RRR  Respiratory:Lungs CTAB  respirations regualr ad unlabored  Gastrointestinal: +BS  +BM  tolerating diet  Genitourinary:Voiding        DIET: Regular  LABS:                        10.1   19.2  )-----------( 397      ( 2017 05:40 )             30.3     06-02    140  |  102  |  14  ----------------------------<  106<H>  3.4<L>   |  25  |  1.10    Ca    8.6      2017 05:40        Urinalysis Basic - ( 31 May 2017 12:35 )    Color: Yellow / Appearance: Clear / S.015 / pH: x  Gluc: x / Ketone: NEGATIVE  / Bili: NEGATIVE / Urobili: 0.2 E.U./dL   Blood: x / Protein: 30 mg/dL / Nitrite: NEGATIVE   Leuk Esterase: NEGATIVE / RBC: < 5 /HPF / WBC < 5 /HPF   Sq Epi: x / Non Sq Epi: Rare /HPF / Bacteria: Present /HPF          CAPILLARY BLOOD GLUCOSE  100 (2017 06:00)  87 (2017 01:00)  201 (2017 16:08)  212 (2017 11:32)      Drug Levels: [] N/A  Vancomycin Level, Trough: 13.7 ug/mL ( @ 23:19)    CSF Analysis: [] N/A      Allergies    No Known Allergies    Intolerances      MEDICATIONS:  Antibiotics:    Neuro:  ondansetron Injectable 4milliGRAM(s) IV Push every 6 hours PRN  aspirin 325milliGRAM(s) Enteral Tube daily  acetaminophen    Suspension. 650milliGRAM(s) Enteral Tube every 6 hours PRN  acetaminophen    Suspension 650milliGRAM(s) Enteral Tube every 6 hours PRN  FLUoxetine 20milliGRAM(s) Oral daily  levETIRAcetam 1000milliGRAM(s) Oral two times a day    Anticoagulation:  enoxaparin Injectable 90milliGRAM(s) SubCutaneous two times a day    OTHER:  dextrose Gel 1Dose(s) Oral once PRN  dextrose 50% Injectable 12.5Gram(s) IV Push once  glucagon  Injectable 1milliGRAM(s) IntraMuscular once PRN  atorvastatin 40milliGRAM(s) Oral at bedtime  labetalol Injectable 10milliGRAM(s) IV Push every 1 hour PRN  losartan 50milliGRAM(s) Oral daily  insulin glargine Injectable (LANTUS) 20Unit(s) SubCutaneous <User Schedule>  insulin lispro (HumaLOG) corrective regimen sliding scale  SubCutaneous every 6 hours  pantoprazole  Injectable 40milliGRAM(s) IV Push daily  amLODIPine   Tablet 10milliGRAM(s) Oral daily    IVF:    CULTURES:  Culture Results:   No growth at 5 days. ( @ 10:45)  Culture Results:   No growth at 5 days. ( @ 10:45)    RADIOLOGY & ADDITIONAL TESTS:      ASSESSMENT:  :  71y Male s/p left STA-MCA bypass, s/p IVC filter     PLAN:  -neuro checks q4hrs  -pain control   -BP goal 120-160  -continue ASA   -continue keppra   -continue zosyn   -passed s/s yesterday, puree-nectar diet  -d/c NGT   -d/c lantus while off standing tube feeds, f/u FS   -OOB/PT/OT  -DVT/GI ppx  -d/w Dr. Rajput

## 2017-06-03 LAB
ANION GAP SERPL CALC-SCNC: 14 MMOL/L — SIGNIFICANT CHANGE UP (ref 5–17)
BUN SERPL-MCNC: 11 MG/DL — SIGNIFICANT CHANGE UP (ref 7–23)
CALCIUM SERPL-MCNC: 9.1 MG/DL — SIGNIFICANT CHANGE UP (ref 8.4–10.5)
CHLORIDE SERPL-SCNC: 101 MMOL/L — SIGNIFICANT CHANGE UP (ref 96–108)
CK SERPL-CCNC: 239 U/L — HIGH (ref 30–200)
CO2 SERPL-SCNC: 23 MMOL/L — SIGNIFICANT CHANGE UP (ref 22–31)
CREAT SERPL-MCNC: 1 MG/DL — SIGNIFICANT CHANGE UP (ref 0.5–1.3)
GLUCOSE SERPL-MCNC: 129 MG/DL — HIGH (ref 70–99)
HCT VFR BLD CALC: 30.9 % — LOW (ref 39–50)
HGB BLD-MCNC: 10.4 G/DL — LOW (ref 13–17)
MCHC RBC-ENTMCNC: 28.9 PG — SIGNIFICANT CHANGE UP (ref 27–34)
MCHC RBC-ENTMCNC: 33.7 G/DL — SIGNIFICANT CHANGE UP (ref 32–36)
MCV RBC AUTO: 85.8 FL — SIGNIFICANT CHANGE UP (ref 80–100)
PLATELET # BLD AUTO: 434 K/UL — HIGH (ref 150–400)
POTASSIUM SERPL-MCNC: 3.4 MMOL/L — LOW (ref 3.5–5.3)
POTASSIUM SERPL-SCNC: 3.4 MMOL/L — LOW (ref 3.5–5.3)
RBC # BLD: 3.6 M/UL — LOW (ref 4.2–5.8)
RBC # FLD: 13.6 % — SIGNIFICANT CHANGE UP (ref 10.3–16.9)
SODIUM SERPL-SCNC: 138 MMOL/L — SIGNIFICANT CHANGE UP (ref 135–145)
TROPONIN T SERPL-MCNC: 0.03 NG/ML — HIGH (ref 0–0.01)
WBC # BLD: 21.5 K/UL — HIGH (ref 3.8–10.5)
WBC # FLD AUTO: 21.5 K/UL — HIGH (ref 3.8–10.5)

## 2017-06-03 PROCEDURE — 99233 SBSQ HOSP IP/OBS HIGH 50: CPT | Mod: GC

## 2017-06-03 PROCEDURE — 93010 ELECTROCARDIOGRAM REPORT: CPT

## 2017-06-03 RX ORDER — POTASSIUM CHLORIDE 20 MEQ
40 PACKET (EA) ORAL EVERY 4 HOURS
Qty: 0 | Refills: 0 | Status: COMPLETED | OUTPATIENT
Start: 2017-06-03 | End: 2017-06-03

## 2017-06-03 RX ADMIN — LOSARTAN POTASSIUM 50 MILLIGRAM(S): 100 TABLET, FILM COATED ORAL at 08:00

## 2017-06-03 RX ADMIN — AMLODIPINE BESYLATE 10 MILLIGRAM(S): 2.5 TABLET ORAL at 07:59

## 2017-06-03 RX ADMIN — LEVETIRACETAM 1000 MILLIGRAM(S): 250 TABLET, FILM COATED ORAL at 17:33

## 2017-06-03 RX ADMIN — PANTOPRAZOLE SODIUM 40 MILLIGRAM(S): 20 TABLET, DELAYED RELEASE ORAL at 13:08

## 2017-06-03 RX ADMIN — LEVETIRACETAM 1000 MILLIGRAM(S): 250 TABLET, FILM COATED ORAL at 08:00

## 2017-06-03 RX ADMIN — Medication 40 MILLIEQUIVALENT(S): at 23:05

## 2017-06-03 RX ADMIN — Medication 20 MILLIGRAM(S): at 13:50

## 2017-06-03 RX ADMIN — Medication 325 MILLIGRAM(S): at 13:50

## 2017-06-03 RX ADMIN — ENOXAPARIN SODIUM 90 MILLIGRAM(S): 100 INJECTION SUBCUTANEOUS at 17:33

## 2017-06-03 RX ADMIN — Medication 6: at 17:32

## 2017-06-03 RX ADMIN — ATORVASTATIN CALCIUM 40 MILLIGRAM(S): 80 TABLET, FILM COATED ORAL at 23:05

## 2017-06-03 RX ADMIN — ENOXAPARIN SODIUM 90 MILLIGRAM(S): 100 INJECTION SUBCUTANEOUS at 07:41

## 2017-06-03 RX ADMIN — INSULIN GLARGINE 20 UNIT(S): 100 INJECTION, SOLUTION SUBCUTANEOUS at 10:29

## 2017-06-03 RX ADMIN — Medication 40 MILLIEQUIVALENT(S): at 17:33

## 2017-06-03 NOTE — PROGRESS NOTE ADULT - PROBLEM SELECTOR PLAN 4
The blood sugars elevated and he is to in sliding-scale N. insulin regimen.  The last two readings increased and will adjust insulin in am

## 2017-06-03 NOTE — PROGRESS NOTE ADULT - ATTENDING COMMENTS
the patient is clinically stable. An NG tube was removed. Followup point the diet aspiration precautions the patient is clinically stable. An NG tube was removed. Followup point the diet aspiration precautions.  her concern is the elevation in the white count with no clinical evidence of infection.

## 2017-06-03 NOTE — PROGRESS NOTE ADULT - SUBJECTIVE AND OBJECTIVE BOX
Interval Events: reviewed  Patient seen and examined at bedside.    Patient is a 71y old  Male who presents with a chief complaint of worsening slurred speech (30 May 2017 09:49)  he is doing better now to study for swallow. He is moving his right side.    PAST MEDICAL & SURGICAL HISTORY:  Cerebrovascular accident (CVA) due to stenosis of left middle cerebral artery  Coronary artery disease without angina pectoris, unspecified vessel or lesion type, unspecified whether native or transplanted heart  Depression, unspecified depression type  Essential hypertension  Controlled type 2 diabetes mellitus without complication, unspecified long term insulin use status  History of penile implant  History of lumbar surgery  H/O umbilical hernia repair  History of appendectomy      MEDICATIONS:  Pulmonary:    Antimicrobials:    Anticoagulants:  enoxaparin Injectable 90milliGRAM(s) SubCutaneous two times a day    Cardiac:  labetalol Injectable 10milliGRAM(s) IV Push every 1 hour PRN  losartan 50milliGRAM(s) Oral daily  amLODIPine   Tablet 10milliGRAM(s) Oral daily      Allergies    No Known Allergies    Intolerances        Vital Signs Last 24 Hrs  T(C): 36.4, Max: 37.2 ( @ 13:00)  T(F): 97.5, Max: 99 ( @ 13:00)  HR: 100 (72 - 100)  BP: 158/75 (129/59 - 169/79)  BP(mean): 108 (85 - 114)  RR: 18 (16 - 20)  SpO2: 97% (95% - 98%)    I & Os for current day (as of  @ 10:21)  =============================================  IN: 1052 ml / OUT: 625 ml / NET: 427 ml        LABS:      CBC Full  -  ( 2017 07:19 )  WBC Count : 21.5 K/uL  Hemoglobin : 10.4 g/dL  Hematocrit : 30.9 %  Platelet Count - Automated : 434 K/uL  Mean Cell Volume : 85.8 fL  Mean Cell Hemoglobin : 28.9 pg  Mean Cell Hemoglobin Concentration : 33.7 g/dL  Auto Neutrophil # : x  Auto Lymphocyte # : x  Auto Monocyte # : x  Auto Eosinophil # : x  Auto Basophil # : x  Auto Neutrophil % : x  Auto Lymphocyte % : x  Auto Monocyte % : x  Auto Eosinophil % : x  Auto Basophil % : x        138  |  101  |  11  ----------------------------<  129<H>  3.4<L>   |  23  |  1.00    Ca    9.1      2017 07:19            Urinalysis Basic - ( 2017 16:20 )    Color: Yellow / Appearance: Clear / S.015 / pH: x  Gluc: x / Ketone: Trace mg/dL  / Bili: NEGATIVE / Urobili: 0.2 E.U./dL   Blood: x / Protein: NEGATIVE mg/dL / Nitrite: NEGATIVE   Leuk Esterase: NEGATIVE / RBC: x / WBC x   Sq Epi: x / Non Sq Epi: x / Bacteria: x                  RADIOLOGY & ADDITIONAL STUDIES (The following images were personally reviewed):  Guerrero:                              No  Urine output:               Yes          DVT prophylaxis:         Yes          Flattus:                          Yes          Bowel movement:       Yes

## 2017-06-04 LAB
ANION GAP SERPL CALC-SCNC: 13 MMOL/L — SIGNIFICANT CHANGE UP (ref 5–17)
BUN SERPL-MCNC: 10 MG/DL — SIGNIFICANT CHANGE UP (ref 7–23)
CALCIUM SERPL-MCNC: 8.9 MG/DL — SIGNIFICANT CHANGE UP (ref 8.4–10.5)
CHLORIDE SERPL-SCNC: 105 MMOL/L — SIGNIFICANT CHANGE UP (ref 96–108)
CK SERPL-CCNC: 131 U/L — SIGNIFICANT CHANGE UP (ref 30–200)
CO2 SERPL-SCNC: 22 MMOL/L — SIGNIFICANT CHANGE UP (ref 22–31)
CREAT SERPL-MCNC: 1 MG/DL — SIGNIFICANT CHANGE UP (ref 0.5–1.3)
GLUCOSE SERPL-MCNC: 101 MG/DL — HIGH (ref 70–99)
HCT VFR BLD CALC: 30.7 % — LOW (ref 39–50)
HGB BLD-MCNC: 10.3 G/DL — LOW (ref 13–17)
MAGNESIUM SERPL-MCNC: 1.8 MG/DL — SIGNIFICANT CHANGE UP (ref 1.6–2.6)
MCHC RBC-ENTMCNC: 28.9 PG — SIGNIFICANT CHANGE UP (ref 27–34)
MCHC RBC-ENTMCNC: 33.6 G/DL — SIGNIFICANT CHANGE UP (ref 32–36)
MCV RBC AUTO: 86 FL — SIGNIFICANT CHANGE UP (ref 80–100)
PHOSPHATE SERPL-MCNC: 3.1 MG/DL — SIGNIFICANT CHANGE UP (ref 2.5–4.5)
PLATELET # BLD AUTO: 365 K/UL — SIGNIFICANT CHANGE UP (ref 150–400)
POTASSIUM SERPL-MCNC: 3.5 MMOL/L — SIGNIFICANT CHANGE UP (ref 3.5–5.3)
POTASSIUM SERPL-SCNC: 3.5 MMOL/L — SIGNIFICANT CHANGE UP (ref 3.5–5.3)
RBC # BLD: 3.57 M/UL — LOW (ref 4.2–5.8)
RBC # FLD: 13.6 % — SIGNIFICANT CHANGE UP (ref 10.3–16.9)
SODIUM SERPL-SCNC: 140 MMOL/L — SIGNIFICANT CHANGE UP (ref 135–145)
TROPONIN T SERPL-MCNC: 0.02 NG/ML — HIGH (ref 0–0.01)
WBC # BLD: 17 K/UL — HIGH (ref 3.8–10.5)
WBC # FLD AUTO: 17 K/UL — HIGH (ref 3.8–10.5)

## 2017-06-04 RX ADMIN — LEVETIRACETAM 1000 MILLIGRAM(S): 250 TABLET, FILM COATED ORAL at 17:35

## 2017-06-04 RX ADMIN — Medication 20 MILLIGRAM(S): at 11:09

## 2017-06-04 RX ADMIN — Medication 650 MILLIGRAM(S): at 03:44

## 2017-06-04 RX ADMIN — AMLODIPINE BESYLATE 10 MILLIGRAM(S): 2.5 TABLET ORAL at 07:02

## 2017-06-04 RX ADMIN — ATORVASTATIN CALCIUM 40 MILLIGRAM(S): 80 TABLET, FILM COATED ORAL at 21:27

## 2017-06-04 RX ADMIN — PANTOPRAZOLE SODIUM 40 MILLIGRAM(S): 20 TABLET, DELAYED RELEASE ORAL at 11:09

## 2017-06-04 RX ADMIN — Medication 325 MILLIGRAM(S): at 11:09

## 2017-06-04 RX ADMIN — LEVETIRACETAM 1000 MILLIGRAM(S): 250 TABLET, FILM COATED ORAL at 07:02

## 2017-06-04 RX ADMIN — Medication 650 MILLIGRAM(S): at 08:37

## 2017-06-04 RX ADMIN — INSULIN GLARGINE 20 UNIT(S): 100 INJECTION, SOLUTION SUBCUTANEOUS at 10:04

## 2017-06-04 RX ADMIN — LOSARTAN POTASSIUM 50 MILLIGRAM(S): 100 TABLET, FILM COATED ORAL at 03:54

## 2017-06-04 RX ADMIN — Medication 650 MILLIGRAM(S): at 17:30

## 2017-06-04 RX ADMIN — Medication 650 MILLIGRAM(S): at 10:39

## 2017-06-04 RX ADMIN — Medication 650 MILLIGRAM(S): at 10:03

## 2017-06-04 RX ADMIN — ENOXAPARIN SODIUM 90 MILLIGRAM(S): 100 INJECTION SUBCUTANEOUS at 07:02

## 2017-06-04 RX ADMIN — ENOXAPARIN SODIUM 90 MILLIGRAM(S): 100 INJECTION SUBCUTANEOUS at 17:35

## 2017-06-04 RX ADMIN — Medication 650 MILLIGRAM(S): at 18:12

## 2017-06-04 NOTE — PROGRESS NOTE ADULT - SUBJECTIVE AND OBJECTIVE BOX
Subjective: Neurosurgery PA note  no acute events overnight  exam improving  denies any pain or headaches  dispo pending acute rehab placement  No restraint overnight, agitation resolved    T(C): 36.5, Max: 37.1 (06-03 @ 14:33)  HR: 68 (68 - 100)  BP: 157/67 (141/65 - 176/79)  RR: 16 (16 - 19)  SpO2: 96% (96% - 97%)  Wt(kg): --    Exam: Mild improvement of expressive aphasia.  Awake and alert, expressive aphasia and dysarthria, FC  CNII-XII grossly intact, PERRL, EOMI, mild left facial  MAEx4, RUE 3/5 and spastic, o/w 5/5 throughout   SILT throughout b/l  Incision/wound: clean, dry and intact; staples in place       CBC Full  -  ( 04 Jun 2017 06:24 )  WBC Count : 17.0 K/uL  Hemoglobin : 10.3 g/dL  Hematocrit : 30.7 %  Platelet Count - Automated : 365 K/uL  Mean Cell Volume : 86.0 fL  Mean Cell Hemoglobin : 28.9 pg  Mean Cell Hemoglobin Concentration : 33.6 g/dL  Auto Neutrophil # : x  Auto Lymphocyte # : x  Auto Monocyte # : x  Auto Eosinophil # : x  Auto Basophil # : x  Auto Neutrophil % : x  Auto Lymphocyte % : x  Auto Monocyte % : x  Auto Eosinophil % : x  Auto Basophil % : x    06-04    140  |  105  |  10  ----------------------------<  101<H>  3.5   |  22  |  1.00    Ca    8.9      04 Jun 2017 06:24  Phos  3.1     06-04  Mg     1.8     06-04            Wound: dry and clean as abvove        Assessment/Plan:  71y Male s/p bypass pending dc to [facility]    PLAN:  NEURO:  cont pain control, neuro checks  enc IS use  f/u am WBC   OOB as tolerated  cont therapeutic lovenox and asa  cont keppra  dc to [facility] pending availability  jonatan Long

## 2017-06-05 LAB
ANION GAP SERPL CALC-SCNC: 13 MMOL/L — SIGNIFICANT CHANGE UP (ref 5–17)
BUN SERPL-MCNC: 11 MG/DL — SIGNIFICANT CHANGE UP (ref 7–23)
CALCIUM SERPL-MCNC: 8.9 MG/DL — SIGNIFICANT CHANGE UP (ref 8.4–10.5)
CHLORIDE SERPL-SCNC: 105 MMOL/L — SIGNIFICANT CHANGE UP (ref 96–108)
CO2 SERPL-SCNC: 23 MMOL/L — SIGNIFICANT CHANGE UP (ref 22–31)
CREAT SERPL-MCNC: 0.9 MG/DL — SIGNIFICANT CHANGE UP (ref 0.5–1.3)
GLUCOSE SERPL-MCNC: 127 MG/DL — HIGH (ref 70–99)
HCT VFR BLD CALC: 31.2 % — LOW (ref 39–50)
HGB BLD-MCNC: 10.2 G/DL — LOW (ref 13–17)
MCHC RBC-ENTMCNC: 28.3 PG — SIGNIFICANT CHANGE UP (ref 27–34)
MCHC RBC-ENTMCNC: 32.7 G/DL — SIGNIFICANT CHANGE UP (ref 32–36)
MCV RBC AUTO: 86.7 FL — SIGNIFICANT CHANGE UP (ref 80–100)
PLATELET # BLD AUTO: 393 K/UL — SIGNIFICANT CHANGE UP (ref 150–400)
POTASSIUM SERPL-MCNC: 3.3 MMOL/L — LOW (ref 3.5–5.3)
POTASSIUM SERPL-SCNC: 3.3 MMOL/L — LOW (ref 3.5–5.3)
RBC # BLD: 3.6 M/UL — LOW (ref 4.2–5.8)
RBC # FLD: 13.2 % — SIGNIFICANT CHANGE UP (ref 10.3–16.9)
SODIUM SERPL-SCNC: 141 MMOL/L — SIGNIFICANT CHANGE UP (ref 135–145)
WBC # BLD: 15.8 K/UL — HIGH (ref 3.8–10.5)
WBC # FLD AUTO: 15.8 K/UL — HIGH (ref 3.8–10.5)

## 2017-06-05 PROCEDURE — 99233 SBSQ HOSP IP/OBS HIGH 50: CPT | Mod: GC

## 2017-06-05 RX ORDER — AMLODIPINE BESYLATE 2.5 MG/1
10 TABLET ORAL DAILY
Qty: 0 | Refills: 0 | Status: DISCONTINUED | OUTPATIENT
Start: 2017-06-06 | End: 2017-06-12

## 2017-06-05 RX ORDER — POTASSIUM CHLORIDE 20 MEQ
40 PACKET (EA) ORAL EVERY 4 HOURS
Qty: 0 | Refills: 0 | Status: COMPLETED | OUTPATIENT
Start: 2017-06-05 | End: 2017-06-05

## 2017-06-05 RX ORDER — NIFEDIPINE 30 MG
30 TABLET, EXTENDED RELEASE 24 HR ORAL DAILY
Qty: 0 | Refills: 0 | Status: DISCONTINUED | OUTPATIENT
Start: 2017-06-05 | End: 2017-06-05

## 2017-06-05 RX ORDER — NIFEDIPINE 30 MG
60 TABLET, EXTENDED RELEASE 24 HR ORAL DAILY
Qty: 0 | Refills: 0 | Status: DISCONTINUED | OUTPATIENT
Start: 2017-06-05 | End: 2017-06-05

## 2017-06-05 RX ORDER — AMLODIPINE BESYLATE 2.5 MG/1
5 TABLET ORAL ONCE
Qty: 0 | Refills: 0 | Status: COMPLETED | OUTPATIENT
Start: 2017-06-05 | End: 2017-06-05

## 2017-06-05 RX ADMIN — LEVETIRACETAM 1000 MILLIGRAM(S): 250 TABLET, FILM COATED ORAL at 07:03

## 2017-06-05 RX ADMIN — LEVETIRACETAM 1000 MILLIGRAM(S): 250 TABLET, FILM COATED ORAL at 19:04

## 2017-06-05 RX ADMIN — Medication 20 MILLIGRAM(S): at 12:36

## 2017-06-05 RX ADMIN — Medication 2: at 12:39

## 2017-06-05 RX ADMIN — Medication 2: at 01:07

## 2017-06-05 RX ADMIN — ENOXAPARIN SODIUM 90 MILLIGRAM(S): 100 INJECTION SUBCUTANEOUS at 19:04

## 2017-06-05 RX ADMIN — PANTOPRAZOLE SODIUM 40 MILLIGRAM(S): 20 TABLET, DELAYED RELEASE ORAL at 15:46

## 2017-06-05 RX ADMIN — INSULIN GLARGINE 20 UNIT(S): 100 INJECTION, SOLUTION SUBCUTANEOUS at 12:36

## 2017-06-05 RX ADMIN — Medication 40 MILLIEQUIVALENT(S): at 19:04

## 2017-06-05 RX ADMIN — Medication 2: at 23:41

## 2017-06-05 RX ADMIN — ENOXAPARIN SODIUM 90 MILLIGRAM(S): 100 INJECTION SUBCUTANEOUS at 07:04

## 2017-06-05 RX ADMIN — AMLODIPINE BESYLATE 5 MILLIGRAM(S): 2.5 TABLET ORAL at 21:18

## 2017-06-05 RX ADMIN — Medication 30 MILLIGRAM(S): at 01:13

## 2017-06-05 RX ADMIN — ATORVASTATIN CALCIUM 40 MILLIGRAM(S): 80 TABLET, FILM COATED ORAL at 21:18

## 2017-06-05 RX ADMIN — Medication 40 MILLIEQUIVALENT(S): at 15:47

## 2017-06-05 RX ADMIN — LOSARTAN POTASSIUM 50 MILLIGRAM(S): 100 TABLET, FILM COATED ORAL at 07:04

## 2017-06-05 RX ADMIN — Medication 40 MILLIEQUIVALENT(S): at 21:18

## 2017-06-05 RX ADMIN — Medication 325 MILLIGRAM(S): at 12:36

## 2017-06-05 NOTE — PROGRESS NOTE ADULT - PROBLEM SELECTOR PLAN 4
The blood sugars elevated and he is to in sliding-scale N. insulin regimen.   On Insulin 20 lantus and BS is controlled

## 2017-06-05 NOTE — PROGRESS NOTE ADULT - SUBJECTIVE AND OBJECTIVE BOX
HPI:  70 y/o male pmhx HTN, CAD, multiple CVA (last 3/2016) DM, known to NSX service with recent angio procedure revealing worsening carotid stenosis planned for elective Left STA-MCA bypass this week but has been experiencing worsening in his speech over the past few days. Per the patients wife, patient has expereinced a gradual decline since March 2016 with symptoms associated with increased confusion and speech difficulty. Denies any headaches, nausea, vomiting or right side deficits. Denies any numbness tingling or decreased sensation.  Pt at baseline using hand crutch and one person assistance to ambulate. Denies any medications for pain or improvement of symptoms. Denies any recent trauma or falls. (21 May 2017 16:56)    OVERNIGHT EVENTS:  Vital Signs Last 24 Hrs  T(C): 36.2, Max: 36.6 (06-04 @ 09:05)  T(F): 97.2, Max: 97.9 (06-04 @ 09:05)  HR: 86 (68 - 86)  BP: 157/82 (143/63 - 171/79)  BP(mean): 111 (90 - 115)  RR: 16 (16 - 19)  SpO2: 95% (95% - 98%)    I&O's Summary    I & Os for current day (as of 05 Jun 2017 07:56)  =============================================  IN: 0 ml / OUT: 400 ml / NET: -400 ml      PHYSICAL EXAM:  Neurological: Awake follows simple commands aphasic BARTOLO No facial weaknes   TML  Motor R side HP L side antigravity  Crani incision CDI  JUAN    Cardiovascular: RRR  Respiratory: Lungs CTAB  Gastrointestinal: Abdomen soft +BS  +BM  Genitourinary: Voiding without difficulty via Urosheath  Extremities: warm and dry    DIET: Soft diet  [LABS:                        10.2   15.8  )-----------( 393      ( 05 Jun 2017 07:08 )             31.2     06-05    141  |  105  |  11  ----------------------------<  127<H>  3.3<L>   |  23  |  0.90    Ca    8.9      05 Jun 2017 07:08  Phos  3.1     06-04  Mg     1.8     06-04          CARDIAC MARKERS ( 04 Jun 2017 06:24 )  x     / 0.02 ng/mL / 131 U/L / x     / x      CARDIAC MARKERS ( 03 Jun 2017 12:38 )  x     / 0.03 ng/mL / 239 U/L / x     / x          CAPILLARY BLOOD GLUCOSE  122 (05 Jun 2017 05:19)  156 (05 Jun 2017 00:19)  144 (04 Jun 2017 16:10)  141 (04 Jun 2017 11:37)    Allergies    No Known Allergies    Intolerances      MEDICATIONS:  Antibiotics:    Neuro:  ondansetron Injectable 4milliGRAM(s) IV Push every 6 hours PRN  aspirin 325milliGRAM(s) Enteral Tube daily  acetaminophen    Suspension. 650milliGRAM(s) Enteral Tube every 6 hours PRN  acetaminophen    Suspension 650milliGRAM(s) Enteral Tube every 6 hours PRN  FLUoxetine 20milliGRAM(s) Oral daily  levETIRAcetam 1000milliGRAM(s) Oral two times a day    Anticoagulation:  enoxaparin Injectable 90milliGRAM(s) SubCutaneous two times a day    OTHER:  dextrose Gel 1Dose(s) Oral once PRN  dextrose 50% Injectable 12.5Gram(s) IV Push once  glucagon  Injectable 1milliGRAM(s) IntraMuscular once PRN  atorvastatin 40milliGRAM(s) Oral at bedtime  losartan 50milliGRAM(s) Oral daily  insulin glargine Injectable (LANTUS) 20Unit(s) SubCutaneous <User Schedule>  insulin lispro (HumaLOG) corrective regimen sliding scale  SubCutaneous every 6 hours  pantoprazole  Injectable 40milliGRAM(s) IV Push daily  NIFEdipine XL 60milliGRAM(s) Oral daily    IVF:    CULTURES:  Culture Results:   No growth at 5 days. (05-26 @ 10:45)  Culture Results:   No growth at 5 days. (05-26 @ 10:45)    RADIOLOGY & ADDITIONAL TESTS:      ASSESSMENT:  71y Male s/p Cerebral bypass  h/i CVA    PLAN:    NEURO:  Monitor neuro status  OT/PT  Speech therapy  F/U routine Dopplers  Pain management  Bowel regime  Continue current medical regime    Dispo: Discussed with attending

## 2017-06-05 NOTE — PROGRESS NOTE ADULT - ATTENDING COMMENTS
the patient is clinically stable. An NG tube was removed. Followup point the diet aspiration precautions.  her concern is the elevation in the white count with no clinical evidence of infection.

## 2017-06-05 NOTE — PROGRESS NOTE ADULT - SUBJECTIVE AND OBJECTIVE BOX
Interval Events: reviewed  Patient seen and examined at bedside.    Patient is a 71y old  Male who presents with a chief complaint of worsening slurred speech (30 May 2017 09:49)  he is doing well    PAST MEDICAL & SURGICAL HISTORY:  Cerebrovascular accident (CVA) due to stenosis of left middle cerebral artery  Coronary artery disease without angina pectoris, unspecified vessel or lesion type, unspecified whether native or transplanted heart  Depression, unspecified depression type  Essential hypertension  Controlled type 2 diabetes mellitus without complication, unspecified long term insulin use status  History of penile implant  History of lumbar surgery  H/O umbilical hernia repair  History of appendectomy      MEDICATIONS:  Pulmonary:    Antimicrobials:    Anticoagulants:  enoxaparin Injectable 90milliGRAM(s) SubCutaneous two times a day    Cardiac:  losartan 50milliGRAM(s) Oral daily  hydrochlorothiazide    Capsule 12.5milliGRAM(s) Oral daily      Allergies    No Known Allergies    Intolerances        Vital Signs Last 24 Hrs  T(C): 36.5, Max: 36.6 (06-05 @ 09:57)  T(F): 97.7, Max: 97.8 (06-05 @ 09:57)  HR: 98 (72 - 110)  BP: 144/65 (141/63 - 180/96)  BP(mean): 101 (90 - 122)  RR: 16 (15 - 17)  SpO2: 95% (95% - 96%)  I & Os for 24h ending 06-05 @ 07:00  =============================================  IN: 0 ml / OUT: 400 ml / NET: -400 ml    I & Os for current day (as of 06-05 @ 23:40)  =============================================  IN: 0 ml / OUT: 400 ml / NET: -400 ml        LABS:      CBC Full  -  ( 05 Jun 2017 07:08 )  WBC Count : 15.8 K/uL  Hemoglobin : 10.2 g/dL  Hematocrit : 31.2 %  Platelet Count - Automated : 393 K/uL  Mean Cell Volume : 86.7 fL  Mean Cell Hemoglobin : 28.3 pg  Mean Cell Hemoglobin Concentration : 32.7 g/dL  Auto Neutrophil # : x  Auto Lymphocyte # : x  Auto Monocyte # : x  Auto Eosinophil # : x  Auto Basophil # : x  Auto Neutrophil % : x  Auto Lymphocyte % : x  Auto Monocyte % : x  Auto Eosinophil % : x  Auto Basophil % : x    06-05    141  |  105  |  11  ----------------------------<  127<H>  3.3<L>   |  23  |  0.90    Ca    8.9      05 Jun 2017 07:08  Phos  3.1     06-04  Mg     1.8     06-04                          RADIOLOGY & ADDITIONAL STUDIES (The following images were personally reviewed):  Guerrero:                              No  Urine output:               Yes         DVT prophylaxis:         Yes         Flattus:                          Yes          Bowel movement:       Yes        Rear

## 2017-06-06 LAB
ANION GAP SERPL CALC-SCNC: 16 MMOL/L — SIGNIFICANT CHANGE UP (ref 5–17)
BUN SERPL-MCNC: 7 MG/DL — SIGNIFICANT CHANGE UP (ref 7–23)
CALCIUM SERPL-MCNC: 9.2 MG/DL — SIGNIFICANT CHANGE UP (ref 8.4–10.5)
CHLORIDE SERPL-SCNC: 100 MMOL/L — SIGNIFICANT CHANGE UP (ref 96–108)
CO2 SERPL-SCNC: 23 MMOL/L — SIGNIFICANT CHANGE UP (ref 22–31)
CREAT SERPL-MCNC: 0.9 MG/DL — SIGNIFICANT CHANGE UP (ref 0.5–1.3)
GLUCOSE SERPL-MCNC: 189 MG/DL — HIGH (ref 70–99)
HCT VFR BLD CALC: 30.8 % — LOW (ref 39–50)
HGB BLD-MCNC: 10.3 G/DL — LOW (ref 13–17)
MCHC RBC-ENTMCNC: 28.5 PG — SIGNIFICANT CHANGE UP (ref 27–34)
MCHC RBC-ENTMCNC: 33.4 G/DL — SIGNIFICANT CHANGE UP (ref 32–36)
MCV RBC AUTO: 85.1 FL — SIGNIFICANT CHANGE UP (ref 80–100)
PLATELET # BLD AUTO: 470 K/UL — HIGH (ref 150–400)
POTASSIUM SERPL-MCNC: 3.7 MMOL/L — SIGNIFICANT CHANGE UP (ref 3.5–5.3)
POTASSIUM SERPL-SCNC: 3.7 MMOL/L — SIGNIFICANT CHANGE UP (ref 3.5–5.3)
RBC # BLD: 3.62 M/UL — LOW (ref 4.2–5.8)
RBC # FLD: 13.5 % — SIGNIFICANT CHANGE UP (ref 10.3–16.9)
SODIUM SERPL-SCNC: 139 MMOL/L — SIGNIFICANT CHANGE UP (ref 135–145)
WBC # BLD: 16.4 K/UL — HIGH (ref 3.8–10.5)
WBC # FLD AUTO: 16.4 K/UL — HIGH (ref 3.8–10.5)

## 2017-06-06 PROCEDURE — 99233 SBSQ HOSP IP/OBS HIGH 50: CPT | Mod: GC

## 2017-06-06 PROCEDURE — 70450 CT HEAD/BRAIN W/O DYE: CPT | Mod: 26

## 2017-06-06 RX ORDER — INSULIN LISPRO 100/ML
4 VIAL (ML) SUBCUTANEOUS
Qty: 0 | Refills: 0 | Status: DISCONTINUED | OUTPATIENT
Start: 2017-06-06 | End: 2017-06-07

## 2017-06-06 RX ADMIN — Medication 325 MILLIGRAM(S): at 12:14

## 2017-06-06 RX ADMIN — PANTOPRAZOLE SODIUM 40 MILLIGRAM(S): 20 TABLET, DELAYED RELEASE ORAL at 12:13

## 2017-06-06 RX ADMIN — AMLODIPINE BESYLATE 10 MILLIGRAM(S): 2.5 TABLET ORAL at 05:21

## 2017-06-06 RX ADMIN — Medication 20 MILLIGRAM(S): at 12:14

## 2017-06-06 RX ADMIN — Medication 4: at 12:14

## 2017-06-06 RX ADMIN — LOSARTAN POTASSIUM 50 MILLIGRAM(S): 100 TABLET, FILM COATED ORAL at 05:18

## 2017-06-06 RX ADMIN — ATORVASTATIN CALCIUM 40 MILLIGRAM(S): 80 TABLET, FILM COATED ORAL at 23:24

## 2017-06-06 RX ADMIN — LEVETIRACETAM 1000 MILLIGRAM(S): 250 TABLET, FILM COATED ORAL at 17:16

## 2017-06-06 RX ADMIN — Medication 2: at 17:16

## 2017-06-06 RX ADMIN — Medication 2: at 06:41

## 2017-06-06 RX ADMIN — ENOXAPARIN SODIUM 90 MILLIGRAM(S): 100 INJECTION SUBCUTANEOUS at 17:15

## 2017-06-06 RX ADMIN — ENOXAPARIN SODIUM 90 MILLIGRAM(S): 100 INJECTION SUBCUTANEOUS at 05:19

## 2017-06-06 RX ADMIN — INSULIN GLARGINE 20 UNIT(S): 100 INJECTION, SOLUTION SUBCUTANEOUS at 12:13

## 2017-06-06 RX ADMIN — LEVETIRACETAM 1000 MILLIGRAM(S): 250 TABLET, FILM COATED ORAL at 05:18

## 2017-06-06 NOTE — PROGRESS NOTE ADULT - SUBJECTIVE AND OBJECTIVE BOX
Interval Events: reviewed  Patient seen and examined at bedside.    Patient is a 71y old  Male who presents with a chief complaint of worsening slurred speech (30 May 2017 09:49)  he is doing OK but does not have an appetite    PAST MEDICAL & SURGICAL HISTORY:  Cerebrovascular accident (CVA) due to stenosis of left middle cerebral artery  Coronary artery disease without angina pectoris, unspecified vessel or lesion type, unspecified whether native or transplanted heart  Depression, unspecified depression type  Essential hypertension  Controlled type 2 diabetes mellitus without complication, unspecified long term insulin use status  History of penile implant  History of lumbar surgery  H/O umbilical hernia repair  History of appendectomy      MEDICATIONS:  Pulmonary:    Antimicrobials:    Anticoagulants:  enoxaparin Injectable 90milliGRAM(s) SubCutaneous two times a day    Cardiac:  losartan 50milliGRAM(s) Oral daily  amLODIPine   Tablet 10milliGRAM(s) Oral daily  hydrochlorothiazide    Capsule 12.5milliGRAM(s) Oral daily      Allergies    No Known Allergies    Intolerances        Vital Signs Last 24 Hrs  T(C): 37.1, Max: 37.1 (06-06 @ 16:56)  T(F): 98.7, Max: 98.7 (06-06 @ 16:56)  HR: 92 (91 - 99)  BP: 152/78 (144/65 - 176/81)  BP(mean): 108 (101 - 112)  RR: 18 (15 - 18)  SpO2: 97% (95% - 100%)  I & Os for 24h ending 06-06 @ 07:00  =============================================  IN: 0 ml / OUT: 400 ml / NET: -400 ml    I & Os for current day (as of 06-06 @ 21:18)  =============================================  IN: 240 ml / OUT: 100 ml / NET: 140 ml        LABS:      CBC Full  -  ( 06 Jun 2017 06:17 )  WBC Count : 16.4 K/uL  Hemoglobin : 10.3 g/dL  Hematocrit : 30.8 %  Platelet Count - Automated : 470 K/uL  Mean Cell Volume : 85.1 fL  Mean Cell Hemoglobin : 28.5 pg  Mean Cell Hemoglobin Concentration : 33.4 g/dL  Auto Neutrophil # : x  Auto Lymphocyte # : x  Auto Monocyte # : x  Auto Eosinophil # : x  Auto Basophil # : x  Auto Neutrophil % : x  Auto Lymphocyte % : x  Auto Monocyte % : x  Auto Eosinophil % : x  Auto Basophil % : x    06-06    139  |  100  |  7   ----------------------------<  189<H>  3.7   |  23  |  0.90    Ca    9.2      06 Jun 2017 06:17                          RADIOLOGY & ADDITIONAL STUDIES (The following images were personally reviewed):  Guerrero:                                    No  Urine output:               Yes         DVT prophylaxis:         Yes          Flattus:                          Yes         Bowel movement:            No

## 2017-06-06 NOTE — PROGRESS NOTE ADULT - SUBJECTIVE AND OBJECTIVE BOX
HPI:  70 y/o male pmhx HTN, CAD, multiple CVA (last 3/2016) DM, known to NSX service with recent angio procedure revealing worsening carotid stenosis planned for elective Left STA-MCA bypass this week but has been experiencing worsening in his speech over the past few days. Per the patients wife, patient has expereinced a gradual decline since March 2016 with symptoms associated with increased confusion and speech difficulty. Denies any headaches, nausea, vomiting or right side deficits. Denies any numbness tingling or decreased sensation.  Pt at baseline using hand crutch and one person assistance to ambulate. Denies any medications for pain or improvement of symptoms. Denies any recent trauma or falls. (21 May 2017 16:56)    OVERNIGHT EVENTS:  Vital Signs Last 24 Hrs  T(C): 36.3, Max: 36.6 (06-05 @ 09:57)  T(F): 97.4, Max: 97.8 (06-05 @ 09:57)  HR: 99 (92 - 110)  BP: 166/78 (141/63 - 180/96)  BP(mean): 112 (90 - 122)  RR: 16 (15 - 16)  SpO2: 95% (95% - 96%)    I&O's Summary    I & Os for current day (as of 06 Jun 2017 08:56)  =============================================  IN: 0 ml / OUT: 400 ml / NET: -400 ml      PHYSICAL EXAM:  Neurological:Awake tracking room activity following simple commads  expressive aphasia persists  OX# with choices he nods Donnie no facial weakness   Motor: R side HP  L side antigravity    Cardiovascular:RRR  Respiratory:Lungs CTAB  Gastrointestinal: +BS  Tolerating diet  Genitourinary: voiding via urosheath clear yellow yellow  Incision/Wound: Crani staple JUAN CDI            DIET: Low Sodium      LABS:                        10.3   16.4  )-----------( 470      ( 06 Jun 2017 06:17 )             30.8     06-06    139  |  100  |  7   ----------------------------<  189<H>  3.7   |  23  |  0.90    Ca    9.2      06 Jun 2017 06:17              CAPILLARY BLOOD GLUCOSE  160 (06 Jun 2017 05:55)  156 (05 Jun 2017 21:40)  121 (05 Jun 2017 16:10)  182 (05 Jun 2017 11:15)      Drug Levels: [] N/A    CSF Analysis: [] N/A      Allergies    No Known Allergies    Intolerances      MEDICATIONS:  Antibiotics:    Neuro:  ondansetron Injectable 4milliGRAM(s) IV Push every 6 hours PRN  aspirin 325milliGRAM(s) Enteral Tube daily  acetaminophen    Suspension. 650milliGRAM(s) Enteral Tube every 6 hours PRN  acetaminophen    Suspension 650milliGRAM(s) Enteral Tube every 6 hours PRN  FLUoxetine 20milliGRAM(s) Oral daily  levETIRAcetam 1000milliGRAM(s) Oral two times a day    Anticoagulation:  enoxaparin Injectable 90milliGRAM(s) SubCutaneous two times a day    OTHER:  dextrose Gel 1Dose(s) Oral once PRN  dextrose 50% Injectable 12.5Gram(s) IV Push once  glucagon  Injectable 1milliGRAM(s) IntraMuscular once PRN  atorvastatin 40milliGRAM(s) Oral at bedtime  losartan 50milliGRAM(s) Oral daily  insulin glargine Injectable (LANTUS) 20Unit(s) SubCutaneous <User Schedule>  insulin lispro (HumaLOG) corrective regimen sliding scale  SubCutaneous every 6 hours  pantoprazole  Injectable 40milliGRAM(s) IV Push daily  amLODIPine   Tablet 10milliGRAM(s) Oral daily  hydrochlorothiazide    Capsule 12.5milliGRAM(s) Oral daily    IVF:    CULTURES:  Culture Results:   No growth at 5 days. (05-26 @ 10:45)  Culture Results:   No growth at 5 days. (05-26 @ 10:45)    RADIOLOGY & ADDITIONAL TESTS:      ASSESSMENT:  71y Male s/p Cerebral bypass   h/o HTN CAD CVA    PLAN:    NEURO:  Monitor neuro status  OT/PT  Head CT this AM  Continue anticoagulation therapy for DVTS  ASA therapy  BP management  Continue current medical regime    Dispo: Will discuss with attending

## 2017-06-06 NOTE — PROGRESS NOTE ADULT - PROBLEM SELECTOR PLAN 4
The blood sugars elevated and he is to in sliding-scale N. insulin regimen.   On Insulin 20 lantus and BS is controlled.  I started premeals

## 2017-06-07 PROCEDURE — 70450 CT HEAD/BRAIN W/O DYE: CPT | Mod: 26

## 2017-06-07 PROCEDURE — 99233 SBSQ HOSP IP/OBS HIGH 50: CPT | Mod: GC

## 2017-06-07 RX ADMIN — ENOXAPARIN SODIUM 90 MILLIGRAM(S): 100 INJECTION SUBCUTANEOUS at 18:24

## 2017-06-07 RX ADMIN — AMLODIPINE BESYLATE 10 MILLIGRAM(S): 2.5 TABLET ORAL at 06:20

## 2017-06-07 RX ADMIN — Medication 4 UNIT(S): at 11:59

## 2017-06-07 RX ADMIN — Medication 20 MILLIGRAM(S): at 11:27

## 2017-06-07 RX ADMIN — PANTOPRAZOLE SODIUM 40 MILLIGRAM(S): 20 TABLET, DELAYED RELEASE ORAL at 11:27

## 2017-06-07 RX ADMIN — Medication 4 UNIT(S): at 06:29

## 2017-06-07 RX ADMIN — LEVETIRACETAM 1000 MILLIGRAM(S): 250 TABLET, FILM COATED ORAL at 18:24

## 2017-06-07 RX ADMIN — Medication 325 MILLIGRAM(S): at 11:27

## 2017-06-07 RX ADMIN — INSULIN GLARGINE 20 UNIT(S): 100 INJECTION, SOLUTION SUBCUTANEOUS at 12:00

## 2017-06-07 RX ADMIN — LEVETIRACETAM 1000 MILLIGRAM(S): 250 TABLET, FILM COATED ORAL at 06:21

## 2017-06-07 RX ADMIN — Medication 4 UNIT(S): at 15:39

## 2017-06-07 RX ADMIN — ATORVASTATIN CALCIUM 40 MILLIGRAM(S): 80 TABLET, FILM COATED ORAL at 23:15

## 2017-06-07 RX ADMIN — ENOXAPARIN SODIUM 90 MILLIGRAM(S): 100 INJECTION SUBCUTANEOUS at 06:21

## 2017-06-07 RX ADMIN — LOSARTAN POTASSIUM 50 MILLIGRAM(S): 100 TABLET, FILM COATED ORAL at 06:20

## 2017-06-07 NOTE — PROGRESS NOTE ADULT - SUBJECTIVE AND OBJECTIVE BOX
Subjective: Seen/evaluated at bedside.  No acute events overnight    T(C): 36, Max: 37.1 (06-06 @ 16:56)  HR: 72 (70 - 99)  BP: 130/58 (126/60 - 176/81)  RR: 19 (16 - 19)  SpO2: 94% (94% - 100%)  Wt(kg): --    Exam: Alert/awake, tracks bilaterally, follows simple commands, expressive aphasia  Left side anti-gravity, UE>LE  Right UE spastic, Right LE plegic  PERRL    CBC Full  -  ( 06 Jun 2017 06:17 )  WBC Count : 16.4 K/uL  Hemoglobin : 10.3 g/dL  Hematocrit : 30.8 %  Platelet Count - Automated : 470 K/uL  Mean Cell Volume : 85.1 fL  Mean Cell Hemoglobin : 28.5 pg  Mean Cell Hemoglobin Concentration : 33.4 g/dL  Auto Neutrophil # : x  Auto Lymphocyte # : x  Auto Monocyte # : x  Auto Eosinophil # : x  Auto Basophil # : x  Auto Neutrophil % : x  Auto Lymphocyte % : x  Auto Monocyte % : x  Auto Eosinophil % : x  Auto Basophil % : x    06-06    139  |  100  |  7   ----------------------------<  189<H>  3.7   |  23  |  0.90    Ca    9.2      06 Jun 2017 06:17    Wound: C/D/I, +staples    Assessment/Plan: s/p left STA-MCA bypass  -Neuro checks/BP control  -PT/OT/OOB  -Continue Aspirin and Lovenox BID  -Medicine input appreciated  -DVT/GI ppx  -Dispo planning: home with home care  -D/W Dr. Rajput

## 2017-06-08 LAB
ANION GAP SERPL CALC-SCNC: 15 MMOL/L — SIGNIFICANT CHANGE UP (ref 5–17)
BUN SERPL-MCNC: 11 MG/DL — SIGNIFICANT CHANGE UP (ref 7–23)
CALCIUM SERPL-MCNC: 9 MG/DL — SIGNIFICANT CHANGE UP (ref 8.4–10.5)
CHLORIDE SERPL-SCNC: 98 MMOL/L — SIGNIFICANT CHANGE UP (ref 96–108)
CO2 SERPL-SCNC: 22 MMOL/L — SIGNIFICANT CHANGE UP (ref 22–31)
CREAT SERPL-MCNC: 1.1 MG/DL — SIGNIFICANT CHANGE UP (ref 0.5–1.3)
GLUCOSE SERPL-MCNC: 193 MG/DL — HIGH (ref 70–99)
HCT VFR BLD CALC: 34.4 % — LOW (ref 39–50)
HGB BLD-MCNC: 11.5 G/DL — LOW (ref 13–17)
MCHC RBC-ENTMCNC: 28.8 PG — SIGNIFICANT CHANGE UP (ref 27–34)
MCHC RBC-ENTMCNC: 33.4 G/DL — SIGNIFICANT CHANGE UP (ref 32–36)
MCV RBC AUTO: 86.2 FL — SIGNIFICANT CHANGE UP (ref 80–100)
PLATELET # BLD AUTO: 475 K/UL — HIGH (ref 150–400)
POTASSIUM SERPL-MCNC: 4.5 MMOL/L — SIGNIFICANT CHANGE UP (ref 3.5–5.3)
POTASSIUM SERPL-SCNC: 4.5 MMOL/L — SIGNIFICANT CHANGE UP (ref 3.5–5.3)
RBC # BLD: 3.99 M/UL — LOW (ref 4.2–5.8)
RBC # FLD: 13.6 % — SIGNIFICANT CHANGE UP (ref 10.3–16.9)
SODIUM SERPL-SCNC: 135 MMOL/L — SIGNIFICANT CHANGE UP (ref 135–145)
WBC # BLD: 12.5 K/UL — HIGH (ref 3.8–10.5)
WBC # FLD AUTO: 12.5 K/UL — HIGH (ref 3.8–10.5)

## 2017-06-08 PROCEDURE — 99233 SBSQ HOSP IP/OBS HIGH 50: CPT | Mod: GC

## 2017-06-08 RX ADMIN — Medication 2: at 18:54

## 2017-06-08 RX ADMIN — PANTOPRAZOLE SODIUM 40 MILLIGRAM(S): 20 TABLET, DELAYED RELEASE ORAL at 12:39

## 2017-06-08 RX ADMIN — LEVETIRACETAM 1000 MILLIGRAM(S): 250 TABLET, FILM COATED ORAL at 06:07

## 2017-06-08 RX ADMIN — LEVETIRACETAM 1000 MILLIGRAM(S): 250 TABLET, FILM COATED ORAL at 18:55

## 2017-06-08 RX ADMIN — LOSARTAN POTASSIUM 50 MILLIGRAM(S): 100 TABLET, FILM COATED ORAL at 06:07

## 2017-06-08 RX ADMIN — Medication 2: at 12:39

## 2017-06-08 RX ADMIN — INSULIN GLARGINE 20 UNIT(S): 100 INJECTION, SOLUTION SUBCUTANEOUS at 12:40

## 2017-06-08 RX ADMIN — Medication 20 MILLIGRAM(S): at 12:38

## 2017-06-08 RX ADMIN — ENOXAPARIN SODIUM 90 MILLIGRAM(S): 100 INJECTION SUBCUTANEOUS at 06:07

## 2017-06-08 RX ADMIN — AMLODIPINE BESYLATE 10 MILLIGRAM(S): 2.5 TABLET ORAL at 06:07

## 2017-06-08 RX ADMIN — ATORVASTATIN CALCIUM 40 MILLIGRAM(S): 80 TABLET, FILM COATED ORAL at 21:50

## 2017-06-08 RX ADMIN — Medication 325 MILLIGRAM(S): at 12:39

## 2017-06-08 RX ADMIN — ENOXAPARIN SODIUM 90 MILLIGRAM(S): 100 INJECTION SUBCUTANEOUS at 18:54

## 2017-06-08 NOTE — PROGRESS NOTE ADULT - PROBLEM SELECTOR PLAN 4
The blood sugars elevated and he is to in sliding-scale N. insulin regimen.   On Insulin 20 lantus.  The blood sugar is better control

## 2017-06-08 NOTE — PROGRESS NOTE ADULT - SUBJECTIVE AND OBJECTIVE BOX
Interval Events: reviewed  Patient seen and examined at bedside.    Patient is a 71y old  Male who presents with a chief complaint of worsening slurred speech (30 May 2017 09:49)    is doing better and moving more of the left leg and also on the   PAST MEDICAL & SURGICAL HISTORY:  Cerebrovascular accident (CVA) due to stenosis of left middle cerebral artery  Coronary artery disease without angina pectoris, unspecified vessel or lesion type, unspecified whether native or transplanted heart  Depression, unspecified depression type  Essential hypertension  Controlled type 2 diabetes mellitus without complication, unspecified long term insulin use status  History of penile implant  History of lumbar surgery  H/O umbilical hernia repair  History of appendectomy      MEDICATIONS:  Pulmonary:    Antimicrobials:    Anticoagulants:  enoxaparin Injectable 90milliGRAM(s) SubCutaneous two times a day    Cardiac:  losartan 50milliGRAM(s) Oral daily  amLODIPine   Tablet 10milliGRAM(s) Oral daily  hydrochlorothiazide    Capsule 12.5milliGRAM(s) Oral daily      Allergies    No Known Allergies    Intolerances        Vital Signs Last 24 Hrs  T(C): 35.9, Max: 36.5 (06-08 @ 09:00)  T(F): 96.7, Max: 97.7 (06-08 @ 09:00)  HR: 84 (68 - 100)  BP: 127/70 (91/43 - 152/65)  BP(mean): 82 (82 - 96)  RR: 22 (20 - 22)  SpO2: 95% (94% - 98%)    I & Os for current day (as of 06-08 @ 22:45)  =============================================  IN: 295 ml / OUT: 0 ml / NET: 295 ml        LABS:      CBC Full  -  ( 08 Jun 2017 12:33 )  WBC Count : 12.5 K/uL  Hemoglobin : 11.5 g/dL  Hematocrit : 34.4 %  Platelet Count - Automated : 475 K/uL  Mean Cell Volume : 86.2 fL  Mean Cell Hemoglobin : 28.8 pg  Mean Cell Hemoglobin Concentration : 33.4 g/dL  Auto Neutrophil # : x  Auto Lymphocyte # : x  Auto Monocyte # : x  Auto Eosinophil # : x  Auto Basophil # : x  Auto Neutrophil % : x  Auto Lymphocyte % : x  Auto Monocyte % : x  Auto Eosinophil % : x  Auto Basophil % : x    06-08    135  |  98  |  11  ----------------------------<  193<H>  4.5   |  22  |  1.10    Ca    9.0      08 Jun 2017 12:33                          RADIOLOGY & ADDITIONAL STUDIES (The following images were personally reviewed):  Guerrero:                         No  Urine output:               Yes         DVT prophylaxis:         Yes         Flattus:                          Yes         Bowel movement:       Yes

## 2017-06-08 NOTE — PROGRESS NOTE ADULT - SUBJECTIVE AND OBJECTIVE BOX
HPI:  72 y/o male pmhx HTN, CAD, multiple CVA (last 3/2016) DM, known to NSX service with recent angio procedure revealing worsening carotid stenosis planned for elective Left STA-MCA bypass this week but has been experiencing worsening in his speech over the past few days. Per the patients wife, patient has expereinced a gradual decline since March 2016 with symptoms associated with increased confusion and speech difficulty. Denies any headaches, nausea, vomiting or right side deficits. Denies any numbness tingling or decreased sensation.  Pt at baseline using hand crutch and one person assistance to ambulate. Denies any medications for pain or improvement of symptoms. Denies any recent trauma or falls. (21 May 2017 16:56)    OVERNIGHT EVENTS:  Vital Signs Last 24 Hrs  T(C): 36.4, Max: 37.2 (06-07 @ 22:30)  T(F): 97.5, Max: 98.9 (06-07 @ 22:30)  HR: 68 (68 - 80)  BP: 142/67 (112/65 - 160/72)  BP(mean): 96 (96 - 103)  RR: 22 (20 - 22)  SpO2: 94% (94% - 99%)    I&O's Summary    I & Os for current day (as of 08 Jun 2017 09:34)  =============================================  IN: 295 ml / OUT: 0 ml / NET: 295 ml      PHYSICAL EXAM:  Neurological: A&OX 3 with choices, expressive aphasia no facial weakness TML   Motor: R HP L side intact      Cardiovascular: RRR  Respiratory: Lungs CTAB  Gastrointestinal: +BS  Genitourinary: Voiding without difficulty  Extremities: warm and dry  :      DIET: Regular      LABS:      CAPILLARY BLOOD GLUCOSE  118 (08 Jun 2017 05:54)  117 (08 Jun 2017 00:21)  85 (07 Jun 2017 16:01)  111 (07 Jun 2017 11:17)      Drug Levels: [] N/A    CSF Analysis: [] N/A      Allergies    No Known Allergies    Intolerances      MEDICATIONS:  Antibiotics:    Neuro:  ondansetron Injectable 4milliGRAM(s) IV Push every 6 hours PRN  aspirin 325milliGRAM(s) Enteral Tube daily  acetaminophen    Suspension. 650milliGRAM(s) Enteral Tube every 6 hours PRN  acetaminophen    Suspension 650milliGRAM(s) Enteral Tube every 6 hours PRN  FLUoxetine 20milliGRAM(s) Oral daily  levETIRAcetam 1000milliGRAM(s) Oral two times a day    Anticoagulation:  enoxaparin Injectable 90milliGRAM(s) SubCutaneous two times a day    OTHER:  dextrose Gel 1Dose(s) Oral once PRN  dextrose 50% Injectable 12.5Gram(s) IV Push once  glucagon  Injectable 1milliGRAM(s) IntraMuscular once PRN  atorvastatin 40milliGRAM(s) Oral at bedtime  losartan 50milliGRAM(s) Oral daily  insulin glargine Injectable (LANTUS) 20Unit(s) SubCutaneous <User Schedule>  insulin lispro (HumaLOG) corrective regimen sliding scale  SubCutaneous every 6 hours  pantoprazole  Injectable 40milliGRAM(s) IV Push daily  amLODIPine   Tablet 10milliGRAM(s) Oral daily  hydrochlorothiazide    Capsule 12.5milliGRAM(s) Oral daily    IVF:    CULTURES:  Culture Results:   No growth at 5 days. (05-26 @ 10:45)  Culture Results:   No growth at 5 days. (05-26 @ 10:45)    RADIOLOGY & ADDITIONAL TESTS:      ASSESSMENT:      71y Male s/p Cerebral bypass   h/o HTN CAD CVA    PLAN:    NEURO:  Monitor neuro status  OT/PT  Head CT tomorrow  Continue anticoagulation therapy for DVTS  ASA therapy  BP management  Continue current medical regime    Dispo: Will discuss with attending

## 2017-06-09 LAB
ANION GAP SERPL CALC-SCNC: 14 MMOL/L — SIGNIFICANT CHANGE UP (ref 5–17)
BUN SERPL-MCNC: 15 MG/DL — SIGNIFICANT CHANGE UP (ref 7–23)
CALCIUM SERPL-MCNC: 9.5 MG/DL — SIGNIFICANT CHANGE UP (ref 8.4–10.5)
CHLORIDE SERPL-SCNC: 99 MMOL/L — SIGNIFICANT CHANGE UP (ref 96–108)
CO2 SERPL-SCNC: 25 MMOL/L — SIGNIFICANT CHANGE UP (ref 22–31)
CREAT SERPL-MCNC: 1.1 MG/DL — SIGNIFICANT CHANGE UP (ref 0.5–1.3)
GLUCOSE SERPL-MCNC: 124 MG/DL — HIGH (ref 70–99)
HCT VFR BLD CALC: 33.9 % — LOW (ref 39–50)
HGB BLD-MCNC: 11.3 G/DL — LOW (ref 13–17)
MCHC RBC-ENTMCNC: 28.6 PG — SIGNIFICANT CHANGE UP (ref 27–34)
MCHC RBC-ENTMCNC: 33.3 G/DL — SIGNIFICANT CHANGE UP (ref 32–36)
MCV RBC AUTO: 85.8 FL — SIGNIFICANT CHANGE UP (ref 80–100)
PLATELET # BLD AUTO: 551 K/UL — HIGH (ref 150–400)
POTASSIUM SERPL-MCNC: 4 MMOL/L — SIGNIFICANT CHANGE UP (ref 3.5–5.3)
POTASSIUM SERPL-SCNC: 4 MMOL/L — SIGNIFICANT CHANGE UP (ref 3.5–5.3)
RBC # BLD: 3.95 M/UL — LOW (ref 4.2–5.8)
RBC # FLD: 13.5 % — SIGNIFICANT CHANGE UP (ref 10.3–16.9)
SODIUM SERPL-SCNC: 138 MMOL/L — SIGNIFICANT CHANGE UP (ref 135–145)
WBC # BLD: 13.6 K/UL — HIGH (ref 3.8–10.5)
WBC # FLD AUTO: 13.6 K/UL — HIGH (ref 3.8–10.5)

## 2017-06-09 PROCEDURE — 99233 SBSQ HOSP IP/OBS HIGH 50: CPT | Mod: GC

## 2017-06-09 PROCEDURE — 70450 CT HEAD/BRAIN W/O DYE: CPT | Mod: 26

## 2017-06-09 RX ADMIN — AMLODIPINE BESYLATE 10 MILLIGRAM(S): 2.5 TABLET ORAL at 05:57

## 2017-06-09 RX ADMIN — LEVETIRACETAM 1000 MILLIGRAM(S): 250 TABLET, FILM COATED ORAL at 18:40

## 2017-06-09 RX ADMIN — Medication 20 MILLIGRAM(S): at 11:36

## 2017-06-09 RX ADMIN — LOSARTAN POTASSIUM 50 MILLIGRAM(S): 100 TABLET, FILM COATED ORAL at 05:57

## 2017-06-09 RX ADMIN — ENOXAPARIN SODIUM 90 MILLIGRAM(S): 100 INJECTION SUBCUTANEOUS at 05:56

## 2017-06-09 RX ADMIN — ATORVASTATIN CALCIUM 40 MILLIGRAM(S): 80 TABLET, FILM COATED ORAL at 21:42

## 2017-06-09 RX ADMIN — PANTOPRAZOLE SODIUM 40 MILLIGRAM(S): 20 TABLET, DELAYED RELEASE ORAL at 11:35

## 2017-06-09 RX ADMIN — Medication 325 MILLIGRAM(S): at 11:36

## 2017-06-09 RX ADMIN — INSULIN GLARGINE 20 UNIT(S): 100 INJECTION, SOLUTION SUBCUTANEOUS at 11:35

## 2017-06-09 RX ADMIN — Medication 2: at 11:42

## 2017-06-09 RX ADMIN — LEVETIRACETAM 1000 MILLIGRAM(S): 250 TABLET, FILM COATED ORAL at 05:57

## 2017-06-09 NOTE — PROGRESS NOTE ADULT - SUBJECTIVE AND OBJECTIVE BOX
HPI:  72 y/o male pmhx HTN, CAD, multiple CVA (last 3/2016) DM, known to NSX service with recent angio procedure revealing worsening carotid stenosis planned for elective Left STA-MCA bypass this week but has been experiencing worsening in his speech over the past few days. Per the patients wife, patient has expereinced a gradual decline since March 2016 with symptoms associated with increased confusion and speech difficulty. Denies any headaches, nausea, vomiting or right side deficits. Denies any numbness tingling or decreased sensation.  Pt at baseline using hand crutch and one person assistance to ambulate. Denies any medications for pain or improvement of symptoms. Denies any recent trauma or falls. (21 May 2017 16:56)    OVERNIGHT EVENTS:  Vital Signs Last 24 Hrs  T(C): 36.4, Max: 36.5 (06-08 @ 09:00)  T(F): 97.6, Max: 97.7 (06-08 @ 09:00)  HR: 70 (70 - 100)  BP: 109/53 (91/43 - 152/65)  BP(mean): 72 (72 - 94)  RR: 12 (12 - 22)  SpO2: 97% (95% - 98%)    I&O's Summary      PHYSICAL EXAM:  Neurological: Awake aphasi continue OX3 with choices by nodding  saying words Triny no facial weakness TML  Motor: L side antigravity  R side HP    Cardiovascular:  RRR  Respiratory: Lings CTAB  Gastrointestinal: +BS  Genitourinary: Voiding without difficulty  Extremities: warm and dry  Incision/Wound: Crani staples CDI  JUAN        DIET: Low Sodium diet    LABS:                        11.3   13.6  )-----------( 551      ( 09 Jun 2017 06:40 )             33.9     06-09    138  |  99  |  15  ----------------------------<  124<H>  4.0   |  25  |  1.10    Ca    9.5      09 Jun 2017 06:40              CAPILLARY BLOOD GLUCOSE  129 (09 Jun 2017 05:26)  104 (08 Jun 2017 21:20)  188 (08 Jun 2017 16:22)  197 (08 Jun 2017 11:42)      Drug Levels: [] N/A    CSF Analysis: [] N/A      Allergies    No Known Allergies    Intolerances      MEDICATIONS:  Antibiotics:    Neuro:  ondansetron Injectable 4milliGRAM(s) IV Push every 6 hours PRN  aspirin 325milliGRAM(s) Enteral Tube daily  acetaminophen    Suspension. 650milliGRAM(s) Enteral Tube every 6 hours PRN  acetaminophen    Suspension 650milliGRAM(s) Enteral Tube every 6 hours PRN  FLUoxetine 20milliGRAM(s) Oral daily  levETIRAcetam 1000milliGRAM(s) Oral two times a day    Anticoagulation:  enoxaparin Injectable 90milliGRAM(s) SubCutaneous two times a day    OTHER:  dextrose Gel 1Dose(s) Oral once PRN  dextrose 50% Injectable 12.5Gram(s) IV Push once  glucagon  Injectable 1milliGRAM(s) IntraMuscular once PRN  atorvastatin 40milliGRAM(s) Oral at bedtime  losartan 50milliGRAM(s) Oral daily  insulin glargine Injectable (LANTUS) 20Unit(s) SubCutaneous <User Schedule>  insulin lispro (HumaLOG) corrective regimen sliding scale  SubCutaneous every 6 hours  pantoprazole  Injectable 40milliGRAM(s) IV Push daily  amLODIPine   Tablet 10milliGRAM(s) Oral daily  hydrochlorothiazide    Capsule 12.5milliGRAM(s) Oral daily        CULTURES:  Culture Results:   No growth at 5 days. (05-26 @ 10:45)  Culture Results:   No growth at 5 days. (05-26 @ 10:45)    RADIOLOGY & ADDITIONAL TESTS:    Head CT  Impression: Status post left frontal parietal craniotomy. Interval mild   increase in left temporal intraparenchymal acute hemorrhage and   surrounding edema. Small left frontal parietal temporal subdural   hemorrhage, unchanged. Mild left subarachnoid hemorrhage and/or frontal   parietal subcortical petechial hemorrhage, unchanged.    ASSESSMENT:  71y Male s/p Cerebral bypass  hospitalization course +DVT  started anticoagulation as per Vascualr        PLAN:  NEURO:    Monitor neuro status  Repeat Head CT  Continue ASA  Continue lovenox for DVT  DC Crani ezra  Continue current medical regime    Dispo; Will discuss with attending HPI:  70 y/o male pmhx HTN, CAD, multiple CVA (last 3/2016) DM, known to NSX service with recent angio procedure revealing worsening carotid stenosis planned for elective Left STA-MCA bypass this week but has been experiencing worsening in his speech over the past few days. Per the patients wife, patient has expereinced a gradual decline since March 2016 with symptoms associated with increased confusion and speech difficulty. Denies any headaches, nausea, vomiting or right side deficits. Denies any numbness tingling or decreased sensation.  Pt at baseline using hand crutch and one person assistance to ambulate. Denies any medications for pain or improvement of symptoms. Denies any recent trauma or falls. (21 May 2017 16:56)    OVERNIGHT EVENTS:  Vital Signs Last 24 Hrs  T(C): 36.4, Max: 36.5 (06-08 @ 09:00)  T(F): 97.6, Max: 97.7 (06-08 @ 09:00)  HR: 70 (70 - 100)  BP: 109/53 (91/43 - 152/65)  BP(mean): 72 (72 - 94)  RR: 12 (12 - 22)  SpO2: 97% (95% - 98%)    I&O's Summary      PHYSICAL EXAM:  Neurological: Awake aphasi continue OX3 with choices by nodding  saying words Triny no facial weakness TML  Motor: L side antigravity  R side HP    Cardiovascular:  RRR  Respiratory: Lings CTAB  Gastrointestinal: +BS  Genitourinary: Voiding without difficulty  Extremities: warm and dry  Incision/Wound: Crani staples CDI  JUAN        DIET: Low Sodium diet    LABS:                        11.3   13.6  )-----------( 551      ( 09 Jun 2017 06:40 )             33.9     06-09    138  |  99  |  15  ----------------------------<  124<H>  4.0   |  25  |  1.10    Ca    9.5      09 Jun 2017 06:40              CAPILLARY BLOOD GLUCOSE  129 (09 Jun 2017 05:26)  104 (08 Jun 2017 21:20)  188 (08 Jun 2017 16:22)  197 (08 Jun 2017 11:42)      Drug Levels: [] N/A    CSF Analysis: [] N/A      Allergies    No Known Allergies    Intolerances      MEDICATIONS:  Antibiotics:    Neuro:  ondansetron Injectable 4milliGRAM(s) IV Push every 6 hours PRN  aspirin 325milliGRAM(s) Enteral Tube daily  acetaminophen    Suspension. 650milliGRAM(s) Enteral Tube every 6 hours PRN  acetaminophen    Suspension 650milliGRAM(s) Enteral Tube every 6 hours PRN  FLUoxetine 20milliGRAM(s) Oral daily  levETIRAcetam 1000milliGRAM(s) Oral two times a day    Anticoagulation:  enoxaparin Injectable 90milliGRAM(s) SubCutaneous two times a day    OTHER:  dextrose Gel 1Dose(s) Oral once PRN  dextrose 50% Injectable 12.5Gram(s) IV Push once  glucagon  Injectable 1milliGRAM(s) IntraMuscular once PRN  atorvastatin 40milliGRAM(s) Oral at bedtime  losartan 50milliGRAM(s) Oral daily  insulin glargine Injectable (LANTUS) 20Unit(s) SubCutaneous <User Schedule>  insulin lispro (HumaLOG) corrective regimen sliding scale  SubCutaneous every 6 hours  pantoprazole  Injectable 40milliGRAM(s) IV Push daily  amLODIPine   Tablet 10milliGRAM(s) Oral daily  hydrochlorothiazide    Capsule 12.5milliGRAM(s) Oral daily        CULTURES:  Culture Results:   No growth at 5 days. (05-26 @ 10:45)  Culture Results:   No growth at 5 days. (05-26 @ 10:45)    RADIOLOGY & ADDITIONAL TESTS:    Head CT  Impression: Status post left frontal parietal craniotomy. Interval mild   increase in left temporal intraparenchymal acute hemorrhage and   surrounding edema. Small left frontal parietal temporal subdural   hemorrhage, unchanged. Mild left subarachnoid hemorrhage and/or frontal   parietal subcortical petechial hemorrhage, unchanged.    ASSESSMENT:  71y Male s/p Cerebral bypass  hospitalization course +DVT  started anticoagulation as per Vascualr        PLAN:  NEURO:    Monitor neuro status  Repeat Head CT  Continue ASA  Discontinue lovenox for DVT  DC Crani ezra  Continue current medical regime    Dispo; Will discuss with attending

## 2017-06-09 NOTE — PROGRESS NOTE ADULT - PROBLEM SELECTOR PLAN 4
The blood sugars better controlled  and he is to in sliding-scale N. insulin regimen.   On Insulin 20 lantus.

## 2017-06-10 LAB
ANION GAP SERPL CALC-SCNC: 14 MMOL/L — SIGNIFICANT CHANGE UP (ref 5–17)
BUN SERPL-MCNC: 19 MG/DL — SIGNIFICANT CHANGE UP (ref 7–23)
CALCIUM SERPL-MCNC: 9.9 MG/DL — SIGNIFICANT CHANGE UP (ref 8.4–10.5)
CHLORIDE SERPL-SCNC: 100 MMOL/L — SIGNIFICANT CHANGE UP (ref 96–108)
CO2 SERPL-SCNC: 24 MMOL/L — SIGNIFICANT CHANGE UP (ref 22–31)
CREAT SERPL-MCNC: 1.2 MG/DL — SIGNIFICANT CHANGE UP (ref 0.5–1.3)
GLUCOSE SERPL-MCNC: 132 MG/DL — HIGH (ref 70–99)
HCT VFR BLD CALC: 32.8 % — LOW (ref 39–50)
HGB BLD-MCNC: 10.9 G/DL — LOW (ref 13–17)
MAGNESIUM SERPL-MCNC: 1.7 MG/DL — SIGNIFICANT CHANGE UP (ref 1.6–2.6)
MCHC RBC-ENTMCNC: 28.4 PG — SIGNIFICANT CHANGE UP (ref 27–34)
MCHC RBC-ENTMCNC: 33.2 G/DL — SIGNIFICANT CHANGE UP (ref 32–36)
MCV RBC AUTO: 85.4 FL — SIGNIFICANT CHANGE UP (ref 80–100)
PHOSPHATE SERPL-MCNC: 3.4 MG/DL — SIGNIFICANT CHANGE UP (ref 2.5–4.5)
PLATELET # BLD AUTO: 501 K/UL — HIGH (ref 150–400)
POTASSIUM SERPL-MCNC: 3.5 MMOL/L — SIGNIFICANT CHANGE UP (ref 3.5–5.3)
POTASSIUM SERPL-SCNC: 3.5 MMOL/L — SIGNIFICANT CHANGE UP (ref 3.5–5.3)
RBC # BLD: 3.84 M/UL — LOW (ref 4.2–5.8)
RBC # FLD: 13.4 % — SIGNIFICANT CHANGE UP (ref 10.3–16.9)
SODIUM SERPL-SCNC: 138 MMOL/L — SIGNIFICANT CHANGE UP (ref 135–145)
WBC # BLD: 10.9 K/UL — HIGH (ref 3.8–10.5)
WBC # FLD AUTO: 10.9 K/UL — HIGH (ref 3.8–10.5)

## 2017-06-10 RX ADMIN — LEVETIRACETAM 1000 MILLIGRAM(S): 250 TABLET, FILM COATED ORAL at 18:03

## 2017-06-10 RX ADMIN — ATORVASTATIN CALCIUM 40 MILLIGRAM(S): 80 TABLET, FILM COATED ORAL at 22:02

## 2017-06-10 RX ADMIN — Medication 650 MILLIGRAM(S): at 12:36

## 2017-06-10 RX ADMIN — INSULIN GLARGINE 20 UNIT(S): 100 INJECTION, SOLUTION SUBCUTANEOUS at 12:03

## 2017-06-10 RX ADMIN — Medication 20 MILLIGRAM(S): at 12:00

## 2017-06-10 RX ADMIN — AMLODIPINE BESYLATE 10 MILLIGRAM(S): 2.5 TABLET ORAL at 06:57

## 2017-06-10 RX ADMIN — Medication 2: at 18:03

## 2017-06-10 RX ADMIN — Medication 650 MILLIGRAM(S): at 12:06

## 2017-06-10 RX ADMIN — Medication 325 MILLIGRAM(S): at 12:00

## 2017-06-10 RX ADMIN — LOSARTAN POTASSIUM 50 MILLIGRAM(S): 100 TABLET, FILM COATED ORAL at 06:57

## 2017-06-10 RX ADMIN — LEVETIRACETAM 1000 MILLIGRAM(S): 250 TABLET, FILM COATED ORAL at 06:57

## 2017-06-10 RX ADMIN — PANTOPRAZOLE SODIUM 40 MILLIGRAM(S): 20 TABLET, DELAYED RELEASE ORAL at 12:00

## 2017-06-10 NOTE — PROGRESS NOTE ADULT - SUBJECTIVE AND OBJECTIVE BOX
HPI:  70 y/o male pmhx HTN, CAD, multiple CVA (last 3/2016) DM, known to NSX service with recent angio procedure revealing worsening carotid stenosis planned for elective Left STA-MCA bypass this week but has been experiencing worsening in his speech over the past few days. Per the patients wife, patient has expereinced a gradual decline since March 2016 with symptoms associated with increased confusion and speech difficulty. Denies any headaches, nausea, vomiting or right side deficits. Denies any numbness tingling or decreased sensation.  Pt at baseline using hand crutch and one person assistance to ambulate. Denies any medications for pain or improvement of symptoms. Denies any recent trauma or falls. (21 May 2017 16:56)    OVERNIGHT EVENTS:  No acute events overnight. Pt seen and examined at bedside, no complaints     Vital Signs Last 24 Hrs  T(C): 36.8, Max: 36.8 (06-10 @ 09:25)  T(F): 98.2, Max: 98.2 (06-10 @ 09:25)  HR: 66 (66 - 96)  BP: 161/73 (136/65 - 164/75)  BP(mean): 105 (93 - 108)  RR: 16 (14 - 17)  SpO2: 97% (79% - 98%)    I&O's Summary      PHYSICAL EXAM:  Neurological:  Alert and awake, eyes track b/l, expressive aphasia, following simple commands  PERRL, EOMI  LUE and LLE anti gravity, UE>LE, RUE spastic, RLE plegic   Incision/wound: clean, dry and intact    TUBES/LINES:  [] Guerrero  [] Lumbar Drain  [] Wound Drains  [] Others      DIET:  [] NPO  [x] Mechanical  [] Tube feeds    LABS:                        10.9   10.9  )-----------( 501      ( 10 Nicola 2017 05:59 )             32.8     06-10    138  |  100  |  19  ----------------------------<  132<H>  3.5   |  24  |  1.20    Ca    9.9      10 Nicola 2017 05:58  Phos  3.4     06-10  Mg     1.7     06-10              CAPILLARY BLOOD GLUCOSE  122 (10 Nicola 2017 04:55)  125 (10 Nicola 2017 00:30)  147 (09 Jun 2017 16:08)  162 (09 Jun 2017 11:00)      Drug Levels: [] N/A    CSF Analysis: [] N/A      Allergies    No Known Allergies    Intolerances      MEDICATIONS:  Antibiotics:    Neuro:  ondansetron Injectable 4milliGRAM(s) IV Push every 6 hours PRN  aspirin 325milliGRAM(s) Enteral Tube daily  acetaminophen    Suspension. 650milliGRAM(s) Enteral Tube every 6 hours PRN  acetaminophen    Suspension 650milliGRAM(s) Enteral Tube every 6 hours PRN  FLUoxetine 20milliGRAM(s) Oral daily  levETIRAcetam 1000milliGRAM(s) Oral two times a day    Anticoagulation:    OTHER:  dextrose Gel 1Dose(s) Oral once PRN  dextrose 50% Injectable 12.5Gram(s) IV Push once  glucagon  Injectable 1milliGRAM(s) IntraMuscular once PRN  atorvastatin 40milliGRAM(s) Oral at bedtime  losartan 50milliGRAM(s) Oral daily  insulin glargine Injectable (LANTUS) 20Unit(s) SubCutaneous <User Schedule>  insulin lispro (HumaLOG) corrective regimen sliding scale  SubCutaneous every 6 hours  pantoprazole  Injectable 40milliGRAM(s) IV Push daily  amLODIPine   Tablet 10milliGRAM(s) Oral daily  hydrochlorothiazide    Capsule 12.5milliGRAM(s) Oral daily    IVF:    CULTURES:    RADIOLOGY & ADDITIONAL TESTS:      ASSESSMENT:  71y Male s/p L STA-MCA bypass, multiple +DVT during hospital course, s/p IVC filter, anticoagulation per vascular     PLAN:  -neuro checks  -continue ASA   -continue keppra   -pain control  -BP control  -OOB/PT/OT  -senna/colace  -GI/DVT ppx  -repeat CTH on monday, likely home with home care pending stable CTH  -d/w Dr. Rajput

## 2017-06-11 LAB
ANION GAP SERPL CALC-SCNC: 13 MMOL/L — SIGNIFICANT CHANGE UP (ref 5–17)
BUN SERPL-MCNC: 18 MG/DL — SIGNIFICANT CHANGE UP (ref 7–23)
CALCIUM SERPL-MCNC: 9.3 MG/DL — SIGNIFICANT CHANGE UP (ref 8.4–10.5)
CHLORIDE SERPL-SCNC: 100 MMOL/L — SIGNIFICANT CHANGE UP (ref 96–108)
CO2 SERPL-SCNC: 26 MMOL/L — SIGNIFICANT CHANGE UP (ref 22–31)
CREAT SERPL-MCNC: 1.2 MG/DL — SIGNIFICANT CHANGE UP (ref 0.5–1.3)
GLUCOSE SERPL-MCNC: 117 MG/DL — HIGH (ref 70–99)
HCT VFR BLD CALC: 30.9 % — LOW (ref 39–50)
HGB BLD-MCNC: 10.4 G/DL — LOW (ref 13–17)
MAGNESIUM SERPL-MCNC: 1.7 MG/DL — SIGNIFICANT CHANGE UP (ref 1.6–2.6)
MCHC RBC-ENTMCNC: 28.7 PG — SIGNIFICANT CHANGE UP (ref 27–34)
MCHC RBC-ENTMCNC: 33.7 G/DL — SIGNIFICANT CHANGE UP (ref 32–36)
MCV RBC AUTO: 85.1 FL — SIGNIFICANT CHANGE UP (ref 80–100)
PHOSPHATE SERPL-MCNC: 3.5 MG/DL — SIGNIFICANT CHANGE UP (ref 2.5–4.5)
PLATELET # BLD AUTO: 492 K/UL — HIGH (ref 150–400)
POTASSIUM SERPL-MCNC: 3.4 MMOL/L — LOW (ref 3.5–5.3)
POTASSIUM SERPL-SCNC: 3.4 MMOL/L — LOW (ref 3.5–5.3)
RBC # BLD: 3.63 M/UL — LOW (ref 4.2–5.8)
RBC # FLD: 13.3 % — SIGNIFICANT CHANGE UP (ref 10.3–16.9)
SODIUM SERPL-SCNC: 139 MMOL/L — SIGNIFICANT CHANGE UP (ref 135–145)
WBC # BLD: 9.5 K/UL — SIGNIFICANT CHANGE UP (ref 3.8–10.5)
WBC # FLD AUTO: 9.5 K/UL — SIGNIFICANT CHANGE UP (ref 3.8–10.5)

## 2017-06-11 RX ORDER — POTASSIUM CHLORIDE 20 MEQ
40 PACKET (EA) ORAL ONCE
Qty: 0 | Refills: 0 | Status: COMPLETED | OUTPATIENT
Start: 2017-06-11 | End: 2017-06-11

## 2017-06-11 RX ORDER — ASPIRIN/CALCIUM CARB/MAGNESIUM 324 MG
325 TABLET ORAL DAILY
Qty: 0 | Refills: 0 | Status: DISCONTINUED | OUTPATIENT
Start: 2017-06-11 | End: 2017-06-12

## 2017-06-11 RX ORDER — POTASSIUM PHOSPHATE, MONOBASIC POTASSIUM PHOSPHATE, DIBASIC 236; 224 MG/ML; MG/ML
15 INJECTION, SOLUTION INTRAVENOUS ONCE
Qty: 0 | Refills: 0 | Status: COMPLETED | OUTPATIENT
Start: 2017-06-11 | End: 2017-06-11

## 2017-06-11 RX ORDER — FLUOXETINE HCL 10 MG
20 CAPSULE ORAL DAILY
Qty: 0 | Refills: 0 | Status: DISCONTINUED | OUTPATIENT
Start: 2017-06-11 | End: 2017-06-12

## 2017-06-11 RX ADMIN — PANTOPRAZOLE SODIUM 40 MILLIGRAM(S): 20 TABLET, DELAYED RELEASE ORAL at 12:55

## 2017-06-11 RX ADMIN — INSULIN GLARGINE 20 UNIT(S): 100 INJECTION, SOLUTION SUBCUTANEOUS at 11:31

## 2017-06-11 RX ADMIN — Medication 20 MILLIGRAM(S): at 12:55

## 2017-06-11 RX ADMIN — Medication 325 MILLIGRAM(S): at 12:55

## 2017-06-11 RX ADMIN — Medication 4: at 18:42

## 2017-06-11 RX ADMIN — Medication 650 MILLIGRAM(S): at 00:42

## 2017-06-11 RX ADMIN — POTASSIUM PHOSPHATE, MONOBASIC POTASSIUM PHOSPHATE, DIBASIC 62.5 MILLIMOLE(S): 236; 224 INJECTION, SOLUTION INTRAVENOUS at 11:27

## 2017-06-11 RX ADMIN — Medication 650 MILLIGRAM(S): at 01:12

## 2017-06-11 RX ADMIN — LOSARTAN POTASSIUM 50 MILLIGRAM(S): 100 TABLET, FILM COATED ORAL at 06:51

## 2017-06-11 RX ADMIN — LEVETIRACETAM 1000 MILLIGRAM(S): 250 TABLET, FILM COATED ORAL at 06:51

## 2017-06-11 RX ADMIN — ATORVASTATIN CALCIUM 40 MILLIGRAM(S): 80 TABLET, FILM COATED ORAL at 21:43

## 2017-06-11 RX ADMIN — LEVETIRACETAM 1000 MILLIGRAM(S): 250 TABLET, FILM COATED ORAL at 18:40

## 2017-06-11 RX ADMIN — Medication 1 TABLET(S): at 12:55

## 2017-06-11 RX ADMIN — AMLODIPINE BESYLATE 10 MILLIGRAM(S): 2.5 TABLET ORAL at 06:51

## 2017-06-11 NOTE — PROVIDER CONTACT NOTE (OTHER) - DATE AND TIME:
11-Jun-2017 07:30
24-May-2017 06:00
02-Jun-2017 05:30
03-Jun-2017 23:00
23-May-2017 21:30
24-May-2017 22:00
25-May-2017 04:01
25-May-2017 05:00

## 2017-06-11 NOTE — PROVIDER CONTACT NOTE (OTHER) - ACTION/TREATMENT ORDERED:
Per Aj will give patient 1G IV Tylenol and 10mg labetalol. June chris assessed patient as bedside. Will continue to monitor
nebulizers ordered. Provider Aj will assess patient at bedside
per PA will placed patient on precedex
will give zofran. provider will order abdominal x ray
Patient has penile implant resistance felt with joe insertion Stopped procedure Coude cath placed by urology Patient output 1.5L or clear yellow urine. Per PA Denis will not clamp. Joe to gravity
Give routine morning BP meds.
Monitor pt, PRAVEEN Rocha at bedside to assess pt. Endorsed to Day shift RN.
none ,will check with gu service

## 2017-06-11 NOTE — PROVIDER CONTACT NOTE (OTHER) - SITUATION
While providing incontinent care this AM, noticed that patient had ecchymotic area, going transverse from left to right.

## 2017-06-11 NOTE — PROVIDER CONTACT NOTE (OTHER) - RECOMMENDATIONS
Guerrero Cath
Continue to monitor pt. PA Aj to assess pt at bedside.
Give routine morning BP meds.
none

## 2017-06-11 NOTE — PROVIDER CONTACT NOTE (OTHER) - ASSESSMENT
Patient arrives from or with distended bladder on palpation, hypertensive and with a urinary containment device in place. Patient was given 2PRBCs, 1L 5% albumin and 7L of crystalloids per Anesthesia.
Patient had delayed awakening, once alert patient was restless, pulling on lines, gagging and vomiting around ET tube, pulling on lines and attempting to get out of bed. Patient inconsolable and unable to follow commands. Zofran was given
Patient has audible expiratory wheezing, o2 saturation and respiratory status at baseline other wise. Patient also having right sided tremors, muscle spasticity
Patient vomited small amount of bile, zofran given. Patient restless and tachycardic to 115bpm and hypertensive to -190s
patient vomited moderate amount of bile, patient continues to be restless with a distended abdomen
Pt alert in bed, denies chest pain, appears stable.
Vitals stable. No obvious signs of pain.
no pain or discomfort at penile or around it

## 2017-06-11 NOTE — PROVIDER CONTACT NOTE (OTHER) - NAME OF MD/NP/PA/DO NOTIFIED:
June Kathleen and neuro surgery axel Giles
June Kathleen, Neurosurgery Aj Szymanski
Kim Monroy, Denis Henson
PRAVEEN Porter
PRAVEEN Rocha/Neuro
elyse Kathleen
jesus wilkinson
Kim Monroy

## 2017-06-11 NOTE — PROVIDER CONTACT NOTE (OTHER) - REASON
Wheezing and right sided tremors
distended bladder
elevated BP
patient agitated
penis with erection (has inflatable penile prosthesis)
vomitting, restlessness
Patient vomitted
Noticed ecchymotic area on abdomen/transverse from left to right

## 2017-06-12 VITALS — TEMPERATURE: 98 F

## 2017-06-12 LAB
ANION GAP SERPL CALC-SCNC: 13 MMOL/L — SIGNIFICANT CHANGE UP (ref 5–17)
BUN SERPL-MCNC: 17 MG/DL — SIGNIFICANT CHANGE UP (ref 7–23)
CALCIUM SERPL-MCNC: 9.6 MG/DL — SIGNIFICANT CHANGE UP (ref 8.4–10.5)
CHLORIDE SERPL-SCNC: 100 MMOL/L — SIGNIFICANT CHANGE UP (ref 96–108)
CO2 SERPL-SCNC: 25 MMOL/L — SIGNIFICANT CHANGE UP (ref 22–31)
CREAT SERPL-MCNC: 1.1 MG/DL — SIGNIFICANT CHANGE UP (ref 0.5–1.3)
GLUCOSE SERPL-MCNC: 186 MG/DL — HIGH (ref 70–99)
HCT VFR BLD CALC: 30.2 % — LOW (ref 39–50)
HGB BLD-MCNC: 10 G/DL — LOW (ref 13–17)
MAGNESIUM SERPL-MCNC: 1.6 MG/DL — SIGNIFICANT CHANGE UP (ref 1.6–2.6)
MCHC RBC-ENTMCNC: 28.1 PG — SIGNIFICANT CHANGE UP (ref 27–34)
MCHC RBC-ENTMCNC: 33.1 G/DL — SIGNIFICANT CHANGE UP (ref 32–36)
MCV RBC AUTO: 84.8 FL — SIGNIFICANT CHANGE UP (ref 80–100)
PHOSPHATE SERPL-MCNC: 3.2 MG/DL — SIGNIFICANT CHANGE UP (ref 2.5–4.5)
PLATELET # BLD AUTO: 463 K/UL — HIGH (ref 150–400)
POTASSIUM SERPL-MCNC: 3.6 MMOL/L — SIGNIFICANT CHANGE UP (ref 3.5–5.3)
POTASSIUM SERPL-SCNC: 3.6 MMOL/L — SIGNIFICANT CHANGE UP (ref 3.5–5.3)
RBC # BLD: 3.56 M/UL — LOW (ref 4.2–5.8)
RBC # FLD: 13.2 % — SIGNIFICANT CHANGE UP (ref 10.3–16.9)
SODIUM SERPL-SCNC: 138 MMOL/L — SIGNIFICANT CHANGE UP (ref 135–145)
WBC # BLD: 10.8 K/UL — HIGH (ref 3.8–10.5)
WBC # FLD AUTO: 10.8 K/UL — HIGH (ref 3.8–10.5)

## 2017-06-12 PROCEDURE — 85730 THROMBOPLASTIN TIME PARTIAL: CPT

## 2017-06-12 PROCEDURE — 81001 URINALYSIS AUTO W/SCOPE: CPT

## 2017-06-12 PROCEDURE — 71045 X-RAY EXAM CHEST 1 VIEW: CPT

## 2017-06-12 PROCEDURE — 86850 RBC ANTIBODY SCREEN: CPT

## 2017-06-12 PROCEDURE — 92610 EVALUATE SWALLOWING FUNCTION: CPT | Mod: GN

## 2017-06-12 PROCEDURE — 85576 BLOOD PLATELET AGGREGATION: CPT

## 2017-06-12 PROCEDURE — 80053 COMPREHEN METABOLIC PANEL: CPT

## 2017-06-12 PROCEDURE — 85025 COMPLETE CBC W/AUTO DIFF WBC: CPT

## 2017-06-12 PROCEDURE — C1880: CPT

## 2017-06-12 PROCEDURE — 97116 GAIT TRAINING THERAPY: CPT

## 2017-06-12 PROCEDURE — 86900 BLOOD TYPING SEROLOGIC ABO: CPT

## 2017-06-12 PROCEDURE — 99285 EMERGENCY DEPT VISIT HI MDM: CPT | Mod: 25

## 2017-06-12 PROCEDURE — 81003 URINALYSIS AUTO W/O SCOPE: CPT

## 2017-06-12 PROCEDURE — 70450 CT HEAD/BRAIN W/O DYE: CPT

## 2017-06-12 PROCEDURE — C1889: CPT

## 2017-06-12 PROCEDURE — C1894: CPT

## 2017-06-12 PROCEDURE — 70250 X-RAY EXAM OF SKULL: CPT

## 2017-06-12 PROCEDURE — C1713: CPT

## 2017-06-12 PROCEDURE — 97535 SELF CARE MNGMENT TRAINING: CPT

## 2017-06-12 PROCEDURE — 85018 HEMOGLOBIN: CPT

## 2017-06-12 PROCEDURE — 80048 BASIC METABOLIC PNL TOTAL CA: CPT

## 2017-06-12 PROCEDURE — 95951: CPT

## 2017-06-12 PROCEDURE — 97530 THERAPEUTIC ACTIVITIES: CPT

## 2017-06-12 PROCEDURE — 83735 ASSAY OF MAGNESIUM: CPT

## 2017-06-12 PROCEDURE — C1769: CPT

## 2017-06-12 PROCEDURE — 82803 BLOOD GASES ANY COMBINATION: CPT

## 2017-06-12 PROCEDURE — 87086 URINE CULTURE/COLONY COUNT: CPT

## 2017-06-12 PROCEDURE — 93970 EXTREMITY STUDY: CPT

## 2017-06-12 PROCEDURE — 94002 VENT MGMT INPAT INIT DAY: CPT

## 2017-06-12 PROCEDURE — 84295 ASSAY OF SERUM SODIUM: CPT

## 2017-06-12 PROCEDURE — 93005 ELECTROCARDIOGRAM TRACING: CPT

## 2017-06-12 PROCEDURE — 87040 BLOOD CULTURE FOR BACTERIA: CPT

## 2017-06-12 PROCEDURE — 80061 LIPID PANEL: CPT

## 2017-06-12 PROCEDURE — 84100 ASSAY OF PHOSPHORUS: CPT

## 2017-06-12 PROCEDURE — 85610 PROTHROMBIN TIME: CPT

## 2017-06-12 PROCEDURE — 86923 COMPATIBILITY TEST ELECTRIC: CPT

## 2017-06-12 PROCEDURE — 36430 TRANSFUSION BLD/BLD COMPNT: CPT

## 2017-06-12 PROCEDURE — P9016: CPT

## 2017-06-12 PROCEDURE — 70450 CT HEAD/BRAIN W/O DYE: CPT | Mod: 26

## 2017-06-12 PROCEDURE — 86901 BLOOD TYPING SEROLOGIC RH(D): CPT

## 2017-06-12 PROCEDURE — 97162 PT EVAL MOD COMPLEX 30 MIN: CPT

## 2017-06-12 PROCEDURE — 84132 ASSAY OF SERUM POTASSIUM: CPT

## 2017-06-12 PROCEDURE — 97163 PT EVAL HIGH COMPLEX 45 MIN: CPT

## 2017-06-12 PROCEDURE — 80202 ASSAY OF VANCOMYCIN: CPT

## 2017-06-12 PROCEDURE — 97164 PT RE-EVAL EST PLAN CARE: CPT

## 2017-06-12 PROCEDURE — 84484 ASSAY OF TROPONIN QUANT: CPT

## 2017-06-12 PROCEDURE — 92523 SPEECH SOUND LANG COMPREHEN: CPT | Mod: GN

## 2017-06-12 PROCEDURE — 74018 RADEX ABDOMEN 1 VIEW: CPT

## 2017-06-12 PROCEDURE — 85027 COMPLETE CBC AUTOMATED: CPT

## 2017-06-12 PROCEDURE — 76000 FLUOROSCOPY <1 HR PHYS/QHP: CPT

## 2017-06-12 PROCEDURE — P9045: CPT

## 2017-06-12 PROCEDURE — 83605 ASSAY OF LACTIC ACID: CPT

## 2017-06-12 PROCEDURE — 82550 ASSAY OF CK (CPK): CPT

## 2017-06-12 PROCEDURE — 84145 PROCALCITONIN (PCT): CPT

## 2017-06-12 PROCEDURE — 92526 ORAL FUNCTION THERAPY: CPT | Mod: GN

## 2017-06-12 PROCEDURE — 94640 AIRWAY INHALATION TREATMENT: CPT

## 2017-06-12 PROCEDURE — 82330 ASSAY OF CALCIUM: CPT

## 2017-06-12 PROCEDURE — 83036 HEMOGLOBIN GLYCOSYLATED A1C: CPT

## 2017-06-12 PROCEDURE — 92507 TX SP LANG VOICE COMM INDIV: CPT | Mod: GN

## 2017-06-12 PROCEDURE — 36415 COLL VENOUS BLD VENIPUNCTURE: CPT

## 2017-06-12 PROCEDURE — 99233 SBSQ HOSP IP/OBS HIGH 50: CPT | Mod: GC

## 2017-06-12 PROCEDURE — P9047: CPT

## 2017-06-12 PROCEDURE — C1725: CPT

## 2017-06-12 RX ORDER — PANTOPRAZOLE SODIUM 20 MG/1
40 TABLET, DELAYED RELEASE ORAL
Qty: 0 | Refills: 0 | Status: DISCONTINUED | OUTPATIENT
Start: 2017-06-13 | End: 2017-06-12

## 2017-06-12 RX ORDER — LEVETIRACETAM 250 MG/1
1 TABLET, FILM COATED ORAL
Qty: 60 | Refills: 0 | OUTPATIENT
Start: 2017-06-12 | End: 2017-07-12

## 2017-06-12 RX ORDER — ACETAMINOPHEN 500 MG
20.31 TABLET ORAL
Qty: 0 | Refills: 0 | COMMUNITY
Start: 2017-06-12

## 2017-06-12 RX ADMIN — Medication 4: at 12:01

## 2017-06-12 RX ADMIN — AMLODIPINE BESYLATE 10 MILLIGRAM(S): 2.5 TABLET ORAL at 06:36

## 2017-06-12 RX ADMIN — LOSARTAN POTASSIUM 50 MILLIGRAM(S): 100 TABLET, FILM COATED ORAL at 06:35

## 2017-06-12 RX ADMIN — Medication 1 TABLET(S): at 11:59

## 2017-06-12 RX ADMIN — INSULIN GLARGINE 20 UNIT(S): 100 INJECTION, SOLUTION SUBCUTANEOUS at 10:49

## 2017-06-12 RX ADMIN — LEVETIRACETAM 1000 MILLIGRAM(S): 250 TABLET, FILM COATED ORAL at 06:36

## 2017-06-12 RX ADMIN — Medication 2: at 06:47

## 2017-06-12 RX ADMIN — PANTOPRAZOLE SODIUM 40 MILLIGRAM(S): 20 TABLET, DELAYED RELEASE ORAL at 12:01

## 2017-06-12 RX ADMIN — Medication 325 MILLIGRAM(S): at 11:59

## 2017-06-12 RX ADMIN — Medication 20 MILLIGRAM(S): at 12:00

## 2017-06-12 NOTE — PROGRESS NOTE ADULT - NEUROLOGICAL COMMENTS
right arm weak and slurring of the speech

## 2017-06-12 NOTE — PROGRESS NOTE ADULT - PROBLEM SELECTOR PLAN 5
Continue on the statin follow liver function test
Continue on the statin follow liver function test
Continue on the statin follow liver function test.  On atorvastatin
Continue on the statin follow liver function test
Continue on the statin follow liver function test

## 2017-06-12 NOTE — PROGRESS NOTE ADULT - SUBJECTIVE AND OBJECTIVE BOX
HPI:  70 y/o male pmhx HTN, CAD, multiple CVA (last 3/2016) DM, known to NSX service with recent angio procedure revealing worsening carotid stenosis planned for elective Left STA-MCA bypass this week but has been experiencing worsening in his speech over the past few days. Per the patients wife, patient has expereinced a gradual decline since March 2016 with symptoms associated with increased confusion and speech difficulty. Denies any headaches, nausea, vomiting or right side deficits. Denies any numbness tingling or decreased sensation.  Pt at baseline using hand crutch and one person assistance to ambulate. Denies any medications for pain or improvement of symptoms. Denies any recent trauma or falls. (21 May 2017 16:56)    OVERNIGHT EVENTS:  Vital Signs Last 24 Hrs  T(C): 35.9, Max: 36.8 (06-11 @ 13:37)  T(F): 96.7, Max: 98.2 (06-11 @ 13:37)  HR: 80 (80 - 96)  BP: 150/70 (116/58 - 150/70)  BP(mean): 101 (80 - 101)  RR: 16 (16 - 20)  SpO2: 96% (96% - 96%)    I&O's Summary    I & Os for current day (as of 12 Jun 2017 07:55)  =============================================  IN: 933.8 ml / OUT: 0 ml / NET: 933.8 ml      PHYSICAL EXAM:  Neurological: awake expressive aphasia persists following commands  BARTOLO no faial weakness  R side HP l side intact    Cardiovascular: RRR  Respiratory: Lungs CTAB  Gastrointestinal: +BS  Genitourinary: Voiding without difficulty  Extremities: warm and dry  Incision/Wound: Crani incision well healed        DIET: Regular    LABS:                        10.0   10.8  )-----------( 463      ( 12 Jun 2017 07:41 )             30.2     06-11    139  |  100  |  18  ----------------------------<  117<H>  3.4<L>   |  26  |  1.20    Ca    9.3      11 Jun 2017 06:14  Phos  3.5     06-11  Mg     1.7     06-11              CAPILLARY BLOOD GLUCOSE  141 (12 Jun 2017 06:10)  157 (11 Jun 2017 21:30)  222 (11 Jun 2017 18:37)  152 (11 Jun 2017 16:30)  143 (11 Jun 2017 12:54)  177 (11 Jun 2017 11:00)      Drug Levels: [] N/A    CSF Analysis: [] N/A      Allergies    No Known Allergies    Intolerances      MEDICATIONS:  Antibiotics:    Neuro:  ondansetron Injectable 4milliGRAM(s) IV Push every 6 hours PRN  acetaminophen    Suspension. 650milliGRAM(s) Enteral Tube every 6 hours PRN  acetaminophen    Suspension 650milliGRAM(s) Enteral Tube every 6 hours PRN  levETIRAcetam 1000milliGRAM(s) Oral two times a day  aspirin 325milliGRAM(s) Oral daily  FLUoxetine 20milliGRAM(s) Oral daily    Anticoagulation:    OTHER:  dextrose Gel 1Dose(s) Oral once PRN  dextrose 50% Injectable 12.5Gram(s) IV Push once  glucagon  Injectable 1milliGRAM(s) IntraMuscular once PRN  atorvastatin 40milliGRAM(s) Oral at bedtime  losartan 50milliGRAM(s) Oral daily  insulin glargine Injectable (LANTUS) 20Unit(s) SubCutaneous <User Schedule>  insulin lispro (HumaLOG) corrective regimen sliding scale  SubCutaneous every 6 hours  pantoprazole  Injectable 40milliGRAM(s) IV Push daily  amLODIPine   Tablet 10milliGRAM(s) Oral daily  hydrochlorothiazide    Capsule 12.5milliGRAM(s) Oral daily    IVF:  multivitamin 1Tablet(s) Oral daily    :      ASSESSMENT:  71y Male s/p Cerebral bypass  hospitalization course +DVT  started anticoagulation as per Joe then disocntinued secondary to bleeding    PLAN:  NEURO:  Monitor neuro status  OT/Pt  Speech therapy  Head CT this AM  Continue Keppra  Conatinue current medical regime    Dispo: Discussed with attending

## 2017-06-12 NOTE — PROGRESS NOTE ADULT - SKIN
No lesions; no rash

## 2017-06-12 NOTE — PROGRESS NOTE ADULT - PROBLEM SELECTOR PROBLEM 4
Controlled type 2 diabetes mellitus without complication, unspecified long term insulin use status

## 2017-06-12 NOTE — PROGRESS NOTE ADULT - PROBLEM SELECTOR PROBLEM 2
Coronary artery disease without angina pectoris, unspecified vessel or lesion type, unspecified whether native or transplanted heart

## 2017-06-12 NOTE — PROGRESS NOTE ADULT - PROBLEM SELECTOR PROBLEM 6
Preoperative clearance

## 2017-06-12 NOTE — PROGRESS NOTE ADULT - PROBLEM SELECTOR PLAN 3
The blood pressure is controlled and he is to continue on the current regimen
The blood pressure is controlled and he is to continue on the current regimen
The blood pressure is controlled and he is to continue on the current regimen.  On Losartan
The blood pressure is controlled and he is to continue on the current regimen.  On Losartan and amlodipine
The blood pressure is controlled and he is to continue on the current regimen.  On Losartan and amlodipine, & hydrochlorothiazide
The blood pressure is controlled but not at target and he is to continue on the current regimen.  On Losartan Amlodipine started
The blood pressure is controlled and he is to continue on the current regimen
The blood pressure is controlled and he is to continue on the current regimen

## 2017-06-12 NOTE — PROGRESS NOTE ADULT - PROBLEM SELECTOR PROBLEM 7
Respiratory failure

## 2017-06-12 NOTE — PROGRESS NOTE ADULT - PROVIDER SPECIALTY LIST ADULT
Internal Medicine
NSICU
Neurosurgery
SICU
Vascular Surgery
Vascular Surgery
NSICU
Neurosurgery
Neurosurgery

## 2017-06-12 NOTE — PROGRESS NOTE ADULT - BACK
No deformity or limitation of movement

## 2017-06-12 NOTE — PROGRESS NOTE ADULT - NS NEC GEN PE MLT EXAM PC
No bruits; no thyromegaly or nodules

## 2017-06-12 NOTE — PROGRESS NOTE ADULT - PROBLEM SELECTOR PROBLEM 5
Hyperlipidemia

## 2017-06-12 NOTE — PROGRESS NOTE ADULT - NEUROLOGICAL DETAILS
strength decreased/right
right/strength decreased
strength decreased

## 2017-06-12 NOTE — PROGRESS NOTE ADULT - EYES
EOMI; PERRL; no drainage or redness

## 2017-06-12 NOTE — PROGRESS NOTE ADULT - PROBLEM SELECTOR PLAN 6
Patient is stable postoperatively

## 2017-06-12 NOTE — PROGRESS NOTE ADULT - GUM GEN PE MLT EXAM PC
not examined

## 2017-06-12 NOTE — PROGRESS NOTE ADULT - VASCULAR
Equal and normal pulses (carotid, femoral, dorsalis pedis)

## 2017-06-12 NOTE — PROGRESS NOTE ADULT - RESPIRATORY
Breath Sounds equal & clear to percussion & auscultation, no accessory muscle use

## 2017-06-12 NOTE — PROGRESS NOTE ADULT - SUBJECTIVE AND OBJECTIVE BOX
Interval Events: revuewed  Patient seen and examined at bedside.    Patient is a 71y old  Male who presents with a chief complaint of worsening slurred speech (30 May 2017 09:49)  is doing well he did physical therapy is out of bed now    PAST MEDICAL & SURGICAL HISTORY:  Cerebrovascular accident (CVA) due to stenosis of left middle cerebral artery  Coronary artery disease without angina pectoris, unspecified vessel or lesion type, unspecified whether native or transplanted heart  Depression, unspecified depression type  Essential hypertension  Controlled type 2 diabetes mellitus without complication, unspecified long term insulin use status  History of penile implant  History of lumbar surgery  H/O umbilical hernia repair  History of appendectomy      MEDICATIONS:  Pulmonary:    Antimicrobials:    Anticoagulants:    Cardiac:  losartan 50milliGRAM(s) Oral daily  amLODIPine   Tablet 10milliGRAM(s) Oral daily  hydrochlorothiazide    Capsule 12.5milliGRAM(s) Oral daily      Allergies    No Known Allergies    Intolerances        Vital Signs Last 24 Hrs  T(C): 36.3, Max: 36.8 (06-11 @ 13:37)  T(F): 97.4, Max: 98.2 (06-11 @ 13:37)  HR: 88 (80 - 118)  BP: 152/67 (116/58 - 152/67)  BP(mean): 96 (80 - 101)  RR: 19 (16 - 19)  SpO2: 95% (95% - 97%)    I & Os for current day (as of 06-12 @ 12:36)  =============================================  IN: 933.8 ml / OUT: 0 ml / NET: 933.8 ml        LABS:      CBC Full  -  ( 12 Jun 2017 07:41 )  WBC Count : 10.8 K/uL  Hemoglobin : 10.0 g/dL  Hematocrit : 30.2 %  Platelet Count - Automated : 463 K/uL  Mean Cell Volume : 84.8 fL  Mean Cell Hemoglobin : 28.1 pg  Mean Cell Hemoglobin Concentration : 33.1 g/dL  Auto Neutrophil # : x  Auto Lymphocyte # : x  Auto Monocyte # : x  Auto Eosinophil # : x  Auto Basophil # : x  Auto Neutrophil % : x  Auto Lymphocyte % : x  Auto Monocyte % : x  Auto Eosinophil % : x  Auto Basophil % : x    06-12    138  |  100  |  17  ----------------------------<  186<H>  3.6   |  25  |  1.10    Ca    9.6      12 Jun 2017 07:41  Phos  3.2     06-12  Mg     1.6     06-12                          RADIOLOGY & ADDITIONAL STUDIES (The following images were personally reviewed):  Guerrero:                          No  Urine output:               Yes          DVT prophylaxis:         Yes          Flattus:                          Yes         Bowel movement:       Yes

## 2017-06-12 NOTE — PROGRESS NOTE ADULT - EXTREMITIES
No cyanosis, clubbing or edema

## 2017-06-12 NOTE — PROGRESS NOTE ADULT - PROBLEM SELECTOR PLAN 2
Is no clinical evidence of on the line ischemia nor heart failure.

## 2017-06-12 NOTE — PROGRESS NOTE ADULT - PROBLEM SELECTOR PROBLEM 1
CVA (cerebral vascular accident)

## 2017-06-12 NOTE — PROGRESS NOTE ADULT - PROBLEM SELECTOR PLAN 7
resolved but he is still as risk for aspiration
The patient was cleared byery and he was successfully extubated. You scanty secretion has an monitor for airway protection

## 2017-06-12 NOTE — PROGRESS NOTE ADULT - PROBLEM SELECTOR PLAN 1
The patient had bypass yesterday. Patient is for repeat CT scan of the head.  Patient is on Keppra for C seizure prophylaxis for the next seven days. neurosurgery for extubation. Patient was clear  the patient is improving with increased strength on the right side
The patient had bypass yesterday. Patient is for repeat CT scan of the head.  Patient is on Keppra for C seizure prophylaxis for the next seven days. neurosurgery for extubation. Patient was clear  the patient is improving with increased strength on the right side continue antiplatelet. There is no evidence of new event
The patient had bypass yesterday. Patient is for repeat CT scan of the head.  Patient is on Keppra for C seizure prophylaxis for the next seven days. neurosurgery for extubation. Patient was clear  the patient is improving with increased strength on the right side continue antiplatelet. There is no evidence of new event.  The patient improved and Ambulated with physical therapy
The patient had bypass yesterday. Patient is for repeat CT scan of the head.  Patient is on Keppra for C seizure prophylaxis for the next seven days. neurosurgery for extubation. Patient was clear  the patient is improving with increased strength on the right side continue antiplatelet. There is no evidence of new event.  The patient improved and Ambulated with physical therapy.  The patient is improving neurological
The patient had bypass yesterday. Patient is for repeat CT scan of the head.  Patient is on Keppra for C seizure prophylaxis for the next seven days. neurosurgery for extubation. Patient was clear  the patient is improving with increased strength on the right side continue antiplatelet. There is no evidence of new event.  The patient improved and Ambulated with physical therapy.  The patient is improving neurological
The patient had bypass. Patient is for repeat CT scan of the head.  Patient is on Keppra for  seizure prophylaxis for the next seven days. Patient is improving with increased strength on the right side continue antiplatelet. There is no evidence of new event.  The patient improved and Ambulated with physical therapy.  The patient is improving neurological
The patient had bypass yesterday. Patient is for repeat CT scan of the head.  Patient is on Keppra for C seizure prophylaxis for the next seven days. neurosurgery for extubation. Patient was clear  the patient is improving with increased strength on the right side
The patient had bypass yesterday. Patient is for repeat CT scan of the head.  Patient is on Keppra for C seizure prophylaxis for the next seven days. neurosurgery for extubation. Patient was clear

## 2017-06-12 NOTE — PROGRESS NOTE ADULT - CONSTITUTIONAL
Well-developed, well nourished

## 2017-06-14 DIAGNOSIS — I66.01 OCCLUSION AND STENOSIS OF RIGHT MIDDLE CEREBRAL ARTERY: ICD-10-CM

## 2017-06-14 DIAGNOSIS — I82.431 ACUTE EMBOLISM AND THROMBOSIS OF RIGHT POPLITEAL VEIN: ICD-10-CM

## 2017-06-14 DIAGNOSIS — R25.3 FASCICULATION: ICD-10-CM

## 2017-06-14 DIAGNOSIS — I67.2 CEREBRAL ATHEROSCLEROSIS: ICD-10-CM

## 2017-06-14 DIAGNOSIS — J18.9 PNEUMONIA, UNSPECIFIED ORGANISM: ICD-10-CM

## 2017-06-14 DIAGNOSIS — E11.9 TYPE 2 DIABETES MELLITUS WITHOUT COMPLICATIONS: ICD-10-CM

## 2017-06-14 DIAGNOSIS — E78.5 HYPERLIPIDEMIA, UNSPECIFIED: ICD-10-CM

## 2017-06-14 DIAGNOSIS — I61.1 NONTRAUMATIC INTRACEREBRAL HEMORRHAGE IN HEMISPHERE, CORTICAL: ICD-10-CM

## 2017-06-14 DIAGNOSIS — R19.7 DIARRHEA, UNSPECIFIED: ICD-10-CM

## 2017-06-14 DIAGNOSIS — I66.02 OCCLUSION AND STENOSIS OF LEFT MIDDLE CEREBRAL ARTERY: ICD-10-CM

## 2017-06-14 DIAGNOSIS — F32.9 MAJOR DEPRESSIVE DISORDER, SINGLE EPISODE, UNSPECIFIED: ICD-10-CM

## 2017-06-14 DIAGNOSIS — I10 ESSENTIAL (PRIMARY) HYPERTENSION: ICD-10-CM
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
## 2017-06-14 DIAGNOSIS — I82.441 ACUTE EMBOLISM AND THROMBOSIS OF RIGHT TIBIAL VEIN: ICD-10-CM

## 2017-06-14 DIAGNOSIS — Z79.4 LONG TERM (CURRENT) USE OF INSULIN: ICD-10-CM

## 2017-06-14 DIAGNOSIS — R50.9 FEVER, UNSPECIFIED: ICD-10-CM

## 2017-06-14 DIAGNOSIS — E11.65 TYPE 2 DIABETES MELLITUS WITH HYPERGLYCEMIA: ICD-10-CM

## 2017-06-14 DIAGNOSIS — I69.928 OTHER SPEECH AND LANGUAGE DEFICITS FOLLOWING UNSPECIFIED CEREBROVASCULAR DISEASE: ICD-10-CM

## 2017-06-14 DIAGNOSIS — J96.91 RESPIRATORY FAILURE, UNSPECIFIED WITH HYPOXIA: ICD-10-CM

## 2017-06-14 DIAGNOSIS — I97.88 OTHER INTRAOPERATIVE COMPLICATIONS OF THE CIRCULATORY SYSTEM, NOT ELSEWHERE CLASSIFIED: ICD-10-CM

## 2017-06-14 DIAGNOSIS — I60.9 NONTRAUMATIC SUBARACHNOID HEMORRHAGE, UNSPECIFIED: ICD-10-CM

## 2017-06-14 DIAGNOSIS — I69.920 APHASIA FOLLOWING UNSPECIFIED CEREBROVASCULAR DISEASE: ICD-10-CM

## 2017-06-14 DIAGNOSIS — I69.351 HEMIPLEGIA AND HEMIPARESIS FOLLOWING CEREBRAL INFARCTION AFFECTING RIGHT DOMINANT SIDE: ICD-10-CM

## 2017-06-14 DIAGNOSIS — Z79.82 LONG TERM (CURRENT) USE OF ASPIRIN: ICD-10-CM

## 2017-06-14 DIAGNOSIS — G93.89 OTHER SPECIFIED DISORDERS OF BRAIN: ICD-10-CM

## 2017-06-14 DIAGNOSIS — Z79.84 LONG TERM (CURRENT) USE OF ORAL HYPOGLYCEMIC DRUGS: ICD-10-CM
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
## 2017-06-14 DIAGNOSIS — R33.9 RETENTION OF URINE, UNSPECIFIED: ICD-10-CM

## 2017-06-14 DIAGNOSIS — I25.10 ATHEROSCLEROTIC HEART DISEASE OF NATIVE CORONARY ARTERY WITHOUT ANGINA PECTORIS: ICD-10-CM

## 2017-06-14 DIAGNOSIS — I63.512 CEREBRAL INFARCTION DUE TO UNSPECIFIED OCCLUSION OR STENOSIS OF LEFT MIDDLE CEREBRAL ARTERY: ICD-10-CM

## 2017-06-14 DIAGNOSIS — D72.829 ELEVATED WHITE BLOOD CELL COUNT, UNSPECIFIED: ICD-10-CM

## 2017-06-14 DIAGNOSIS — I82.411 ACUTE EMBOLISM AND THROMBOSIS OF RIGHT FEMORAL VEIN: ICD-10-CM

## 2017-06-14 DIAGNOSIS — I65.22 OCCLUSION AND STENOSIS OF LEFT CAROTID ARTERY: ICD-10-CM

## 2017-06-14 DIAGNOSIS — J69.0 PNEUMONITIS DUE TO INHALATION OF FOOD AND VOMIT: ICD-10-CM

## 2017-06-27 DIAGNOSIS — G81.90 HEMIPLEGIA, UNSPECIFIED AFFECTING UNSPECIFIED SIDE: ICD-10-CM

## 2017-06-27 DIAGNOSIS — I69.020 APHASIA FOLLOWING NONTRAUMATIC SUBARACHNOID HEMORRHAGE: ICD-10-CM

## 2017-07-03 PROBLEM — I10 ESSENTIAL (PRIMARY) HYPERTENSION: Chronic | Status: ACTIVE | Noted: 2017-05-21

## 2017-07-03 PROBLEM — I63.512 CEREBRAL INFARCTION DUE TO UNSPECIFIED OCCLUSION OR STENOSIS OF LEFT MIDDLE CEREBRAL ARTERY: Chronic | Status: ACTIVE | Noted: 2017-05-21

## 2017-07-03 PROBLEM — F32.9 MAJOR DEPRESSIVE DISORDER, SINGLE EPISODE, UNSPECIFIED: Chronic | Status: ACTIVE | Noted: 2017-05-21

## 2017-07-03 PROBLEM — E11.9 TYPE 2 DIABETES MELLITUS WITHOUT COMPLICATIONS: Chronic | Status: ACTIVE | Noted: 2017-05-21

## 2017-07-03 PROBLEM — I25.10 ATHEROSCLEROTIC HEART DISEASE OF NATIVE CORONARY ARTERY WITHOUT ANGINA PECTORIS: Chronic | Status: ACTIVE | Noted: 2017-05-21

## 2017-07-27 ENCOUNTER — APPOINTMENT (OUTPATIENT)
Dept: NEUROSURGERY | Facility: CLINIC | Age: 72
End: 2017-07-27
Payer: COMMERCIAL

## 2017-07-27 VITALS
DIASTOLIC BLOOD PRESSURE: 75 MMHG | WEIGHT: 195 LBS | SYSTOLIC BLOOD PRESSURE: 136 MMHG | HEART RATE: 76 BPM | OXYGEN SATURATION: 96 % | BODY MASS INDEX: 31.34 KG/M2 | HEIGHT: 66 IN

## 2017-07-27 DIAGNOSIS — Z09 ENCOUNTER FOR FOLLOW-UP EXAMINATION AFTER COMPLETED TREATMENT FOR CONDITIONS OTHER THAN MALIGNANT NEOPLASM: ICD-10-CM

## 2017-07-27 DIAGNOSIS — Z85.9 PERSONAL HISTORY OF MALIGNANT NEOPLASM, UNSPECIFIED: ICD-10-CM

## 2017-07-27 DIAGNOSIS — Z87.39 PERSONAL HISTORY OF OTHER DISEASES OF THE MUSCULOSKELETAL SYSTEM AND CONNECTIVE TISSUE: ICD-10-CM

## 2017-07-27 PROCEDURE — 99024 POSTOP FOLLOW-UP VISIT: CPT

## 2017-07-31 ENCOUNTER — INPATIENT (INPATIENT)
Facility: HOSPITAL | Age: 72
LOS: 3 days | Discharge: HOME | End: 2017-08-04
Attending: INTERNAL MEDICINE | Admitting: INTERNAL MEDICINE

## 2017-07-31 DIAGNOSIS — Z96.89 PRESENCE OF OTHER SPECIFIED FUNCTIONAL IMPLANTS: Chronic | ICD-10-CM

## 2017-07-31 DIAGNOSIS — I10 ESSENTIAL (PRIMARY) HYPERTENSION: ICD-10-CM

## 2017-07-31 DIAGNOSIS — I62.00 NONTRAUMATIC SUBDURAL HEMORRHAGE, UNSPECIFIED: ICD-10-CM

## 2017-07-31 DIAGNOSIS — G81.90 HEMIPLEGIA, UNSPECIFIED AFFECTING UNSPECIFIED SIDE: ICD-10-CM

## 2017-07-31 DIAGNOSIS — Z90.49 ACQUIRED ABSENCE OF OTHER SPECIFIED PARTS OF DIGESTIVE TRACT: Chronic | ICD-10-CM

## 2017-07-31 DIAGNOSIS — I69.30 UNSPECIFIED SEQUELAE OF CEREBRAL INFARCTION: ICD-10-CM

## 2017-07-31 DIAGNOSIS — E11.9 TYPE 2 DIABETES MELLITUS WITHOUT COMPLICATIONS: ICD-10-CM

## 2017-07-31 DIAGNOSIS — R26.81 UNSTEADINESS ON FEET: ICD-10-CM

## 2017-07-31 DIAGNOSIS — R47.01 APHASIA: ICD-10-CM
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
## 2017-07-31 DIAGNOSIS — Z98.890 OTHER SPECIFIED POSTPROCEDURAL STATES: Chronic | ICD-10-CM

## 2017-07-31 DIAGNOSIS — R56.9 UNSPECIFIED CONVULSIONS: ICD-10-CM

## 2017-07-31 DIAGNOSIS — I25.10 ATHEROSCLEROTIC HEART DISEASE OF NATIVE CORONARY ARTERY WITHOUT ANGINA PECTORIS: ICD-10-CM

## 2017-08-07 DIAGNOSIS — G40.409 OTHER GENERALIZED EPILEPSY AND EPILEPTIC SYNDROMES, NOT INTRACTABLE, WITHOUT STATUS EPILEPTICUS: ICD-10-CM

## 2017-08-07 DIAGNOSIS — I69.951 HEMIPLEGIA AND HEMIPARESIS FOLLOWING UNSPECIFIED CEREBROVASCULAR DISEASE AFFECTING RIGHT DOMINANT SIDE: ICD-10-CM

## 2017-08-07 DIAGNOSIS — I62.01 NONTRAUMATIC ACUTE SUBDURAL HEMORRHAGE: ICD-10-CM

## 2017-08-07 DIAGNOSIS — I25.10 ATHEROSCLEROTIC HEART DISEASE OF NATIVE CORONARY ARTERY WITHOUT ANGINA PECTORIS: ICD-10-CM

## 2017-08-07 DIAGNOSIS — E11.9 TYPE 2 DIABETES MELLITUS WITHOUT COMPLICATIONS: ICD-10-CM

## 2017-08-07 DIAGNOSIS — F32.9 MAJOR DEPRESSIVE DISORDER, SINGLE EPISODE, UNSPECIFIED: ICD-10-CM

## 2017-08-07 DIAGNOSIS — Z98.890 OTHER SPECIFIED POSTPROCEDURAL STATES: ICD-10-CM

## 2017-08-07 DIAGNOSIS — E87.6 HYPOKALEMIA: ICD-10-CM

## 2017-08-07 DIAGNOSIS — Z96.0 PRESENCE OF UROGENITAL IMPLANTS: ICD-10-CM

## 2017-08-07 DIAGNOSIS — N17.9 ACUTE KIDNEY FAILURE, UNSPECIFIED: ICD-10-CM

## 2017-08-07 DIAGNOSIS — Z95.5 PRESENCE OF CORONARY ANGIOPLASTY IMPLANT AND GRAFT: ICD-10-CM

## 2017-08-07 DIAGNOSIS — E78.5 HYPERLIPIDEMIA, UNSPECIFIED: ICD-10-CM

## 2017-10-18 ENCOUNTER — INPATIENT (INPATIENT)
Facility: HOSPITAL | Age: 72
LOS: 11 days | Discharge: NURSING, REHAB OR LONG-TERM CARE | End: 2017-10-30
Attending: INTERNAL MEDICINE

## 2017-10-18 DIAGNOSIS — I10 ESSENTIAL (PRIMARY) HYPERTENSION: ICD-10-CM

## 2017-10-18 DIAGNOSIS — I62.00 NONTRAUMATIC SUBDURAL HEMORRHAGE, UNSPECIFIED: ICD-10-CM

## 2017-10-18 DIAGNOSIS — R56.9 UNSPECIFIED CONVULSIONS: ICD-10-CM

## 2017-10-18 DIAGNOSIS — Z96.89 PRESENCE OF OTHER SPECIFIED FUNCTIONAL IMPLANTS: Chronic | ICD-10-CM

## 2017-10-18 DIAGNOSIS — Z98.890 OTHER SPECIFIED POSTPROCEDURAL STATES: Chronic | ICD-10-CM

## 2017-10-18 DIAGNOSIS — R26.81 UNSTEADINESS ON FEET: ICD-10-CM

## 2017-10-18 DIAGNOSIS — I25.10 ATHEROSCLEROTIC HEART DISEASE OF NATIVE CORONARY ARTERY WITHOUT ANGINA PECTORIS: ICD-10-CM

## 2017-10-18 DIAGNOSIS — Z90.49 ACQUIRED ABSENCE OF OTHER SPECIFIED PARTS OF DIGESTIVE TRACT: Chronic | ICD-10-CM

## 2017-10-18 DIAGNOSIS — E11.9 TYPE 2 DIABETES MELLITUS WITHOUT COMPLICATIONS: ICD-10-CM
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
## 2017-10-18 DIAGNOSIS — I69.30 UNSPECIFIED SEQUELAE OF CEREBRAL INFARCTION: ICD-10-CM

## 2017-10-18 DIAGNOSIS — G81.90 HEMIPLEGIA, UNSPECIFIED AFFECTING UNSPECIFIED SIDE: ICD-10-CM

## 2017-10-18 DIAGNOSIS — R47.01 APHASIA: ICD-10-CM

## 2017-11-01 DIAGNOSIS — R41.82 ALTERED MENTAL STATUS, UNSPECIFIED: ICD-10-CM

## 2017-11-01 DIAGNOSIS — Z96.89 PRESENCE OF OTHER SPECIFIED FUNCTIONAL IMPLANTS: ICD-10-CM

## 2017-11-01 DIAGNOSIS — I12.9 HYPERTENSIVE CHRONIC KIDNEY DISEASE WITH STAGE 1 THROUGH STAGE 4 CHRONIC KIDNEY DISEASE, OR UNSPECIFIED CHRONIC KIDNEY DISEASE: ICD-10-CM

## 2017-11-01 DIAGNOSIS — I62.01 NONTRAUMATIC ACUTE SUBDURAL HEMORRHAGE: ICD-10-CM

## 2017-11-01 DIAGNOSIS — G92 TOXIC ENCEPHALOPATHY: ICD-10-CM

## 2017-11-01 DIAGNOSIS — R47.01 APHASIA: ICD-10-CM

## 2017-11-01 DIAGNOSIS — I63.512 CEREBRAL INFARCTION DUE TO UNSPECIFIED OCCLUSION OR STENOSIS OF LEFT MIDDLE CEREBRAL ARTERY: ICD-10-CM

## 2017-11-01 DIAGNOSIS — I25.10 ATHEROSCLEROTIC HEART DISEASE OF NATIVE CORONARY ARTERY WITHOUT ANGINA PECTORIS: ICD-10-CM

## 2017-11-01 DIAGNOSIS — E11.22 TYPE 2 DIABETES MELLITUS WITH DIABETIC CHRONIC KIDNEY DISEASE: ICD-10-CM

## 2017-11-01 DIAGNOSIS — E78.00 PURE HYPERCHOLESTEROLEMIA, UNSPECIFIED: ICD-10-CM

## 2017-11-01 DIAGNOSIS — N18.3 CHRONIC KIDNEY DISEASE, STAGE 3 (MODERATE): ICD-10-CM

## 2017-11-01 DIAGNOSIS — Z79.4 LONG TERM (CURRENT) USE OF INSULIN: ICD-10-CM

## 2017-11-29 ENCOUNTER — APPOINTMENT (OUTPATIENT)
Dept: CARDIOLOGY | Facility: CLINIC | Age: 72
End: 2017-11-29

## 2017-12-15 ENCOUNTER — OUTPATIENT (OUTPATIENT)
Dept: OUTPATIENT SERVICES | Facility: HOSPITAL | Age: 72
LOS: 1 days | Discharge: HOME | End: 2017-12-15

## 2017-12-15 DIAGNOSIS — G81.90 HEMIPLEGIA, UNSPECIFIED AFFECTING UNSPECIFIED SIDE: ICD-10-CM

## 2017-12-15 DIAGNOSIS — I10 ESSENTIAL (PRIMARY) HYPERTENSION: ICD-10-CM
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
## 2017-12-15 DIAGNOSIS — Z90.49 ACQUIRED ABSENCE OF OTHER SPECIFIED PARTS OF DIGESTIVE TRACT: Chronic | ICD-10-CM

## 2017-12-15 DIAGNOSIS — Z98.890 OTHER SPECIFIED POSTPROCEDURAL STATES: Chronic | ICD-10-CM

## 2017-12-15 DIAGNOSIS — Z01.818 ENCOUNTER FOR OTHER PREPROCEDURAL EXAMINATION: ICD-10-CM

## 2017-12-15 DIAGNOSIS — R56.9 UNSPECIFIED CONVULSIONS: ICD-10-CM

## 2017-12-15 DIAGNOSIS — Z96.89 PRESENCE OF OTHER SPECIFIED FUNCTIONAL IMPLANTS: Chronic | ICD-10-CM

## 2017-12-15 DIAGNOSIS — I69.30 UNSPECIFIED SEQUELAE OF CEREBRAL INFARCTION: ICD-10-CM

## 2017-12-15 DIAGNOSIS — R47.01 APHASIA: ICD-10-CM

## 2017-12-15 DIAGNOSIS — R26.81 UNSTEADINESS ON FEET: ICD-10-CM

## 2017-12-15 DIAGNOSIS — I62.00 NONTRAUMATIC SUBDURAL HEMORRHAGE, UNSPECIFIED: ICD-10-CM

## 2017-12-15 DIAGNOSIS — E11.9 TYPE 2 DIABETES MELLITUS WITHOUT COMPLICATIONS: ICD-10-CM

## 2017-12-15 DIAGNOSIS — I25.10 ATHEROSCLEROTIC HEART DISEASE OF NATIVE CORONARY ARTERY WITHOUT ANGINA PECTORIS: ICD-10-CM

## 2017-12-18 DIAGNOSIS — E78.00 PURE HYPERCHOLESTEROLEMIA, UNSPECIFIED: ICD-10-CM

## 2017-12-18 DIAGNOSIS — I10 ESSENTIAL (PRIMARY) HYPERTENSION: ICD-10-CM

## 2017-12-18 DIAGNOSIS — I63.9 CEREBRAL INFARCTION, UNSPECIFIED: ICD-10-CM

## 2017-12-18 DIAGNOSIS — E11.9 TYPE 2 DIABETES MELLITUS WITHOUT COMPLICATIONS: ICD-10-CM

## 2017-12-26 ENCOUNTER — OUTPATIENT (OUTPATIENT)
Dept: OUTPATIENT SERVICES | Facility: HOSPITAL | Age: 72
LOS: 1 days | Discharge: HOME | End: 2017-12-26

## 2017-12-26 DIAGNOSIS — E11.9 TYPE 2 DIABETES MELLITUS WITHOUT COMPLICATIONS: ICD-10-CM

## 2017-12-26 DIAGNOSIS — I62.00 NONTRAUMATIC SUBDURAL HEMORRHAGE, UNSPECIFIED: ICD-10-CM

## 2017-12-26 DIAGNOSIS — G81.90 HEMIPLEGIA, UNSPECIFIED AFFECTING UNSPECIFIED SIDE: ICD-10-CM
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
## 2017-12-26 DIAGNOSIS — Z98.890 OTHER SPECIFIED POSTPROCEDURAL STATES: Chronic | ICD-10-CM

## 2017-12-26 DIAGNOSIS — I10 ESSENTIAL (PRIMARY) HYPERTENSION: ICD-10-CM

## 2017-12-26 DIAGNOSIS — R56.9 UNSPECIFIED CONVULSIONS: ICD-10-CM

## 2017-12-26 DIAGNOSIS — Z90.49 ACQUIRED ABSENCE OF OTHER SPECIFIED PARTS OF DIGESTIVE TRACT: Chronic | ICD-10-CM

## 2017-12-26 DIAGNOSIS — R47.01 APHASIA: ICD-10-CM

## 2017-12-26 DIAGNOSIS — R26.81 UNSTEADINESS ON FEET: ICD-10-CM

## 2017-12-26 DIAGNOSIS — I69.30 UNSPECIFIED SEQUELAE OF CEREBRAL INFARCTION: ICD-10-CM

## 2017-12-26 DIAGNOSIS — I25.10 ATHEROSCLEROTIC HEART DISEASE OF NATIVE CORONARY ARTERY WITHOUT ANGINA PECTORIS: ICD-10-CM

## 2017-12-26 DIAGNOSIS — I63.8 OTHER CEREBRAL INFARCTION: ICD-10-CM

## 2017-12-26 DIAGNOSIS — Z96.89 PRESENCE OF OTHER SPECIFIED FUNCTIONAL IMPLANTS: Chronic | ICD-10-CM

## 2018-01-09 NOTE — BRIEF OPERATIVE NOTE - PRE-OP DX
Addended byZaynab Carter on: 1/9/2018 02:50 PM     Modules accepted: Orders
Stenosis of right carotid artery  05/12/2017    Active  Javier Torres

## 2018-02-06 ENCOUNTER — EMERGENCY (EMERGENCY)
Facility: HOSPITAL | Age: 73
LOS: 0 days | Discharge: HOME | End: 2018-02-07
Attending: EMERGENCY MEDICINE

## 2018-02-06 VITALS
RESPIRATION RATE: 18 BRPM | DIASTOLIC BLOOD PRESSURE: 76 MMHG | HEART RATE: 109 BPM | OXYGEN SATURATION: 97 % | SYSTOLIC BLOOD PRESSURE: 165 MMHG

## 2018-02-06 DIAGNOSIS — E78.5 HYPERLIPIDEMIA, UNSPECIFIED: ICD-10-CM

## 2018-02-06 DIAGNOSIS — F03.90 UNSPECIFIED DEMENTIA WITHOUT BEHAVIORAL DISTURBANCE: ICD-10-CM

## 2018-02-06 DIAGNOSIS — Z96.89 PRESENCE OF OTHER SPECIFIED FUNCTIONAL IMPLANTS: Chronic | ICD-10-CM

## 2018-02-06 DIAGNOSIS — Z98.890 OTHER SPECIFIED POSTPROCEDURAL STATES: Chronic | ICD-10-CM

## 2018-02-06 DIAGNOSIS — Z90.49 ACQUIRED ABSENCE OF OTHER SPECIFIED PARTS OF DIGESTIVE TRACT: Chronic | ICD-10-CM

## 2018-02-06 DIAGNOSIS — Z79.4 LONG TERM (CURRENT) USE OF INSULIN: ICD-10-CM

## 2018-02-06 DIAGNOSIS — L89.899 PRESSURE ULCER OF OTHER SITE, UNSPECIFIED STAGE: ICD-10-CM

## 2018-02-06 DIAGNOSIS — Z90.89 ACQUIRED ABSENCE OF OTHER ORGANS: ICD-10-CM

## 2018-02-06 DIAGNOSIS — I10 ESSENTIAL (PRIMARY) HYPERTENSION: ICD-10-CM

## 2018-02-06 DIAGNOSIS — M79.89 OTHER SPECIFIED SOFT TISSUE DISORDERS: ICD-10-CM

## 2018-02-06 DIAGNOSIS — I25.10 ATHEROSCLEROTIC HEART DISEASE OF NATIVE CORONARY ARTERY WITHOUT ANGINA PECTORIS: ICD-10-CM

## 2018-02-06 DIAGNOSIS — Z79.82 LONG TERM (CURRENT) USE OF ASPIRIN: ICD-10-CM

## 2018-02-06 DIAGNOSIS — E11.9 TYPE 2 DIABETES MELLITUS WITHOUT COMPLICATIONS: ICD-10-CM

## 2018-02-06 DIAGNOSIS — Z87.891 PERSONAL HISTORY OF NICOTINE DEPENDENCE: ICD-10-CM

## 2018-02-06 DIAGNOSIS — M79.661 PAIN IN RIGHT LOWER LEG: ICD-10-CM

## 2018-02-06 DIAGNOSIS — Z98.890 OTHER SPECIFIED POSTPROCEDURAL STATES: ICD-10-CM

## 2018-02-06 LAB
ANION GAP SERPL CALC-SCNC: 11 MMOL/L — SIGNIFICANT CHANGE UP (ref 7–14)
BASOPHILS # BLD AUTO: 0.13 K/UL — SIGNIFICANT CHANGE UP (ref 0–0.2)
BASOPHILS NFR BLD AUTO: 0.9 % — SIGNIFICANT CHANGE UP (ref 0–1)
BUN SERPL-MCNC: 17 MG/DL — SIGNIFICANT CHANGE UP (ref 10–20)
CALCIUM SERPL-MCNC: 9.6 MG/DL — SIGNIFICANT CHANGE UP (ref 8.5–10.1)
CHLORIDE SERPL-SCNC: 101 MMOL/L — SIGNIFICANT CHANGE UP (ref 98–110)
CO2 SERPL-SCNC: 25 MMOL/L — SIGNIFICANT CHANGE UP (ref 17–32)
CREAT SERPL-MCNC: 1.2 MG/DL — SIGNIFICANT CHANGE UP (ref 0.7–1.5)
EOSINOPHIL # BLD AUTO: 0.22 K/UL — SIGNIFICANT CHANGE UP (ref 0–0.7)
EOSINOPHIL NFR BLD AUTO: 1.6 % — SIGNIFICANT CHANGE UP (ref 0–8)
GLUCOSE SERPL-MCNC: 131 MG/DL — HIGH (ref 70–110)
HCT VFR BLD CALC: 38.1 % — LOW (ref 42–52)
HGB BLD-MCNC: 12.7 G/DL — LOW (ref 14–18)
IMM GRANULOCYTES NFR BLD AUTO: 0.4 % — HIGH (ref 0.1–0.3)
LYMPHOCYTES # BLD AUTO: 16.1 % — LOW (ref 20.5–51.1)
LYMPHOCYTES # BLD AUTO: 2.24 K/UL — SIGNIFICANT CHANGE UP (ref 1.2–3.4)
MCHC RBC-ENTMCNC: 28.7 PG — SIGNIFICANT CHANGE UP (ref 27–31)
MCHC RBC-ENTMCNC: 33.3 G/DL — SIGNIFICANT CHANGE UP (ref 32–37)
MCV RBC AUTO: 86 FL — SIGNIFICANT CHANGE UP (ref 80–94)
MONOCYTES # BLD AUTO: 1.18 K/UL — HIGH (ref 0.1–0.6)
MONOCYTES NFR BLD AUTO: 8.5 % — SIGNIFICANT CHANGE UP (ref 1.7–9.3)
NEUTROPHILS # BLD AUTO: 10.1 K/UL — HIGH (ref 1.4–6.5)
NEUTROPHILS NFR BLD AUTO: 72.5 % — SIGNIFICANT CHANGE UP (ref 42.2–75.2)
NRBC # BLD: 0 /100 WBCS — SIGNIFICANT CHANGE UP (ref 0–0)
PLATELET # BLD AUTO: 286 K/UL — SIGNIFICANT CHANGE UP (ref 130–400)
POTASSIUM SERPL-MCNC: 5.7 MMOL/L — HIGH (ref 3.5–5)
POTASSIUM SERPL-SCNC: 5.7 MMOL/L — HIGH (ref 3.5–5)
RBC # BLD: 4.43 M/UL — LOW (ref 4.7–6.1)
RBC # FLD: 12.5 % — SIGNIFICANT CHANGE UP (ref 11.5–14.5)
SODIUM SERPL-SCNC: 137 MMOL/L — SIGNIFICANT CHANGE UP (ref 135–146)
WBC # BLD: 13.93 K/UL — HIGH (ref 4.8–10.8)
WBC # FLD AUTO: 13.93 K/UL — HIGH (ref 4.8–10.8)

## 2018-02-06 RX ORDER — ATORVASTATIN CALCIUM 80 MG/1
1 TABLET, FILM COATED ORAL
Qty: 0 | Refills: 0 | COMMUNITY

## 2018-02-06 RX ORDER — FLUOXETINE HCL 10 MG
1 CAPSULE ORAL
Qty: 0 | Refills: 0 | COMMUNITY

## 2018-02-06 NOTE — ED PROVIDER NOTE - ATTENDING CONTRIBUTION TO CARE
I personally evaluated the patient. I reviewed the Resident’s or Physician Assistant’s note (as assigned above), and agree with the findings and plan except as documented in my note.   73 yo diabetic, cva hx, c/o RLE wound to back of calf. took abx for >1 week, not improving. minor pain. no fever or chills. no pus or d/c. no change in baseline neuro status. pt in nad, RLE has 1cm ulceration with scab, surrounding rim of erythema, no warmth, tenderness, crepitus, fluctuance, streaking, lad. nml pulses. no cords or homans. will get labs, duplex, xray, reassess.

## 2018-02-06 NOTE — ED PROVIDER NOTE - MUSCULOSKELETAL, MLM
1+ DP/PT pulses to right LE. non-tender to right knee/ankle. right leg n/v intact. temps equal to b/l LE.

## 2018-02-06 NOTE — ED PROVIDER NOTE - OBJECTIVE STATEMENT
73 yo male hx of CVA with right side weakness/HTN/HLD/DM/CAD/DVT with [place] filter present c/o right lower leg pain for few days. as per wife, he has a small pressure sore to the mid calf for a while and his PMD treated him with abx. redness and swelling were improved but persistent with pain so she brought patient in ED for evaluation. denies fever/chill/worsening swelling/redness, denies chest pain/sob/abd pain/n/v/d.

## 2018-02-06 NOTE — ED PROVIDER NOTE - PSH
[place] filter in place    H/O umbilical hernia repair    History of appendectomy    History of lumbar surgery    History of penile implant

## 2018-02-06 NOTE — ED PROVIDER NOTE - SKIN, MLM
+ small 2 cm round pressure ulcer noted to posterior aspect of mid right calf without surrounding erythema/swelling. + ttp.

## 2018-02-06 NOTE — ED ADULT NURSE NOTE - OBJECTIVE STATEMENT
Pt is an A&Ox2 Pt is an A&Ox3 72 YOM who is here for a wound on his right heel/calf. Wound measures approximately 1.5cmx1.5cm and is scabbed over with a noticeable red ring surrounding the area. Pt is complaining of a great deal of pain in the leg. Pt has a hx of a CVA that affects that side so the pts wife became concerned that the pain was increasing. Pt was treated by his primary MD with Cephalexin 500mg and Mupirocin cream. Pt appeared to have gotten better, but the last few days it has gotten worse. Pt is denying any fever, chills, nausea, vomiting. Pt is bed ridden but has the ability to reposition himself. Pt is a diabetic and wants to ensure that his wound is not going to cause him any further problems.

## 2018-02-06 NOTE — ED ADULT NURSE NOTE - NSELOPED_ED_ALL_ED
Patient's chart was referred to charge nurse/supervisor for disposition of prescription and/or discharge instructions./Patient and/or family announced that they are leaving. They were advised to stay, advised to return if worse.

## 2018-02-07 VITALS
RESPIRATION RATE: 18 BRPM | OXYGEN SATURATION: 98 % | HEART RATE: 103 BPM | SYSTOLIC BLOOD PRESSURE: 172 MMHG | DIASTOLIC BLOOD PRESSURE: 81 MMHG | TEMPERATURE: 97 F

## 2018-02-07 LAB
BASE EXCESS BLDV CALC-SCNC: 6.4 MMOL/L — HIGH (ref -2–2)
HCO3 BLDV-SCNC: 29 MMOL/L — SIGNIFICANT CHANGE UP (ref 22–29)
LACTATE BLDV-MCNC: 1.4 MMOL/L — SIGNIFICANT CHANGE UP (ref 0.5–1.6)
LACTATE SERPL-SCNC: 1.4 MMOL/L — SIGNIFICANT CHANGE UP (ref 0.5–2.2)
PCO2 BLDV: 34 MMHG — LOW (ref 41–51)
PH BLDV: 7.53 — HIGH (ref 7.26–7.43)
PO2 BLDV: 26 MMHG — SIGNIFICANT CHANGE UP (ref 20–40)
SAO2 % BLDV: 52 % — SIGNIFICANT CHANGE UP

## 2018-02-07 NOTE — ED ADULT NURSE REASSESSMENT NOTE - NS ED NURSE REASSESS COMMENT FT1
Pt IV was pulled from the left hand, bleeding controlled with a pressure dressing. Site covered after pulling the 20G IV, site dressing is clean dry and intact, bleeding is controlled, pressure dressing in place. Pt states he is going to leave after the IV is pulled if the provider doesn't see him. I informed the patient I would notify the MD/PA, pt left the ER between me pulling the IV and notifying the provider. Provider was notified, but the patient has already left the ED. Pt states he will follow up with PMD and wound care as an outpatient. Pt educated about the risks of leaving the ER without being discharged. Pt provided risks of leaving and not having full results of diagnostics performed. Pt educated that leaving the ER without knowing the results could cause serious injury and harm to his self, up to including death. Educated the patient about warning signs to return and when to seek medical treatment. Pt and family both stated understanding and understanding determined by successful teach back. Pt not in any distress and pain has subsided to a 2/10. Pt appears comfortable and IV has been removed. Pt left the ED in his own wheelchair with his daughter pushing him out.

## 2018-02-07 NOTE — ED ADULT NURSE REASSESSMENT NOTE - NS ED NURSE REASSESS COMMENT FT1
Pt and pt family is requesting the IV be removed or they will pull it out themselves. Pt states that he just "wants the IV out, so that I can leave whenever I want". Notified PRAVEEN Hrenandez that pt is desiring to leave and that they are demanding the removal of the IV on the left AC. IV will be removed and site covered with a pressure dressing.

## 2018-02-08 DIAGNOSIS — Z95.828 PRESENCE OF OTHER VASCULAR IMPLANTS AND GRAFTS: Chronic | ICD-10-CM

## 2018-02-12 NOTE — PATIENT PROFILE ADULT. - NS PRO PT REFERRAL QUES 2 YN
ASSESSMENT AND PLAN:   59 PMH SLE, pyoderma gangrenosum, hashimotos, sjogrens admitted for pain control of pyoderma gangrenosum.  In the past, pt was reluctant to start systemic therapy.    - Discussed PO options, such as prednisone and cyclosporine, which pt is not interested in. Pt may consider thalidomide, as it worked in the past  - Discussed topical and intralesional options as well, such as topical/intralesional steroid and cyclosporine eyedrop. Pt would like to try intralesional Kenalog injection.  - Will attempt intralesional Kenalog injection to one of the ulcers tomorrow morning with a measurement and picture. Pt will follow with Dr. Sidhu for re-evaluation.  - Pt can be discharged with clobetasol 0.05% oint BID to affected areas with a follow-up with Dr. Sidhu next week.          Ernesto Cota MD  Resident Physician, PGY-3  Department of Dermatology  [facility]  Pager: [phone]  Office: [phone] no

## 2018-02-20 ENCOUNTER — OUTPATIENT (OUTPATIENT)
Dept: OUTPATIENT SERVICES | Facility: HOSPITAL | Age: 73
LOS: 1 days | Discharge: HOME | End: 2018-02-20

## 2018-02-20 ENCOUNTER — APPOINTMENT (OUTPATIENT)
Age: 73
End: 2018-02-20

## 2018-02-20 ENCOUNTER — APPOINTMENT (OUTPATIENT)
Dept: SURGERY | Facility: CLINIC | Age: 73
End: 2018-02-20

## 2018-02-20 DIAGNOSIS — Z98.890 OTHER SPECIFIED POSTPROCEDURAL STATES: Chronic | ICD-10-CM

## 2018-02-20 DIAGNOSIS — S81.801A UNSPECIFIED OPEN WOUND, RIGHT LOWER LEG, INITIAL ENCOUNTER: ICD-10-CM

## 2018-02-20 DIAGNOSIS — Z90.49 ACQUIRED ABSENCE OF OTHER SPECIFIED PARTS OF DIGESTIVE TRACT: Chronic | ICD-10-CM

## 2018-02-20 DIAGNOSIS — Z95.828 PRESENCE OF OTHER VASCULAR IMPLANTS AND GRAFTS: Chronic | ICD-10-CM

## 2018-02-20 DIAGNOSIS — Z96.89 PRESENCE OF OTHER SPECIFIED FUNCTIONAL IMPLANTS: Chronic | ICD-10-CM

## 2018-03-06 ENCOUNTER — APPOINTMENT (OUTPATIENT)
Age: 73
End: 2018-03-06

## 2018-03-06 ENCOUNTER — OUTPATIENT (OUTPATIENT)
Dept: OUTPATIENT SERVICES | Facility: HOSPITAL | Age: 73
LOS: 1 days | Discharge: HOME | End: 2018-03-06

## 2018-03-06 DIAGNOSIS — Z98.890 OTHER SPECIFIED POSTPROCEDURAL STATES: Chronic | ICD-10-CM

## 2018-03-06 DIAGNOSIS — Z96.89 PRESENCE OF OTHER SPECIFIED FUNCTIONAL IMPLANTS: Chronic | ICD-10-CM

## 2018-03-06 DIAGNOSIS — Z90.49 ACQUIRED ABSENCE OF OTHER SPECIFIED PARTS OF DIGESTIVE TRACT: Chronic | ICD-10-CM

## 2018-03-06 DIAGNOSIS — Z95.828 PRESENCE OF OTHER VASCULAR IMPLANTS AND GRAFTS: Chronic | ICD-10-CM

## 2018-03-13 DIAGNOSIS — S81.801A UNSPECIFIED OPEN WOUND, RIGHT LOWER LEG, INITIAL ENCOUNTER: ICD-10-CM
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
## 2018-03-27 ENCOUNTER — OUTPATIENT (OUTPATIENT)
Dept: OUTPATIENT SERVICES | Facility: HOSPITAL | Age: 73
LOS: 1 days | Discharge: HOME | End: 2018-03-27

## 2018-03-27 ENCOUNTER — APPOINTMENT (OUTPATIENT)
Age: 73
End: 2018-03-27

## 2018-03-27 DIAGNOSIS — S81.801D UNSPECIFIED OPEN WOUND, RIGHT LOWER LEG, SUBSEQUENT ENCOUNTER: ICD-10-CM

## 2018-03-27 DIAGNOSIS — Z98.890 OTHER SPECIFIED POSTPROCEDURAL STATES: Chronic | ICD-10-CM

## 2018-03-27 DIAGNOSIS — Z90.49 ACQUIRED ABSENCE OF OTHER SPECIFIED PARTS OF DIGESTIVE TRACT: Chronic | ICD-10-CM

## 2018-03-27 DIAGNOSIS — Z95.828 PRESENCE OF OTHER VASCULAR IMPLANTS AND GRAFTS: Chronic | ICD-10-CM

## 2018-03-27 DIAGNOSIS — Z96.89 PRESENCE OF OTHER SPECIFIED FUNCTIONAL IMPLANTS: Chronic | ICD-10-CM

## 2018-04-09 DIAGNOSIS — S81.801A UNSPECIFIED OPEN WOUND, RIGHT LOWER LEG, INITIAL ENCOUNTER: ICD-10-CM
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
## 2018-04-17 ENCOUNTER — APPOINTMENT (OUTPATIENT)
Age: 73
End: 2018-04-17

## 2018-04-17 ENCOUNTER — OUTPATIENT (OUTPATIENT)
Dept: OUTPATIENT SERVICES | Facility: HOSPITAL | Age: 73
LOS: 1 days | Discharge: HOME | End: 2018-04-17

## 2018-04-17 DIAGNOSIS — Z95.828 PRESENCE OF OTHER VASCULAR IMPLANTS AND GRAFTS: Chronic | ICD-10-CM

## 2018-04-17 DIAGNOSIS — L53.9 ERYTHEMATOUS CONDITION, UNSPECIFIED: ICD-10-CM

## 2018-04-17 DIAGNOSIS — Z96.89 PRESENCE OF OTHER SPECIFIED FUNCTIONAL IMPLANTS: Chronic | ICD-10-CM

## 2018-04-17 DIAGNOSIS — Z98.890 OTHER SPECIFIED POSTPROCEDURAL STATES: Chronic | ICD-10-CM

## 2018-04-17 DIAGNOSIS — Z90.49 ACQUIRED ABSENCE OF OTHER SPECIFIED PARTS OF DIGESTIVE TRACT: Chronic | ICD-10-CM

## 2018-04-25 DIAGNOSIS — S81.801D UNSPECIFIED OPEN WOUND, RIGHT LOWER LEG, SUBSEQUENT ENCOUNTER: ICD-10-CM

## 2018-04-25 DIAGNOSIS — L53.9 ERYTHEMATOUS CONDITION, UNSPECIFIED: ICD-10-CM
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
## 2018-07-11 ENCOUNTER — INPATIENT (INPATIENT)
Facility: HOSPITAL | Age: 73
LOS: 6 days | Discharge: NURSING, REHAB OR LONG-TERM CARE | End: 2018-07-18
Attending: INTERNAL MEDICINE | Admitting: INTERNAL MEDICINE

## 2018-07-11 VITALS
DIASTOLIC BLOOD PRESSURE: 60 MMHG | SYSTOLIC BLOOD PRESSURE: 115 MMHG | HEIGHT: 65 IN | RESPIRATION RATE: 18 BRPM | WEIGHT: 179.9 LBS | OXYGEN SATURATION: 95 % | HEART RATE: 78 BPM | TEMPERATURE: 98 F

## 2018-07-11 DIAGNOSIS — I69.320 APHASIA FOLLOWING CEREBRAL INFARCTION: ICD-10-CM

## 2018-07-11 DIAGNOSIS — F06.31 MOOD DISORDER DUE TO KNOWN PHYSIOLOGICAL CONDITION WITH DEPRESSIVE FEATURES: ICD-10-CM

## 2018-07-11 DIAGNOSIS — R41.82 ALTERED MENTAL STATUS, UNSPECIFIED: ICD-10-CM

## 2018-07-11 DIAGNOSIS — Z95.820 PERIPHERAL VASCULAR ANGIOPLASTY STATUS WITH IMPLANTS AND GRAFTS: ICD-10-CM

## 2018-07-11 DIAGNOSIS — Z95.828 PRESENCE OF OTHER VASCULAR IMPLANTS AND GRAFTS: Chronic | ICD-10-CM

## 2018-07-11 DIAGNOSIS — N18.3 CHRONIC KIDNEY DISEASE, STAGE 3 (MODERATE): ICD-10-CM

## 2018-07-11 DIAGNOSIS — Z90.49 ACQUIRED ABSENCE OF OTHER SPECIFIED PARTS OF DIGESTIVE TRACT: Chronic | ICD-10-CM

## 2018-07-11 DIAGNOSIS — E78.00 PURE HYPERCHOLESTEROLEMIA, UNSPECIFIED: ICD-10-CM

## 2018-07-11 DIAGNOSIS — R53.1 WEAKNESS: ICD-10-CM

## 2018-07-11 DIAGNOSIS — Z79.4 LONG TERM (CURRENT) USE OF INSULIN: ICD-10-CM

## 2018-07-11 DIAGNOSIS — I12.9 HYPERTENSIVE CHRONIC KIDNEY DISEASE WITH STAGE 1 THROUGH STAGE 4 CHRONIC KIDNEY DISEASE, OR UNSPECIFIED CHRONIC KIDNEY DISEASE: ICD-10-CM

## 2018-07-11 DIAGNOSIS — Z98.890 OTHER SPECIFIED POSTPROCEDURAL STATES: Chronic | ICD-10-CM

## 2018-07-11 DIAGNOSIS — R47.89 OTHER SPEECH DISTURBANCES: ICD-10-CM

## 2018-07-11 DIAGNOSIS — Z95.828 PRESENCE OF OTHER VASCULAR IMPLANTS AND GRAFTS: ICD-10-CM

## 2018-07-11 DIAGNOSIS — I69.351 HEMIPLEGIA AND HEMIPARESIS FOLLOWING CEREBRAL INFARCTION AFFECTING RIGHT DOMINANT SIDE: ICD-10-CM

## 2018-07-11 DIAGNOSIS — Z96.89 PRESENCE OF OTHER SPECIFIED FUNCTIONAL IMPLANTS: Chronic | ICD-10-CM

## 2018-07-11 DIAGNOSIS — R45.1 RESTLESSNESS AND AGITATION: ICD-10-CM

## 2018-07-11 DIAGNOSIS — I25.10 ATHEROSCLEROTIC HEART DISEASE OF NATIVE CORONARY ARTERY WITHOUT ANGINA PECTORIS: ICD-10-CM

## 2018-07-11 DIAGNOSIS — I69.393 ATAXIA FOLLOWING CEREBRAL INFARCTION: ICD-10-CM

## 2018-07-11 DIAGNOSIS — F32.9 MAJOR DEPRESSIVE DISORDER, SINGLE EPISODE, UNSPECIFIED: ICD-10-CM

## 2018-07-11 DIAGNOSIS — R41.81 AGE-RELATED COGNITIVE DECLINE: ICD-10-CM

## 2018-07-11 DIAGNOSIS — E11.22 TYPE 2 DIABETES MELLITUS WITH DIABETIC CHRONIC KIDNEY DISEASE: ICD-10-CM

## 2018-07-11 LAB
ALBUMIN SERPL ELPH-MCNC: 4.1 G/DL — SIGNIFICANT CHANGE UP (ref 3.5–5.2)
ALP SERPL-CCNC: 123 U/L — HIGH (ref 30–115)
ALT FLD-CCNC: 15 U/L — SIGNIFICANT CHANGE UP (ref 0–41)
ANION GAP SERPL CALC-SCNC: 15 MMOL/L — HIGH (ref 7–14)
APPEARANCE UR: CLEAR — SIGNIFICANT CHANGE UP
APTT BLD: 28.8 SEC — SIGNIFICANT CHANGE UP (ref 27–39.2)
AST SERPL-CCNC: 24 U/L — SIGNIFICANT CHANGE UP (ref 0–41)
BASOPHILS # BLD AUTO: 0.07 K/UL — SIGNIFICANT CHANGE UP (ref 0–0.2)
BASOPHILS NFR BLD AUTO: 0.6 % — SIGNIFICANT CHANGE UP (ref 0–1)
BILIRUB SERPL-MCNC: 0.2 MG/DL — SIGNIFICANT CHANGE UP (ref 0.2–1.2)
BILIRUB UR-MCNC: NEGATIVE — SIGNIFICANT CHANGE UP
BUN SERPL-MCNC: 26 MG/DL — HIGH (ref 10–20)
CALCIUM SERPL-MCNC: 9.8 MG/DL — SIGNIFICANT CHANGE UP (ref 8.5–10.1)
CHLORIDE SERPL-SCNC: 101 MMOL/L — SIGNIFICANT CHANGE UP (ref 98–110)
CK SERPL-CCNC: 211 U/L — SIGNIFICANT CHANGE UP (ref 0–225)
CO2 SERPL-SCNC: 28 MMOL/L — SIGNIFICANT CHANGE UP (ref 17–32)
COLOR SPEC: YELLOW — SIGNIFICANT CHANGE UP
CREAT SERPL-MCNC: 1.7 MG/DL — HIGH (ref 0.7–1.5)
DIFF PNL FLD: NEGATIVE — SIGNIFICANT CHANGE UP
EOSINOPHIL # BLD AUTO: 0.36 K/UL — SIGNIFICANT CHANGE UP (ref 0–0.7)
EOSINOPHIL NFR BLD AUTO: 3 % — SIGNIFICANT CHANGE UP (ref 0–8)
GLUCOSE SERPL-MCNC: 154 MG/DL — HIGH (ref 70–99)
GLUCOSE UR QL: NEGATIVE MG/DL — SIGNIFICANT CHANGE UP
HCT VFR BLD CALC: 40.4 % — LOW (ref 42–52)
HGB BLD-MCNC: 13.5 G/DL — LOW (ref 14–18)
IMM GRANULOCYTES NFR BLD AUTO: 0.2 % — SIGNIFICANT CHANGE UP (ref 0.1–0.3)
INR BLD: 1.1 RATIO — SIGNIFICANT CHANGE UP (ref 0.65–1.3)
KETONES UR-MCNC: (no result)
LEUKOCYTE ESTERASE UR-ACNC: NEGATIVE — SIGNIFICANT CHANGE UP
LYMPHOCYTES # BLD AUTO: 1.78 K/UL — SIGNIFICANT CHANGE UP (ref 1.2–3.4)
LYMPHOCYTES # BLD AUTO: 14.8 % — LOW (ref 20.5–51.1)
MCHC RBC-ENTMCNC: 29.6 PG — SIGNIFICANT CHANGE UP (ref 27–31)
MCHC RBC-ENTMCNC: 33.4 G/DL — SIGNIFICANT CHANGE UP (ref 32–37)
MCV RBC AUTO: 88.6 FL — SIGNIFICANT CHANGE UP (ref 80–94)
MONOCYTES # BLD AUTO: 1.27 K/UL — HIGH (ref 0.1–0.6)
MONOCYTES NFR BLD AUTO: 10.6 % — HIGH (ref 1.7–9.3)
NEUTROPHILS # BLD AUTO: 8.51 K/UL — HIGH (ref 1.4–6.5)
NEUTROPHILS NFR BLD AUTO: 70.8 % — SIGNIFICANT CHANGE UP (ref 42.2–75.2)
NITRITE UR-MCNC: NEGATIVE — SIGNIFICANT CHANGE UP
NRBC # BLD: 0 /100 WBCS — SIGNIFICANT CHANGE UP (ref 0–0)
PH UR: 5.5 — SIGNIFICANT CHANGE UP (ref 5–8)
PLATELET # BLD AUTO: 322 K/UL — SIGNIFICANT CHANGE UP (ref 130–400)
POTASSIUM SERPL-MCNC: 4.9 MMOL/L — SIGNIFICANT CHANGE UP (ref 3.5–5)
POTASSIUM SERPL-SCNC: 4.9 MMOL/L — SIGNIFICANT CHANGE UP (ref 3.5–5)
PROT SERPL-MCNC: 7.6 G/DL — SIGNIFICANT CHANGE UP (ref 6–8)
PROT UR-MCNC: NEGATIVE MG/DL — SIGNIFICANT CHANGE UP
PROTHROM AB SERPL-ACNC: 11.9 SEC — SIGNIFICANT CHANGE UP (ref 9.95–12.87)
RBC # BLD: 4.56 M/UL — LOW (ref 4.7–6.1)
RBC # FLD: 12 % — SIGNIFICANT CHANGE UP (ref 11.5–14.5)
SODIUM SERPL-SCNC: 144 MMOL/L — SIGNIFICANT CHANGE UP (ref 135–146)
SP GR SPEC: 1.02 — SIGNIFICANT CHANGE UP (ref 1.01–1.03)
TROPONIN T SERPL-MCNC: <0.01 NG/ML — SIGNIFICANT CHANGE UP
UROBILINOGEN FLD QL: 0.2 MG/DL — SIGNIFICANT CHANGE UP (ref 0.2–0.2)
WBC # BLD: 12.02 K/UL — HIGH (ref 4.8–10.8)
WBC # FLD AUTO: 12.02 K/UL — HIGH (ref 4.8–10.8)

## 2018-07-11 NOTE — ED PROVIDER NOTE - MEDICAL DECISION MAKING DETAILS
results reviewed and d/w pt spouse.  will admit for further eval/pt eval.  Pt Neurologist is Dr Crowe. PRAVEEN Pavon aware of admission

## 2018-07-11 NOTE — ED ADULT NURSE REASSESSMENT NOTE - NS ED NURSE REASSESS COMMENT FT1
pt received awake in bed alert; altered mental status. Pt breathing RA w/ normal WOB. Pt responsive to pain; denies any pain/discomfort at this time. Family at bedside. Pt transported to CT at this time. Continued monitoring.

## 2018-07-11 NOTE — ED PROVIDER NOTE - OBJECTIVE STATEMENT
this is 72 yo male who presents to ed for evaluation of mental status worsening. patient has history of previous cva. patient wife states he normal does not speak. patient wife states that he is more lethargic. wife states he is becoming harder to take care of at home

## 2018-07-11 NOTE — ED ADULT TRIAGE NOTE - CHIEF COMPLAINT QUOTE
pt BIBA for AMS, as per wife pt has a hx of 5 strokes in the past 2 years, progressively becoming weak in the past month, nonverbal, poor appetite, and weakness. states he roll off the bed tonight, lac to left forearm.

## 2018-07-11 NOTE — ED ADULT NURSE NOTE - CHIEF COMPLAINT QUOTE
pt BIBA for AMS, as per wife pt has a hx of 5 strokes in the past 2 years, progressively becoming weak in the past month, nonverbal, poor appetite, and increase weakness. states he roll off the bed tonight, lac to left forearm. denies head trauma

## 2018-07-11 NOTE — ED PROVIDER NOTE - PSH
[place] filter in place    H/O umbilical hernia repair    History of appendectomy    History of lumbar surgery    History of penile implant

## 2018-07-11 NOTE — ED PROVIDER NOTE - PHYSICAL EXAMINATION
--EXAM--  VITAL SIGNS: I have reviewed vs documented at present.  CONSTITUTIONAL:  in no acute distress.   SKIN: Warm and dry, no acute rash.   HEAD: Normocephalic; atraumatic.  EYES: PERRL, EOM intact; conjunctiva and sclera clear. No nystagmus.  ENT: No nasal discharge; airway clear.  NECK: Supple; non tender.  CARD: S1, S2, Regular rate and rhythm.   RESP: No wheezes, rales or rhonchi.  ABD: Normal bowel sounds; soft; non-distended; non-tender.  EXT: Normal ROM.   NEURO:  patient reactive to painful stimuli. patient has weakness to right side from previous stroke.   PSYCH: Cooperative, appropriate.

## 2018-07-11 NOTE — ED PROVIDER NOTE - ATTENDING CONTRIBUTION TO CARE
72 yo M PMHx noted including h/o  CVA, DM, HTN presents from home accompanied by family with c/o worsening of mental status over the last few days, agitation tonight (was spilling his urine out of urinal per wife) which caused him to roll off the bed onto floor, no LOC.  Wife explains that patient has been declining over last several months.  He rarely speaks and has not been able to ambulate since June 12th.  no n/v,  + decreased appetite. On exam pt in NAd alert and awake, very soft voice, follows commands, PERRL, appears pale, no signs of head trauma, + contacted RUE, no edema, abd soft nt nd

## 2018-07-11 NOTE — ED ADULT NURSE NOTE - PMH
Cerebrovascular accident (CVA) due to stenosis of left middle cerebral artery    Controlled type 2 diabetes mellitus without complication, unspecified long term insulin use status    Coronary artery disease without angina pectoris, unspecified vessel or lesion type, unspecified whether native or transplanted heart    Depression, unspecified depression type    Essential hypertension    High blood cholesterol

## 2018-07-11 NOTE — ED ADULT NURSE NOTE - PSH
[place] filter in place    H/O umbilical hernia repair    History of appendectomy    History of lumbar surgery    History of penile implant

## 2018-07-11 NOTE — ED PROVIDER NOTE - CARE PLAN
Principal Discharge DX:	Weakness Principal Discharge DX:	Weakness  Secondary Diagnosis:	Fall from bed, initial encounter Principal Discharge DX:	Weakness  Secondary Diagnosis:	Fall from bed, initial encounter  Secondary Diagnosis:	Gait disturbance, post-stroke
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
## 2018-07-12 DIAGNOSIS — I69.398 OTHER SEQUELAE OF CEREBRAL INFARCTION: ICD-10-CM

## 2018-07-12 DIAGNOSIS — R53.1 WEAKNESS: ICD-10-CM

## 2018-07-12 LAB
ANION GAP SERPL CALC-SCNC: 16 MMOL/L — HIGH (ref 7–14)
BUN SERPL-MCNC: 24 MG/DL — HIGH (ref 10–20)
CALCIUM SERPL-MCNC: 9.3 MG/DL — SIGNIFICANT CHANGE UP (ref 8.5–10.1)
CHLORIDE SERPL-SCNC: 104 MMOL/L — SIGNIFICANT CHANGE UP (ref 98–110)
CO2 SERPL-SCNC: 25 MMOL/L — SIGNIFICANT CHANGE UP (ref 17–32)
CREAT SERPL-MCNC: 1.2 MG/DL — SIGNIFICANT CHANGE UP (ref 0.7–1.5)
GLUCOSE SERPL-MCNC: 145 MG/DL — HIGH (ref 70–99)
HCT VFR BLD CALC: 41.1 % — LOW (ref 42–52)
HGB BLD-MCNC: 13.8 G/DL — LOW (ref 14–18)
MCHC RBC-ENTMCNC: 29.7 PG — SIGNIFICANT CHANGE UP (ref 27–31)
MCHC RBC-ENTMCNC: 33.6 G/DL — SIGNIFICANT CHANGE UP (ref 32–37)
MCV RBC AUTO: 88.4 FL — SIGNIFICANT CHANGE UP (ref 80–94)
NRBC # BLD: 0 /100 WBCS — SIGNIFICANT CHANGE UP (ref 0–0)
PLATELET # BLD AUTO: 296 K/UL — SIGNIFICANT CHANGE UP (ref 130–400)
POTASSIUM SERPL-MCNC: 4.1 MMOL/L — SIGNIFICANT CHANGE UP (ref 3.5–5)
POTASSIUM SERPL-SCNC: 4.1 MMOL/L — SIGNIFICANT CHANGE UP (ref 3.5–5)
RBC # BLD: 4.65 M/UL — LOW (ref 4.7–6.1)
RBC # FLD: 12.2 % — SIGNIFICANT CHANGE UP (ref 11.5–14.5)
SODIUM SERPL-SCNC: 145 MMOL/L — SIGNIFICANT CHANGE UP (ref 135–146)
WBC # BLD: 11.04 K/UL — HIGH (ref 4.8–10.8)
WBC # FLD AUTO: 11.04 K/UL — HIGH (ref 4.8–10.8)

## 2018-07-12 RX ORDER — ATORVASTATIN CALCIUM 80 MG/1
40 TABLET, FILM COATED ORAL AT BEDTIME
Qty: 0 | Refills: 0 | Status: DISCONTINUED | OUTPATIENT
Start: 2018-07-12 | End: 2018-07-18

## 2018-07-12 RX ORDER — ACETAMINOPHEN 500 MG
650 TABLET ORAL EVERY 6 HOURS
Qty: 0 | Refills: 0 | Status: DISCONTINUED | OUTPATIENT
Start: 2018-07-12 | End: 2018-07-18

## 2018-07-12 RX ORDER — METFORMIN HYDROCHLORIDE 850 MG/1
850 TABLET ORAL DAILY
Qty: 0 | Refills: 0 | Status: DISCONTINUED | OUTPATIENT
Start: 2018-07-12 | End: 2018-07-14

## 2018-07-12 RX ORDER — METOPROLOL TARTRATE 50 MG
50 TABLET ORAL
Qty: 0 | Refills: 0 | Status: DISCONTINUED | OUTPATIENT
Start: 2018-07-12 | End: 2018-07-18

## 2018-07-12 RX ORDER — ASPIRIN/CALCIUM CARB/MAGNESIUM 324 MG
325 TABLET ORAL DAILY
Qty: 0 | Refills: 0 | Status: DISCONTINUED | OUTPATIENT
Start: 2018-07-12 | End: 2018-07-18

## 2018-07-12 RX ORDER — HEPARIN SODIUM 5000 [USP'U]/ML
5000 INJECTION INTRAVENOUS; SUBCUTANEOUS EVERY 8 HOURS
Qty: 0 | Refills: 0 | Status: DISCONTINUED | OUTPATIENT
Start: 2018-07-12 | End: 2018-07-18

## 2018-07-12 RX ORDER — LOSARTAN POTASSIUM 100 MG/1
50 TABLET, FILM COATED ORAL ONCE
Qty: 0 | Refills: 0 | Status: COMPLETED | OUTPATIENT
Start: 2018-07-12 | End: 2018-07-12

## 2018-07-12 RX ORDER — SODIUM CHLORIDE 9 MG/ML
1000 INJECTION INTRAMUSCULAR; INTRAVENOUS; SUBCUTANEOUS
Qty: 0 | Refills: 0 | Status: DISCONTINUED | OUTPATIENT
Start: 2018-07-12 | End: 2018-07-18

## 2018-07-12 RX ORDER — NIFEDIPINE 30 MG
60 TABLET, EXTENDED RELEASE 24 HR ORAL DAILY
Qty: 0 | Refills: 0 | Status: DISCONTINUED | OUTPATIENT
Start: 2018-07-12 | End: 2018-07-18

## 2018-07-12 RX ORDER — LOSARTAN POTASSIUM 100 MG/1
50 TABLET, FILM COATED ORAL DAILY
Qty: 0 | Refills: 0 | Status: DISCONTINUED | OUTPATIENT
Start: 2018-07-12 | End: 2018-07-18

## 2018-07-12 RX ORDER — PANTOPRAZOLE SODIUM 20 MG/1
40 TABLET, DELAYED RELEASE ORAL
Qty: 0 | Refills: 0 | Status: DISCONTINUED | OUTPATIENT
Start: 2018-07-12 | End: 2018-07-18

## 2018-07-12 RX ORDER — LEVETIRACETAM 250 MG/1
1000 TABLET, FILM COATED ORAL
Qty: 0 | Refills: 0 | Status: DISCONTINUED | OUTPATIENT
Start: 2018-07-12 | End: 2018-07-18

## 2018-07-12 RX ADMIN — SODIUM CHLORIDE 75 MILLILITER(S): 9 INJECTION INTRAMUSCULAR; INTRAVENOUS; SUBCUTANEOUS at 17:44

## 2018-07-12 RX ADMIN — LOSARTAN POTASSIUM 50 MILLIGRAM(S): 100 TABLET, FILM COATED ORAL at 23:48

## 2018-07-12 RX ADMIN — ATORVASTATIN CALCIUM 40 MILLIGRAM(S): 80 TABLET, FILM COATED ORAL at 21:18

## 2018-07-12 RX ADMIN — PANTOPRAZOLE SODIUM 40 MILLIGRAM(S): 20 TABLET, DELAYED RELEASE ORAL at 06:24

## 2018-07-12 RX ADMIN — Medication 50 MILLIGRAM(S): at 17:41

## 2018-07-12 RX ADMIN — LEVETIRACETAM 1000 MILLIGRAM(S): 250 TABLET, FILM COATED ORAL at 17:41

## 2018-07-12 RX ADMIN — Medication 50 MILLIGRAM(S): at 06:23

## 2018-07-12 RX ADMIN — HEPARIN SODIUM 5000 UNIT(S): 5000 INJECTION INTRAVENOUS; SUBCUTANEOUS at 13:14

## 2018-07-12 RX ADMIN — METFORMIN HYDROCHLORIDE 850 MILLIGRAM(S): 850 TABLET ORAL at 11:50

## 2018-07-12 RX ADMIN — Medication 325 MILLIGRAM(S): at 11:50

## 2018-07-12 RX ADMIN — Medication 1 TABLET(S): at 11:50

## 2018-07-12 RX ADMIN — LOSARTAN POTASSIUM 50 MILLIGRAM(S): 100 TABLET, FILM COATED ORAL at 06:23

## 2018-07-12 RX ADMIN — HEPARIN SODIUM 5000 UNIT(S): 5000 INJECTION INTRAVENOUS; SUBCUTANEOUS at 21:18

## 2018-07-12 RX ADMIN — HEPARIN SODIUM 5000 UNIT(S): 5000 INJECTION INTRAVENOUS; SUBCUTANEOUS at 06:19

## 2018-07-12 RX ADMIN — SODIUM CHLORIDE 75 MILLILITER(S): 9 INJECTION INTRAMUSCULAR; INTRAVENOUS; SUBCUTANEOUS at 05:21

## 2018-07-12 RX ADMIN — Medication 60 MILLIGRAM(S): at 06:20

## 2018-07-12 RX ADMIN — LEVETIRACETAM 1000 MILLIGRAM(S): 250 TABLET, FILM COATED ORAL at 06:22

## 2018-07-12 NOTE — SWALLOW BEDSIDE ASSESSMENT ADULT - COMMENTS
patient demonstrated ability to self feed well, when question family why they won't let patient self feed they said he spills some things so they don't allow, "he only has one hand how could he feed himself" educated provided re: reduced risk of coughing and choking when patients self feed. family did not demonstrate understanding.

## 2018-07-12 NOTE — H&P ADULT - HISTORY OF PRESENT ILLNESS
74 yo M PMHx noted including h/o  CVA, DM, HTN presents from home accompanied by family with c/o worsening of mental status over the last few days, agitation tonight (was spilling his urine out of urinal per wife) which caused him to roll off the bed onto floor, no LOC.  Wife explains that patient has been declining over last several months.  He rarely speaks and has not been able to ambulate since June 12th.  no n/v,  + decreased appetite.

## 2018-07-12 NOTE — H&P ADULT - PSH
[place] filter in place    H/O umbilical hernia repair    History of appendectomy    History of lumbar surgery    History of penile implant

## 2018-07-12 NOTE — H&P ADULT - NSHPPHYSICALEXAM_GEN_ALL_CORE
Vital Signs Last 24 Hrs  T(C): 35.9 (07-12-18 @ 02:55)  T(F): 96.7 (07-12-18 @ 02:55), Max: 98.1 (07-11-18 @ 21:43)  HR: 75 (07-12-18 @ 02:55) (75 - 78)  BP: 147/70 (07-12-18 @ 02:55)  BP(mean): --  RR: 16 (07-12-18 @ 02:55) (16 - 18)  SpO2: 97% (07-12-18 @ 01:08) (95% - 97%)  Wt(kg): --

## 2018-07-12 NOTE — H&P ADULT - NSHPLABSRESULTS_GEN_ALL_CORE
13.5    )-----------( 322      ( 2018 22:00 )             40.4       -    144  |  101  |  26<H>  ----------------------------<  154<H>  4.9   |  28  |  1.7<H>    Ca    9.8      2018 22:00    TPro  7.6  /  Alb  4.1  /  TBili  0.2  /  DBili  x   /  AST  24  /  ALT  15  /  AlkPhos  123<H>  0711              Urinalysis Basic - ( 2018 23:11 )    Color: Yellow / Appearance: Clear / S.020 / pH: x  Gluc: x / Ketone: Trace  / Bili: Negative / Urobili: 0.2 mg/dL   Blood: x / Protein: Negative mg/dL / Nitrite: Negative   Leuk Esterase: Negative / RBC: x / WBC x   Sq Epi: x / Non Sq Epi: x / Bacteria: x        PT/INR - ( 2018 22:00 )   PT: 11.90 sec;   INR: 1.10 ratio         PTT - ( 2018 22:00 )  PTT:28.8 sec    Lactate Trend      CARDIAC MARKERS ( 2018 22:00 )  x     / <0.01 ng/mL / 211 U/L / x     / x            CAPILLARY BLOOD GLUCOSE

## 2018-07-12 NOTE — ED ADULT NURSE REASSESSMENT NOTE - NS ED NURSE REASSESS COMMENT FT1
Report given to 3S RNBonnie. Pt awake, non-verbal, AMS. Pt breathing RA w/ normal WOB, V/S reassessed and documented. Pt stable for transport.

## 2018-07-12 NOTE — CONSULT NOTE ADULT - SUBJECTIVE AND OBJECTIVE BOX
Neurology Consultation note    Name  RISHI HERRERA    HPI:  74 yo M PMHx noted including h/o  CVA, DM, HTN presents from home accompanied by family with c/o worsening of mental status over the last few days, agitation tonight (was spilling his urine out of urinal per wife) which caused him to roll off the bed onto floor, no LOC.  Wife explains that patient has been declining over last several months.  He rarely speaks and has not been able to ambulate since June 12th.  no n/v,  + decreased appetite. (12 Jul 2018 03:05)      neuro:  PATIENT IS A 72 YO MAN WITH L MCA CVA WITH RESIDUAL PARTIAL GLOBAL APHASIA AND R HP, HTN AND DM P/W GRADUALLY CHANGING MS OVER MONTHS AND AGITATION OVER A FEW DAYS      Vital Signs Last 24 Hrs  T(C): 36.1 (12 Jul 2018 05:17), Max: 36.7 (11 Jul 2018 21:43)  T(F): 97 (12 Jul 2018 05:17), Max: 98.1 (11 Jul 2018 21:43)  HR: 76 (12 Jul 2018 05:17) (75 - 78)  BP: 130/72 (12 Jul 2018 05:17) (115/60 - 147/70)  BP(mean): --  RR: 16 (12 Jul 2018 05:17) (16 - 18)  SpO2: 97% (12 Jul 2018 01:08) (95% - 97%)    Neurological Exam:   AWAKE ALERT, CAN SAY FEW WORDS O/W NEAR COMPLETE EXPRESSIVE APHASIA, RECEPTIVE COMPONENT AS WELL. CAN FOLLWS A FEW SIMPLE COMMANDS. CALM AND COOPERATIVE AT PRESENT  EYES MIDLINE, PERRL, +BTT BILATERALLY, EOM FULL GROSSLY, R CENTRAL FACIAL  CONTRACTED PLEGIC RUE, TRACE MVMT RLE  SENSORY/COORDINATION - CANNOT ASSESS      Medications  acetaminophen   Tablet. 650 milliGRAM(s) Oral every 6 hours PRN  aspirin 325 milliGRAM(s) Oral daily  atorvastatin 40 milliGRAM(s) Oral at bedtime  heparin  Injectable 5000 Unit(s) SubCutaneous every 8 hours  levETIRAcetam 1000 milliGRAM(s) Oral two times a day  losartan 50 milliGRAM(s) Oral daily  metFORMIN 850 milliGRAM(s) Oral daily  metoprolol tartrate 50 milliGRAM(s) Oral two times a day  multivitamin 1 Tablet(s) Oral daily  NIFEdipine XL 60 milliGRAM(s) Oral daily  pantoprazole    Tablet 40 milliGRAM(s) Oral before breakfast  sodium chloride 0.9%. 1000 milliLiter(s) IV Continuous <Continuous>      Lab  07-12    145  |  104  |  24<H>  ----------------------------<  145<H>  4.1   |  25  |  1.2    Ca    9.3      12 Jul 2018 07:02    TPro  7.6  /  Alb  4.1  /  TBili  0.2  /  DBili  x   /  AST  24  /  ALT  15  /  AlkPhos  123<H>  07-11                          13.8   11.04 )-----------( 296      ( 12 Jul 2018 07:02 )             41.1     LIVER FUNCTIONS - ( 11 Jul 2018 22:00 )  Alb: 4.1 g/dL / Pro: 7.6 g/dL / ALK PHOS: 123 U/L / ALT: 15 U/L / AST: 24 U/L / GGT: x                   Radiology  CT Head No Cont:   EXAM:  CT BRAIN            PROCEDURE DATE:  07/11/2018            INTERPRETATION:  Clinical History / Reason for exam: Altered mental   status.    Technique: Multiple axial CT images were obtained from the base of skull   to the vertex without the administration of IV contrast.  Coronal and   Sagittal reformations provided.     Comparison: 12/26/2017.    Findings:    Prominent sulcal and ventricular system consistent with parenchymal   volume loss.Scattered subcortical and periventricular hypodensities   without mass effect most consistent with chronic microvascular ischemic   changes.    Unchanged chronic left MCA territory infarct.     No acute extra-axial collection.  No mass effect, midline shift or acute   hemorrhage is seen.      No depressed skull fracture. Post left frontal craniotomy with stable   associated postoperative changes.    Frothy secretions within the sphenoid sinuses. Mastoid air cells are   well-aerated.      Impression:    No acute intracranial abnormality.    Sphenoid sinus disease.            Assessment: 72 YO MAN WITH L MCA CVA. FINDINGS ON EXAM ARE CONSISTENT WITH THIS HX  PT WITH CHANGE IN MS AND WALKING OVER SEVERAL MONTHS AS PER WIFE. WOULD FIRST MAKE SURE R/O CP SZ GIVEN STRUCTURAL  FOCUS. ALSO CONSIDER POST-CVA DEPRESSION    DO NOT SUSPECT ACUTE CVA OR OTHER NEUROLOGIC EVENT    Plan:  REEG  CHECK B12, FOLATE, RPR  CONSIDER INITIATION OF SSRI Neurology Consultation note    Name  RISHI HERRERA    HPI:  74 yo M PMHx noted including h/o  CVA, DM, HTN presents from home accompanied by family with c/o worsening of mental status over the last few days, agitation tonight (was spilling his urine out of urinal per wife) which caused him to roll off the bed onto floor, no LOC.  Wife explains that patient has been declining over last several months.  He rarely speaks and has not been able to ambulate since June 12th.  no n/v,  + decreased appetite. (12 Jul 2018 03:05)      neuro:  PATIENT IS A 72 YO MAN WITH L MCA CVA WITH RESIDUAL PARTIAL GLOBAL APHASIA AND R HP, HTN AND DM P/W GRADUALLY CHANGING MS OVER MONTHS AND AGITATION OVER A FEW DAYS      Vital Signs Last 24 Hrs  T(C): 36.1 (12 Jul 2018 05:17), Max: 36.7 (11 Jul 2018 21:43)  T(F): 97 (12 Jul 2018 05:17), Max: 98.1 (11 Jul 2018 21:43)  HR: 76 (12 Jul 2018 05:17) (75 - 78)  BP: 130/72 (12 Jul 2018 05:17) (115/60 - 147/70)  BP(mean): --  RR: 16 (12 Jul 2018 05:17) (16 - 18)  SpO2: 97% (12 Jul 2018 01:08) (95% - 97%)    Neurological Exam:   AWAKE ALERT, CAN SAY FEW WORDS O/W NEAR COMPLETE EXPRESSIVE APHASIA, RECEPTIVE COMPONENT AS WELL. CAN FOLLWS A FEW SIMPLE COMMANDS. CALM AND COOPERATIVE AT PRESENT  EYES MIDLINE, PERRL, +BTT BILATERALLY, EOM FULL GROSSLY, R CENTRAL FACIAL  CONTRACTED PLEGIC RUE, TRACE MVMT RLE  SENSORY/COORDINATION - CANNOT ASSESS      Medications  acetaminophen   Tablet. 650 milliGRAM(s) Oral every 6 hours PRN  aspirin 325 milliGRAM(s) Oral daily  atorvastatin 40 milliGRAM(s) Oral at bedtime  heparin  Injectable 5000 Unit(s) SubCutaneous every 8 hours  levETIRAcetam 1000 milliGRAM(s) Oral two times a day  losartan 50 milliGRAM(s) Oral daily  metFORMIN 850 milliGRAM(s) Oral daily  metoprolol tartrate 50 milliGRAM(s) Oral two times a day  multivitamin 1 Tablet(s) Oral daily  NIFEdipine XL 60 milliGRAM(s) Oral daily  pantoprazole    Tablet 40 milliGRAM(s) Oral before breakfast  sodium chloride 0.9%. 1000 milliLiter(s) IV Continuous <Continuous>      Lab  07-12    145  |  104  |  24<H>  ----------------------------<  145<H>  4.1   |  25  |  1.2    Ca    9.3      12 Jul 2018 07:02    TPro  7.6  /  Alb  4.1  /  TBili  0.2  /  DBili  x   /  AST  24  /  ALT  15  /  AlkPhos  123<H>  07-11                          13.8   11.04 )-----------( 296      ( 12 Jul 2018 07:02 )             41.1     LIVER FUNCTIONS - ( 11 Jul 2018 22:00 )  Alb: 4.1 g/dL / Pro: 7.6 g/dL / ALK PHOS: 123 U/L / ALT: 15 U/L / AST: 24 U/L / GGT: x                   Radiology  CT Head No Cont:   EXAM:  CT BRAIN            PROCEDURE DATE:  07/11/2018            INTERPRETATION:  Clinical History / Reason for exam: Altered mental   status.    Technique: Multiple axial CT images were obtained from the base of skull   to the vertex without the administration of IV contrast.  Coronal and   Sagittal reformations provided.     Comparison: 12/26/2017.    Findings:    Prominent sulcal and ventricular system consistent with parenchymal   volume loss.Scattered subcortical and periventricular hypodensities   without mass effect most consistent with chronic microvascular ischemic   changes.    Unchanged chronic left MCA territory infarct.     No acute extra-axial collection.  No mass effect, midline shift or acute   hemorrhage is seen.      No depressed skull fracture. Post left frontal craniotomy with stable   associated postoperative changes.    Frothy secretions within the sphenoid sinuses. Mastoid air cells are   well-aerated.      Impression:    No acute intracranial abnormality.    Sphenoid sinus disease.            Assessment: 72 YO MAN WITH L MCA CVA. FINDINGS ON EXAM ARE CONSISTENT WITH THIS HX  PT WITH CHANGE IN MS AND WALKING OVER SEVERAL MONTHS AS PER WIFE. WOULD FIRST MAKE SURE R/O CP SZ GIVEN STRUCTURAL  FOCUS. ALSO CONSIDER POST-CVA DEPRESSION    DO NOT SUSPECT ACUTE CVA OR OTHER NEUROLOGIC EVENT    Plan:  REEG  CHECK B12, FOLATE, RPR  CONSIDER INITIATION OF SSRI  CONT KEPPRA 1000 Q 12

## 2018-07-12 NOTE — SWALLOW BEDSIDE ASSESSMENT ADULT - SLP GENERAL OBSERVATIONS
in bed anxious upon SLP arrival. imitated deep breathing technique to reduce anxiety HAILEY Gonzalez assisted.

## 2018-07-12 NOTE — SWALLOW BEDSIDE ASSESSMENT ADULT - SWALLOW EVAL: DIAGNOSIS
functional oral and pharyngeal swallow based on bedside assessment, no signs of dysphagia present. impaired ability to self feed consistently as family interferes

## 2018-07-12 NOTE — H&P ADULT - ASSESSMENT
73 years old male admit to medical floor for weakness , fall from bed and gait disturbance post stroke .

## 2018-07-12 NOTE — PATIENT PROFILE ADULT. - NS PRO CONTRA FLU 1
unable to assess immunization status/unable to obtain unable to obtain/out of season (available sept 1 thru apr 2 only)

## 2018-07-13 DIAGNOSIS — I63.512 CEREBRAL INFARCTION DUE TO UNSPECIFIED OCCLUSION OR STENOSIS OF LEFT MIDDLE CEREBRAL ARTERY: ICD-10-CM

## 2018-07-13 DIAGNOSIS — I25.10 ATHEROSCLEROTIC HEART DISEASE OF NATIVE CORONARY ARTERY WITHOUT ANGINA PECTORIS: ICD-10-CM

## 2018-07-13 DIAGNOSIS — E11.9 TYPE 2 DIABETES MELLITUS WITHOUT COMPLICATIONS: ICD-10-CM

## 2018-07-13 LAB
CULTURE RESULTS: NO GROWTH — SIGNIFICANT CHANGE UP
SPECIMEN SOURCE: SIGNIFICANT CHANGE UP

## 2018-07-13 RX ORDER — LABETALOL HCL 100 MG
10 TABLET ORAL ONCE
Qty: 0 | Refills: 0 | Status: COMPLETED | OUTPATIENT
Start: 2018-07-13 | End: 2018-07-13

## 2018-07-13 RX ORDER — HYDROXYZINE HCL 10 MG
50 TABLET ORAL AT BEDTIME
Qty: 0 | Refills: 0 | Status: DISCONTINUED | OUTPATIENT
Start: 2018-07-13 | End: 2018-07-18

## 2018-07-13 RX ORDER — HYDROXYZINE HCL 10 MG
50 TABLET ORAL ONCE
Qty: 0 | Refills: 0 | Status: COMPLETED | OUTPATIENT
Start: 2018-07-13 | End: 2018-07-13

## 2018-07-13 RX ADMIN — Medication 1 TABLET(S): at 12:04

## 2018-07-13 RX ADMIN — Medication 50 MILLIGRAM(S): at 06:00

## 2018-07-13 RX ADMIN — HEPARIN SODIUM 5000 UNIT(S): 5000 INJECTION INTRAVENOUS; SUBCUTANEOUS at 21:38

## 2018-07-13 RX ADMIN — PANTOPRAZOLE SODIUM 40 MILLIGRAM(S): 20 TABLET, DELAYED RELEASE ORAL at 06:01

## 2018-07-13 RX ADMIN — LEVETIRACETAM 1000 MILLIGRAM(S): 250 TABLET, FILM COATED ORAL at 17:56

## 2018-07-13 RX ADMIN — SODIUM CHLORIDE 75 MILLILITER(S): 9 INJECTION INTRAMUSCULAR; INTRAVENOUS; SUBCUTANEOUS at 22:51

## 2018-07-13 RX ADMIN — Medication 50 MILLIGRAM(S): at 04:24

## 2018-07-13 RX ADMIN — Medication 325 MILLIGRAM(S): at 12:04

## 2018-07-13 RX ADMIN — ATORVASTATIN CALCIUM 40 MILLIGRAM(S): 80 TABLET, FILM COATED ORAL at 21:38

## 2018-07-13 RX ADMIN — LEVETIRACETAM 1000 MILLIGRAM(S): 250 TABLET, FILM COATED ORAL at 06:01

## 2018-07-13 RX ADMIN — METFORMIN HYDROCHLORIDE 850 MILLIGRAM(S): 850 TABLET ORAL at 12:04

## 2018-07-13 RX ADMIN — Medication 50 MILLIGRAM(S): at 17:57

## 2018-07-13 RX ADMIN — HEPARIN SODIUM 5000 UNIT(S): 5000 INJECTION INTRAVENOUS; SUBCUTANEOUS at 06:00

## 2018-07-13 RX ADMIN — SODIUM CHLORIDE 75 MILLILITER(S): 9 INJECTION INTRAMUSCULAR; INTRAVENOUS; SUBCUTANEOUS at 12:05

## 2018-07-13 RX ADMIN — Medication 10 MILLIGRAM(S): at 01:54

## 2018-07-13 RX ADMIN — HEPARIN SODIUM 5000 UNIT(S): 5000 INJECTION INTRAVENOUS; SUBCUTANEOUS at 13:20

## 2018-07-13 RX ADMIN — Medication 60 MILLIGRAM(S): at 06:00

## 2018-07-13 RX ADMIN — LOSARTAN POTASSIUM 50 MILLIGRAM(S): 100 TABLET, FILM COATED ORAL at 06:00

## 2018-07-13 NOTE — PHYSICAL THERAPY INITIAL EVALUATION ADULT - IMPAIRMENTS CONTRIBUTING TO GAIT DEVIATIONS, PT EVAL
narrow base of support/impaired postural control/decreased endurance/impaired motor control/decreased strength/impaired balance

## 2018-07-13 NOTE — PHYSICAL THERAPY INITIAL EVALUATION ADULT - IMPAIRMENTS CONTRIBUTING IMPAIRED BED MOBILITY, REHAB EVAL
narrow base of support/decreased strength/impaired balance/decreased endurance/impaired motor control/impaired postural control

## 2018-07-13 NOTE — PHYSICAL THERAPY INITIAL EVALUATION ADULT - GAIT DEVIATIONS NOTED, PT EVAL
stooped posture, dec heel strike/pushoff /stance marisela RLE/decreased weight-shifting ability/footdrop/decreased gentry/decreased step length

## 2018-07-13 NOTE — PHYSICAL THERAPY INITIAL EVALUATION ADULT - ACTIVE RANGE OF MOTION EXAMINATION, REHAB EVAL
LUE/LLE/RLE joints WFL and painfree AAROM/AROM except for (R) ankle required PROM and RUE with minimal ROM outside resting position of (R) shoulder adduction/IR/ elbow flex/pronation/ wrist and finger flexion

## 2018-07-13 NOTE — PHYSICAL THERAPY INITIAL EVALUATION ADULT - IMPAIRED TRANSFERS: SIT/STAND, REHAB EVAL
decreased strength/impaired balance/narrow base of support/impaired postural control/decreased endurance

## 2018-07-14 RX ORDER — METFORMIN HYDROCHLORIDE 850 MG/1
850 TABLET ORAL
Qty: 0 | Refills: 0 | Status: DISCONTINUED | OUTPATIENT
Start: 2018-07-14 | End: 2018-07-18

## 2018-07-14 RX ORDER — INSULIN LISPRO 100/ML
VIAL (ML) SUBCUTANEOUS
Qty: 0 | Refills: 0 | Status: DISCONTINUED | OUTPATIENT
Start: 2018-07-14 | End: 2018-07-18

## 2018-07-14 RX ORDER — SODIUM CHLORIDE 9 MG/ML
1000 INJECTION, SOLUTION INTRAVENOUS
Qty: 0 | Refills: 0 | Status: DISCONTINUED | OUTPATIENT
Start: 2018-07-14 | End: 2018-07-18

## 2018-07-14 RX ORDER — DEXTROSE 50 % IN WATER 50 %
12.5 SYRINGE (ML) INTRAVENOUS ONCE
Qty: 0 | Refills: 0 | Status: DISCONTINUED | OUTPATIENT
Start: 2018-07-14 | End: 2018-07-18

## 2018-07-14 RX ORDER — INSULIN GLARGINE 100 [IU]/ML
30 INJECTION, SOLUTION SUBCUTANEOUS
Qty: 0 | Refills: 0 | COMMUNITY

## 2018-07-14 RX ORDER — DIAZEPAM 5 MG
10 TABLET ORAL ONCE
Qty: 0 | Refills: 0 | Status: DISCONTINUED | OUTPATIENT
Start: 2018-07-14 | End: 2018-07-14

## 2018-07-14 RX ADMIN — Medication 3: at 18:11

## 2018-07-14 RX ADMIN — LOSARTAN POTASSIUM 50 MILLIGRAM(S): 100 TABLET, FILM COATED ORAL at 05:06

## 2018-07-14 RX ADMIN — HEPARIN SODIUM 5000 UNIT(S): 5000 INJECTION INTRAVENOUS; SUBCUTANEOUS at 15:53

## 2018-07-14 RX ADMIN — Medication 60 MILLIGRAM(S): at 05:05

## 2018-07-14 RX ADMIN — LEVETIRACETAM 1000 MILLIGRAM(S): 250 TABLET, FILM COATED ORAL at 17:22

## 2018-07-14 RX ADMIN — HEPARIN SODIUM 5000 UNIT(S): 5000 INJECTION INTRAVENOUS; SUBCUTANEOUS at 05:06

## 2018-07-14 RX ADMIN — Medication 325 MILLIGRAM(S): at 11:51

## 2018-07-14 RX ADMIN — Medication 10 MILLIGRAM(S): at 19:58

## 2018-07-14 RX ADMIN — LEVETIRACETAM 1000 MILLIGRAM(S): 250 TABLET, FILM COATED ORAL at 05:05

## 2018-07-14 RX ADMIN — METFORMIN HYDROCHLORIDE 850 MILLIGRAM(S): 850 TABLET ORAL at 11:51

## 2018-07-14 RX ADMIN — HEPARIN SODIUM 5000 UNIT(S): 5000 INJECTION INTRAVENOUS; SUBCUTANEOUS at 21:59

## 2018-07-14 RX ADMIN — Medication 1 TABLET(S): at 11:51

## 2018-07-14 RX ADMIN — Medication 50 MILLIGRAM(S): at 05:06

## 2018-07-14 RX ADMIN — Medication 50 MILLIGRAM(S): at 17:22

## 2018-07-14 RX ADMIN — ATORVASTATIN CALCIUM 40 MILLIGRAM(S): 80 TABLET, FILM COATED ORAL at 21:59

## 2018-07-14 RX ADMIN — Medication 50 MILLIGRAM(S): at 00:30

## 2018-07-14 RX ADMIN — PANTOPRAZOLE SODIUM 40 MILLIGRAM(S): 20 TABLET, DELAYED RELEASE ORAL at 05:06

## 2018-07-15 LAB
ANION GAP SERPL CALC-SCNC: 18 MMOL/L — HIGH (ref 7–14)
BUN SERPL-MCNC: 15 MG/DL — SIGNIFICANT CHANGE UP (ref 10–20)
CALCIUM SERPL-MCNC: 8.7 MG/DL — SIGNIFICANT CHANGE UP (ref 8.5–10.1)
CHLORIDE SERPL-SCNC: 100 MMOL/L — SIGNIFICANT CHANGE UP (ref 98–110)
CO2 SERPL-SCNC: 22 MMOL/L — SIGNIFICANT CHANGE UP (ref 17–32)
CREAT SERPL-MCNC: 1.4 MG/DL — SIGNIFICANT CHANGE UP (ref 0.7–1.5)
ESTIMATED AVERAGE GLUCOSE: 180 MG/DL — HIGH (ref 68–114)
GLUCOSE SERPL-MCNC: 325 MG/DL — HIGH (ref 70–99)
HBA1C BLD-MCNC: 7.9 % — HIGH (ref 4–5.6)
HCT VFR BLD CALC: 39.3 % — LOW (ref 42–52)
HGB BLD-MCNC: 13 G/DL — LOW (ref 14–18)
MCHC RBC-ENTMCNC: 29 PG — SIGNIFICANT CHANGE UP (ref 27–31)
MCHC RBC-ENTMCNC: 33.1 G/DL — SIGNIFICANT CHANGE UP (ref 32–37)
MCV RBC AUTO: 87.5 FL — SIGNIFICANT CHANGE UP (ref 80–94)
NRBC # BLD: 0 /100 WBCS — SIGNIFICANT CHANGE UP (ref 0–0)
PLATELET # BLD AUTO: 228 K/UL — SIGNIFICANT CHANGE UP (ref 130–400)
POTASSIUM SERPL-MCNC: 4 MMOL/L — SIGNIFICANT CHANGE UP (ref 3.5–5)
POTASSIUM SERPL-SCNC: 4 MMOL/L — SIGNIFICANT CHANGE UP (ref 3.5–5)
RBC # BLD: 4.49 M/UL — LOW (ref 4.7–6.1)
RBC # FLD: 12 % — SIGNIFICANT CHANGE UP (ref 11.5–14.5)
SODIUM SERPL-SCNC: 140 MMOL/L — SIGNIFICANT CHANGE UP (ref 135–146)
WBC # BLD: 12.16 K/UL — HIGH (ref 4.8–10.8)
WBC # FLD AUTO: 12.16 K/UL — HIGH (ref 4.8–10.8)

## 2018-07-15 RX ORDER — LEVETIRACETAM 250 MG/1
1000 TABLET, FILM COATED ORAL ONCE
Qty: 0 | Refills: 0 | Status: COMPLETED | OUTPATIENT
Start: 2018-07-15 | End: 2018-07-15

## 2018-07-15 RX ADMIN — LEVETIRACETAM 1000 MILLIGRAM(S): 250 TABLET, FILM COATED ORAL at 05:40

## 2018-07-15 RX ADMIN — Medication 3: at 08:12

## 2018-07-15 RX ADMIN — HEPARIN SODIUM 5000 UNIT(S): 5000 INJECTION INTRAVENOUS; SUBCUTANEOUS at 13:14

## 2018-07-15 RX ADMIN — HEPARIN SODIUM 5000 UNIT(S): 5000 INJECTION INTRAVENOUS; SUBCUTANEOUS at 05:40

## 2018-07-15 RX ADMIN — Medication 650 MILLIGRAM(S): at 06:59

## 2018-07-15 RX ADMIN — Medication 1 TABLET(S): at 11:32

## 2018-07-15 RX ADMIN — Medication 3: at 11:31

## 2018-07-15 RX ADMIN — LOSARTAN POTASSIUM 50 MILLIGRAM(S): 100 TABLET, FILM COATED ORAL at 05:40

## 2018-07-15 RX ADMIN — HEPARIN SODIUM 5000 UNIT(S): 5000 INJECTION INTRAVENOUS; SUBCUTANEOUS at 21:22

## 2018-07-15 RX ADMIN — ATORVASTATIN CALCIUM 40 MILLIGRAM(S): 80 TABLET, FILM COATED ORAL at 21:21

## 2018-07-15 RX ADMIN — Medication 325 MILLIGRAM(S): at 11:32

## 2018-07-15 RX ADMIN — Medication 650 MILLIGRAM(S): at 00:50

## 2018-07-15 RX ADMIN — PANTOPRAZOLE SODIUM 40 MILLIGRAM(S): 20 TABLET, DELAYED RELEASE ORAL at 05:41

## 2018-07-15 RX ADMIN — Medication 60 MILLIGRAM(S): at 05:41

## 2018-07-15 RX ADMIN — Medication 50 MILLIGRAM(S): at 05:40

## 2018-07-15 RX ADMIN — METFORMIN HYDROCHLORIDE 850 MILLIGRAM(S): 850 TABLET ORAL at 05:40

## 2018-07-15 RX ADMIN — Medication 650 MILLIGRAM(S): at 06:12

## 2018-07-15 RX ADMIN — LEVETIRACETAM 400 MILLIGRAM(S): 250 TABLET, FILM COATED ORAL at 18:37

## 2018-07-15 RX ADMIN — SODIUM CHLORIDE 75 MILLILITER(S): 9 INJECTION INTRAMUSCULAR; INTRAVENOUS; SUBCUTANEOUS at 06:00

## 2018-07-15 RX ADMIN — Medication 0.2 MILLIGRAM(S): at 01:06

## 2018-07-15 RX ADMIN — Medication 650 MILLIGRAM(S): at 00:07

## 2018-07-15 RX ADMIN — SODIUM CHLORIDE 75 MILLILITER(S): 9 INJECTION INTRAMUSCULAR; INTRAVENOUS; SUBCUTANEOUS at 17:13

## 2018-07-16 RX ORDER — HYDROXYZINE HCL 10 MG
50 TABLET ORAL ONCE
Qty: 0 | Refills: 0 | Status: DISCONTINUED | OUTPATIENT
Start: 2018-07-16 | End: 2018-07-17

## 2018-07-16 RX ADMIN — Medication 60 MILLIGRAM(S): at 07:15

## 2018-07-16 RX ADMIN — Medication 50 MILLIGRAM(S): at 17:56

## 2018-07-16 RX ADMIN — SODIUM CHLORIDE 75 MILLILITER(S): 9 INJECTION INTRAMUSCULAR; INTRAVENOUS; SUBCUTANEOUS at 07:37

## 2018-07-16 RX ADMIN — LEVETIRACETAM 1000 MILLIGRAM(S): 250 TABLET, FILM COATED ORAL at 07:15

## 2018-07-16 RX ADMIN — HEPARIN SODIUM 5000 UNIT(S): 5000 INJECTION INTRAVENOUS; SUBCUTANEOUS at 21:28

## 2018-07-16 RX ADMIN — Medication 2: at 08:10

## 2018-07-16 RX ADMIN — LEVETIRACETAM 1000 MILLIGRAM(S): 250 TABLET, FILM COATED ORAL at 17:56

## 2018-07-16 RX ADMIN — HEPARIN SODIUM 5000 UNIT(S): 5000 INJECTION INTRAVENOUS; SUBCUTANEOUS at 06:40

## 2018-07-16 RX ADMIN — ATORVASTATIN CALCIUM 40 MILLIGRAM(S): 80 TABLET, FILM COATED ORAL at 21:28

## 2018-07-16 RX ADMIN — SODIUM CHLORIDE 75 MILLILITER(S): 9 INJECTION INTRAMUSCULAR; INTRAVENOUS; SUBCUTANEOUS at 20:45

## 2018-07-16 RX ADMIN — HEPARIN SODIUM 5000 UNIT(S): 5000 INJECTION INTRAVENOUS; SUBCUTANEOUS at 13:26

## 2018-07-16 RX ADMIN — Medication 325 MILLIGRAM(S): at 11:33

## 2018-07-16 RX ADMIN — Medication 2: at 11:33

## 2018-07-16 RX ADMIN — Medication 50 MILLIGRAM(S): at 07:15

## 2018-07-16 RX ADMIN — METFORMIN HYDROCHLORIDE 850 MILLIGRAM(S): 850 TABLET ORAL at 07:15

## 2018-07-16 RX ADMIN — LOSARTAN POTASSIUM 50 MILLIGRAM(S): 100 TABLET, FILM COATED ORAL at 07:15

## 2018-07-16 RX ADMIN — METFORMIN HYDROCHLORIDE 850 MILLIGRAM(S): 850 TABLET ORAL at 17:57

## 2018-07-16 RX ADMIN — PANTOPRAZOLE SODIUM 40 MILLIGRAM(S): 20 TABLET, DELAYED RELEASE ORAL at 07:15

## 2018-07-16 RX ADMIN — Medication 1 TABLET(S): at 11:33

## 2018-07-16 NOTE — PROGRESS NOTE ADULT - PROBLEM SELECTOR PROBLEM 1
Cerebrovascular accident (CVA) due to stenosis of left middle cerebral artery

## 2018-07-16 NOTE — CONSULT NOTE ADULT - SUBJECTIVE AND OBJECTIVE BOX
HPI:  74 yo M PMHx noted including h/o  CVA, DM, HTN presents from home accompanied by family with c/o worsening of mental status over the last few days, agitation tonight (was spilling his urine out of urinal per wife) which caused him to roll off the bed onto floor, no LOC.  Wife explains that patient has been declining over last several months.  He rarely speaks and has not been able to ambulate since June 12th.  no n/v,  + decreased appetite.    PTN  REFERRED TO ACUTE  REHAB  FOR  EVAL AND  TX   PAST MEDICAL & SURGICAL HISTORY:  High blood cholesterol  Cerebrovascular accident (CVA) due to stenosis of left middle cerebral artery  Coronary artery disease without angina pectoris, unspecified vessel or lesion type, unspecified whether native or transplanted heart rt  HP  Depression, unspecified depression type  Essential hypertension  Controlled type 2 diabetes mellitus without complication, unspecified long term insulin use status  [place] filter in place  History of penile implant  History of lumbar surgery  H/O umbilical hernia repair  History of appendectomy      Hospital Course:    TODAY'S SUBJECTIVE & REVIEW OF SYMPTOMS:     Constitutional WNL   Cardio WNL   Resp WNL   GI WNL  Heme WNL  Endo WNL  Skin WNL  MSK WNL  Neuro WNL  Cognitive WNL  Psych WNL      MEDICATIONS  (STANDING):  aspirin 325 milliGRAM(s) Oral daily  atorvastatin 40 milliGRAM(s) Oral at bedtime  dextrose 5%. 1000 milliLiter(s) (50 mL/Hr) IV Continuous <Continuous>  dextrose 50% Injectable 12.5 Gram(s) IV Push once  heparin  Injectable 5000 Unit(s) SubCutaneous every 8 hours  insulin lispro (HumaLOG) corrective regimen sliding scale   SubCutaneous three times a day before meals  levETIRAcetam 1000 milliGRAM(s) Oral two times a day  losartan 50 milliGRAM(s) Oral daily  metFORMIN 850 milliGRAM(s) Oral two times a day  metoprolol tartrate 50 milliGRAM(s) Oral two times a day  multivitamin 1 Tablet(s) Oral daily  NIFEdipine XL 60 milliGRAM(s) Oral daily  pantoprazole    Tablet 40 milliGRAM(s) Oral before breakfast  sodium chloride 0.9%. 1000 milliLiter(s) (75 mL/Hr) IV Continuous <Continuous>    MEDICATIONS  (PRN):  acetaminophen   Tablet. 650 milliGRAM(s) Oral every 6 hours PRN Moderate Pain (4 - 6)  hydrOXYzine hydrochloride 50 milliGRAM(s) Oral at bedtime PRN Anxiety/ insomnia      FAMILY HISTORY:  No pertinent family history in first degree relatives      Allergies    No Known Allergies    Intolerances        SOCIAL HISTORY:    [  ] Etoh  [  ] Smoking  [  ] Substance abuse     Home Environment:  [  ] Home Alone  [ x ] Lives with Family  [  ] Home Health Aid    Dwelling:  [  ] Apartment  [x  ] Private House  [  ] Adult Home  [  ] Skilled Nursing Facility      [  ] Short Term  [  ] Long Term  [ x ] Stairs       Elevator [  ]    FUNCTIONAL STATUS PTA: (Check all that apply)  Ambulation: [  x ]Independent    [ x ] Dependent     [  ] Non-Ambulatory  Assistive Device: [  ] SA Cane  [  ]  Q Cane  [  x] Walker  [  x]  Wheelchair  ADL : [  x] Independent  [x  ]  Dependent       Vital Signs Last 24 Hrs  T(C): 36.4 (16 Jul 2018 14:16), Max: 36.7 (15 Jul 2018 22:00)  T(F): 97.6 (16 Jul 2018 14:16), Max: 98.1 (15 Jul 2018 22:00)  HR: 87 (16 Jul 2018 14:16) (67 - 87)  BP: 138/65 (16 Jul 2018 14:16) (111/56 - 138/65)  BP(mean): --  RR: 16 (16 Jul 2018 06:27) (14 - 16)  SpO2: --      PHYSICAL EXAM: Alert & Oriented X 1  GENERAL: NAD, well-groomed, well-developed  HEAD:  Atraumatic, Normocephalic  EYES: EOMI, PERRLA, conjunctiva and sclera clear  NECK: Supple, No JVD, Normal thyroid  CHEST/LUNG: Clear to percussion bilaterally; No rales, rhonchi, wheezing, or rubs  HEART: Regular rate and rhythm; No murmurs, rubs, or gallops  ABDOMEN: Soft, Nontender, Nondistended; Bowel sounds present  EXTREMITIES:  2+ Peripheral Pulses, No clubbing, cyanosis, or edema    NERVOUS SYSTEM:  Cranial Nerves 2-12 intact [x  ] Abnormal  [  ]  ROM: WFL all extremities [  ]  Abnormal [x rt  ue contaction ]  Motor Strength: WFL all extremities  [  ]  Abnormal [x ]  Sensation: intact to light touch [  ] Abnormal [ x ]  Reflexes: Symmetric [  ]  Abnormal [ x ]    FUNCTIONAL STATUS:  Bed Mobility: Independent [  ]  Supervision [  ]  Needs Assistance [x  ]  N/A [  ]  Transfers: Independent [  ]  Supervision [  ]  Needs Assistance [ x]  N/A [  ]   Ambulation: Independent [  ]  Supervision [  ]  Needs Assistance [ x ]  N/A [  ]  ADL: Independent [  ] Requires Assistance [ x ] N/A [  ]    see  pt  ie  notes   LABS:                        13.0   12.16 )-----------( 228      ( 15 Jul 2018 07:21 )             39.3     07-15    140  |  100  |  15  ----------------------------<  325<H>  4.0   |  22  |  1.4    Ca    8.7      15 Jul 2018 07:21            RADIOLOGY & ADDITIONAL STUDIES:< from: CT Head No Cont (07.11.18 @ 23:33) >  Prominent sulcal and ventricular system consistent with parenchymal   volume loss.Scattered subcortical and periventricular hypodensities   without mass effect most consistent with chronic microvascular ischemic   changes.    Unchanged chronic left MCA territory infarct.     No acute extra-axial collection.  No mass effect, midline shift or acute   hemorrhage is seen.      No depressed skull fracture. Post left frontal craniotomy with stable   associated postoperative changes.    Frothy secretions within the sphenoid sinuses. Mastoid air cells are   well-aerated.      Impression:    No acute intracranial abnormality.    Sphenoid sinus disease.    < end of copied text >      Assesment:

## 2018-07-17 ENCOUNTER — TRANSCRIPTION ENCOUNTER (OUTPATIENT)
Age: 73
End: 2018-07-17

## 2018-07-17 RX ORDER — LEVETIRACETAM 250 MG/1
1 TABLET, FILM COATED ORAL
Qty: 0 | Refills: 0 | COMMUNITY
Start: 2018-07-17

## 2018-07-17 RX ORDER — HYDROXYZINE HCL 10 MG
1 TABLET ORAL
Qty: 0 | Refills: 0 | COMMUNITY
Start: 2018-07-17

## 2018-07-17 RX ORDER — NIFEDIPINE 30 MG
1 TABLET, EXTENDED RELEASE 24 HR ORAL
Qty: 0 | Refills: 0 | COMMUNITY
Start: 2018-07-17

## 2018-07-17 RX ORDER — PANTOPRAZOLE SODIUM 20 MG/1
1 TABLET, DELAYED RELEASE ORAL
Qty: 0 | Refills: 0 | COMMUNITY
Start: 2018-07-17

## 2018-07-17 RX ORDER — INSULIN LISPRO 100/ML
0 VIAL (ML) SUBCUTANEOUS
Qty: 0 | Refills: 0 | COMMUNITY

## 2018-07-17 RX ORDER — METFORMIN HYDROCHLORIDE 850 MG/1
1 TABLET ORAL
Qty: 0 | Refills: 0 | COMMUNITY
Start: 2018-07-17

## 2018-07-17 RX ORDER — NIFEDIPINE 30 MG
1 TABLET, EXTENDED RELEASE 24 HR ORAL
Qty: 0 | Refills: 0 | COMMUNITY

## 2018-07-17 RX ORDER — METOPROLOL TARTRATE 50 MG
1 TABLET ORAL
Qty: 0 | Refills: 0 | COMMUNITY
Start: 2018-07-17

## 2018-07-17 RX ORDER — PANTOPRAZOLE SODIUM 20 MG/1
1 TABLET, DELAYED RELEASE ORAL
Qty: 0 | Refills: 0 | COMMUNITY

## 2018-07-17 RX ORDER — METOPROLOL TARTRATE 50 MG
1 TABLET ORAL
Qty: 0 | Refills: 0 | COMMUNITY

## 2018-07-17 RX ORDER — ATORVASTATIN CALCIUM 80 MG/1
2 TABLET, FILM COATED ORAL
Qty: 0 | Refills: 0 | COMMUNITY

## 2018-07-17 RX ORDER — METFORMIN HYDROCHLORIDE 850 MG/1
1 TABLET ORAL
Qty: 0 | Refills: 0 | COMMUNITY

## 2018-07-17 RX ORDER — HYDROXYZINE HCL 10 MG
50 TABLET ORAL ONCE
Qty: 0 | Refills: 0 | Status: COMPLETED | OUTPATIENT
Start: 2018-07-17 | End: 2018-07-17

## 2018-07-17 RX ORDER — LOSARTAN POTASSIUM 100 MG/1
1 TABLET, FILM COATED ORAL
Qty: 0 | Refills: 0 | COMMUNITY

## 2018-07-17 RX ORDER — LOSARTAN POTASSIUM 100 MG/1
1 TABLET, FILM COATED ORAL
Qty: 0 | Refills: 0 | COMMUNITY
Start: 2018-07-17

## 2018-07-17 RX ORDER — ATORVASTATIN CALCIUM 80 MG/1
1 TABLET, FILM COATED ORAL
Qty: 0 | Refills: 0 | COMMUNITY
Start: 2018-07-17

## 2018-07-17 RX ADMIN — METFORMIN HYDROCHLORIDE 850 MILLIGRAM(S): 850 TABLET ORAL at 17:58

## 2018-07-17 RX ADMIN — PANTOPRAZOLE SODIUM 40 MILLIGRAM(S): 20 TABLET, DELAYED RELEASE ORAL at 06:07

## 2018-07-17 RX ADMIN — HEPARIN SODIUM 5000 UNIT(S): 5000 INJECTION INTRAVENOUS; SUBCUTANEOUS at 21:21

## 2018-07-17 RX ADMIN — Medication 50 MILLIGRAM(S): at 00:21

## 2018-07-17 RX ADMIN — Medication 60 MILLIGRAM(S): at 05:29

## 2018-07-17 RX ADMIN — LEVETIRACETAM 1000 MILLIGRAM(S): 250 TABLET, FILM COATED ORAL at 17:58

## 2018-07-17 RX ADMIN — Medication 50 MILLIGRAM(S): at 05:29

## 2018-07-17 RX ADMIN — HEPARIN SODIUM 5000 UNIT(S): 5000 INJECTION INTRAVENOUS; SUBCUTANEOUS at 13:08

## 2018-07-17 RX ADMIN — HEPARIN SODIUM 5000 UNIT(S): 5000 INJECTION INTRAVENOUS; SUBCUTANEOUS at 05:34

## 2018-07-17 RX ADMIN — LOSARTAN POTASSIUM 50 MILLIGRAM(S): 100 TABLET, FILM COATED ORAL at 05:29

## 2018-07-17 RX ADMIN — Medication 2: at 13:03

## 2018-07-17 RX ADMIN — LEVETIRACETAM 1000 MILLIGRAM(S): 250 TABLET, FILM COATED ORAL at 05:29

## 2018-07-17 RX ADMIN — SODIUM CHLORIDE 75 MILLILITER(S): 9 INJECTION INTRAMUSCULAR; INTRAVENOUS; SUBCUTANEOUS at 23:45

## 2018-07-17 RX ADMIN — Medication 325 MILLIGRAM(S): at 13:08

## 2018-07-17 RX ADMIN — Medication 1 TABLET(S): at 13:08

## 2018-07-17 RX ADMIN — Medication 2: at 17:58

## 2018-07-17 RX ADMIN — Medication 50 MILLIGRAM(S): at 17:58

## 2018-07-17 RX ADMIN — Medication 2: at 08:26

## 2018-07-17 RX ADMIN — ATORVASTATIN CALCIUM 40 MILLIGRAM(S): 80 TABLET, FILM COATED ORAL at 21:21

## 2018-07-17 RX ADMIN — METFORMIN HYDROCHLORIDE 850 MILLIGRAM(S): 850 TABLET ORAL at 05:29

## 2018-07-17 NOTE — CONSULT NOTE ADULT - SUBJECTIVE AND OBJECTIVE BOX
NEPHROLOGY CONSULTATION NOTE    72 yo male with pmh as below, very well known to me, admitted with subacute decline in functional and mental status.  Pt now awaiting SNF bed.  Pt also with stable CKD and labile HTN.  Pt is poorly verbal and unable to give history.    PAST MEDICAL & SURGICAL HISTORY:  High blood cholesterol  Cerebrovascular accident (CVA) due to stenosis of left middle cerebral artery  Coronary artery disease without angina pectoris, unspecified vessel or lesion type, unspecified whether native or transplanted heart  Depression, unspecified depression type  Essential hypertension  Controlled type 2 diabetes mellitus without complication, unspecified long term insulin use status  [place] filter in place  History of penile implant  History of lumbar surgery  H/O umbilical hernia repair  History of appendectomy    Allergies:  No Known Allergies    Home Medications Reviewed    SOCIAL HISTORY:  Denies ETOH,Smoking,   FAMILY HISTORY:  No pertinent family history in first degree relatives        REVIEW OF SYSTEMS:  poor historian  All other review of systems is negative unless indicated above.    PHYSICAL EXAM:  NAd  frail  moist mm  no jvd  cta b/l  + murmur  soft, nt  no edema    Hospital Medications:   MEDICATIONS  (STANDING):  aspirin 325 milliGRAM(s) Oral daily  atorvastatin 40 milliGRAM(s) Oral at bedtime  dextrose 5%. 1000 milliLiter(s) (50 mL/Hr) IV Continuous <Continuous>  dextrose 50% Injectable 12.5 Gram(s) IV Push once  heparin  Injectable 5000 Unit(s) SubCutaneous every 8 hours  insulin lispro (HumaLOG) corrective regimen sliding scale   SubCutaneous three times a day before meals  levETIRAcetam 1000 milliGRAM(s) Oral two times a day  losartan 50 milliGRAM(s) Oral daily  metFORMIN 850 milliGRAM(s) Oral two times a day  metoprolol tartrate 50 milliGRAM(s) Oral two times a day  multivitamin 1 Tablet(s) Oral daily  NIFEdipine XL 60 milliGRAM(s) Oral daily  pantoprazole    Tablet 40 milliGRAM(s) Oral before breakfast  sodium chloride 0.9%. 1000 milliLiter(s) (75 mL/Hr) IV Continuous <Continuous>        VITALS:  T(F): 99.5 (18 @ 14:27), Max: 99.5 (18 @ 14:27)  HR: 99 (18 @ 14:27)  BP: 135/71 (18 @ 14:27)  RR: 16 (18 @ 14:27)  SpO2: --  Wt(kg): --     @ 07:01  -   @ 07:00  --------------------------------------------------------  IN: 1000 mL / OUT: 0 mL / NET: 1000 mL      Height (cm): 177.8 ( @ 10:33)  Weight (kg): 77.5 ( @ 10:33)  BMI (kg/m2): 24.5 ( @ 10:33)  BSA (m2): 1.95 ( 10:33)    LABS:            Urine Studies:  Urinalysis Basic - ( 2018 23:11 )    Color: Yellow / Appearance: Clear / S.020 / pH:   Gluc:  / Ketone: Trace  / Bili: Negative / Urobili: 0.2 mg/dL   Blood:  / Protein: Negative mg/dL / Nitrite: Negative   Leuk Esterase: Negative / RBC:  / WBC    Sq Epi:  / Non Sq Epi:  / Bacteria:           RADIOLOGY & ADDITIONAL STUDIES:

## 2018-07-17 NOTE — CONSULT NOTE ADULT - ASSESSMENT
IMPRESSION: Rehab of 72 y/o  m  rehab  for decline  cva rt  hp     PRECAUTIONS: [  ] Cardiac  [  ] Respiratory  [  ] Seizures [  ] Contact Isolation  [  ] Droplet Isolation  [x  ] Other fall    Weight Bearing Status:     RECOMMENDATION:    Out of Bed to Chair     DVT/Decubiti Prophylaxis    REHAB PLAN:     [  xx ] Bedside P/T 3-5 times a week   [   ]   Bedside O/T  2-3 times a week             [   ] No Rehab Therapy Indicated                   [   ]  Speech Therapy   Conditioning/ROM                                    ADL  Bed Mobility                                               Conditioning/ROM  Transfers                                                     Bed Mobility  Sitting /Standing Balance                         Transfers                                        Gait Training                                               Sitting/Standing Balance  Stair Training [   ]Applicable                    Home equipment Eval                                                                        Splinting  [   ] Only      GOALS:   ADL   [ x  ]   Independent                    Transfers  [  x ] Independent                          Ambulation  [  x ] Independent     [ x  ] With device                            [  x ]  CG                                                         [  x ]  CG                                                                  [   ] CG                            [   x ] Min A                                                   [  x ] Min A                                                              [   ] Min  A          DISCHARGE PLAN:   [   ]  Good candidate for Intensive Rehabilitation/Hospital based-[facility]A [facility]                                             Will tolerate 3hrs Intensive Rehab Daily                                       [xx    ]  Short Term Rehab in Skilled Nursing Facility dw  family  and  bothb agree                                        [    ]  Home with Outpatient or  services                                         [    ]  Possible Candidate for Intensive Hospital based Rehab
CKD 3  HTN  DM  old CVA  severe cerebrovascular disease  functional decline    plan:    con't current antihypertensives  may cont metformin  rehab / pt  f/u neuro  dc planning to STR  will try to speak to wife

## 2018-07-17 NOTE — DISCHARGE NOTE ADULT - PATIENT PORTAL LINK FT
You can access the [facility] Patient Portal, offered by [facility], by registering with the following website: [email or web address]

## 2018-07-17 NOTE — DISCHARGE NOTE ADULT - HOSPITAL COURSE
patient was admitted  w worsening of mental status  ct head done no new stroke  old  stroke  EEG normal     discharged to rehab

## 2018-07-17 NOTE — DISCHARGE NOTE ADULT - MEDICATION SUMMARY - MEDICATIONS TO TAKE
I will START or STAY ON the medications listed below when I get home from the hospital:    aspirin 325 mg oral tablet  -- 1 tab(s) by mouth once a day  -- Indication: For Cerebrovascular accident (CVA) due to stenosis of left middle cerebral artery    losartan 50 mg oral tablet  -- 1 tab(s) by mouth once a day  -- Indication: For Hypertensio    levETIRAcetam 1000 mg oral tablet  -- 1 tab(s) by mouth 2 times a day  -- Indication: For Cerebrovascular accident (CVA) due to stenosis of left middle cerebral artery    metFORMIN 850 mg oral tablet  -- 1 tab(s) by mouth 2 times a day  -- Indication: For dm    Toujeo SoloStar 300 units/mL subcutaneous solution  -- 30 unit(s) subcutaneous once (at bedtime)  -- Indication: For dm    atorvastatin 40 mg oral tablet  -- 1 tab(s) by mouth once a day (at bedtime)  -- Indication: For High blood cholesterol    hydrOXYzine hydrochloride 50 mg oral tablet  -- 1 tab(s) by mouth once a day (at bedtime), As needed, Anxiety/ insomnia  -- Indication: For insomnia    metoprolol tartrate 50 mg oral tablet  -- 1 tab(s) by mouth 2 times a day  -- Indication: For Htn    NIFEdipine 60 mg oral tablet, extended release  -- 1 tab(s) by mouth once a day  -- Indication: For Htn    pantoprazole 40 mg oral delayed release tablet  -- 1 tab(s) by mouth once a day (before a meal)  -- Indication: For Gerd    Multiple Vitamins oral tablet  -- 1 tab(s) by mouth once a day  -- Indication: For supplement

## 2018-07-17 NOTE — DISCHARGE NOTE ADULT - CARE PLAN
Principal Discharge DX:	Cerebrovascular accident (CVA) due to stenosis of left middle cerebral artery  Goal:	rehab  Assessment and plan of treatment:	continue current meds

## 2018-07-18 VITALS
DIASTOLIC BLOOD PRESSURE: 81 MMHG | SYSTOLIC BLOOD PRESSURE: 182 MMHG | RESPIRATION RATE: 16 BRPM | HEART RATE: 77 BPM | TEMPERATURE: 97 F

## 2018-07-18 RX ADMIN — LEVETIRACETAM 1000 MILLIGRAM(S): 250 TABLET, FILM COATED ORAL at 05:51

## 2018-07-18 RX ADMIN — Medication 1: at 08:23

## 2018-07-18 RX ADMIN — HEPARIN SODIUM 5000 UNIT(S): 5000 INJECTION INTRAVENOUS; SUBCUTANEOUS at 05:52

## 2018-07-18 RX ADMIN — Medication 1: at 12:08

## 2018-07-18 RX ADMIN — Medication 50 MILLIGRAM(S): at 05:51

## 2018-07-18 RX ADMIN — Medication 1 TABLET(S): at 11:17

## 2018-07-18 RX ADMIN — Medication 325 MILLIGRAM(S): at 11:17

## 2018-07-18 RX ADMIN — PANTOPRAZOLE SODIUM 40 MILLIGRAM(S): 20 TABLET, DELAYED RELEASE ORAL at 06:35

## 2018-07-18 RX ADMIN — Medication 60 MILLIGRAM(S): at 05:51

## 2018-07-18 RX ADMIN — HEPARIN SODIUM 5000 UNIT(S): 5000 INJECTION INTRAVENOUS; SUBCUTANEOUS at 13:49

## 2018-07-18 RX ADMIN — Medication 2: at 17:16

## 2018-07-18 RX ADMIN — Medication 650 MILLIGRAM(S): at 03:29

## 2018-07-18 RX ADMIN — METFORMIN HYDROCHLORIDE 850 MILLIGRAM(S): 850 TABLET ORAL at 05:51

## 2018-07-18 RX ADMIN — LOSARTAN POTASSIUM 50 MILLIGRAM(S): 100 TABLET, FILM COATED ORAL at 05:51

## 2018-07-18 NOTE — DIETITIAN INITIAL EVALUATION ADULT. - OTHER INFO
Reason for assessment: LOS. Pt presented to the ED w/worsening mental status, decreased appetite, and Pt fell from his bed. Pt was admitted w/ weakness and gait disturbance post stroke. Family does not know his UBW but denies any recent wt loss. Pt was eval by SLP who recommended a regular, thin liquid consistency. Fecal incontinence noted. NKFA. Pt is waiting for bed avail to be d/c to NH.

## 2018-07-18 NOTE — DIETITIAN INITIAL EVALUATION ADULT. - NS AS NUTRI INTERV MEDICAL AND FOOD SUPPLEMENTS
Family reports that they have tried supplements in the past and that the Pt does not like them. They are refusing supplements at this time./Purpose (specify)

## 2018-07-18 NOTE — DIETITIAN INITIAL EVALUATION ADULT. - SOURCE
family/significant other/First attempt Pt was sleeping. Second attempt Pts family was there and I spoke with them to obtain history.

## 2018-07-18 NOTE — PROGRESS NOTE ADULT - ASSESSMENT
CKD 3  HTN  DM  old CVA  severe cerebrovascular disease  functional decline    plan:    dc ivf's  con't current antihypertensives  cont metformin  rehab / pt  1:1 feeds / dysphagia diet  dc planning to STR
await placement
discharge when BED available
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
## 2018-07-18 NOTE — PROGRESS NOTE ADULT - PROVIDER SPECIALTY LIST ADULT
Internal Medicine
Neurology
Nephrology

## 2018-07-26 PROBLEM — Z86.73 HISTORY OF STROKE: Status: ACTIVE | Noted: 2017-03-20

## 2018-08-03 ENCOUNTER — EMERGENCY (EMERGENCY)
Facility: HOSPITAL | Age: 73
LOS: 0 days | Discharge: HOME | End: 2018-08-03
Attending: EMERGENCY MEDICINE | Admitting: EMERGENCY MEDICINE

## 2018-08-03 VITALS
RESPIRATION RATE: 20 BRPM | SYSTOLIC BLOOD PRESSURE: 146 MMHG | DIASTOLIC BLOOD PRESSURE: 67 MMHG | HEART RATE: 98 BPM | TEMPERATURE: 96 F | OXYGEN SATURATION: 98 %

## 2018-08-03 VITALS
SYSTOLIC BLOOD PRESSURE: 138 MMHG | OXYGEN SATURATION: 98 % | DIASTOLIC BLOOD PRESSURE: 68 MMHG | HEART RATE: 87 BPM | RESPIRATION RATE: 18 BRPM

## 2018-08-03 DIAGNOSIS — Z95.828 PRESENCE OF OTHER VASCULAR IMPLANTS AND GRAFTS: Chronic | ICD-10-CM

## 2018-08-03 DIAGNOSIS — Z79.84 LONG TERM (CURRENT) USE OF ORAL HYPOGLYCEMIC DRUGS: ICD-10-CM

## 2018-08-03 DIAGNOSIS — F32.9 MAJOR DEPRESSIVE DISORDER, SINGLE EPISODE, UNSPECIFIED: ICD-10-CM

## 2018-08-03 DIAGNOSIS — Z90.49 ACQUIRED ABSENCE OF OTHER SPECIFIED PARTS OF DIGESTIVE TRACT: Chronic | ICD-10-CM

## 2018-08-03 DIAGNOSIS — Z98.890 OTHER SPECIFIED POSTPROCEDURAL STATES: Chronic | ICD-10-CM

## 2018-08-03 DIAGNOSIS — Z96.89 PRESENCE OF OTHER SPECIFIED FUNCTIONAL IMPLANTS: Chronic | ICD-10-CM

## 2018-08-03 DIAGNOSIS — R46.89 OTHER SYMPTOMS AND SIGNS INVOLVING APPEARANCE AND BEHAVIOR: ICD-10-CM

## 2018-08-03 DIAGNOSIS — Z79.82 LONG TERM (CURRENT) USE OF ASPIRIN: ICD-10-CM

## 2018-08-03 DIAGNOSIS — F03.91 UNSPECIFIED DEMENTIA WITH BEHAVIORAL DISTURBANCE: ICD-10-CM

## 2018-08-03 DIAGNOSIS — Z79.899 OTHER LONG TERM (CURRENT) DRUG THERAPY: ICD-10-CM

## 2018-08-03 PROBLEM — E78.00 PURE HYPERCHOLESTEROLEMIA, UNSPECIFIED: Chronic | Status: ACTIVE | Noted: 2018-07-11

## 2018-08-03 LAB
ALBUMIN SERPL ELPH-MCNC: 4.1 G/DL — SIGNIFICANT CHANGE UP (ref 3.5–5.2)
ALP SERPL-CCNC: 150 U/L — HIGH (ref 30–115)
ALT FLD-CCNC: 16 U/L — SIGNIFICANT CHANGE UP (ref 0–41)
ANION GAP SERPL CALC-SCNC: 18 MMOL/L — HIGH (ref 7–14)
APAP SERPL-MCNC: <5 UG/ML — LOW (ref 10–30)
AST SERPL-CCNC: 16 U/L — SIGNIFICANT CHANGE UP (ref 0–41)
BASOPHILS # BLD AUTO: 0.1 K/UL — SIGNIFICANT CHANGE UP (ref 0–0.2)
BASOPHILS NFR BLD AUTO: 0.7 % — SIGNIFICANT CHANGE UP (ref 0–1)
BILIRUB SERPL-MCNC: 0.3 MG/DL — SIGNIFICANT CHANGE UP (ref 0.2–1.2)
BUN SERPL-MCNC: 18 MG/DL — SIGNIFICANT CHANGE UP (ref 10–20)
CALCIUM SERPL-MCNC: 9.6 MG/DL — SIGNIFICANT CHANGE UP (ref 8.5–10.1)
CHLORIDE SERPL-SCNC: 101 MMOL/L — SIGNIFICANT CHANGE UP (ref 98–110)
CO2 SERPL-SCNC: 26 MMOL/L — SIGNIFICANT CHANGE UP (ref 17–32)
CREAT SERPL-MCNC: 1.2 MG/DL — SIGNIFICANT CHANGE UP (ref 0.7–1.5)
EOSINOPHIL # BLD AUTO: 0.32 K/UL — SIGNIFICANT CHANGE UP (ref 0–0.7)
EOSINOPHIL NFR BLD AUTO: 2.3 % — SIGNIFICANT CHANGE UP (ref 0–8)
ETHANOL SERPL-MCNC: <10 MG/DL — HIGH
GLUCOSE SERPL-MCNC: 85 MG/DL — SIGNIFICANT CHANGE UP (ref 70–99)
HCT VFR BLD CALC: 40 % — LOW (ref 42–52)
HGB BLD-MCNC: 13.2 G/DL — LOW (ref 14–18)
IMM GRANULOCYTES NFR BLD AUTO: 0.4 % — HIGH (ref 0.1–0.3)
LYMPHOCYTES # BLD AUTO: 1.75 K/UL — SIGNIFICANT CHANGE UP (ref 1.2–3.4)
LYMPHOCYTES # BLD AUTO: 12.3 % — LOW (ref 20.5–51.1)
MCHC RBC-ENTMCNC: 28.9 PG — SIGNIFICANT CHANGE UP (ref 27–31)
MCHC RBC-ENTMCNC: 33 G/DL — SIGNIFICANT CHANGE UP (ref 32–37)
MCV RBC AUTO: 87.5 FL — SIGNIFICANT CHANGE UP (ref 80–94)
MONOCYTES # BLD AUTO: 1.07 K/UL — HIGH (ref 0.1–0.6)
MONOCYTES NFR BLD AUTO: 7.5 % — SIGNIFICANT CHANGE UP (ref 1.7–9.3)
NEUTROPHILS # BLD AUTO: 10.89 K/UL — HIGH (ref 1.4–6.5)
NEUTROPHILS NFR BLD AUTO: 76.8 % — HIGH (ref 42.2–75.2)
NRBC # BLD: 0 /100 WBCS — SIGNIFICANT CHANGE UP (ref 0–0)
PLATELET # BLD AUTO: 451 K/UL — HIGH (ref 130–400)
POTASSIUM SERPL-MCNC: 4.4 MMOL/L — SIGNIFICANT CHANGE UP (ref 3.5–5)
POTASSIUM SERPL-SCNC: 4.4 MMOL/L — SIGNIFICANT CHANGE UP (ref 3.5–5)
PROT SERPL-MCNC: 7.5 G/DL — SIGNIFICANT CHANGE UP (ref 6–8)
RBC # BLD: 4.57 M/UL — LOW (ref 4.7–6.1)
RBC # FLD: 12.1 % — SIGNIFICANT CHANGE UP (ref 11.5–14.5)
SALICYLATES SERPL-MCNC: 0.5 MG/DL — LOW (ref 4–30)
SODIUM SERPL-SCNC: 145 MMOL/L — SIGNIFICANT CHANGE UP (ref 135–146)
WBC # BLD: 14.18 K/UL — HIGH (ref 4.8–10.8)
WBC # FLD AUTO: 14.18 K/UL — HIGH (ref 4.8–10.8)

## 2018-08-03 RX ORDER — SODIUM CHLORIDE 9 MG/ML
100 INJECTION INTRAMUSCULAR; INTRAVENOUS; SUBCUTANEOUS ONCE
Qty: 0 | Refills: 0 | Status: COMPLETED | OUTPATIENT
Start: 2018-08-03 | End: 2018-08-03

## 2018-08-03 RX ADMIN — SODIUM CHLORIDE 100 MILLILITER(S): 9 INJECTION INTRAMUSCULAR; INTRAVENOUS; SUBCUTANEOUS at 17:13

## 2018-08-03 NOTE — ED PROVIDER NOTE - DIAGNOSIS COUNSELING, MDM
conducted a detailed discussion... I had a detailed discussion with the patient and/or guardian regarding the historical points, exam findings, and any diagnostic results supporting the discharge/admit diagnosis.. Plan from ED / Psych is understood by wife.
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
## 2018-08-03 NOTE — ED ADULT TRIAGE NOTE - NURSING HOMES
[facility] and [facility], [facility]
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
## 2018-08-03 NOTE — ED ADULT NURSE NOTE - NSIMPLEMENTINTERV_GEN_ALL_ED
Implemented All Fall with Harm Risk Interventions:  [place] to call system. Call bell, personal items and telephone within reach. Instruct patient to call for assistance. Room bathroom lighting operational. Non-slip footwear when patient is off stretcher. Physically safe environment: no spills, clutter or unnecessary equipment. Stretcher in lowest position, wheels locked, appropriate side rails in place. Provide visual cue, wrist band, yellow gown, etc. Monitor gait and stability. Monitor for mental status changes and reorient to person, place, and time. Review medications for side effects contributing to fall risk. Reinforce activity limits and safety measures with patient and family. Provide visual clues: red socks.

## 2018-08-03 NOTE — ED PROVIDER NOTE - PHYSICAL EXAMINATION
wife states she notes a left frontal swelling but no contusion hematoma ecchymosis noted on my exam.
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
## 2018-08-13 NOTE — SWALLOW BEDSIDE ASSESSMENT ADULT - SLP PATIENT PROFILE REVIEW
"[facility]  Psychiatry  Consult Note    Patient Name: Janie Zamorano  MRN: [id number]   Code Status: Full Code  Admission Date: 8/3/2018  Hospital Length of Stay: 10 days  Attending Physician: Ruslan Jameson MD  Primary Care Provider: He Hoyt MD    Current Legal Status: {Select Legal Status:20086}    Patient information was obtained from {info source:[id number]::"ER records"}.   Consults  Subjective:     Principal Problem:Hypercalcemia    Chief Complaint:  Insomia    HPI:   Janie Zamorano is a 60 y.o. female with a history of schizophrenia who presented to [facility] due to back pain. Psychiatry was consulted to address the patient's symptoms of psychosis.       Pt was recently diagnosed with Multiple myeloma, has multiple lytic lesions of the spine, needs surgery on her T12 vertebrae.  Psychiatry was consulted because pt has become paranoid, agitated, at times refusing meds.  Pt has been admitted to [facility] in the past, has undergone ECT.    On interview pt was reclined in bed, neck brace immobilizing her head.  Pt politely refused to answer any questions about her mood, psychosis, past psych history.  When questioned on why she refused to answer basic questions, pt stared blankly at the interviewer.       Collateral:   Pt's son was in the room with the patient, we spoke outside the room.  He states the the pt does have a history of depression, not schizophrenia.  She was taking risperdal and cogentin, but for depression.  She stopped taking risperdal due to sedation, 4 months ago, has not displayed any symptoms of psychosis in the interval.  Pt's behavior over the last day was the result of an inability to sleep, the seroquel 50 mg helped somewhat.  Pt's son minimized psychiatric past and current symptoms.       Hospital Course:   8/12/18: Psychiatry consulted due to patient         Patient History           Medical as of 8/13/2018     Past Medical History     Diagnosis Date Comments " yes Source    Asthma -- -- Provider    Depression -- -- Provider    GERD (gastroesophageal reflux disease) -- -- Provider    Hypertension -- -- Provider    Neck pain -- -- Provider    Schizophrenia -- -- Provider          Pertinent Negatives     Diagnosis Date Noted Comments Source    Diabetes mellitus 2016 -- Provider    Glaucoma 2016 -- Provider    Macular degeneration 2016 -- Provider    Retinal detachment 2016 -- Provider    Sickle cell anemia 2016 -- Provider    Sickle cell trait 2016 -- Provider                  Surgical as of 2018     Past Surgical History     Procedure Laterality Date Comments Source     SECTION -- -- X 1 Provider    LIPOMA RESECTION -- -- back Provider    HYSTERECTOMY --  KJB---DLH/BSO Provider    COLONOSCOPY N/A 2018 Procedure: COLONOSCOPY;  Surgeon: Albert Russell MD;  Location: [facility] [email or web address] (4TH FLR);  Service: Endoscopy;  Laterality: N/A;  pm prep/MS Provider    ESOPHAGOGASTRODUODENOSCOPY N/A 2018 Procedure: ESOPHAGOGASTRODUODENOSCOPY (EGD);  Surgeon: Albert Russell MD;  Location: [facility] [email or web address] (2ND FLR);  Service: Endoscopy;  Laterality: N/A;  EGD in 8 weeks on Pantoprazole 40mg every 12 hours patient needs to take her PPI morning of EGD with sip of water.  Follow up esophagitis.       hx of gastroparesis. per Dr Russell-24 hours clear liquids/ Per Dr. Russell on 18 Pt to be r/s with  Provider                  Family as of 2018     Problem Relation Name Age of Onset Comments Source    Hypertension Mother -- -- -- Provider    Glaucoma Mother -- -- -- Provider    Macular degeneration Mother -- -- -- Provider    Breast cancer Mother -- -- -- Provider    COPD Father -- -- -- Provider    Hypertension Father -- -- -- Provider    Diabetes Brother -- -- -- Provider    Glaucoma Sister -- -- -- Provider    Liver cancer Paternal Uncle -- 75 dad brother ETOH Provider    Ovarian cancer Neg Hx -- -- -- Provider    Colon cancer Neg  Hx -- -- -- Provider    Colon polyps Neg Hx -- -- -- Provider    Cirrhosis Neg Hx -- -- -- Provider    Celiac disease Neg Hx -- -- -- Provider    Ulcerative colitis Neg Hx -- -- -- Provider    Hemochromatosis Neg Hx -- -- -- Provider    Esophageal cancer Neg Hx -- -- -- Provider            Tobacco Use as of 8/13/2018     Smoking Status Smoking Start Date Smoking Quit Date Packs/Day Years Used    Never Smoker -- -- -- --    Types Comments Smokeless Tobacco Status Smokeless Tobacco Quit Date Source    -- -- Never Used -- Provider            Alcohol Use as of 8/13/2018     Alcohol Use Drinks/Week Alcohol/Week Comments Source    No 0 Standard drinks or equivalent 0.0 oz -- Provider    Frequency Standard Drinks Binge Drinking Source      -- -- -- Provider             Drug Use as of 8/13/2018     Drug Use Types Frequency Comments Source    No -- -- -- Provider            Sexual Activity as of 8/13/2018     Sexually Active Birth Control Partners Comments Source    Not Currently Post-menopausal Male -- Provider            Activities of Daily Living as of 8/13/2018    None           Social Documentation as of 8/13/2018      Source: Provider           Occupational as of 8/13/2018    None           Socioeconomic as of 8/13/2018     Marital Status Spouse Name Number of Children Years Education Education Level Preferred Language Ethnicity Race Source     -- -- -- -- English /Black Black or  Provider    Financial Resource Strain Food Insecurity: Worry Food Insecurity: Inability Transportation Needs: Medical Transportation Needs: Non-medical       -- -- -- -- --             Pertinent History     Question Response Comments    Lives with -- --    Place in Birth Order -- --    Lives in -- --    Number of Siblings -- --    Raised by -- --    Legal Involvement -- --    Childhood Trauma -- --    Criminal History of -- --    Financial Status -- --    Highest Level of Education -- --    Does  patient have access to a firearm? -- --     Service -- --    Primary Leisure Activity -- --    Spirituality -- --          Review of patient's allergies indicates:   Allergen Reactions    Shellfish containing products Itching       No current facility-administered medications on file prior to encounter.      Current Outpatient Medications on File Prior to Encounter   Medication Sig    albuterol 90 mcg/actuation inhaler Inhale 2 puffs into the lungs every 6 (six) hours as needed for Wheezing.    benztropine (COGENTIN) 1 MG tablet TK 1 T PO  BID (Patient taking differently: Take 1 mg by mouth 2 (two) times daily. )    bisacodyl (DULCOLAX) 5 mg EC tablet Take 5 mg by mouth daily as needed for Constipation.    cholecalciferol, vitamin D3, 2,000 unit Cap Take 1 capsule (2,000 Units total) by mouth once daily.    cyanocobalamin, vitamin B-12, (VITAMIN B-12 ORAL) Take 1 tablet by mouth daily as needed.    fluticasone (FLONASE) 50 mcg/actuation nasal spray 1 spray by Each Nare route once daily. (Patient taking differently: 1 spray by Each Nare route daily as needed for Allergies. )    labetalol (NORMODYNE) 100 MG tablet TAKE 1 TABLET BY MOUTH TWICE DAILY    pantoprazole (PROTONIX) 40 MG tablet Take 1 tablet (40 mg total) by mouth every 12 (twelve) hours.    risperiDONE (RISPERDAL) 1 MG tablet Take 1 tablet (1 mg total) by mouth once daily. (Patient taking differently: Take 1 tablet by mouth  in the morning and 2 at bedtime)    sodium phosphates (DISPOSABLE ENEMA) 19-7 gram/118 mL Enem Place 1 enema rectally as needed (constipation).     Psychotherapeutics (From admission, onward)    Start     Stop Route Frequency Ordered    08/12/18 2100  QUEtiapine tablet 100 mg      -- Oral Nightly 08/12/18 1553    08/11/18 0112  ramelteon tablet 8 mg      -- Oral Nightly PRN 08/11/18 0112        Review of Systems  Objective:     Vital Signs (Most Recent):  Temp: 98.3 °F (36.8 °C) (08/13/18 1137)  Pulse: 86 (08/13/18  "1137)  Resp: 19 (08/13/18 1137)  BP: 125/85 (08/13/18 1137)  SpO2: 98 % (08/13/18 1137) Vital Signs (24h Range):  Temp:  [97.3 °F (36.3 °C)-98.8 °F (37.1 °C)] 98.3 °F (36.8 °C)  Pulse:  [86-97] 86  Resp:  [16-19] 19  SpO2:  [94 %-98 %] 98 %  BP: (125-150)/(76-92) 125/85     Height: 5' 4" (162.6 cm)  Weight: 70.4 kg (155 lb 3.3 oz)  Body mass index is 26.64 kg/m².      Intake/Output Summary (Last 24 hours) at 8/13/2018 1143  Last data filed at 8/13/2018 1100  Gross per 24 hour   Intake 240 ml   Output 850 ml   Net -610 ml       Physical Exam     Significant Labs: {Results:84986}    Significant Imaging: {Imaging Review:10173}    Assessment/Plan:     Active Diagnoses:    Diagnosis Date Noted POA    PRINCIPAL PROBLEM:  Hypercalcemia [E83.52] 08/03/2018 Yes    Delirium [R41.0] 08/12/2018 Yes    Pathological fracture of vertebrae in neoplastic disease [M84.58XA]  Yes    GERD (gastroesophageal reflux disease) [K21.9] 08/10/2018 Yes    Constipation [K59.00] 08/05/2018 Yes    Closed compression fracture of thoracic vertebra [S22.000A] 08/03/2018 Yes    Multiple myeloma not having achieved remission [C90.00] 08/03/2018 Yes    Anemia [D64.9] 08/03/2018 Yes    Paranoid schizophrenia [F20.0] 08/06/2016 Yes    Other secondary hypertension [I15.8] 05/14/2016 Yes    Asthma [J45.909] 05/14/2016 Yes      Problems Resolved During this Admission:    Diagnosis Date Noted Date Resolved POA    Acute kidney injury [N17.9] 08/03/2018 08/10/2018 Yes        ***    Drew Case   Psychiatry  [facility]  "

## 2018-08-15 ENCOUNTER — OUTPATIENT (OUTPATIENT)
Dept: OUTPATIENT SERVICES | Facility: HOSPITAL | Age: 73
LOS: 1 days | Discharge: HOME | End: 2018-08-15

## 2018-08-15 DIAGNOSIS — Z98.890 OTHER SPECIFIED POSTPROCEDURAL STATES: Chronic | ICD-10-CM

## 2018-08-15 DIAGNOSIS — Z90.49 ACQUIRED ABSENCE OF OTHER SPECIFIED PARTS OF DIGESTIVE TRACT: Chronic | ICD-10-CM

## 2018-08-15 DIAGNOSIS — Z95.828 PRESENCE OF OTHER VASCULAR IMPLANTS AND GRAFTS: Chronic | ICD-10-CM

## 2018-08-15 DIAGNOSIS — Z96.89 PRESENCE OF OTHER SPECIFIED FUNCTIONAL IMPLANTS: Chronic | ICD-10-CM

## 2018-08-15 DIAGNOSIS — D64.9 ANEMIA, UNSPECIFIED: ICD-10-CM

## 2018-11-26 ENCOUNTER — INPATIENT (INPATIENT)
Facility: HOSPITAL | Age: 73
LOS: 3 days | Discharge: HOME HEALTH | End: 2018-11-30
Attending: INTERNAL MEDICINE | Admitting: INTERNAL MEDICINE

## 2018-11-26 VITALS
OXYGEN SATURATION: 98 % | RESPIRATION RATE: 18 BRPM | DIASTOLIC BLOOD PRESSURE: 98 MMHG | SYSTOLIC BLOOD PRESSURE: 140 MMHG | HEART RATE: 83 BPM | TEMPERATURE: 101 F

## 2018-11-26 DIAGNOSIS — Z98.890 OTHER SPECIFIED POSTPROCEDURAL STATES: Chronic | ICD-10-CM

## 2018-11-26 DIAGNOSIS — Z96.89 PRESENCE OF OTHER SPECIFIED FUNCTIONAL IMPLANTS: Chronic | ICD-10-CM

## 2018-11-26 DIAGNOSIS — Z90.49 ACQUIRED ABSENCE OF OTHER SPECIFIED PARTS OF DIGESTIVE TRACT: Chronic | ICD-10-CM

## 2018-11-26 DIAGNOSIS — Z95.828 PRESENCE OF OTHER VASCULAR IMPLANTS AND GRAFTS: Chronic | ICD-10-CM

## 2018-11-26 LAB
ALBUMIN SERPL ELPH-MCNC: 4.1 G/DL — SIGNIFICANT CHANGE UP (ref 3.5–5.2)
ALP SERPL-CCNC: 123 U/L — HIGH (ref 30–115)
ALT FLD-CCNC: 39 U/L — SIGNIFICANT CHANGE UP (ref 0–41)
ANION GAP SERPL CALC-SCNC: 19 MMOL/L — HIGH (ref 7–14)
APTT BLD: 30.7 SEC — SIGNIFICANT CHANGE UP (ref 27–39.2)
AST SERPL-CCNC: 28 U/L — SIGNIFICANT CHANGE UP (ref 0–41)
BASOPHILS # BLD AUTO: 0.05 K/UL — SIGNIFICANT CHANGE UP (ref 0–0.2)
BASOPHILS NFR BLD AUTO: 0.2 % — SIGNIFICANT CHANGE UP (ref 0–1)
BILIRUB SERPL-MCNC: 0.5 MG/DL — SIGNIFICANT CHANGE UP (ref 0.2–1.2)
BLD GP AB SCN SERPL QL: SIGNIFICANT CHANGE UP
BUN SERPL-MCNC: 26 MG/DL — HIGH (ref 10–20)
CALCIUM SERPL-MCNC: 9.4 MG/DL — SIGNIFICANT CHANGE UP (ref 8.5–10.1)
CHLORIDE SERPL-SCNC: 94 MMOL/L — LOW (ref 98–110)
CO2 SERPL-SCNC: 25 MMOL/L — SIGNIFICANT CHANGE UP (ref 17–32)
CREAT SERPL-MCNC: 1.7 MG/DL — HIGH (ref 0.7–1.5)
EOSINOPHIL # BLD AUTO: 0.04 K/UL — SIGNIFICANT CHANGE UP (ref 0–0.7)
EOSINOPHIL NFR BLD AUTO: 0.2 % — SIGNIFICANT CHANGE UP (ref 0–8)
GLUCOSE SERPL-MCNC: 314 MG/DL — HIGH (ref 70–99)
HCT VFR BLD CALC: 39.2 % — LOW (ref 42–52)
HGB BLD-MCNC: 13 G/DL — LOW (ref 14–18)
IMM GRANULOCYTES NFR BLD AUTO: 0.6 % — HIGH (ref 0.1–0.3)
INR BLD: 1.15 RATIO — SIGNIFICANT CHANGE UP (ref 0.65–1.3)
LACTATE SERPL-SCNC: 2 MMOL/L — SIGNIFICANT CHANGE UP (ref 0.5–2.2)
LYMPHOCYTES # BLD AUTO: 1.79 K/UL — SIGNIFICANT CHANGE UP (ref 1.2–3.4)
LYMPHOCYTES # BLD AUTO: 8.3 % — LOW (ref 20.5–51.1)
MCHC RBC-ENTMCNC: 29.5 PG — SIGNIFICANT CHANGE UP (ref 27–31)
MCHC RBC-ENTMCNC: 33.2 G/DL — SIGNIFICANT CHANGE UP (ref 32–37)
MCV RBC AUTO: 89.1 FL — SIGNIFICANT CHANGE UP (ref 80–94)
MONOCYTES # BLD AUTO: 1.61 K/UL — HIGH (ref 0.1–0.6)
MONOCYTES NFR BLD AUTO: 7.4 % — SIGNIFICANT CHANGE UP (ref 1.7–9.3)
NEUTROPHILS # BLD AUTO: 18.02 K/UL — HIGH (ref 1.4–6.5)
NEUTROPHILS NFR BLD AUTO: 83.3 % — HIGH (ref 42.2–75.2)
NRBC # BLD: 0 /100 WBCS — SIGNIFICANT CHANGE UP (ref 0–0)
PLATELET # BLD AUTO: 256 K/UL — SIGNIFICANT CHANGE UP (ref 130–400)
POTASSIUM SERPL-MCNC: 4 MMOL/L — SIGNIFICANT CHANGE UP (ref 3.5–5)
POTASSIUM SERPL-SCNC: 4 MMOL/L — SIGNIFICANT CHANGE UP (ref 3.5–5)
PROT SERPL-MCNC: 7.4 G/DL — SIGNIFICANT CHANGE UP (ref 6–8)
PROTHROM AB SERPL-ACNC: 13.2 SEC — HIGH (ref 9.95–12.87)
RBC # BLD: 4.4 M/UL — LOW (ref 4.7–6.1)
RBC # FLD: 13.2 % — SIGNIFICANT CHANGE UP (ref 11.5–14.5)
SODIUM SERPL-SCNC: 138 MMOL/L — SIGNIFICANT CHANGE UP (ref 135–146)
TYPE + AB SCN PNL BLD: SIGNIFICANT CHANGE UP
WBC # BLD: 21.64 K/UL — HIGH (ref 4.8–10.8)
WBC # FLD AUTO: 21.64 K/UL — HIGH (ref 4.8–10.8)

## 2018-11-26 RX ORDER — ACETAMINOPHEN 500 MG
975 TABLET ORAL ONCE
Qty: 0 | Refills: 0 | Status: COMPLETED | OUTPATIENT
Start: 2018-11-26 | End: 2018-11-26

## 2018-11-26 RX ORDER — SODIUM CHLORIDE 9 MG/ML
2200 INJECTION, SOLUTION INTRAVENOUS ONCE
Qty: 0 | Refills: 0 | Status: COMPLETED | OUTPATIENT
Start: 2018-11-26 | End: 2018-11-26

## 2018-11-26 RX ORDER — PIPERACILLIN AND TAZOBACTAM 4; .5 G/20ML; G/20ML
3.38 INJECTION, POWDER, LYOPHILIZED, FOR SOLUTION INTRAVENOUS ONCE
Qty: 0 | Refills: 0 | Status: COMPLETED | OUTPATIENT
Start: 2018-11-26 | End: 2018-11-26

## 2018-11-26 RX ADMIN — SODIUM CHLORIDE 4400 MILLILITER(S): 9 INJECTION, SOLUTION INTRAVENOUS at 23:05

## 2018-11-26 RX ADMIN — Medication 975 MILLIGRAM(S): at 23:03

## 2018-11-26 RX ADMIN — PIPERACILLIN AND TAZOBACTAM 200 GRAM(S): 4; .5 INJECTION, POWDER, LYOPHILIZED, FOR SOLUTION INTRAVENOUS at 23:41

## 2018-11-26 NOTE — ED PROVIDER NOTE - PROGRESS NOTE DETAILS
GI Dr. Dugan aware pt NPO will be seen by GI in the AM for colonoscopy, ian anthony Patient to be admitted to an inpatient floor. Case discussed with and care endorsed to medical admitting resident. Admitting physician notified.

## 2018-11-26 NOTE — ED PROVIDER NOTE - ATTENDING CONTRIBUTION TO CARE
BRBPR x 1 day. LLQ tenderness on exam.   LLQ Abdominal pain most consistent with diverticulitis vs colitis, less likely ischemia (No pain out of proportion), less likely cdiff. 1) LABS/UA/IVF/ABX 2) CT abd/pelvis  3) pain control and anti-emetics as needed 4) reassess 5) surgical consultation if necessary

## 2018-11-26 NOTE — ED PROVIDER NOTE - PHYSICAL EXAMINATION
Physical Exam    Vital Signs: I have reviewed the initial vital signs.  Constitutional: well-nourished, appears stated age, no acute distress  Eyes: PERRLA, EOM intact, RAPD absent, no conjunctival injection, and symmetrical lids.  ENT: Neck supple with no adenopathy, moist MM.  Cardiovascular: regular rate, regular rhythm, well-perfused extremities  Respiratory: unlabored respiratory effort, clear to auscultation bilaterally  Gastrointestinal: soft, +LLQ TTP, no guarding, no rebound TTP, no pulsatile mass  Musculoskeletal: supple neck, no lower extremity edema  Integumentary: warm, dry, no rash  Neurologic: awake, alert, extremities’ motor and sensory functions unchanged from previous CVA with left arm held in flexion and normal motor and sensory function on the right arm.  Psychiatric: A&Ox3

## 2018-11-26 NOTE — ED PROVIDER NOTE - OBJECTIVE STATEMENT
74 yo m pmhx sig for cva, htn, hld, dm reports with 4 gross red bloody bowel movements starting today PTA not associated with abdominal pain. Pt reports chest congestion. Denies CP, SOB, palpitations, n/v, decreased PO intake.    I have reviewed available current nursing and previous documentation of past medical, surgical, family, and/or social history.

## 2018-11-26 NOTE — ED PROVIDER NOTE - NS ED ROS FT
Review of Systems    Constitutional: (+) fever  Eyes/ENT: (-) change in vision, (-) sore throat, (-) ear pain  Cardiovascular: (-) chest pain, (-) palpitation   Respiratory: (-) cough, (-) shortness of breath  Gastrointestinal: (-) abdominal pain (-) vomiting  Musculoskeletal: (-) neck pain, (-) back pain, (-) joint pain  Integumentary: (-) rash, (-) edema  Neurological: (-) headache, (-) altered mental status  Heme/Lymph: (-) easy bruising (-) easy bleeding  Allergic/Immunologic: (-) pruritus

## 2018-11-26 NOTE — ED ADULT NURSE NOTE - OBJECTIVE STATEMENT
Wife Wife states pt has been having rectal bleeding since 4am. Bright red blood noted. Denies pain at this time.

## 2018-11-26 NOTE — ED PROVIDER NOTE - MEDICAL DECISION MAKING DETAILS
Pt presents with brbpr. hd stable. no signs of systemic infection. Found on CT to have colitis. Does not require transfusion given hgb level. GI aware. Admitted to medicine.

## 2018-11-27 LAB
ANION GAP SERPL CALC-SCNC: 16 MMOL/L — HIGH (ref 7–14)
BASOPHILS # BLD AUTO: 0.07 K/UL — SIGNIFICANT CHANGE UP (ref 0–0.2)
BASOPHILS # BLD AUTO: 0.07 K/UL — SIGNIFICANT CHANGE UP (ref 0–0.2)
BASOPHILS NFR BLD AUTO: 0.4 % — SIGNIFICANT CHANGE UP (ref 0–1)
BASOPHILS NFR BLD AUTO: 0.4 % — SIGNIFICANT CHANGE UP (ref 0–1)
BUN SERPL-MCNC: 17 MG/DL — SIGNIFICANT CHANGE UP (ref 10–20)
CALCIUM SERPL-MCNC: 9.6 MG/DL — SIGNIFICANT CHANGE UP (ref 8.5–10.1)
CHLORIDE SERPL-SCNC: 102 MMOL/L — SIGNIFICANT CHANGE UP (ref 98–110)
CO2 SERPL-SCNC: 24 MMOL/L — SIGNIFICANT CHANGE UP (ref 17–32)
CREAT SERPL-MCNC: 1.3 MG/DL — SIGNIFICANT CHANGE UP (ref 0.7–1.5)
EOSINOPHIL # BLD AUTO: 0.18 K/UL — SIGNIFICANT CHANGE UP (ref 0–0.7)
EOSINOPHIL # BLD AUTO: 0.2 K/UL — SIGNIFICANT CHANGE UP (ref 0–0.7)
EOSINOPHIL NFR BLD AUTO: 1 % — SIGNIFICANT CHANGE UP (ref 0–8)
EOSINOPHIL NFR BLD AUTO: 1.2 % — SIGNIFICANT CHANGE UP (ref 0–8)
GLUCOSE BLDC GLUCOMTR-MCNC: 125 MG/DL — HIGH (ref 70–99)
GLUCOSE BLDC GLUCOMTR-MCNC: 135 MG/DL — HIGH (ref 70–99)
GLUCOSE BLDC GLUCOMTR-MCNC: 144 MG/DL — HIGH (ref 70–99)
GLUCOSE SERPL-MCNC: 127 MG/DL — HIGH (ref 70–99)
HCT VFR BLD CALC: 33.5 % — LOW (ref 42–52)
HCT VFR BLD CALC: 35.9 % — LOW (ref 42–52)
HGB BLD-MCNC: 11.3 G/DL — LOW (ref 14–18)
HGB BLD-MCNC: 12.2 G/DL — LOW (ref 14–18)
IMM GRANULOCYTES NFR BLD AUTO: 0.5 % — HIGH (ref 0.1–0.3)
IMM GRANULOCYTES NFR BLD AUTO: 0.6 % — HIGH (ref 0.1–0.3)
LYMPHOCYTES # BLD AUTO: 1.58 K/UL — SIGNIFICANT CHANGE UP (ref 1.2–3.4)
LYMPHOCYTES # BLD AUTO: 1.9 K/UL — SIGNIFICANT CHANGE UP (ref 1.2–3.4)
LYMPHOCYTES # BLD AUTO: 11.4 % — LOW (ref 20.5–51.1)
LYMPHOCYTES # BLD AUTO: 8.5 % — LOW (ref 20.5–51.1)
MCHC RBC-ENTMCNC: 30 PG — SIGNIFICANT CHANGE UP (ref 27–31)
MCHC RBC-ENTMCNC: 30.1 PG — SIGNIFICANT CHANGE UP (ref 27–31)
MCHC RBC-ENTMCNC: 33.7 G/DL — SIGNIFICANT CHANGE UP (ref 32–37)
MCHC RBC-ENTMCNC: 34 G/DL — SIGNIFICANT CHANGE UP (ref 32–37)
MCV RBC AUTO: 88.6 FL — SIGNIFICANT CHANGE UP (ref 80–94)
MCV RBC AUTO: 88.9 FL — SIGNIFICANT CHANGE UP (ref 80–94)
MONOCYTES # BLD AUTO: 1.3 K/UL — HIGH (ref 0.1–0.6)
MONOCYTES # BLD AUTO: 1.5 K/UL — HIGH (ref 0.1–0.6)
MONOCYTES NFR BLD AUTO: 7 % — SIGNIFICANT CHANGE UP (ref 1.7–9.3)
MONOCYTES NFR BLD AUTO: 9 % — SIGNIFICANT CHANGE UP (ref 1.7–9.3)
NEUTROPHILS # BLD AUTO: 12.96 K/UL — HIGH (ref 1.4–6.5)
NEUTROPHILS # BLD AUTO: 15.26 K/UL — HIGH (ref 1.4–6.5)
NEUTROPHILS NFR BLD AUTO: 77.5 % — HIGH (ref 42.2–75.2)
NEUTROPHILS NFR BLD AUTO: 82.5 % — HIGH (ref 42.2–75.2)
PLATELET # BLD AUTO: 204 K/UL — SIGNIFICANT CHANGE UP (ref 130–400)
PLATELET # BLD AUTO: 228 K/UL — SIGNIFICANT CHANGE UP (ref 130–400)
POTASSIUM SERPL-MCNC: 3.8 MMOL/L — SIGNIFICANT CHANGE UP (ref 3.5–5)
POTASSIUM SERPL-SCNC: 3.8 MMOL/L — SIGNIFICANT CHANGE UP (ref 3.5–5)
RBC # BLD: 3.77 M/UL — LOW (ref 4.7–6.1)
RBC # BLD: 4.05 M/UL — LOW (ref 4.7–6.1)
RBC # FLD: 12.9 % — SIGNIFICANT CHANGE UP (ref 11.5–14.5)
RBC # FLD: 13.2 % — SIGNIFICANT CHANGE UP (ref 11.5–14.5)
SODIUM SERPL-SCNC: 142 MMOL/L — SIGNIFICANT CHANGE UP (ref 135–146)
WBC # BLD: 16.72 K/UL — HIGH (ref 4.8–10.8)
WBC # BLD: 18.5 K/UL — HIGH (ref 4.8–10.8)
WBC # FLD AUTO: 16.72 K/UL — HIGH (ref 4.8–10.8)
WBC # FLD AUTO: 18.5 K/UL — HIGH (ref 4.8–10.8)

## 2018-11-27 RX ORDER — CIPROFLOXACIN LACTATE 400MG/40ML
400 VIAL (ML) INTRAVENOUS EVERY 12 HOURS
Qty: 0 | Refills: 0 | Status: DISCONTINUED | OUTPATIENT
Start: 2018-11-27 | End: 2018-11-30

## 2018-11-27 RX ORDER — INSULIN GLARGINE 100 [IU]/ML
30 INJECTION, SOLUTION SUBCUTANEOUS AT BEDTIME
Qty: 0 | Refills: 0 | Status: DISCONTINUED | OUTPATIENT
Start: 2018-11-27 | End: 2018-11-30

## 2018-11-27 RX ORDER — ATORVASTATIN CALCIUM 80 MG/1
40 TABLET, FILM COATED ORAL AT BEDTIME
Qty: 0 | Refills: 0 | Status: DISCONTINUED | OUTPATIENT
Start: 2018-11-27 | End: 2018-11-30

## 2018-11-27 RX ORDER — DEXTROSE 50 % IN WATER 50 %
12.5 SYRINGE (ML) INTRAVENOUS ONCE
Qty: 0 | Refills: 0 | Status: DISCONTINUED | OUTPATIENT
Start: 2018-11-27 | End: 2018-11-30

## 2018-11-27 RX ORDER — NIFEDIPINE 30 MG
60 TABLET, EXTENDED RELEASE 24 HR ORAL DAILY
Qty: 0 | Refills: 0 | Status: DISCONTINUED | OUTPATIENT
Start: 2018-11-27 | End: 2018-11-30

## 2018-11-27 RX ORDER — LEVETIRACETAM 250 MG/1
500 TABLET, FILM COATED ORAL
Qty: 0 | Refills: 0 | Status: DISCONTINUED | OUTPATIENT
Start: 2018-11-27 | End: 2018-11-30

## 2018-11-27 RX ORDER — ASPIRIN/CALCIUM CARB/MAGNESIUM 324 MG
1 TABLET ORAL
Qty: 0 | Refills: 0 | COMMUNITY

## 2018-11-27 RX ORDER — INSULIN LISPRO 100/ML
VIAL (ML) SUBCUTANEOUS
Qty: 0 | Refills: 0 | Status: DISCONTINUED | OUTPATIENT
Start: 2018-11-27 | End: 2018-11-30

## 2018-11-27 RX ORDER — SODIUM CHLORIDE 9 MG/ML
1000 INJECTION INTRAMUSCULAR; INTRAVENOUS; SUBCUTANEOUS
Qty: 0 | Refills: 0 | Status: DISCONTINUED | OUTPATIENT
Start: 2018-11-27 | End: 2018-11-30

## 2018-11-27 RX ORDER — PANTOPRAZOLE SODIUM 20 MG/1
40 TABLET, DELAYED RELEASE ORAL
Qty: 0 | Refills: 0 | Status: DISCONTINUED | OUTPATIENT
Start: 2018-11-27 | End: 2018-11-30

## 2018-11-27 RX ORDER — ASPIRIN AND DIPYRIDAMOLE 25; 200 MG/1; MG/1
1 CAPSULE, EXTENDED RELEASE ORAL
Qty: 0 | Refills: 0 | COMMUNITY

## 2018-11-27 RX ORDER — QUETIAPINE FUMARATE 200 MG/1
1 TABLET, FILM COATED ORAL
Qty: 0 | Refills: 0 | COMMUNITY

## 2018-11-27 RX ORDER — DEXTROSE 50 % IN WATER 50 %
25 SYRINGE (ML) INTRAVENOUS ONCE
Qty: 0 | Refills: 0 | Status: DISCONTINUED | OUTPATIENT
Start: 2018-11-27 | End: 2018-11-30

## 2018-11-27 RX ORDER — METOPROLOL TARTRATE 50 MG
50 TABLET ORAL
Qty: 0 | Refills: 0 | Status: DISCONTINUED | OUTPATIENT
Start: 2018-11-27 | End: 2018-11-30

## 2018-11-27 RX ORDER — ASPIRIN AND DIPYRIDAMOLE 25; 200 MG/1; MG/1
1 CAPSULE, EXTENDED RELEASE ORAL
Qty: 0 | Refills: 0 | Status: DISCONTINUED | OUTPATIENT
Start: 2018-11-27 | End: 2018-11-30

## 2018-11-27 RX ORDER — DEXTROSE 50 % IN WATER 50 %
15 SYRINGE (ML) INTRAVENOUS ONCE
Qty: 0 | Refills: 0 | Status: DISCONTINUED | OUTPATIENT
Start: 2018-11-27 | End: 2018-11-30

## 2018-11-27 RX ORDER — SODIUM CHLORIDE 9 MG/ML
1000 INJECTION, SOLUTION INTRAVENOUS
Qty: 0 | Refills: 0 | Status: DISCONTINUED | OUTPATIENT
Start: 2018-11-27 | End: 2018-11-30

## 2018-11-27 RX ORDER — QUETIAPINE FUMARATE 200 MG/1
25 TABLET, FILM COATED ORAL
Qty: 0 | Refills: 0 | Status: DISCONTINUED | OUTPATIENT
Start: 2018-11-27 | End: 2018-11-30

## 2018-11-27 RX ORDER — METRONIDAZOLE 500 MG
500 TABLET ORAL EVERY 8 HOURS
Qty: 0 | Refills: 0 | Status: DISCONTINUED | OUTPATIENT
Start: 2018-11-27 | End: 2018-11-30

## 2018-11-27 RX ORDER — DIAZEPAM 5 MG
1 TABLET ORAL
Qty: 0 | Refills: 0 | COMMUNITY

## 2018-11-27 RX ORDER — ASPIRIN/CALCIUM CARB/MAGNESIUM 324 MG
81 TABLET ORAL DAILY
Qty: 0 | Refills: 0 | Status: DISCONTINUED | OUTPATIENT
Start: 2018-11-27 | End: 2018-11-27

## 2018-11-27 RX ORDER — GLUCAGON INJECTION, SOLUTION 0.5 MG/.1ML
1 INJECTION, SOLUTION SUBCUTANEOUS ONCE
Qty: 0 | Refills: 0 | Status: DISCONTINUED | OUTPATIENT
Start: 2018-11-27 | End: 2018-11-30

## 2018-11-27 RX ADMIN — QUETIAPINE FUMARATE 25 MILLIGRAM(S): 200 TABLET, FILM COATED ORAL at 16:28

## 2018-11-27 RX ADMIN — Medication 200 MILLIGRAM(S): at 16:26

## 2018-11-27 RX ADMIN — Medication 50 MILLIGRAM(S): at 15:53

## 2018-11-27 RX ADMIN — SODIUM CHLORIDE 75 MILLILITER(S): 9 INJECTION INTRAMUSCULAR; INTRAVENOUS; SUBCUTANEOUS at 10:34

## 2018-11-27 RX ADMIN — Medication 100 MILLIGRAM(S): at 22:01

## 2018-11-27 RX ADMIN — Medication 100 MILLIGRAM(S): at 15:07

## 2018-11-27 RX ADMIN — PANTOPRAZOLE SODIUM 40 MILLIGRAM(S): 20 TABLET, DELAYED RELEASE ORAL at 16:28

## 2018-11-27 RX ADMIN — ATORVASTATIN CALCIUM 40 MILLIGRAM(S): 80 TABLET, FILM COATED ORAL at 22:01

## 2018-11-27 RX ADMIN — Medication 60 MILLIGRAM(S): at 16:28

## 2018-11-27 RX ADMIN — INSULIN GLARGINE 30 UNIT(S): 100 INJECTION, SOLUTION SUBCUTANEOUS at 23:48

## 2018-11-27 RX ADMIN — ASPIRIN AND DIPYRIDAMOLE 1 CAPSULE(S): 25; 200 CAPSULE, EXTENDED RELEASE ORAL at 18:37

## 2018-11-27 RX ADMIN — LEVETIRACETAM 500 MILLIGRAM(S): 250 TABLET, FILM COATED ORAL at 16:27

## 2018-11-27 NOTE — CONSULT NOTE ADULT - SUBJECTIVE AND OBJECTIVE BOX
Chief Complaint:  Patient is a 73y old  Male who presents with a chief complaint of BRBPR of 1 day duration (27 Nov 2018 09:01)      HPI:  72yo man with PMH of DM , HTN , DLD , CAD , multiple CVA with residual dysarthria , and R sided weakness presenting for the above CC. Hx provided by wife over the phone as the patient cannot express himself.  His wife states that she had BRBPR since yesterday , multiple episodes , not related to BM . No associated melena , NO vomiting or nausea , no abdominal pain . NO prior hx of GI bleed . NO fever or chills . Off note that patient is on Asp 325 mg for prior strokes . Wife is unable to provide list of meds now over the phone but son will bring a copy when he comes in         Allergies:  No Known Allergies    Hospital Medications:  ciprofloxacin   IVPB 400 milliGRAM(s) IV Intermittent every 12 hours  dextrose 40% Gel 15 Gram(s) Oral once PRN  dextrose 5%. 1000 milliLiter(s) IV Continuous <Continuous>  dextrose 50% Injectable 12.5 Gram(s) IV Push once  dextrose 50% Injectable 25 Gram(s) IV Push once  dextrose 50% Injectable 25 Gram(s) IV Push once  glucagon  Injectable 1 milliGRAM(s) IntraMuscular once PRN  insulin glargine Injectable (LANTUS) 30 Unit(s) SubCutaneous at bedtime  insulin lispro (HumaLOG) corrective regimen sliding scale   SubCutaneous three times a day before meals  metroNIDAZOLE  IVPB 500 milliGRAM(s) IV Intermittent every 8 hours  pantoprazole    Tablet 40 milliGRAM(s) Oral before breakfast  sodium chloride 0.9%. 1000 milliLiter(s) IV Continuous <Continuous>      PMHX/PSHX:  High blood cholesterol  Cerebrovascular accident (CVA) due to stenosis of left middle cerebral artery  Coronary artery disease without angina pectoris, unspecified vessel or lesion type, unspecified whether native or transplanted heart  Depression, unspecified depression type  Essential hypertension  Controlled type 2 diabetes mellitus without complication, unspecified long term insulin use status  Bradly filter in place  History of penile implant  History of lumbar surgery  H/O umbilical hernia repair  History of appendectomy      Family history:  No pertinent family history in first degree relatives    ROS:   Unable to obtain , patient is dysarthic    PHYSICAL EXAM:   Vital Signs:  Vital Signs Last 24 Hrs  T(C): 36.7 (27 Nov 2018 08:18), Max: 38.2 (26 Nov 2018 17:49)  T(F): 98.1 (27 Nov 2018 08:18), Max: 100.8 (26 Nov 2018 17:49)  HR: 71 (27 Nov 2018 08:18) (71 - 88)  BP: 158/70 (27 Nov 2018 08:18) (136/90 - 158/70)  BP(mean): --  RR: 20 (27 Nov 2018 08:18) (18 - 20)  SpO2: 95% (27 Nov 2018 08:18) (95% - 98%)  Daily     Daily     GENERAL:  Appears stated age, well-groomed, well-nourished, no distress  HEENT:  NC/AT,  conjunctivae clear and pink, no thyromegaly, nodules, adenopathy, no JVD, sclera -anicteric  CHEST:  Full & symmetric excursion, no increased effort, breath sounds clear  HEART:  Regular rhythm, S1, S2, no murmur/rub/S3/S4, no abdominal bruit, no edema  ABDOMEN:  Soft, non-tender, non-distended, normoactive bowel sounds,  no masses ,no hepato-splenomegaly,   EXTEREMITIES:  no cyanosis,clubbing or edema  SKIN:  No rash/erythema/ecchymoses/petechiae/wounds/abscess/warm/dry  NEURO:  Alert, oriented, no asterixis, no tremor, no encephalopathy    LABS:                        13.0   21.64 )-----------( 256      ( 26 Nov 2018 21:18 )             39.2     11-26    138  |  94<L>  |  26<H>  ----------------------------<  314<H>  4.0   |  25  |  1.7<H>    Ca    9.4      26 Nov 2018 21:18    TPro  7.4  /  Alb  4.1  /  TBili  0.5  /  DBili  x   /  AST  28  /  ALT  39  /  AlkPhos  123<H>  11-26    LIVER FUNCTIONS - ( 26 Nov 2018 21:18 )  Alb: 4.1 g/dL / Pro: 7.4 g/dL / ALK PHOS: 123 U/L / ALT: 39 U/L / AST: 28 U/L / GGT: x           PT/INR - ( 26 Nov 2018 21:18 )   PT: 13.20 sec;   INR: 1.15 ratio         PTT - ( 26 Nov 2018 21:18 )  PTT:30.7 sec        Imaging:

## 2018-11-27 NOTE — H&P ADULT - ASSESSMENT
72yo man with PMH of DM , HTN , DLD , CAD , multiple CVA with residual dysarthria , and R sided weakness presenting for BRBPR , found to have acute sigmoid colitis on Ct abdomen     1)Lower GI bleed    Evidence of sigmoid colitis on Ct abdomen     No drop in Hb    -->NPO for now   -->GI evaluation (Dr Gottlieb)  -->IV antibiotics   -->Active T/S , transfuse if Hb<8 or active bleed   -->Hold Aspirin     2)JORDYN     Likely hypovolemic     -->IV hydration   -->Trend creatinine daily  -->Hold ACE     3)Multiple CVA     Hold Aspirin     Resume when cleared by GI     3)DM     HOLd PO antidiabetics     Keep on Lantus and premeals Lispro     #Will confirm home meds when available  #DVT px: SCD   #GI Px : Protonix PO 74yo man with PMH of DM , HTN , DLD , CAD , multiple CVA with residual dysarthria , and R sided weakness presenting for BRBPR , found to have acute sigmoid colitis on Ct abdomen     1)Lower GI bleed    Evidence of sigmoid colitis on Ct abdomen     No drop in Hb    -->NPO for now   -->GI evaluation (Dr Gottlieb)  -->IV antibiotics   -->Active T/S , transfuse if Hb<8 or active bleed   -->Hold Aspirin     2)JORDYN     Likely hypovolemic     No hydronephrosis on Ct pelvis     -->IV hydration   -->Trend creatinine daily  -->Hold ACE     3)Multiple CVA     Hold Aspirin     Resume when cleared by GI     3)DM     HOLd PO antidiabetics     Keep on Lantus and premeals Lispro     #Will confirm home meds when available  #DVT px: SCD   #GI Px : Protonix PO

## 2018-11-27 NOTE — H&P ADULT - HISTORY OF PRESENT ILLNESS
74yo man with PMH of DM , HTN , DLD , CAD , multiple CVA with residual dysarthria , and R sided weakness presenting for the above CC. Hx provided by wife over the phone as the patient cannot express himself.  His wife states that she had BRBPR since yesterday , multiple episodes , not related to BM . No associated melena , NO vomiting or nausea , no abdominal pain . NO prior hx of GI bleed . NO fever or chills . Off note that patient is on Asp 325 mg for prior strokes 74yo man with PMH of DM , HTN , DLD , CAD , multiple CVA with residual dysarthria , and R sided weakness presenting for the above CC. Hx provided by wife over the phone as the patient cannot express himself.  His wife states that she had BRBPR since yesterday , multiple episodes , not related to BM . No associated melena , NO vomiting or nausea , no abdominal pain . NO prior hx of GI bleed . NO fever or chills . Off note that patient is on Asp 325 mg for prior strokes . Wife is unable to provide list of meds now over the phone but son will bring a copy when he comes in

## 2018-11-27 NOTE — H&P ADULT - PSH
Nunnelly filter in place    H/O umbilical hernia repair    History of appendectomy    History of lumbar surgery    History of penile implant

## 2018-11-27 NOTE — H&P ADULT - NSHPPHYSICALEXAM_GEN_ALL_CORE
T(C): 36.7 (11-27-18 @ 08:18), Max: 38.2 (11-26-18 @ 17:49)  HR: 71 (11-27-18 @ 08:18) (71 - 88)  BP: 158/70 (11-27-18 @ 08:18) (136/90 - 158/70)  RR: 20 (11-27-18 @ 08:18) (18 - 20)  SpO2: 95% (11-27-18 @ 08:18) (95% - 98%)    PHYSICAL EXAM:  GENERAL: NAD, well-developed  NECK: Supple, No JVD  CHEST/LUNG: Clear to auscultation bilaterally; No wheeze  HEART: Regular rate and rhythm; No murmurs, rubs, or gallops  ABDOMEN: Soft, LLQ tenderness on palpation, Nondistended; Bowel sounds present  EXTREMITIES:  2+ Peripheral Pulses, No clubbing, cyanosis, or edema

## 2018-11-27 NOTE — CONSULT NOTE ADULT - ASSESSMENT
74yo man with PMH of DM , HTN , DLD , CAD , multiple CVA with residual dysarthria , and R sided weakness presenting for BRBPR , found to have acute sigmoid colitis on Ct abdomen   - Hematochezia secondary to colitis   could be infectious vs ischemic etiology  Bleeding stopped since last night  Plan:  monitor H&H  transfuse appropriately  continue with IV Abx  check stool cx and stool for C diff  can resume aspirin  follow up with Dr Mtz as outpatient  will arrange for colonoscopy as outpatient once colitis resolve  start clear liquid diet and advance as tolerated

## 2018-11-27 NOTE — H&P ADULT - ATTENDING COMMENTS
pt seen jose luis examined independently, I have read and agree with above exam and poa    lower gi bleed,  npo  iv abx  sigmoid colitis  gi eval dr palomino    hold asa  renal dr cox

## 2018-11-27 NOTE — H&P ADULT - NSHPLABSRESULTS_GEN_ALL_CORE
13.0   21.64 )-----------( 256      ( 26 Nov 2018 21:18 )             39.2       11-26    138  |  94<L>  |  26<H>  ----------------------------<  314<H>  4.0   |  25  |  1.7<H>    Ca    9.4      26 Nov 2018 21:18    TPro  7.4  /  Alb  4.1  /  TBili  0.5  /  DBili  x   /  AST  28  /  ALT  39  /  AlkPhos  123<H>  11-26                  PT/INR - ( 26 Nov 2018 21:18 )   PT: 13.20 sec;   INR: 1.15 ratio         PTT - ( 26 Nov 2018 21:18 )  PTT:30.7 sec    Lactate Trend  11-26 @ 22:30 Lactate:2.0             CAPILLARY BLOOD GLUCOSE            < from: CT Abdomen and Pelvis No Cont (11.27.18 @ 01:34) >    Distal descending and proximal sigmoid colitis; no evidence of abscess      < end of copied text >

## 2018-11-28 LAB
ANION GAP SERPL CALC-SCNC: 16 MMOL/L — HIGH (ref 7–14)
BASOPHILS # BLD AUTO: 0.07 K/UL — SIGNIFICANT CHANGE UP (ref 0–0.2)
BASOPHILS NFR BLD AUTO: 0.5 % — SIGNIFICANT CHANGE UP (ref 0–1)
BUN SERPL-MCNC: 9 MG/DL — LOW (ref 10–20)
CALCIUM SERPL-MCNC: 9.1 MG/DL — SIGNIFICANT CHANGE UP (ref 8.5–10.1)
CHLORIDE SERPL-SCNC: 103 MMOL/L — SIGNIFICANT CHANGE UP (ref 98–110)
CO2 SERPL-SCNC: 24 MMOL/L — SIGNIFICANT CHANGE UP (ref 17–32)
CREAT SERPL-MCNC: 1 MG/DL — SIGNIFICANT CHANGE UP (ref 0.7–1.5)
EOSINOPHIL # BLD AUTO: 0.11 K/UL — SIGNIFICANT CHANGE UP (ref 0–0.7)
EOSINOPHIL NFR BLD AUTO: 0.8 % — SIGNIFICANT CHANGE UP (ref 0–8)
ESTIMATED AVERAGE GLUCOSE: 169 MG/DL — HIGH (ref 68–114)
GLUCOSE BLDC GLUCOMTR-MCNC: 137 MG/DL — HIGH (ref 70–99)
GLUCOSE BLDC GLUCOMTR-MCNC: 150 MG/DL — HIGH (ref 70–99)
GLUCOSE BLDC GLUCOMTR-MCNC: 164 MG/DL — HIGH (ref 70–99)
GLUCOSE BLDC GLUCOMTR-MCNC: 249 MG/DL — HIGH (ref 70–99)
GLUCOSE SERPL-MCNC: 170 MG/DL — HIGH (ref 70–99)
HBA1C BLD-MCNC: 7.5 % — HIGH (ref 4–5.6)
HCT VFR BLD CALC: 35.4 % — LOW (ref 42–52)
HGB BLD-MCNC: 11.7 G/DL — LOW (ref 14–18)
IMM GRANULOCYTES NFR BLD AUTO: 0.6 % — HIGH (ref 0.1–0.3)
LYMPHOCYTES # BLD AUTO: 1.29 K/UL — SIGNIFICANT CHANGE UP (ref 1.2–3.4)
LYMPHOCYTES # BLD AUTO: 9 % — LOW (ref 20.5–51.1)
MCHC RBC-ENTMCNC: 29.5 PG — SIGNIFICANT CHANGE UP (ref 27–31)
MCHC RBC-ENTMCNC: 33.1 G/DL — SIGNIFICANT CHANGE UP (ref 32–37)
MCV RBC AUTO: 89.4 FL — SIGNIFICANT CHANGE UP (ref 80–94)
MONOCYTES # BLD AUTO: 1.21 K/UL — HIGH (ref 0.1–0.6)
MONOCYTES NFR BLD AUTO: 8.4 % — SIGNIFICANT CHANGE UP (ref 1.7–9.3)
NEUTROPHILS # BLD AUTO: 11.6 K/UL — HIGH (ref 1.4–6.5)
NEUTROPHILS NFR BLD AUTO: 80.7 % — HIGH (ref 42.2–75.2)
PLATELET # BLD AUTO: 235 K/UL — SIGNIFICANT CHANGE UP (ref 130–400)
POTASSIUM SERPL-MCNC: 3.7 MMOL/L — SIGNIFICANT CHANGE UP (ref 3.5–5)
POTASSIUM SERPL-SCNC: 3.7 MMOL/L — SIGNIFICANT CHANGE UP (ref 3.5–5)
RBC # BLD: 3.96 M/UL — LOW (ref 4.7–6.1)
RBC # FLD: 12.8 % — SIGNIFICANT CHANGE UP (ref 11.5–14.5)
SODIUM SERPL-SCNC: 143 MMOL/L — SIGNIFICANT CHANGE UP (ref 135–146)
WBC # BLD: 14.37 K/UL — HIGH (ref 4.8–10.8)
WBC # FLD AUTO: 14.37 K/UL — HIGH (ref 4.8–10.8)

## 2018-11-28 RX ORDER — HEPARIN SODIUM 5000 [USP'U]/ML
5000 INJECTION INTRAVENOUS; SUBCUTANEOUS EVERY 8 HOURS
Qty: 0 | Refills: 0 | Status: DISCONTINUED | OUTPATIENT
Start: 2018-11-28 | End: 2018-11-30

## 2018-11-28 RX ORDER — LOSARTAN POTASSIUM 100 MG/1
50 TABLET, FILM COATED ORAL DAILY
Qty: 0 | Refills: 0 | Status: DISCONTINUED | OUTPATIENT
Start: 2018-11-28 | End: 2018-11-30

## 2018-11-28 RX ADMIN — ASPIRIN AND DIPYRIDAMOLE 1 CAPSULE(S): 25; 200 CAPSULE, EXTENDED RELEASE ORAL at 05:42

## 2018-11-28 RX ADMIN — Medication 100 MILLIGRAM(S): at 22:47

## 2018-11-28 RX ADMIN — Medication 200 MILLIGRAM(S): at 18:02

## 2018-11-28 RX ADMIN — Medication 50 MILLIGRAM(S): at 18:14

## 2018-11-28 RX ADMIN — PANTOPRAZOLE SODIUM 40 MILLIGRAM(S): 20 TABLET, DELAYED RELEASE ORAL at 08:18

## 2018-11-28 RX ADMIN — Medication 60 MILLIGRAM(S): at 05:42

## 2018-11-28 RX ADMIN — LEVETIRACETAM 500 MILLIGRAM(S): 250 TABLET, FILM COATED ORAL at 18:04

## 2018-11-28 RX ADMIN — QUETIAPINE FUMARATE 25 MILLIGRAM(S): 200 TABLET, FILM COATED ORAL at 05:42

## 2018-11-28 RX ADMIN — ATORVASTATIN CALCIUM 40 MILLIGRAM(S): 80 TABLET, FILM COATED ORAL at 23:29

## 2018-11-28 RX ADMIN — QUETIAPINE FUMARATE 25 MILLIGRAM(S): 200 TABLET, FILM COATED ORAL at 18:03

## 2018-11-28 RX ADMIN — Medication 1: at 18:16

## 2018-11-28 RX ADMIN — Medication 100 MILLIGRAM(S): at 06:43

## 2018-11-28 RX ADMIN — Medication 50 MILLIGRAM(S): at 05:41

## 2018-11-28 RX ADMIN — HEPARIN SODIUM 5000 UNIT(S): 5000 INJECTION INTRAVENOUS; SUBCUTANEOUS at 23:29

## 2018-11-28 RX ADMIN — LEVETIRACETAM 500 MILLIGRAM(S): 250 TABLET, FILM COATED ORAL at 05:42

## 2018-11-28 RX ADMIN — LOSARTAN POTASSIUM 50 MILLIGRAM(S): 100 TABLET, FILM COATED ORAL at 14:56

## 2018-11-28 RX ADMIN — SODIUM CHLORIDE 75 MILLILITER(S): 9 INJECTION INTRAMUSCULAR; INTRAVENOUS; SUBCUTANEOUS at 06:43

## 2018-11-28 RX ADMIN — HEPARIN SODIUM 5000 UNIT(S): 5000 INJECTION INTRAVENOUS; SUBCUTANEOUS at 14:55

## 2018-11-28 RX ADMIN — ASPIRIN AND DIPYRIDAMOLE 1 CAPSULE(S): 25; 200 CAPSULE, EXTENDED RELEASE ORAL at 18:02

## 2018-11-28 RX ADMIN — Medication 200 MILLIGRAM(S): at 05:40

## 2018-11-28 RX ADMIN — Medication 100 MILLIGRAM(S): at 13:50

## 2018-11-28 NOTE — CONSULT NOTE ADULT - SUBJECTIVE AND OBJECTIVE BOX
HPI:  74yo man with PMH of DM , HTN , DLD , CAD , multiple CVA with residual dysarthria , and R sided weakness presenting for the above CC. Hx provided by wife over the phone as the patient cannot express himself.  His wife states that she had BRBPR since yesterday , multiple episodes , not related to BM . No associated melena , NO vomiting or nausea , no abdominal pain . NO prior hx of GI bleed . NO fever or chills . Off note that patient is on Asp 325 mg for prior strokes . Wife is unable to provide list of meds now over the phone but son will bring a copy when he comes in (27 Nov 2018 09:01)      PAST MEDICAL & SURGICAL HISTORY:  High blood cholesterol  Cerebrovascular accident (CVA) due to stenosis of left middle cerebral artery  Coronary artery disease without angina pectoris, unspecified vessel or lesion type, unspecified whether native or transplanted heart  Depression, unspecified depression type  Essential hypertension  Controlled type 2 diabetes mellitus without complication, unspecified long term insulin use status  Medon filter in place  History of penile implant  History of lumbar surgery  H/O umbilical hernia repair  History of appendectomy      Hospital Course:    TODAY'S SUBJECTIVE & REVIEW OF SYMPTOMS:     Constitutional WNL   Cardio WNL   Resp WNL   GI WNL  Heme WNL  Endo WNL  Skin WNL  MSK WNL  Neuro right linda / aphasia  Cognitive WNL  Psych WNL      MEDICATIONS  (STANDING):  atorvastatin 40 milliGRAM(s) Oral at bedtime  ciprofloxacin   IVPB 400 milliGRAM(s) IV Intermittent every 12 hours  dextrose 5%. 1000 milliLiter(s) (50 mL/Hr) IV Continuous <Continuous>  dextrose 50% Injectable 12.5 Gram(s) IV Push once  dextrose 50% Injectable 25 Gram(s) IV Push once  dextrose 50% Injectable 25 Gram(s) IV Push once  dipyridamole 200 mG/aspirin 25 mG 1 Capsule(s) Oral two times a day  heparin  Injectable 5000 Unit(s) SubCutaneous every 8 hours  insulin glargine Injectable (LANTUS) 30 Unit(s) SubCutaneous at bedtime  insulin lispro (HumaLOG) corrective regimen sliding scale   SubCutaneous three times a day before meals  levETIRAcetam 500 milliGRAM(s) Oral two times a day  losartan 50 milliGRAM(s) Oral daily  metoprolol tartrate 50 milliGRAM(s) Oral two times a day  metroNIDAZOLE  IVPB 500 milliGRAM(s) IV Intermittent every 8 hours  NIFEdipine XL 60 milliGRAM(s) Oral daily  pantoprazole    Tablet 40 milliGRAM(s) Oral before breakfast  QUEtiapine 25 milliGRAM(s) Oral two times a day  sodium chloride 0.9%. 1000 milliLiter(s) (75 mL/Hr) IV Continuous <Continuous>    MEDICATIONS  (PRN):  dextrose 40% Gel 15 Gram(s) Oral once PRN Blood Glucose LESS THAN 70 milliGRAM(s)/deciliter  glucagon  Injectable 1 milliGRAM(s) IntraMuscular once PRN Glucose LESS THAN 70 milligrams/deciliter      FAMILY HISTORY:  No pertinent family history in first degree relatives      Allergies    No Known Allergies    Intolerances        SOCIAL HISTORY:    [  ] Etoh  [  ] Smoking  [  ] Substance abuse     Home Environment:  [  ] Home Alone  [ x ] Lives with Family  [  ] Home Health Aid    Dwelling:  [  ] Apartment  [ x ] Private House  [  ] Adult Home  [  ] Skilled Nursing Facility      [  ] Short Term  [  ] Long Term  [x  ] Stairs       Elevator [  ]    FUNCTIONAL STATUS PTA: (Check all that apply)  Ambulation: [   ]Independent    [ x ] Dependent     [  ] Non-Ambulatory  Assistive Device: [  ] SA Cane  [  ]  Q Cane  [  ] Walker  [  ]  Wheelchair  ADL : [  ] Independent  [x  ]  Dependent       Vital Signs Last 24 Hrs  T(C): 36.7 (28 Nov 2018 08:00), Max: 37 (28 Nov 2018 00:15)  T(F): 98 (28 Nov 2018 08:00), Max: 98.6 (28 Nov 2018 00:15)  HR: 78 (28 Nov 2018 08:00) (72 - 91)  BP: 133/70 (28 Nov 2018 08:00) (133/70 - 160/74)  BP(mean): --  RR: 18 (28 Nov 2018 08:00) (18 - 18)  SpO2: 96% (28 Nov 2018 08:00) (96% - 96%)      PHYSICAL EXAM: Alert & awake  GENERAL: NAD, well-groomed, well-developed  HEAD:  Atraumatic, Normocephalic  CHEST/LUNG: Clear   HEART: S1S2+  ABDOMEN: Soft, Nontender  EXTREMITIES:  no calf tenderness    NERVOUS SYSTEM:  Cranial Nerves 2-12 intact [  ] Abnormal  [  ]  ROM: WFL all extremities [  ]  Abnormal [ x ]limited right side  Motor Strength: WFL all extremities  [  ]  Abnormal [ x ]limited right side  Sensation: intact to light touch [  ] Abnormal [  ]  Reflexes: Symmetric [  ]  Abnormal [  ]    FUNCTIONAL STATUS:  Bed Mobility: Independent [  ]  Supervision [  ]  Needs Assistance [x  ]  N/A [  ]  Transfers: Independent [  ]  Supervision [  ]  Needs Assistance [x  ]  N/A [  ]   Ambulation: Independent [  ]  Supervision [  ]  Needs Assistance [x  ]  N/A [  ]  ADL: Independent [  ] Requires Assistance [  ] N/A [  ]      LABS:                        11.7   14.37 )-----------( 235      ( 28 Nov 2018 09:20 )             35.4     11-28    143  |  103  |  9<L>  ----------------------------<  170<H>  3.7   |  24  |  1.0    Ca    9.1      28 Nov 2018 09:20    TPro  7.4  /  Alb  4.1  /  TBili  0.5  /  DBili  x   /  AST  28  /  ALT  39  /  AlkPhos  123<H>  11-26    PT/INR - ( 26 Nov 2018 21:18 )   PT: 13.20 sec;   INR: 1.15 ratio         PTT - ( 26 Nov 2018 21:18 )  PTT:30.7 sec      RADIOLOGY & ADDITIONAL STUDIES:    Assesment:

## 2018-11-28 NOTE — CONSULT NOTE ADULT - ASSESSMENT
IMPRESSION: Rehab of right hemiparesis    PRECAUTIONS: [  ] Cardiac  [  ] Respiratory  [  ] Seizures [  ] Contact Isolation  [  ] Droplet Isolation  [  ] Other    Weight Bearing Status:     RECOMMENDATION:    Out of Bed to Chair     DVT/Decubiti Prophylaxis    REHAB PLAN:     [x   ] Bedside P/T 3-5 times a week   [   ]   Bedside O/T  2-3 times a week             [   ] No Rehab Therapy Indicated                   [   ]  Speech Therapy   Conditioning/ROM                                    ADL  Bed Mobility                                               Conditioning/ROM  Transfers                                                     Bed Mobility  Sitting /Standing Balance                         Transfers                                        Gait Training                                               Sitting/Standing Balance  Stair Training [   ]Applicable                    Home equipment Eval                                                                        Splinting  [   ] Only      GOALS:   ADL   [   ]   Independent                    Transfers  [   ] Independent                          Ambulation  [   ] Independent     [ x   ] With device                            [   ]  CG                                                         [ x  ]  CG                                                                  [ x  ] CG                            [    ] Min A                                                   [   ] Min A                                                              [   ] Min  A          DISCHARGE PLAN:   [   ]  Good candidate for Intensive Rehabilitation/Hospital based                                             Will tolerate 3hrs Intensive Rehab Daily                                       [ x   ]  Short Term Rehab in Skilled Nursing Facility                            vs           [  x  ]  Home with Outpatient or VN services                                         [    ]  Possible Candidate for Intensive Hospital based Rehab

## 2018-11-29 LAB
ANION GAP SERPL CALC-SCNC: 17 MMOL/L — HIGH (ref 7–14)
BASOPHILS # BLD AUTO: 0.07 K/UL — SIGNIFICANT CHANGE UP (ref 0–0.2)
BASOPHILS NFR BLD AUTO: 0.6 % — SIGNIFICANT CHANGE UP (ref 0–1)
BUN SERPL-MCNC: 7 MG/DL — LOW (ref 10–20)
CALCIUM SERPL-MCNC: 9.5 MG/DL — SIGNIFICANT CHANGE UP (ref 8.5–10.1)
CHLORIDE SERPL-SCNC: 101 MMOL/L — SIGNIFICANT CHANGE UP (ref 98–110)
CO2 SERPL-SCNC: 27 MMOL/L — SIGNIFICANT CHANGE UP (ref 17–32)
CREAT SERPL-MCNC: 1.1 MG/DL — SIGNIFICANT CHANGE UP (ref 0.7–1.5)
EOSINOPHIL # BLD AUTO: 0.22 K/UL — SIGNIFICANT CHANGE UP (ref 0–0.7)
EOSINOPHIL NFR BLD AUTO: 1.8 % — SIGNIFICANT CHANGE UP (ref 0–8)
GLUCOSE BLDC GLUCOMTR-MCNC: 108 MG/DL — HIGH (ref 70–99)
GLUCOSE BLDC GLUCOMTR-MCNC: 140 MG/DL — HIGH (ref 70–99)
GLUCOSE BLDC GLUCOMTR-MCNC: 206 MG/DL — HIGH (ref 70–99)
GLUCOSE BLDC GLUCOMTR-MCNC: 250 MG/DL — HIGH (ref 70–99)
GLUCOSE SERPL-MCNC: 110 MG/DL — HIGH (ref 70–99)
HCT VFR BLD CALC: 37.7 % — LOW (ref 42–52)
HGB BLD-MCNC: 12.9 G/DL — LOW (ref 14–18)
IMM GRANULOCYTES NFR BLD AUTO: 0.3 % — SIGNIFICANT CHANGE UP (ref 0.1–0.3)
LYMPHOCYTES # BLD AUTO: 16.4 % — LOW (ref 20.5–51.1)
LYMPHOCYTES # BLD AUTO: 2.02 K/UL — SIGNIFICANT CHANGE UP (ref 1.2–3.4)
MAGNESIUM SERPL-MCNC: 1.4 MG/DL — LOW (ref 1.8–2.4)
MCHC RBC-ENTMCNC: 29.9 PG — SIGNIFICANT CHANGE UP (ref 27–31)
MCHC RBC-ENTMCNC: 34.2 G/DL — SIGNIFICANT CHANGE UP (ref 32–37)
MCV RBC AUTO: 87.5 FL — SIGNIFICANT CHANGE UP (ref 80–94)
MONOCYTES # BLD AUTO: 1.27 K/UL — HIGH (ref 0.1–0.6)
MONOCYTES NFR BLD AUTO: 10.3 % — HIGH (ref 1.7–9.3)
NEUTROPHILS # BLD AUTO: 8.73 K/UL — HIGH (ref 1.4–6.5)
NEUTROPHILS NFR BLD AUTO: 70.6 % — SIGNIFICANT CHANGE UP (ref 42.2–75.2)
PLATELET # BLD AUTO: 288 K/UL — SIGNIFICANT CHANGE UP (ref 130–400)
POTASSIUM SERPL-MCNC: 3.4 MMOL/L — LOW (ref 3.5–5)
POTASSIUM SERPL-SCNC: 3.4 MMOL/L — LOW (ref 3.5–5)
RBC # BLD: 4.31 M/UL — LOW (ref 4.7–6.1)
RBC # FLD: 12.6 % — SIGNIFICANT CHANGE UP (ref 11.5–14.5)
SODIUM SERPL-SCNC: 145 MMOL/L — SIGNIFICANT CHANGE UP (ref 135–146)
WBC # BLD: 12.35 K/UL — HIGH (ref 4.8–10.8)
WBC # FLD AUTO: 12.35 K/UL — HIGH (ref 4.8–10.8)

## 2018-11-29 RX ORDER — MAGNESIUM SULFATE 500 MG/ML
2 VIAL (ML) INJECTION ONCE
Qty: 0 | Refills: 0 | Status: COMPLETED | OUTPATIENT
Start: 2018-11-29 | End: 2018-11-29

## 2018-11-29 RX ORDER — POTASSIUM CHLORIDE 20 MEQ
40 PACKET (EA) ORAL ONCE
Qty: 0 | Refills: 0 | Status: COMPLETED | OUTPATIENT
Start: 2018-11-29 | End: 2018-11-29

## 2018-11-29 RX ADMIN — Medication 200 MILLIGRAM(S): at 17:37

## 2018-11-29 RX ADMIN — Medication 40 MILLIEQUIVALENT(S): at 12:45

## 2018-11-29 RX ADMIN — HEPARIN SODIUM 5000 UNIT(S): 5000 INJECTION INTRAVENOUS; SUBCUTANEOUS at 05:28

## 2018-11-29 RX ADMIN — Medication 20 GRAM(S): at 14:23

## 2018-11-29 RX ADMIN — INSULIN GLARGINE 30 UNIT(S): 100 INJECTION, SOLUTION SUBCUTANEOUS at 00:13

## 2018-11-29 RX ADMIN — Medication 200 MILLIGRAM(S): at 05:39

## 2018-11-29 RX ADMIN — LEVETIRACETAM 500 MILLIGRAM(S): 250 TABLET, FILM COATED ORAL at 17:09

## 2018-11-29 RX ADMIN — Medication 50 MILLIGRAM(S): at 17:09

## 2018-11-29 RX ADMIN — Medication 100 MILLIGRAM(S): at 06:23

## 2018-11-29 RX ADMIN — Medication 100 MILLIGRAM(S): at 14:30

## 2018-11-29 RX ADMIN — ASPIRIN AND DIPYRIDAMOLE 1 CAPSULE(S): 25; 200 CAPSULE, EXTENDED RELEASE ORAL at 17:09

## 2018-11-29 RX ADMIN — QUETIAPINE FUMARATE 25 MILLIGRAM(S): 200 TABLET, FILM COATED ORAL at 17:09

## 2018-11-29 RX ADMIN — Medication 2: at 12:44

## 2018-11-29 RX ADMIN — HEPARIN SODIUM 5000 UNIT(S): 5000 INJECTION INTRAVENOUS; SUBCUTANEOUS at 14:25

## 2018-11-29 RX ADMIN — QUETIAPINE FUMARATE 25 MILLIGRAM(S): 200 TABLET, FILM COATED ORAL at 05:28

## 2018-11-29 NOTE — PHYSICAL THERAPY INITIAL EVALUATION ADULT - IMPAIRMENTS CONTRIBUTING TO GAIT DEVIATIONS, PT EVAL
impaired coordination/decreased strength/impaired balance/narrow base of support/impaired postural control

## 2018-11-29 NOTE — PHYSICAL THERAPY INITIAL EVALUATION ADULT - PLANNED THERAPY INTERVENTIONS, PT EVAL
gait training/postural re-education/ROM/balance training/bed mobility training/strengthening/transfer training

## 2018-11-29 NOTE — PHYSICAL THERAPY INITIAL EVALUATION ADULT - PERTINENT HX OF CURRENT PROBLEM, REHAB EVAL
72yo man with PMH of DM , HTN , DLD , CAD , multiple CVA with residual dysarthria , and R sided weakness presenting for the above CC

## 2018-11-29 NOTE — PHYSICAL THERAPY INITIAL EVALUATION ADULT - GAIT DEVIATIONS NOTED, PT EVAL
decreased swing-to-stance ratio/increased time in double stance/decreased velocity of limb motion/Pt very unsteady with posterior loss of balance, poor balance reactions, uncoordinated foot placement, R knee buckling/decreased step length/decreased stride length/decreased weight-shifting ability

## 2018-11-29 NOTE — PROGRESS NOTE ADULT - ASSESSMENT
74yo man with PMH of DM , HTN , DLD , CAD , multiple CVA with residual dysarthria , and R sided weakness presenting for BRBPR , found to have acute sigmoid colitis on Ct abdomen     1- Hematochezia   resolved  no signs of active bleeding  h/h stable   tolerating diet    on aggrenox     -on cipro and flagyl   -monitor for any signs of bleeding   -follow up with Dr Mtz as outpatient for colonoscopy
72yo man with PMH of DM , HTN , DLD , CAD , multiple CVA with residual dysarthria , and R sided weakness presenting for fresh blood per rectum.    **hematochezia 2/2 to colitis  -Most probably infectious  -Evidence of sigmoid colitis on Ct abdomen   -hb trend: 13 - 12.2 - 11.3 - 11.7  -GI note appreciated  -continue ciprofloxacin and flagyl    -stool cound, culture and Cdiff pending to be collected, no BM  -Colonoscopy as OP / dr Mtz    **JORDYN: resolved  -Likely hypovolemic: s/p IV hyration  -No hydronephrosis on Ct pelvis     **Multiple CVA   -on aggrenox and statin    **DM   -on lantus 30 + lispro corrective scale  -FS controlled    **HTN  -continue metoprolol + nifedipine  -resume losartan since cr is back to normal and BP is uncontrolled    #DVT px: will start heparin s/c  #GI Px : Protonix PO
74yo man with PMH of DM , HTN , DLD , CAD , multiple CVA with residual dysarthria , and R sided weakness presenting for fresh blood per rectum.    # BRPR 2/2 to colitis  - Most probably infectious. WBC trending down now 12.35.  - Evidence of sigmoid colitis on Ct abdomen   - Hb trend: 13 - 12.2 - 11.3 - 11.7 -12.6. Stable  - Ciprofloxacin and Flagyl - message left with [facility] office on duration and if okay to transition to PO.   - stool cound, culture and Cdiff pending to be collected, no BM  -Colonoscopy as OP / dr Mtz    # JORDYN: resolved - Likely hypovolemic: s/p IV hydration / No hydronephrosis on Ct pelvis   # Multiple CVA  -on Aggrenox and statin. Hold   # DM  -on lantus 30 + lispro corrective scale -FS controlled  # HTN -continue metoprolol + nifedipine and resume losartan since cr is back to normal and BP is uncontrolled  # GI PPx - (X)Protonix   # DVT PPx - (X) Heparin 5000 mg SubQ     # Activity -  (X) Increase as Tolerated  () OOB w/ assist  () Bed Rest  # Dispo -  SNF pending case management placement.   # Code Status - (X) FULL    () DNR / DNI    (X) Discussed Case and Plan with the Medical Attending.  Please call Dr. Gongora with any questions/ recommendations: Spectra #[phone]
yo man with PMH of DM , HTN , DLD , CAD , multiple CVA with residual dysarthria , and R sided weakness presenting for fresh blood per rectum.    # BRPR 2/2 to colitis  - Most probably infectious. WBC trending down now 12.35.  - Evidence of sigmoid colitis on Ct abdomen   - Hb trend: 13 - 12.2 - 11.3 - 11.7 -12.6. Stable  - Ciprofloxacin and Flagyl - message left with [facility] on duration and if okay to transition to PO.   - stool cound, culture and Cdiff pending to be collected, no BM  -Colonoscopy as OP / dr Mtz  advance po    # JORDYN: resolved - Likely hypovolemic: s/p IV hydration / No hydronephrosis on Ct pelvis   # Multiple CVA  -on Aggrenox and statin. Hold   # DM  -on lantus 30 + lispro corrective scale -FS controlled  # HTN -continue metoprolol + nifedipine and resume losartan since cr is back to normal and BP is uncontrolled  # GI PPx - (X)Protonix   # DVT PPx - (X) Heparin 5000 mg SubQ     # Activity -  (X) Increase as Tolerated  () OOB w/ assist  () Bed Rest  # Dispo -  SNF pending case management placemen

## 2018-11-29 NOTE — PHYSICAL THERAPY INITIAL EVALUATION ADULT - AMBULATION SKILLS, REHAB EVAL
Appears to be quad cane for short distance as per pt description, wheelchair/needs device/needed assist

## 2018-11-30 ENCOUNTER — TRANSCRIPTION ENCOUNTER (OUTPATIENT)
Age: 73
End: 2018-11-30

## 2018-11-30 VITALS
TEMPERATURE: 98 F | RESPIRATION RATE: 18 BRPM | DIASTOLIC BLOOD PRESSURE: 62 MMHG | SYSTOLIC BLOOD PRESSURE: 139 MMHG | HEART RATE: 77 BPM

## 2018-11-30 LAB
ANION GAP SERPL CALC-SCNC: 14 MMOL/L — SIGNIFICANT CHANGE UP (ref 7–14)
BUN SERPL-MCNC: 9 MG/DL — LOW (ref 10–20)
CALCIUM SERPL-MCNC: 9 MG/DL — SIGNIFICANT CHANGE UP (ref 8.5–10.1)
CHLORIDE SERPL-SCNC: 100 MMOL/L — SIGNIFICANT CHANGE UP (ref 98–110)
CO2 SERPL-SCNC: 27 MMOL/L — SIGNIFICANT CHANGE UP (ref 17–32)
CREAT SERPL-MCNC: 1.1 MG/DL — SIGNIFICANT CHANGE UP (ref 0.7–1.5)
GLUCOSE BLDC GLUCOMTR-MCNC: 116 MG/DL — HIGH (ref 70–99)
GLUCOSE BLDC GLUCOMTR-MCNC: 180 MG/DL — HIGH (ref 70–99)
GLUCOSE BLDC GLUCOMTR-MCNC: 203 MG/DL — HIGH (ref 70–99)
GLUCOSE SERPL-MCNC: 111 MG/DL — HIGH (ref 70–99)
HCT VFR BLD CALC: 36.5 % — LOW (ref 42–52)
HGB BLD-MCNC: 12 G/DL — LOW (ref 14–18)
MAGNESIUM SERPL-MCNC: 1.8 MG/DL — SIGNIFICANT CHANGE UP (ref 1.8–2.4)
MCHC RBC-ENTMCNC: 29.3 PG — SIGNIFICANT CHANGE UP (ref 27–31)
MCHC RBC-ENTMCNC: 32.9 G/DL — SIGNIFICANT CHANGE UP (ref 32–37)
MCV RBC AUTO: 89 FL — SIGNIFICANT CHANGE UP (ref 80–94)
NRBC # BLD: 0 /100 WBCS — SIGNIFICANT CHANGE UP (ref 0–0)
PHOSPHATE SERPL-MCNC: 3.5 MG/DL — SIGNIFICANT CHANGE UP (ref 2.1–4.9)
PLATELET # BLD AUTO: 279 K/UL — SIGNIFICANT CHANGE UP (ref 130–400)
POTASSIUM SERPL-MCNC: 3.8 MMOL/L — SIGNIFICANT CHANGE UP (ref 3.5–5)
POTASSIUM SERPL-SCNC: 3.8 MMOL/L — SIGNIFICANT CHANGE UP (ref 3.5–5)
RBC # BLD: 4.1 M/UL — LOW (ref 4.7–6.1)
RBC # FLD: 12.7 % — SIGNIFICANT CHANGE UP (ref 11.5–14.5)
SODIUM SERPL-SCNC: 141 MMOL/L — SIGNIFICANT CHANGE UP (ref 135–146)
WBC # BLD: 11.3 K/UL — HIGH (ref 4.8–10.8)
WBC # FLD AUTO: 11.3 K/UL — HIGH (ref 4.8–10.8)

## 2018-11-30 RX ORDER — METRONIDAZOLE 500 MG
1 TABLET ORAL
Qty: 21 | Refills: 0 | OUTPATIENT
Start: 2018-11-30 | End: 2018-12-06

## 2018-11-30 RX ORDER — MOXIFLOXACIN HYDROCHLORIDE TABLETS, 400 MG 400 MG/1
1 TABLET, FILM COATED ORAL
Qty: 14 | Refills: 0 | OUTPATIENT
Start: 2018-11-30 | End: 2018-12-06

## 2018-11-30 RX ORDER — MAGNESIUM SULFATE 500 MG/ML
1 VIAL (ML) INJECTION ONCE
Qty: 0 | Refills: 0 | Status: COMPLETED | OUTPATIENT
Start: 2018-11-30 | End: 2018-11-30

## 2018-11-30 RX ADMIN — Medication 200 MILLIGRAM(S): at 06:15

## 2018-11-30 RX ADMIN — Medication 100 MILLIGRAM(S): at 06:15

## 2018-11-30 RX ADMIN — HEPARIN SODIUM 5000 UNIT(S): 5000 INJECTION INTRAVENOUS; SUBCUTANEOUS at 06:16

## 2018-11-30 RX ADMIN — QUETIAPINE FUMARATE 25 MILLIGRAM(S): 200 TABLET, FILM COATED ORAL at 17:28

## 2018-11-30 RX ADMIN — HEPARIN SODIUM 5000 UNIT(S): 5000 INJECTION INTRAVENOUS; SUBCUTANEOUS at 13:10

## 2018-11-30 RX ADMIN — Medication 50 MILLIGRAM(S): at 06:16

## 2018-11-30 RX ADMIN — Medication 200 MILLIGRAM(S): at 17:19

## 2018-11-30 RX ADMIN — Medication 100 GRAM(S): at 15:15

## 2018-11-30 RX ADMIN — ASPIRIN AND DIPYRIDAMOLE 1 CAPSULE(S): 25; 200 CAPSULE, EXTENDED RELEASE ORAL at 06:16

## 2018-11-30 RX ADMIN — Medication 50 MILLIGRAM(S): at 17:28

## 2018-11-30 RX ADMIN — QUETIAPINE FUMARATE 25 MILLIGRAM(S): 200 TABLET, FILM COATED ORAL at 06:16

## 2018-11-30 RX ADMIN — LOSARTAN POTASSIUM 50 MILLIGRAM(S): 100 TABLET, FILM COATED ORAL at 06:16

## 2018-11-30 RX ADMIN — ATORVASTATIN CALCIUM 40 MILLIGRAM(S): 80 TABLET, FILM COATED ORAL at 00:37

## 2018-11-30 RX ADMIN — Medication 1: at 12:34

## 2018-11-30 RX ADMIN — LEVETIRACETAM 500 MILLIGRAM(S): 250 TABLET, FILM COATED ORAL at 17:27

## 2018-11-30 RX ADMIN — INSULIN GLARGINE 30 UNIT(S): 100 INJECTION, SOLUTION SUBCUTANEOUS at 00:38

## 2018-11-30 RX ADMIN — ASPIRIN AND DIPYRIDAMOLE 1 CAPSULE(S): 25; 200 CAPSULE, EXTENDED RELEASE ORAL at 17:27

## 2018-11-30 RX ADMIN — Medication 100 MILLIGRAM(S): at 13:08

## 2018-11-30 RX ADMIN — Medication 100 MILLIGRAM(S): at 00:39

## 2018-11-30 RX ADMIN — Medication 60 MILLIGRAM(S): at 06:15

## 2018-11-30 RX ADMIN — Medication 2: at 17:28

## 2018-11-30 RX ADMIN — LEVETIRACETAM 500 MILLIGRAM(S): 250 TABLET, FILM COATED ORAL at 06:16

## 2018-11-30 RX ADMIN — HEPARIN SODIUM 5000 UNIT(S): 5000 INJECTION INTRAVENOUS; SUBCUTANEOUS at 01:32

## 2018-11-30 RX ADMIN — PANTOPRAZOLE SODIUM 40 MILLIGRAM(S): 20 TABLET, DELAYED RELEASE ORAL at 06:15

## 2018-11-30 NOTE — SWALLOW BEDSIDE ASSESSMENT ADULT - ASR SWALLOW ASPIRATION MONITOR
cough/upper respiratory infection/gurgly voice/pneumonia/position upright (90Y)/fever
gurgly voice/upper respiratory infection/position upright (90Y)/fever/cough/pneumonia

## 2018-11-30 NOTE — SWALLOW BEDSIDE ASSESSMENT ADULT - SLP GENERAL OBSERVATIONS
Pt received in bed awake and alert on room air, aphasic at baseline
Pt received in bed awake and alert on room air, aphasic , dysarthric at baseline

## 2018-11-30 NOTE — DISCHARGE NOTE ADULT - PATIENT PORTAL LINK FT
You can access the [facility] Patient Portal, offered by [facility], by registering with the following website: [email or web address]

## 2018-11-30 NOTE — PROGRESS NOTE ADULT - SUBJECTIVE AND OBJECTIVE BOX
INTERVAL HPI/OVERNIGHT EVENTS:    74yo man with PMH of DM , HTN , DLD , CAD , multiple CVA with residual dysarthria , and R sided weakness presenting for the above CC. Hx provided by wife over the phone as the patient cannot express himself.  His wife states that she had BRBPR since yesterday , multiple episodes , not related to BM . No associated melena , NO vomiting or nausea , no abdominal pain . NO prior hx of GI bleed . NO fever or chills . Off note that patient is on Asp 325 mg for prior strokes . Wife is unable to provide list of meds now over the phone but son will bring a copy when he comes in     11/29 tolerating diet, no rectal bleeding, no bms     PAST MEDICAL & SURGICAL HISTORY:  High blood cholesterol  Cerebrovascular accident (CVA) due to stenosis of left middle cerebral artery  Coronary artery disease without angina pectoris, unspecified vessel or lesion type, unspecified whether native or transplanted heart  Depression, unspecified depression type  Essential hypertension  Controlled type 2 diabetes mellitus without complication, unspecified long term insulin use status  Cameron filter in place  History of penile implant  History of lumbar surgery  H/O umbilical hernia repair  History of appendectomy      Home Medications:  Aggrenox 25 mg-200 mg oral capsule, extended release: 1 cap(s) orally 2 times a day (27 Nov 2018 15:03)  atorvastatin 40 mg oral tablet: 1 tab(s) orally once a day (at bedtime) (17 Jul 2018 11:16)  levETIRAcetam 500 mg oral tablet: 1 tab(s) orally 2 times a day (27 Nov 2018 15:03)  losartan 50 mg oral tablet: 1 tab(s) orally once a day (17 Jul 2018 11:16)  metFORMIN 1000 mg oral tablet: 1 tab(s) orally 2 times a day (27 Nov 2018 15:05)  metoprolol tartrate 50 mg oral tablet: 1 tab(s) orally 2 times a day (17 Jul 2018 11:16)  NIFEdipine 60 mg oral tablet, extended release: 1 tab(s) orally once a day (17 Jul 2018 11:16)  pantoprazole 40 mg oral delayed release tablet: 1 tab(s) orally once a day (before a meal) (17 Jul 2018 11:16)  SEROquel 25 mg oral tablet: 1 tab(s) orally 2 times a day (27 Nov 2018 15:07)  Toujeo SoloStar 300 units/mL subcutaneous solution: 30 unit(s) subcutaneous once (at bedtime) (14 Jul 2018 17:41)  Valium 10 mg oral tablet: 1 tab(s) orally 2 times a day, As Needed for anxiety (27 Nov 2018 15:08)      MEDICATIONS  (STANDING):  atorvastatin 40 milliGRAM(s) Oral at bedtime  ciprofloxacin   IVPB 400 milliGRAM(s) IV Intermittent every 12 hours  dextrose 5%. 1000 milliLiter(s) (50 mL/Hr) IV Continuous <Continuous>  dextrose 50% Injectable 12.5 Gram(s) IV Push once  dextrose 50% Injectable 25 Gram(s) IV Push once  dextrose 50% Injectable 25 Gram(s) IV Push once  dipyridamole 200 mG/aspirin 25 mG 1 Capsule(s) Oral two times a day  heparin  Injectable 5000 Unit(s) SubCutaneous every 8 hours  insulin glargine Injectable (LANTUS) 30 Unit(s) SubCutaneous at bedtime  insulin lispro (HumaLOG) corrective regimen sliding scale   SubCutaneous three times a day before meals  levETIRAcetam 500 milliGRAM(s) Oral two times a day  losartan 50 milliGRAM(s) Oral daily  magnesium sulfate  IVPB 2 Gram(s) IV Intermittent once  metoprolol tartrate 50 milliGRAM(s) Oral two times a day  metroNIDAZOLE  IVPB 500 milliGRAM(s) IV Intermittent every 8 hours  NIFEdipine XL 60 milliGRAM(s) Oral daily  pantoprazole    Tablet 40 milliGRAM(s) Oral before breakfast  potassium chloride    Tablet ER 40 milliEquivalent(s) Oral once  QUEtiapine 25 milliGRAM(s) Oral two times a day  sodium chloride 0.9%. 1000 milliLiter(s) (75 mL/Hr) IV Continuous <Continuous>    MEDICATIONS  (PRN):  dextrose 40% Gel 15 Gram(s) Oral once PRN Blood Glucose LESS THAN 70 milliGRAM(s)/deciliter  glucagon  Injectable 1 milliGRAM(s) IntraMuscular once PRN Glucose LESS THAN 70 milligrams/deciliter      Allergies    No Known Allergies    Intolerances        Review of systems  General: mild fatigue   Skin: no rash   Ophtalmologic: no visual changes   Respiratory: no shortness of breath   Cardiovascular: no chest pain   Gastrointestinal: as per H&P   Genitourinary: no dysuria   Neurological: no weakness   otherwise as described above     PHYSICAL EXAM:   Vital Signs:  Vital Signs Last 24 Hrs  T(C): 37.3 (29 Nov 2018 04:56), Max: 37.3 (29 Nov 2018 04:56)  T(F): 99.1 (29 Nov 2018 04:56), Max: 99.1 (29 Nov 2018 04:56)  HR: 84 (29 Nov 2018 07:02) (84 - 98)  BP: 139/66 (29 Nov 2018 04:56) (126/59 - 168/74)  BP(mean): --  RR: 18 (29 Nov 2018 04:56) (18 - 18)  SpO2: 96% (29 Nov 2018 07:02) (92% - 96%)  Daily     Daily     GENERAL:  no distress  SKIN: intact  HEENT:  NC/AT,  anicteric  CHEST:   no increased effort, breath sounds clear  HEART:  Regular rhythm  ABDOMEN:  Soft, non-tender, non-distended, normoactive bowel sounds,  no masses ,no hepato-splenomegaly, no signs of chronic liver disease  EXTREMITIES:  no cyanosis  PSYCHIATRIC: conscious       LABS:                        12.9   12.35 )-----------( 288      ( 29 Nov 2018 08:48 )             37.7       Hemoglobin: 12.9 g/dL (11-29-18 @ 08:48)  Hemoglobin: 11.7 g/dL (11-28-18 @ 09:20)  Hemoglobin: 11.3 g/dL (11-27-18 @ 21:30)  Hemoglobin: 12.2 g/dL (11-27-18 @ 12:37)  Hemoglobin: 13.0 g/dL (11-26-18 @ 21:18)      11-29    145  |  101  |  7<L>  ----------------------------<  110<H>  3.4<L>   |  27  |  1.1    Ca    9.5      29 Nov 2018 08:48  Mg     1.4     11-29        INR: 1.15 ratio (11-26-18 @ 21:18)      Aspartate Aminotransferase (AST/SGOT): 28 U/L (11-26-18 @ 21:18)  Alanine Aminotransferase (ALT/SGPT): 39 U/L (11-26-18 @ 21:18)  Alkaline Phosphatase, Serum: 123 U/L (11-26-18 @ 21:18)            RADIOLOGY & ADDITIONAL TESTS:
DAILY PROGRESS NOTE  ===========================================================  Patient Information:   Hospital Day:</RISHI HERRERA  /  73y  /  Male  /b> 2d /  MRN#: [id number]  Working / Admitting Diagnosis:  1. Acute sigmoid Colitis    |:::::::::::::::::::::::::::::| SUBJECTIVE |:::::::::::::::::::::::::::::::|  OVERNIGHT EVENTS:   Patient is a 73y old Male who presents with a chief complaint of BRBPR of 1 day duration   Today is hospital day 2d. This morning he is resting comfortably in bed and reports no pain and no BM overnight. the nurse also reports no events overnight.       Past Medical History:   High blood cholesterol  Cerebrovascular accident (CVA) due to stenosis of left middle cerebral artery  Coronary artery disease without angina pectoris, unspecified vessel or lesion type, unspecified whether native or transplanted heart  Depression, unspecified depression type  Essential hypertension  Controlled type 2 diabetes mellitus without complication, unspecified long term insulin use status  Bradly filter in place  History of penile implant  History of lumbar surgery  H/O umbilical hernia repair  History of appendectomy    ALLERGIES:  No Known Allergies    HOME MEDICATIONS:  Aggrenox 25 mg-200 mg oral capsule, extended release: 1 cap(s) orally 2 times a day (27 Nov 2018 15:03)  atorvastatin 40 mg oral tablet: 1 tab(s) orally once a day (at bedtime) (17 Jul 2018 11:16)  levETIRAcetam 500 mg oral tablet: 1 tab(s) orally 2 times a day (27 Nov 2018 15:03)  losartan 50 mg oral tablet: 1 tab(s) orally once a day (17 Jul 2018 11:16)  metFORMIN 1000 mg oral tablet: 1 tab(s) orally 2 times a day (27 Nov 2018 15:05)  metoprolol tartrate 50 mg oral tablet: 1 tab(s) orally 2 times a day (17 Jul 2018 11:16)  NIFEdipine 60 mg oral tablet, extended release: 1 tab(s) orally once a day (17 Jul 2018 11:16)  pantoprazole 40 mg oral delayed release tablet: 1 tab(s) orally once a day (before a meal) (17 Jul 2018 11:16)  SEROquel 25 mg oral tablet: 1 tab(s) orally 2 times a day (27 Nov 2018 15:07)  Toujeo SoloStar 300 units/mL subcutaneous solution: 30 unit(s) subcutaneous once (at bedtime) (14 Jul 2018 17:41)  Valium 10 mg oral tablet: 1 tab(s) orally 2 times a day, As Needed for anxiety (27 Nov 2018 15:08)    |:::::::::::::::::::::::::::| OBJECTIVE |:::::::::::::::::::::::::::|    VITAL SIGNS: Last 24 Hours  T(C): 37.3 (29 Nov 2018 04:56), Max: 37.3 (29 Nov 2018 04:56)  T(F): 99.1 (29 Nov 2018 04:56), Max: 99.1 (29 Nov 2018 04:56)  HR: 84 (29 Nov 2018 07:02) (84 - 98)  BP: 139/66 (29 Nov 2018 04:56) (126/59 - 168/74)  RR: 18 (29 Nov 2018 04:56) (18 - 18)  SpO2: 96% (29 Nov 2018 07:02) (92% - 96%)    11-28-18 @ 07:01  -  11-29-18 @ 07:00  --------------------------------------------------------  IN: 0 mL / OUT: 600 mL / NET: -600 mL    PHYSICAL EXAM:  GEN: No acute distress  LUNGS: Clear to anterior auscultation bilaterally   HEART: S1/S2 present. RRR.   ABD: Soft, non-tender, non-distended. Bowel sounds present  NEURO: dysarthric, right sided weakness and rigidity.     Guerrero cathter: condom catheter is in place.    LAB RESULTS:                        12.9   12.35 )-----------( 288      ( 29 Nov 2018 08:48 )             37.7     11-29    145  |  101  |  7<L>  ----------------------------<  110<H>  3.4<L>   |  27  |  1.1    Ca    9.5      29 Nov 2018 08:48  Mg     1.4     11-29    MICROBIOLOGY:  Culture - Blood (collected 27 Nov 2018 12:37)  Source: .Blood None  Preliminary Report (28 Nov 2018 18:03):    No growth to date.    RADIOLOGY:  < from: CT Abdomen and Pelvis No Cont (11.27.18 @ 01:34) >  Distal descending and proximal sigmoid colitis; no evidence of abscess    INPATIENT MEDICATIONS:  atorvastatin 40 milliGRAM(s) Oral at bedtime  ciprofloxacin   IVPB 400 milliGRAM(s) IV Intermittent every 12 hours  dextrose 5%. 1000 milliLiter(s) IV Continuous <Continuous>  dextrose 50% Injectable 12.5 Gram(s) IV Push once  dextrose 50% Injectable 25 Gram(s) IV Push once  dextrose 50% Injectable 25 Gram(s) IV Push once  dipyridamole 200 mG/aspirin 25 mG 1 Capsule(s) Oral two times a day  heparin  Injectable 5000 Unit(s) SubCutaneous every 8 hours  insulin glargine Injectable (LANTUS) 30 Unit(s) SubCutaneous at bedtime  insulin lispro (HumaLOG) corrective regimen sliding scale   SubCutaneous three times a day before meals  levETIRAcetam 500 milliGRAM(s) Oral two times a day  losartan 50 milliGRAM(s) Oral daily  metoprolol tartrate 50 milliGRAM(s) Oral two times a day  metroNIDAZOLE  IVPB 500 milliGRAM(s) IV Intermittent every 8 hours  NIFEdipine XL 60 milliGRAM(s) Oral daily  pantoprazole    Tablet 40 milliGRAM(s) Oral before breakfast  QUEtiapine 25 milliGRAM(s) Oral two times a day  sodium chloride 0.9%. 1000 milliLiter(s) IV Continuous <Continuous>    PRN MEDICATIONS  dextrose 40% Gel 15 Gram(s) Oral once PRN  glucagon  Injectable 1 milliGRAM(s) IntraMuscular once PRN    ------------------------------------------------------------------------------------------------------------
Patient was seen and examined. Spoke with RN. Chart reviewed.    No events overnight.  Vital Signs Last 24 Hrs  T(F): 99.1 (29 Nov 2018 04:56), Max: 99.1 (29 Nov 2018 04:56)  HR: 84 (29 Nov 2018 07:02) (84 - 98)  BP: 139/66 (29 Nov 2018 04:56) (126/59 - 168/74)  SpO2: 96% (29 Nov 2018 07:02) (92% - 96%)  MEDICATIONS  (STANDING):  atorvastatin 40 milliGRAM(s) Oral at bedtime  ciprofloxacin   IVPB 400 milliGRAM(s) IV Intermittent every 12 hours  dextrose 5%. 1000 milliLiter(s) (50 mL/Hr) IV Continuous <Continuous>  dextrose 50% Injectable 12.5 Gram(s) IV Push once  dextrose 50% Injectable 25 Gram(s) IV Push once  dextrose 50% Injectable 25 Gram(s) IV Push once  dipyridamole 200 mG/aspirin 25 mG 1 Capsule(s) Oral two times a day  heparin  Injectable 5000 Unit(s) SubCutaneous every 8 hours  insulin glargine Injectable (LANTUS) 30 Unit(s) SubCutaneous at bedtime  insulin lispro (HumaLOG) corrective regimen sliding scale   SubCutaneous three times a day before meals  levETIRAcetam 500 milliGRAM(s) Oral two times a day  losartan 50 milliGRAM(s) Oral daily  metoprolol tartrate 50 milliGRAM(s) Oral two times a day  metroNIDAZOLE  IVPB 500 milliGRAM(s) IV Intermittent every 8 hours  NIFEdipine XL 60 milliGRAM(s) Oral daily  pantoprazole    Tablet 40 milliGRAM(s) Oral before breakfast  QUEtiapine 25 milliGRAM(s) Oral two times a day  sodium chloride 0.9%. 1000 milliLiter(s) (75 mL/Hr) IV Continuous <Continuous>    MEDICATIONS  (PRN):  dextrose 40% Gel 15 Gram(s) Oral once PRN Blood Glucose LESS THAN 70 milliGRAM(s)/deciliter  glucagon  Injectable 1 milliGRAM(s) IntraMuscular once PRN Glucose LESS THAN 70 milligrams/deciliter    Labs:                        12.9   12.35 )-----------( 288      ( 29 Nov 2018 08:48 )             37.7                         11.7   14.37 )-----------( 235      ( 28 Nov 2018 09:20 )             35.4     29 Nov 2018 08:48    145    |  101    |  7      ----------------------------<  110    3.4     |  27     |  1.1    28 Nov 2018 09:20    143    |  103    |  9      ----------------------------<  170    3.7     |  24     |  1.0      Ca    9.5        29 Nov 2018 08:48  Ca    9.1        28 Nov 2018 09:20  Mg     1.4       29 Nov 2018 08:48            Culture - Blood (collected 27 Nov 2018 12:37)  Source: .Blood None  Preliminary Report (28 Nov 2018 18:03):    No growth to date.        Radiology:    General: comfortable, NAD  Neurology: A&Ox1 nonfocal, confused  Head:  Normocephalic, atraumatic  ENT:  Mucosa moist, no ulcerations  Neck:  Supple, no JVD,   Skin: no breakdowns (as per RN)  Resp: CTA B/L  CV: RRR, S1S2,   GI: Soft, NT, bowel sounds  MS: No edema, + peripheral pulses, FROM all 4 extremity
Patient was seen and examined. Spoke with RN. Chart reviewed.  No events overnight.  Vital Signs Last 24 Hrs  T(F): 97.9 (30 Nov 2018 05:49), Max: 97.9 (30 Nov 2018 05:49)  HR: 76 (30 Nov 2018 05:49) (76 - 91)  BP: 178/79 (30 Nov 2018 05:49) (155/74 - 178/79)  SpO2: 94% (30 Nov 2018 02:40) (94% - 94%)  MEDICATIONS  (STANDING):  atorvastatin 40 milliGRAM(s) Oral at bedtime  ciprofloxacin   IVPB 400 milliGRAM(s) IV Intermittent every 12 hours  dextrose 5%. 1000 milliLiter(s) (50 mL/Hr) IV Continuous <Continuous>  dextrose 50% Injectable 12.5 Gram(s) IV Push once  dextrose 50% Injectable 25 Gram(s) IV Push once  dextrose 50% Injectable 25 Gram(s) IV Push once  dipyridamole 200 mG/aspirin 25 mG 1 Capsule(s) Oral two times a day  heparin  Injectable 5000 Unit(s) SubCutaneous every 8 hours  insulin glargine Injectable (LANTUS) 30 Unit(s) SubCutaneous at bedtime  insulin lispro (HumaLOG) corrective regimen sliding scale   SubCutaneous three times a day before meals  levETIRAcetam 500 milliGRAM(s) Oral two times a day  losartan 50 milliGRAM(s) Oral daily  metoprolol tartrate 50 milliGRAM(s) Oral two times a day  metroNIDAZOLE  IVPB 500 milliGRAM(s) IV Intermittent every 8 hours  NIFEdipine XL 60 milliGRAM(s) Oral daily  pantoprazole    Tablet 40 milliGRAM(s) Oral before breakfast  QUEtiapine 25 milliGRAM(s) Oral two times a day  sodium chloride 0.9%. 1000 milliLiter(s) (75 mL/Hr) IV Continuous <Continuous>    MEDICATIONS  (PRN):  dextrose 40% Gel 15 Gram(s) Oral once PRN Blood Glucose LESS THAN 70 milliGRAM(s)/deciliter  glucagon  Injectable 1 milliGRAM(s) IntraMuscular once PRN Glucose LESS THAN 70 milligrams/deciliter    Labs:                        12.0   11.30 )-----------( 279      ( 30 Nov 2018 07:03 )             36.5                         12.9   12.35 )-----------( 288      ( 29 Nov 2018 08:48 )             37.7     30 Nov 2018 07:03    141    |  100    |  9      ----------------------------<  111    3.8     |  27     |  1.1    29 Nov 2018 08:48    145    |  101    |  7      ----------------------------<  110    3.4     |  27     |  1.1      Ca    9.0        30 Nov 2018 07:03  Ca    9.5        29 Nov 2018 08:48  Phos  3.5       30 Nov 2018 07:03  Mg     1.8       30 Nov 2018 07:03  Mg     1.4       29 Nov 2018 08:48            Culture - Blood (collected 27 Nov 2018 12:37)  Source: .Blood None  Preliminary Report (28 Nov 2018 18:03):    No growth to date.      General: comfortable, NAD  Neurology: A&Ox3, nonfocal  Head:  Normocephalic, atraumatic  ENT:  Mucosa moist, no ulcerations  Neck:  Supple, no JVD,   Skin: no breakdowns (as per RN)  Resp: CTA B/L  CV: RRR, S1S2,   GI: Soft, NT, bowel sounds  MS: No edema, + peripheral pulses, FROM all 4 extremity      A/P:  72yo man with PMH of DM , HTN , DLD , CAD , multiple CVA with residual dysarthria , and R sided weakness presenting for BRBPR , found to have acute sigmoid colitis on Ct abdomen       no signs of active bleeding  h/h stable   tolerating diet    on aggrenox     -on cipro and flagyl - continue for total 10 days  -monitor for any signs of bleeding   -follow up with Dr Mtz as outpatient for colonoscopy     DC planning    DVT prophylaxis  Decubitus prevention- all measures as per RN protocol  Please call or text me with any questions or updates
SUBJECTIVE:    Patient is a 73y old Male who presents with a chief complaint of BRBPR of 1 day duration (27 Nov 2018 12:53)    Currently admitted to medicine with the primary diagnosis of Colitis     Today is hospital day 1d. This morning he is resting comfortably in bed and reports no pain and no BM overnight. the nurse also reports no events overnight.       PAST MEDICAL & SURGICAL HISTORY  High blood cholesterol  Cerebrovascular accident (CVA) due to stenosis of left middle cerebral artery  Coronary artery disease without angina pectoris, unspecified vessel or lesion type, unspecified whether native or transplanted heart  Depression, unspecified depression type  Essential hypertension  Controlled type 2 diabetes mellitus without complication, unspecified long term insulin use status  Bradly filter in place  History of penile implant  History of lumbar surgery  H/O umbilical hernia repair  History of appendectomy    ALLERGIES:  No Known Allergies    MEDICATIONS:  STANDING MEDICATIONS  atorvastatin 40 milliGRAM(s) Oral at bedtime  ciprofloxacin   IVPB 400 milliGRAM(s) IV Intermittent every 12 hours  dextrose 5%. 1000 milliLiter(s) IV Continuous <Continuous>  dextrose 50% Injectable 12.5 Gram(s) IV Push once  dextrose 50% Injectable 25 Gram(s) IV Push once  dextrose 50% Injectable 25 Gram(s) IV Push once  dipyridamole 200 mG/aspirin 25 mG 1 Capsule(s) Oral two times a day  insulin glargine Injectable (LANTUS) 30 Unit(s) SubCutaneous at bedtime  insulin lispro (HumaLOG) corrective regimen sliding scale   SubCutaneous three times a day before meals  levETIRAcetam 500 milliGRAM(s) Oral two times a day  metoprolol tartrate 50 milliGRAM(s) Oral two times a day  metroNIDAZOLE  IVPB 500 milliGRAM(s) IV Intermittent every 8 hours  NIFEdipine XL 60 milliGRAM(s) Oral daily  pantoprazole    Tablet 40 milliGRAM(s) Oral before breakfast  QUEtiapine 25 milliGRAM(s) Oral two times a day  sodium chloride 0.9%. 1000 milliLiter(s) IV Continuous <Continuous>    PRN MEDICATIONS  dextrose 40% Gel 15 Gram(s) Oral once PRN  glucagon  Injectable 1 milliGRAM(s) IntraMuscular once PRN    VITALS:   T(C): 36.7, Max: 37 (11-28-18 @ 00:15)  T(F): 98, Max: 98.6 (11-28-18 @ 00:15)  HR: 78 (72 - 91)  BP: 133/70 (133/70 - 182/83)  RR: 18 (18 - 18)  SpO2: 96% (95% - 96%)    LABS:                        11.7   14.37 )-----------( 235      ( 28 Nov 2018 09:20 )             35.4     11-28    143  |  103  |  9<L>  ----------------------------<  170<H>  3.7   |  24  |  1.0    Ca    9.1      28 Nov 2018 09:20    TPro  7.4  /  Alb  4.1  /  TBili  0.5  /  DBili  x   /  AST  28  /  ALT  39  /  AlkPhos  123<H>  11-26    PT/INR - ( 26 Nov 2018 21:18 )   PT: 13.20 sec;   INR: 1.15 ratio         PTT - ( 26 Nov 2018 21:18 )  PTT:30.7 sec      RADIOLOGY:  < from: CT Abdomen and Pelvis No Cont (11.27.18 @ 01:34) >  Distal descending and proximal sigmoid colitis; no evidence of abscess    < end of copied text >    PHYSICAL EXAM:  GEN: No acute distress  LUNGS: Clear to anterior auscultation bilaterally   HEART: S1/S2 present. RRR.   ABD: Soft, non-tender, non-distended. Bowel sounds present  NEURO: dysarthric, right sided weakness and rigidity.     Guerrero cathter: condom catheter is in place.

## 2018-11-30 NOTE — SWALLOW BEDSIDE ASSESSMENT ADULT - SWALLOW EVAL: DIAGNOSIS
+toleration of soft, puree and thin, w/ mild oral dysphagia for soft and w/o any overt s/s of penetration/aspiration
+toleration of soft,  thin, w/ mild oral dysphagia for soft and w/o any overt s/s of penetration/aspiration

## 2018-11-30 NOTE — DISCHARGE NOTE ADULT - CARE PROVIDER_API CALL
Kody Mtz (DO), Gastroenterology  [address] [place] [address]  Phone: [phone]  Fax: [phone]    Jay Jay Kerr), Internal Medicine  [address] [place] [id number]  Phone: [phone]  Fax: [phone]

## 2018-11-30 NOTE — DISCHARGE NOTE ADULT - CARE PLAN
Principal Discharge DX:	Colitis  Goal:	Complete ABX therapy for colitis  Assessment and plan of treatment:	Complete 7 more days of both antibiotics as perscribed  Follow up with Gastroenterologist Dr. Robledo

## 2018-11-30 NOTE — DISCHARGE NOTE ADULT - HOSPITAL COURSE
74yo man with PMH of DM , HTN , DLD , CAD , multiple CVA with residual dysarthria , and R sided weakness presenting for BRBPR , found to have acute sigmoid colitis on Ct abdomen   no signs of active bleeding  h/h stable. WBC wnl now elevated on admission  tolerating diet    on aggrenox     -on cipro and flagyl - continue for total 10 days  -monitor for any signs of bleeding   -follow up with Dr Mtz as outpatient for colonoscopy

## 2018-11-30 NOTE — SWALLOW BEDSIDE ASSESSMENT ADULT - SLP PERTINENT HISTORY OF CURRENT PROBLEM
Pt admitted w/ BRBPR, known to ST service from previous admission. h/o toleration of reg w/ thin
Pt admitted w/ BRBPR, known to ST service from previous admission. h/o toleration of reg w/ thin

## 2018-11-30 NOTE — DISCHARGE NOTE ADULT - PLAN OF CARE
Complete ABX therapy for colitis Complete 7 more days of both antibiotics as perscribed  Follow up with Gastroenterologist Dr. Robledo

## 2018-11-30 NOTE — SWALLOW BEDSIDE ASSESSMENT ADULT - ORAL PHASE
Decreased anterior-posterior movement of the bolus/Delayed oral transit time Within functional limits

## 2018-11-30 NOTE — PROGRESS NOTE ADULT - REASON FOR ADMISSION
BRBPR of 1 day duration

## 2018-11-30 NOTE — DISCHARGE NOTE ADULT - MEDICATION SUMMARY - MEDICATIONS TO TAKE
I will START or STAY ON the medications listed below when I get home from the hospital:    Flagyl 500 mg oral tablet  -- 1 tab(s) by mouth 3 times a day  -- Indication: For Colitis    losartan 50 mg oral tablet  -- 1 tab(s) by mouth once a day  -- Indication: For HTN    levETIRAcetam 500 mg oral tablet  -- 1 tab(s) by mouth 2 times a day  -- Indication: For Seizures    Valium 10 mg oral tablet  -- 1 tab(s) by mouth 2 times a day, As Needed for anxiety  -- Indication: For Anxiety    Toujeo SoloStar 300 units/mL subcutaneous solution  -- 30 unit(s) subcutaneous once (at bedtime)  -- Indication: For DMII    metFORMIN 1000 mg oral tablet  -- 1 tab(s) by mouth 2 times a day  -- Indication: For DMII    atorvastatin 40 mg oral tablet  -- 1 tab(s) by mouth once a day (at bedtime)  -- Indication: For DLD    Aggrenox 25 mg-200 mg oral capsule, extended release  -- 1 cap(s) by mouth 2 times a day  -- Indication: For Stroke    SEROquel 25 mg oral tablet  -- 1 tab(s) by mouth 2 times a day  -- Indication: For Antipsychotics    metoprolol tartrate 50 mg oral tablet  -- 1 tab(s) by mouth 2 times a day  -- Indication: For Afib    NIFEdipine 60 mg oral tablet, extended release  -- 1 tab(s) by mouth once a day  -- Indication: For HTN    pantoprazole 40 mg oral delayed release tablet  -- 1 tab(s) by mouth once a day (before a meal)  -- Indication: For Reflux    Cipro 500 mg oral tablet  -- 1 tab(s) by mouth every 12 hours  -- Indication: For Colitis

## 2018-11-30 NOTE — SWALLOW BEDSIDE ASSESSMENT ADULT - COMMENTS
dysphagia follow up conducted bedside during lunch time. pt received alert, responsive. normal breath patterns, room air. +dysarthria of speech, global paraphasias. no c/o pain, discomfort noted. +toleration

## 2018-12-02 LAB
CULTURE RESULTS: SIGNIFICANT CHANGE UP
SPECIMEN SOURCE: SIGNIFICANT CHANGE UP

## 2018-12-04 DIAGNOSIS — E86.1 HYPOVOLEMIA: ICD-10-CM

## 2018-12-04 DIAGNOSIS — A09 INFECTIOUS GASTROENTERITIS AND COLITIS, UNSPECIFIED: ICD-10-CM

## 2018-12-04 DIAGNOSIS — I69.322 DYSARTHRIA FOLLOWING CEREBRAL INFARCTION: ICD-10-CM

## 2018-12-04 DIAGNOSIS — I25.10 ATHEROSCLEROTIC HEART DISEASE OF NATIVE CORONARY ARTERY WITHOUT ANGINA PECTORIS: ICD-10-CM

## 2018-12-04 DIAGNOSIS — N17.9 ACUTE KIDNEY FAILURE, UNSPECIFIED: ICD-10-CM

## 2018-12-04 DIAGNOSIS — I10 ESSENTIAL (PRIMARY) HYPERTENSION: ICD-10-CM

## 2018-12-04 DIAGNOSIS — E11.9 TYPE 2 DIABETES MELLITUS WITHOUT COMPLICATIONS: ICD-10-CM

## 2018-12-04 DIAGNOSIS — I69.351 HEMIPLEGIA AND HEMIPARESIS FOLLOWING CEREBRAL INFARCTION AFFECTING RIGHT DOMINANT SIDE: ICD-10-CM

## 2018-12-04 DIAGNOSIS — F32.9 MAJOR DEPRESSIVE DISORDER, SINGLE EPISODE, UNSPECIFIED: ICD-10-CM

## 2018-12-04 DIAGNOSIS — E78.5 HYPERLIPIDEMIA, UNSPECIFIED: ICD-10-CM

## 2019-01-11 NOTE — SWALLOW BEDSIDE ASSESSMENT ADULT - ASR SWALLOW ASPIRATION MONITOR
Ophthalmology H&P


H&P


Chief Complaint:  decreased vision in left eye





HPI


Vision Affects Ability to:  read


Past Ocular History:  glaucoma





Exam


Eye Exam:  normal OU: external exam, palpebral fissure-width, marginal reflex 

distance, levator function, corneas, anterior chambers; findings: lens - +4NS 

CATARACT, fundus exam - poor view





Attestation


Attestation


The risks and benefits of the surgery as well as alternative procedures were 

explained to the patient in detail.











Davion Argueta MD Jan 11, 2019 15:08 gurgly voice/throat clearing/cough/fever/pneumonia

## 2019-02-11 NOTE — CONSULT NOTE ADULT - MUSCULOSKELETAL
negative Attending Attestation (For Attendings USE Only)... No joint pain, swelling or deformity; no limitation of movement

## 2019-03-28 ENCOUNTER — INPATIENT (INPATIENT)
Facility: HOSPITAL | Age: 74
LOS: 3 days | Discharge: HOME | End: 2019-04-01
Attending: INTERNAL MEDICINE | Admitting: INTERNAL MEDICINE

## 2019-03-28 VITALS
RESPIRATION RATE: 16 BRPM | OXYGEN SATURATION: 98 % | HEART RATE: 83 BPM | TEMPERATURE: 97 F | SYSTOLIC BLOOD PRESSURE: 140 MMHG | DIASTOLIC BLOOD PRESSURE: 78 MMHG

## 2019-03-28 DIAGNOSIS — I10 ESSENTIAL (PRIMARY) HYPERTENSION: ICD-10-CM

## 2019-03-28 DIAGNOSIS — I63.512 CEREBRAL INFARCTION DUE TO UNSPECIFIED OCCLUSION OR STENOSIS OF LEFT MIDDLE CEREBRAL ARTERY: ICD-10-CM

## 2019-03-28 DIAGNOSIS — R55 SYNCOPE AND COLLAPSE: ICD-10-CM

## 2019-03-28 DIAGNOSIS — R56.9 UNSPECIFIED CONVULSIONS: ICD-10-CM

## 2019-03-28 DIAGNOSIS — Z95.828 PRESENCE OF OTHER VASCULAR IMPLANTS AND GRAFTS: Chronic | ICD-10-CM

## 2019-03-28 DIAGNOSIS — F32.9 MAJOR DEPRESSIVE DISORDER, SINGLE EPISODE, UNSPECIFIED: ICD-10-CM

## 2019-03-28 DIAGNOSIS — E78.00 PURE HYPERCHOLESTEROLEMIA, UNSPECIFIED: ICD-10-CM

## 2019-03-28 DIAGNOSIS — E11.9 TYPE 2 DIABETES MELLITUS WITHOUT COMPLICATIONS: ICD-10-CM

## 2019-03-28 DIAGNOSIS — Z90.49 ACQUIRED ABSENCE OF OTHER SPECIFIED PARTS OF DIGESTIVE TRACT: Chronic | ICD-10-CM

## 2019-03-28 DIAGNOSIS — Z98.890 OTHER SPECIFIED POSTPROCEDURAL STATES: Chronic | ICD-10-CM

## 2019-03-28 DIAGNOSIS — Z96.89 PRESENCE OF OTHER SPECIFIED FUNCTIONAL IMPLANTS: Chronic | ICD-10-CM

## 2019-03-28 DIAGNOSIS — I25.10 ATHEROSCLEROTIC HEART DISEASE OF NATIVE CORONARY ARTERY WITHOUT ANGINA PECTORIS: ICD-10-CM

## 2019-03-28 LAB
ALBUMIN SERPL ELPH-MCNC: 4.2 G/DL — SIGNIFICANT CHANGE UP (ref 3.5–5.2)
ALP SERPL-CCNC: 139 U/L — HIGH (ref 30–115)
ALT FLD-CCNC: 22 U/L — SIGNIFICANT CHANGE UP (ref 0–41)
ANION GAP SERPL CALC-SCNC: 13 MMOL/L — SIGNIFICANT CHANGE UP (ref 7–14)
ANION GAP SERPL CALC-SCNC: 13 MMOL/L — SIGNIFICANT CHANGE UP (ref 7–14)
APPEARANCE UR: CLEAR — SIGNIFICANT CHANGE UP
APTT BLD: 33.3 SEC — SIGNIFICANT CHANGE UP (ref 27–39.2)
AST SERPL-CCNC: 27 U/L — SIGNIFICANT CHANGE UP (ref 0–41)
BASOPHILS # BLD AUTO: 0.08 K/UL — SIGNIFICANT CHANGE UP (ref 0–0.2)
BASOPHILS # BLD AUTO: 0.1 K/UL — SIGNIFICANT CHANGE UP (ref 0–0.2)
BASOPHILS NFR BLD AUTO: 0.8 % — SIGNIFICANT CHANGE UP (ref 0–1)
BASOPHILS NFR BLD AUTO: 0.9 % — SIGNIFICANT CHANGE UP (ref 0–1)
BILIRUB SERPL-MCNC: 0.3 MG/DL — SIGNIFICANT CHANGE UP (ref 0.2–1.2)
BILIRUB UR-MCNC: NEGATIVE — SIGNIFICANT CHANGE UP
BUN SERPL-MCNC: 13 MG/DL — SIGNIFICANT CHANGE UP (ref 10–20)
BUN SERPL-MCNC: 14 MG/DL — SIGNIFICANT CHANGE UP (ref 10–20)
CALCIUM SERPL-MCNC: 8.9 MG/DL — SIGNIFICANT CHANGE UP (ref 8.5–10.1)
CALCIUM SERPL-MCNC: 9.9 MG/DL — SIGNIFICANT CHANGE UP (ref 8.5–10.1)
CHLORIDE SERPL-SCNC: 100 MMOL/L — SIGNIFICANT CHANGE UP (ref 98–110)
CHLORIDE SERPL-SCNC: 101 MMOL/L — SIGNIFICANT CHANGE UP (ref 98–110)
CK MB CFR SERPL CALC: 2.6 NG/ML — SIGNIFICANT CHANGE UP (ref 0.6–6.3)
CK SERPL-CCNC: 162 U/L — SIGNIFICANT CHANGE UP (ref 0–225)
CO2 SERPL-SCNC: 26 MMOL/L — SIGNIFICANT CHANGE UP (ref 17–32)
CO2 SERPL-SCNC: 29 MMOL/L — SIGNIFICANT CHANGE UP (ref 17–32)
COLOR SPEC: YELLOW — SIGNIFICANT CHANGE UP
CREAT SERPL-MCNC: 1.2 MG/DL — SIGNIFICANT CHANGE UP (ref 0.7–1.5)
CREAT SERPL-MCNC: 1.3 MG/DL — SIGNIFICANT CHANGE UP (ref 0.7–1.5)
DIFF PNL FLD: NEGATIVE — SIGNIFICANT CHANGE UP
EOSINOPHIL # BLD AUTO: 0.14 K/UL — SIGNIFICANT CHANGE UP (ref 0–0.7)
EOSINOPHIL # BLD AUTO: 0.2 K/UL — SIGNIFICANT CHANGE UP (ref 0–0.7)
EOSINOPHIL NFR BLD AUTO: 1.1 % — SIGNIFICANT CHANGE UP (ref 0–8)
EOSINOPHIL NFR BLD AUTO: 2.1 % — SIGNIFICANT CHANGE UP (ref 0–8)
EPI CELLS # UR: ABNORMAL /HPF
GLUCOSE BLDC GLUCOMTR-MCNC: 196 MG/DL — HIGH (ref 70–99)
GLUCOSE BLDC GLUCOMTR-MCNC: 199 MG/DL — HIGH (ref 70–99)
GLUCOSE SERPL-MCNC: 162 MG/DL — HIGH (ref 70–99)
GLUCOSE SERPL-MCNC: 211 MG/DL — HIGH (ref 70–99)
GLUCOSE UR QL: NEGATIVE MG/DL — SIGNIFICANT CHANGE UP
HCT VFR BLD CALC: 35.6 % — LOW (ref 42–52)
HCT VFR BLD CALC: 41.8 % — LOW (ref 42–52)
HGB BLD-MCNC: 11.5 G/DL — LOW (ref 14–18)
HGB BLD-MCNC: 13.5 G/DL — LOW (ref 14–18)
IMM GRANULOCYTES NFR BLD AUTO: 0.3 % — SIGNIFICANT CHANGE UP (ref 0.1–0.3)
IMM GRANULOCYTES NFR BLD AUTO: 0.4 % — HIGH (ref 0.1–0.3)
INR BLD: 1.11 RATIO — SIGNIFICANT CHANGE UP (ref 0.65–1.3)
KETONES UR-MCNC: NEGATIVE — SIGNIFICANT CHANGE UP
LEUKOCYTE ESTERASE UR-ACNC: NEGATIVE — SIGNIFICANT CHANGE UP
LYMPHOCYTES # BLD AUTO: 0.68 K/UL — LOW (ref 1.2–3.4)
LYMPHOCYTES # BLD AUTO: 1.14 K/UL — LOW (ref 1.2–3.4)
LYMPHOCYTES # BLD AUTO: 12.2 % — LOW (ref 20.5–51.1)
LYMPHOCYTES # BLD AUTO: 5.4 % — LOW (ref 20.5–51.1)
MAGNESIUM SERPL-MCNC: 1.4 MG/DL — LOW (ref 1.8–2.4)
MAGNESIUM SERPL-MCNC: 1.8 MG/DL — SIGNIFICANT CHANGE UP (ref 1.8–2.4)
MCHC RBC-ENTMCNC: 29.6 PG — SIGNIFICANT CHANGE UP (ref 27–31)
MCHC RBC-ENTMCNC: 29.7 PG — SIGNIFICANT CHANGE UP (ref 27–31)
MCHC RBC-ENTMCNC: 32.3 G/DL — SIGNIFICANT CHANGE UP (ref 32–37)
MCHC RBC-ENTMCNC: 32.3 G/DL — SIGNIFICANT CHANGE UP (ref 32–37)
MCV RBC AUTO: 91.8 FL — SIGNIFICANT CHANGE UP (ref 80–94)
MCV RBC AUTO: 91.9 FL — SIGNIFICANT CHANGE UP (ref 80–94)
MONOCYTES # BLD AUTO: 1.04 K/UL — HIGH (ref 0.1–0.6)
MONOCYTES # BLD AUTO: 1.2 K/UL — HIGH (ref 0.1–0.6)
MONOCYTES NFR BLD AUTO: 12.8 % — HIGH (ref 1.7–9.3)
MONOCYTES NFR BLD AUTO: 8.2 % — SIGNIFICANT CHANGE UP (ref 1.7–9.3)
NEUTROPHILS # BLD AUTO: 10.66 K/UL — HIGH (ref 1.4–6.5)
NEUTROPHILS # BLD AUTO: 6.7 K/UL — HIGH (ref 1.4–6.5)
NEUTROPHILS NFR BLD AUTO: 71.7 % — SIGNIFICANT CHANGE UP (ref 42.2–75.2)
NEUTROPHILS NFR BLD AUTO: 84.1 % — HIGH (ref 42.2–75.2)
NITRITE UR-MCNC: NEGATIVE — SIGNIFICANT CHANGE UP
NRBC # BLD: 0 /100 WBCS — SIGNIFICANT CHANGE UP (ref 0–0)
NRBC # BLD: 0 /100 WBCS — SIGNIFICANT CHANGE UP (ref 0–0)
PH UR: 6.5 — SIGNIFICANT CHANGE UP (ref 5–8)
PLATELET # BLD AUTO: 199 K/UL — SIGNIFICANT CHANGE UP (ref 130–400)
PLATELET # BLD AUTO: 241 K/UL — SIGNIFICANT CHANGE UP (ref 130–400)
POTASSIUM SERPL-MCNC: 4.3 MMOL/L — SIGNIFICANT CHANGE UP (ref 3.5–5)
POTASSIUM SERPL-MCNC: 4.4 MMOL/L — SIGNIFICANT CHANGE UP (ref 3.5–5)
POTASSIUM SERPL-SCNC: 4.3 MMOL/L — SIGNIFICANT CHANGE UP (ref 3.5–5)
POTASSIUM SERPL-SCNC: 4.4 MMOL/L — SIGNIFICANT CHANGE UP (ref 3.5–5)
PROT SERPL-MCNC: 7.8 G/DL — SIGNIFICANT CHANGE UP (ref 6–8)
PROT UR-MCNC: 30 MG/DL
PROTHROM AB SERPL-ACNC: 12.7 SEC — SIGNIFICANT CHANGE UP (ref 9.95–12.87)
RBC # BLD: 3.88 M/UL — LOW (ref 4.7–6.1)
RBC # BLD: 4.55 M/UL — LOW (ref 4.7–6.1)
RBC # FLD: 12.7 % — SIGNIFICANT CHANGE UP (ref 11.5–14.5)
RBC # FLD: 12.7 % — SIGNIFICANT CHANGE UP (ref 11.5–14.5)
SODIUM SERPL-SCNC: 140 MMOL/L — SIGNIFICANT CHANGE UP (ref 135–146)
SODIUM SERPL-SCNC: 142 MMOL/L — SIGNIFICANT CHANGE UP (ref 135–146)
SP GR SPEC: 1.02 — SIGNIFICANT CHANGE UP (ref 1.01–1.03)
TROPONIN T SERPL-MCNC: <0.01 NG/ML — SIGNIFICANT CHANGE UP
TROPONIN T SERPL-MCNC: <0.01 NG/ML — SIGNIFICANT CHANGE UP
UROBILINOGEN FLD QL: 0.2 MG/DL — SIGNIFICANT CHANGE UP (ref 0.2–0.2)
WBC # BLD: 12.67 K/UL — HIGH (ref 4.8–10.8)
WBC # BLD: 9.35 K/UL — SIGNIFICANT CHANGE UP (ref 4.8–10.8)
WBC # FLD AUTO: 12.67 K/UL — HIGH (ref 4.8–10.8)
WBC # FLD AUTO: 9.35 K/UL — SIGNIFICANT CHANGE UP (ref 4.8–10.8)
WBC UR QL: SIGNIFICANT CHANGE UP /HPF

## 2019-03-28 RX ORDER — PANTOPRAZOLE SODIUM 20 MG/1
40 TABLET, DELAYED RELEASE ORAL
Qty: 0 | Refills: 0 | Status: DISCONTINUED | OUTPATIENT
Start: 2019-03-28 | End: 2019-04-01

## 2019-03-28 RX ORDER — ATORVASTATIN CALCIUM 80 MG/1
40 TABLET, FILM COATED ORAL AT BEDTIME
Qty: 0 | Refills: 0 | Status: DISCONTINUED | OUTPATIENT
Start: 2019-03-28 | End: 2019-04-01

## 2019-03-28 RX ORDER — DEXTROSE 50 % IN WATER 50 %
25 SYRINGE (ML) INTRAVENOUS ONCE
Qty: 0 | Refills: 0 | Status: DISCONTINUED | OUTPATIENT
Start: 2019-03-28 | End: 2019-04-01

## 2019-03-28 RX ORDER — GLUCAGON INJECTION, SOLUTION 0.5 MG/.1ML
1 INJECTION, SOLUTION SUBCUTANEOUS ONCE
Qty: 0 | Refills: 0 | Status: DISCONTINUED | OUTPATIENT
Start: 2019-03-28 | End: 2019-04-01

## 2019-03-28 RX ORDER — INSULIN GLARGINE 100 [IU]/ML
30 INJECTION, SOLUTION SUBCUTANEOUS AT BEDTIME
Qty: 0 | Refills: 0 | Status: DISCONTINUED | OUTPATIENT
Start: 2019-03-28 | End: 2019-04-01

## 2019-03-28 RX ORDER — SODIUM CHLORIDE 9 MG/ML
1000 INJECTION, SOLUTION INTRAVENOUS
Qty: 0 | Refills: 0 | Status: DISCONTINUED | OUTPATIENT
Start: 2019-03-28 | End: 2019-04-01

## 2019-03-28 RX ORDER — INSULIN LISPRO 100/ML
2 VIAL (ML) SUBCUTANEOUS ONCE
Qty: 0 | Refills: 0 | Status: DISCONTINUED | OUTPATIENT
Start: 2019-03-28 | End: 2019-04-01

## 2019-03-28 RX ORDER — METOPROLOL TARTRATE 50 MG
50 TABLET ORAL
Qty: 0 | Refills: 0 | Status: DISCONTINUED | OUTPATIENT
Start: 2019-03-28 | End: 2019-04-01

## 2019-03-28 RX ORDER — LOSARTAN POTASSIUM 100 MG/1
50 TABLET, FILM COATED ORAL DAILY
Qty: 0 | Refills: 0 | Status: DISCONTINUED | OUTPATIENT
Start: 2019-03-28 | End: 2019-04-01

## 2019-03-28 RX ORDER — INSULIN LISPRO 100/ML
2 VIAL (ML) SUBCUTANEOUS
Qty: 0 | Refills: 0 | Status: DISCONTINUED | OUTPATIENT
Start: 2019-03-28 | End: 2019-03-28

## 2019-03-28 RX ORDER — QUETIAPINE FUMARATE 200 MG/1
25 TABLET, FILM COATED ORAL
Qty: 0 | Refills: 0 | Status: DISCONTINUED | OUTPATIENT
Start: 2019-03-28 | End: 2019-04-01

## 2019-03-28 RX ORDER — DIAZEPAM 5 MG
10 TABLET ORAL
Qty: 0 | Refills: 0 | Status: DISCONTINUED | OUTPATIENT
Start: 2019-03-28 | End: 2019-04-01

## 2019-03-28 RX ORDER — INSULIN GLARGINE 100 [IU]/ML
30 INJECTION, SOLUTION SUBCUTANEOUS AT BEDTIME
Qty: 0 | Refills: 0 | Status: DISCONTINUED | OUTPATIENT
Start: 2019-03-28 | End: 2019-03-28

## 2019-03-28 RX ORDER — INSULIN LISPRO 100/ML
2 VIAL (ML) SUBCUTANEOUS
Qty: 0 | Refills: 0 | Status: DISCONTINUED | OUTPATIENT
Start: 2019-03-28 | End: 2019-04-01

## 2019-03-28 RX ORDER — INSULIN LISPRO 100/ML
VIAL (ML) SUBCUTANEOUS
Qty: 0 | Refills: 0 | Status: DISCONTINUED | OUTPATIENT
Start: 2019-03-28 | End: 2019-04-01

## 2019-03-28 RX ORDER — DEXTROSE 50 % IN WATER 50 %
15 SYRINGE (ML) INTRAVENOUS ONCE
Qty: 0 | Refills: 0 | Status: DISCONTINUED | OUTPATIENT
Start: 2019-03-28 | End: 2019-04-01

## 2019-03-28 RX ORDER — DEXTROSE 50 % IN WATER 50 %
12.5 SYRINGE (ML) INTRAVENOUS ONCE
Qty: 0 | Refills: 0 | Status: DISCONTINUED | OUTPATIENT
Start: 2019-03-28 | End: 2019-04-01

## 2019-03-28 RX ORDER — NIFEDIPINE 30 MG
60 TABLET, EXTENDED RELEASE 24 HR ORAL DAILY
Qty: 0 | Refills: 0 | Status: DISCONTINUED | OUTPATIENT
Start: 2019-03-28 | End: 2019-04-01

## 2019-03-28 RX ORDER — METFORMIN HYDROCHLORIDE 850 MG/1
850 TABLET ORAL
Qty: 0 | Refills: 0 | Status: DISCONTINUED | OUTPATIENT
Start: 2019-03-28 | End: 2019-04-01

## 2019-03-28 RX ORDER — ASPIRIN AND DIPYRIDAMOLE 25; 200 MG/1; MG/1
1 CAPSULE, EXTENDED RELEASE ORAL
Qty: 0 | Refills: 0 | Status: DISCONTINUED | OUTPATIENT
Start: 2019-03-28 | End: 2019-04-01

## 2019-03-28 RX ORDER — MAGNESIUM SULFATE 500 MG/ML
2 VIAL (ML) INJECTION ONCE
Qty: 0 | Refills: 0 | Status: COMPLETED | OUTPATIENT
Start: 2019-03-28 | End: 2019-03-28

## 2019-03-28 RX ORDER — LEVETIRACETAM 250 MG/1
500 TABLET, FILM COATED ORAL
Qty: 0 | Refills: 0 | Status: DISCONTINUED | OUTPATIENT
Start: 2019-03-28 | End: 2019-03-29

## 2019-03-28 RX ADMIN — INSULIN GLARGINE 30 UNIT(S): 100 INJECTION, SOLUTION SUBCUTANEOUS at 21:53

## 2019-03-28 RX ADMIN — QUETIAPINE FUMARATE 25 MILLIGRAM(S): 200 TABLET, FILM COATED ORAL at 17:48

## 2019-03-28 RX ADMIN — Medication 1: at 18:17

## 2019-03-28 RX ADMIN — LEVETIRACETAM 500 MILLIGRAM(S): 250 TABLET, FILM COATED ORAL at 17:48

## 2019-03-28 RX ADMIN — ASPIRIN AND DIPYRIDAMOLE 1 CAPSULE(S): 25; 200 CAPSULE, EXTENDED RELEASE ORAL at 17:48

## 2019-03-28 RX ADMIN — METFORMIN HYDROCHLORIDE 850 MILLIGRAM(S): 850 TABLET ORAL at 17:47

## 2019-03-28 RX ADMIN — ATORVASTATIN CALCIUM 40 MILLIGRAM(S): 80 TABLET, FILM COATED ORAL at 21:52

## 2019-03-28 RX ADMIN — Medication 50 GRAM(S): at 15:00

## 2019-03-28 RX ADMIN — Medication 2 UNIT(S): at 18:16

## 2019-03-28 RX ADMIN — Medication 50 MILLIGRAM(S): at 17:49

## 2019-03-28 NOTE — H&P ADULT - NSICDXPASTMEDICALHX_GEN_ALL_CORE_FT
PAST MEDICAL HISTORY:  Cerebrovascular accident (CVA) due to stenosis of left middle cerebral artery     Controlled type 2 diabetes mellitus without complication, unspecified long term insulin use status     Coronary artery disease without angina pectoris, unspecified vessel or lesion type, unspecified whether native or transplanted heart     Depression, unspecified depression type     Essential hypertension     High blood cholesterol

## 2019-03-28 NOTE — H&P ADULT - NSICDXPASTSURGICALHX_GEN_ALL_CORE_FT
PAST SURGICAL HISTORY:  [place] filter in place     H/O brain surgery     H/O umbilical hernia repair     History of appendectomy     History of lumbar surgery     History of penile implant

## 2019-03-28 NOTE — H&P ADULT - ASSESSMENT
74 yo male with h/o CVA with R hemiplegia and seizures s/p brain sx presents after syncope vs seizure.                      D/W Dr Parish

## 2019-03-28 NOTE — ED PROVIDER NOTE - PSH
[place] filter in place    H/O umbilical hernia repair    History of appendectomy    History of lumbar surgery    History of penile implant

## 2019-03-28 NOTE — ED PROVIDER NOTE - OBJECTIVE STATEMENT
hx from wife  72 yo M hx of CVA with right hemiparesis, brain sx, DM, seizure here for  ?seizure vs syncope. Per wife, patient was going up stairs pta  and then started turning grey, started staring, eyes rolled backwards and  started to collapse (but was caught by family) and had fecal incontinence. Episode lasted about 1 minute and patient was back to baseline in 5 minutes. +cough since yesterday. No CP, SOB, abdominal pains.

## 2019-03-28 NOTE — ED PROVIDER NOTE - CLINICAL SUMMARY MEDICAL DECISION MAKING FREE TEXT BOX
73y male above PMH bib family after syncopal episode/?seizure, currently at baseline, no change keppra, PE as above, labs and studies reviewed, will admit monitored setting for cardiac and neuro evals

## 2019-03-28 NOTE — H&P ADULT - NSHPPHYSICALEXAM_GEN_ALL_CORE
Vital Signs Last 24 Hrs  T(C): 35.9 (28 Mar 2019 12:01), Max: 35.9 (28 Mar 2019 12:01)  T(F): 96.6 (28 Mar 2019 12:01), Max: 96.6 (28 Mar 2019 12:01)  HR: 83 (28 Mar 2019 12:01) (83 - 83)  BP: 140/78 (28 Mar 2019 12:01) (140/78 - 140/78)  BP(mean): --  RR: 16 (28 Mar 2019 12:01) (16 - 16)  SpO2: 98% (28 Mar 2019 12:01) (98% - 98%)    T(C): 35.9 (03-28-19 @ 12:01), Max: 35.9 (03-28-19 @ 12:01)  HR: 83 (03-28-19 @ 12:01) (83 - 83)  BP: 140/78 (03-28-19 @ 12:01) (140/78 - 140/78)  RR: 16 (03-28-19 @ 12:01) (16 - 16)  SpO2: 98% (03-28-19 @ 12:01) (98% - 98%)    PHYSICAL EXAM:  GENERAL: NAD, well-developed, well nourished, looks stated age  HEAD:  Atraumatic, Normocephalic  EYES: EOMI, PERRLA, conjunctiva and sclera clear  NECK: Supple, No JVD, no bruits, no masses, no thyroid enlargement  ENMT: No tonsillar erythema, exudated or enlargement, moist mucous membranes  CHEST/LUNG: Clear to auscultation bilaterally; No rales, rhonchi or wheezing, no dullness to percussion  HEART: S1,S2 Regular rate and rhythm; No murmurs, rubs, or gallops  ABDOMEN: Soft, nontender, nondistended, no rebound tenderness; No palpable masses, no hernias, no organomegaly; Bowel sounds present and normoactive  EXTREMITIES:  2+ peripheral pulses bilaterally and symmetrically, no clubbing, cyanosis, or edema  LYMPH: No lymphadenopathy  PSYCH: AAOx3, normal mood and affect  NEUROLOGY: +R Hemiplegia, muscle strength 0/5 RUE, 2/5 RLE   SKIN: No rashes or lesions

## 2019-03-28 NOTE — ED PROVIDER NOTE - PHYSICAL EXAMINATION
Gen: Alert, NAD, well appearing  Head: NC, AT, PERRL, EOMI, normal lids/conjunctiva  ENT: normal hearing, patent oropharynx   Neck: +supple, no tenderness/meningismus,  Pulm: Bilateral BS, normal resp effort, no wheeze/stridor/retractions  CV: S1S2, RRR  Abd: soft, NT/ND  Mskel: no edema/erythema/cyanosis, RUE contracted  Skin: no rash, warm/dry  Neuro: AAOx0, Right hemiparesis, follows simple commands, at baseline per wife

## 2019-03-28 NOTE — ED ADULT NURSE NOTE - PSH
[place] filter in place    H/O umbilical hernia repair    History of appendectomy    History of lumbar surgery    History of penile implant

## 2019-03-28 NOTE — H&P ADULT - HISTORY OF PRESENT ILLNESS
72 yo male presents after a syncopal episode vs seizure at home. Pts wife states pt stated he wasnt feeling well and wanted to go upstairs so as she and her son were trying to get him upstairs his legs became weak, his eyes rolled back, but remained open and he made grunting noises and had LOC for about a minute. Son states pt was shaking slightly but not same intensity he has in past with seizure. Pt had episode of fecal incontinence - baseline - with this episode. When pt woke up he was confused but no more lethargic than usual.    Pt denies any CP, SOB, dizziness prior to episode.

## 2019-03-28 NOTE — ED PROVIDER NOTE - NS ED ROS FT
Review of Systems    Constitutional: (-) fever, (-) chills  Eyes/ENT: (-) blurry vision, (-) epistaxis, (-) sore throat  Cardiovascular: (-) chest pain, (?) syncope  Respiratory: (+) cough, (-) shortness of breath  Gastrointestinal: (-) pain, (-) nausea, (-) vomiting, (-) diarrhea, (+)incontinence   Musculoskeletal: (-) neck pain, (-) back pain, (-) joint pain  Integumentary: (-) rash, (-) edema  Neurological: (-) headache, (-) altered mental status, (?) seizure  Psychiatric: (-) hallucinations  Allergic/Immunologic: (-) pruritus

## 2019-03-28 NOTE — H&P ADULT - NSHPLABSRESULTS_GEN_ALL_CORE
13.5   12. )-----------( 241      ( 28 Mar 2019 13:20 )             41.8           142  |  100  |  13  ----------------------------<  162<H>  4.4   |  29  |  1.3    Ca    9.9      28 Mar 2019 13:20  Mg     1.4         TPro  7.8  /  Alb  4.2  /  TBili  0.3  /  DBili  x   /  AST  27  /  ALT  22  /  AlkPhos  139<H>            Magnesium, Serum: 1.4 mg/dL (19 @ 13:20)          Urinalysis Basic - ( 28 Mar 2019 14:00 )    Color: Yellow / Appearance: Clear / S.020 / pH: x  Gluc: x / Ketone: Negative  / Bili: Negative / Urobili: 0.2 mg/dL   Blood: x / Protein: 30 mg/dL / Nitrite: Negative   Leuk Esterase: Negative / RBC: x / WBC 1-2 /HPF   Sq Epi: x / Non Sq Epi: Occasional /HPF / Bacteria: x        PT/INR - ( 28 Mar 2019 13:20 )   PT: 12.70 sec;   INR: 1.11 ratio         PTT - ( 28 Mar 2019 13:20 )  PTT:33.3 sec    Lactate Trend      CARDIAC MARKERS ( 28 Mar 2019 13:20 )  x     / <0.01 ng/mL / x     / x     / x                  < from: CT Head No Cont (19 @ 12:36) >      No evidence of acute intracranial pathology. Stable exam.    Chronic left MCA territory infarction. Post left parietal craniotomy.                  SUSHANT REESE M.D., ATTENDING RADIOLOGIST  This document has been electronically signed. Mar 28 2019 12:58PM                < end of copied text >

## 2019-03-29 LAB
CK MB CFR SERPL CALC: 2.4 NG/ML — SIGNIFICANT CHANGE UP (ref 0.6–6.3)
CK SERPL-CCNC: 148 U/L — SIGNIFICANT CHANGE UP (ref 0–225)
ESTIMATED AVERAGE GLUCOSE: 171 MG/DL — HIGH (ref 68–114)
GLUCOSE BLDC GLUCOMTR-MCNC: 100 MG/DL — HIGH (ref 70–99)
GLUCOSE BLDC GLUCOMTR-MCNC: 127 MG/DL — HIGH (ref 70–99)
GLUCOSE BLDC GLUCOMTR-MCNC: 166 MG/DL — HIGH (ref 70–99)
GLUCOSE BLDC GLUCOMTR-MCNC: 199 MG/DL — HIGH (ref 70–99)
GLUCOSE BLDC GLUCOMTR-MCNC: 199 MG/DL — HIGH (ref 70–99)
GLUCOSE BLDC GLUCOMTR-MCNC: 81 MG/DL — SIGNIFICANT CHANGE UP (ref 70–99)
HBA1C BLD-MCNC: 7.6 % — HIGH (ref 4–5.6)
TROPONIN T SERPL-MCNC: <0.01 NG/ML — SIGNIFICANT CHANGE UP

## 2019-03-29 RX ORDER — LEVETIRACETAM 250 MG/1
750 TABLET, FILM COATED ORAL
Qty: 0 | Refills: 0 | Status: DISCONTINUED | OUTPATIENT
Start: 2019-03-29 | End: 2019-04-01

## 2019-03-29 RX ADMIN — METFORMIN HYDROCHLORIDE 850 MILLIGRAM(S): 850 TABLET ORAL at 05:07

## 2019-03-29 RX ADMIN — LOSARTAN POTASSIUM 50 MILLIGRAM(S): 100 TABLET, FILM COATED ORAL at 05:07

## 2019-03-29 RX ADMIN — QUETIAPINE FUMARATE 25 MILLIGRAM(S): 200 TABLET, FILM COATED ORAL at 17:04

## 2019-03-29 RX ADMIN — Medication 60 MILLIGRAM(S): at 05:07

## 2019-03-29 RX ADMIN — ASPIRIN AND DIPYRIDAMOLE 1 CAPSULE(S): 25; 200 CAPSULE, EXTENDED RELEASE ORAL at 17:03

## 2019-03-29 RX ADMIN — Medication 50 MILLIGRAM(S): at 17:04

## 2019-03-29 RX ADMIN — LEVETIRACETAM 500 MILLIGRAM(S): 250 TABLET, FILM COATED ORAL at 05:07

## 2019-03-29 RX ADMIN — Medication 2 UNIT(S): at 17:03

## 2019-03-29 RX ADMIN — PANTOPRAZOLE SODIUM 40 MILLIGRAM(S): 20 TABLET, DELAYED RELEASE ORAL at 08:09

## 2019-03-29 RX ADMIN — Medication 50 MILLIGRAM(S): at 05:07

## 2019-03-29 RX ADMIN — ASPIRIN AND DIPYRIDAMOLE 1 CAPSULE(S): 25; 200 CAPSULE, EXTENDED RELEASE ORAL at 05:07

## 2019-03-29 RX ADMIN — LEVETIRACETAM 500 MILLIGRAM(S): 250 TABLET, FILM COATED ORAL at 17:04

## 2019-03-29 RX ADMIN — Medication 1: at 17:03

## 2019-03-29 RX ADMIN — ATORVASTATIN CALCIUM 40 MILLIGRAM(S): 80 TABLET, FILM COATED ORAL at 22:24

## 2019-03-29 RX ADMIN — QUETIAPINE FUMARATE 25 MILLIGRAM(S): 200 TABLET, FILM COATED ORAL at 05:07

## 2019-03-29 RX ADMIN — METFORMIN HYDROCHLORIDE 850 MILLIGRAM(S): 850 TABLET ORAL at 17:04

## 2019-03-29 NOTE — CONSULT NOTE ADULT - SUBJECTIVE AND OBJECTIVE BOX
NEPHROLOGY CONSULTATION NOTE    Patient is a 73y Male whom presented to the hospital with     PAST MEDICAL & SURGICAL HISTORY:  High blood cholesterol  Cerebrovascular accident (CVA) due to stenosis of left middle cerebral artery  Coronary artery disease without angina pectoris, unspecified vessel or lesion type, unspecified whether native or transplanted heart  Depression, unspecified depression type  Essential hypertension  Controlled type 2 diabetes mellitus without complication, unspecified long term insulin use status  H/O brain surgery  [place] filter in place  History of penile implant  History of lumbar surgery  H/O umbilical hernia repair  History of appendectomy    Allergies:  No Known Allergies    Home Medications Reviewed    SOCIAL HISTORY:  Denies ETOH,Smoking,   FAMILY HISTORY:  No pertinent family history in first degree relatives        REVIEW OF SYSTEMS:  poor historian  All other review of systems is negative unless indicated above.    PHYSICAL EXAM:  NAD  poorly verbal  + HP  + craniotomy scar  moist mm  no jvd  cta bl  rrr  soft, nt  no cvat  no cce    Hospital Medications:   MEDICATIONS  (STANDING):  atorvastatin 40 milliGRAM(s) Oral at bedtime  dextrose 5%. 1000 milliLiter(s) (50 mL/Hr) IV Continuous <Continuous>  dextrose 50% Injectable 12.5 Gram(s) IV Push once  dextrose 50% Injectable 25 Gram(s) IV Push once  dextrose 50% Injectable 25 Gram(s) IV Push once  dipyridamole 200 mG/aspirin 25 mG 1 Capsule(s) Oral two times a day  insulin glargine Injectable (LANTUS) 30 Unit(s) SubCutaneous at bedtime  insulin lispro (HumaLOG) corrective regimen sliding scale   SubCutaneous three times a day before meals  insulin lispro Injectable (HumaLOG) 2 Unit(s) SubCutaneous three times a day before meals  insulin lispro Injectable (HumaLOG) 2 Unit(s) SubCutaneous once  levETIRAcetam 500 milliGRAM(s) Oral two times a day  losartan 50 milliGRAM(s) Oral daily  metFORMIN 850 milliGRAM(s) Oral two times a day  metoprolol tartrate 50 milliGRAM(s) Oral two times a day  NIFEdipine XL 60 milliGRAM(s) Oral daily  pantoprazole    Tablet 40 milliGRAM(s) Oral before breakfast  QUEtiapine 25 milliGRAM(s) Oral two times a day        VITALS:  T(F): 97.4 (19 @ 14:19), Max: 98.5 (19 @ 22:15)  HR: 76 (19 @ 14:19)  BP: 147/68 (19 @ 14:19)  RR: 16 (19 @ 14:19)  SpO2: 95% (19 @ 17:36)  Wt(kg): --     @ 07:01  -   @ 17:13  --------------------------------------------------------  IN: 0 mL / OUT: 200 mL / NET: -200 mL      Height (cm): 175.26 ( @ 09:05)  Weight (kg): 80.2 ( @ 09:05)  BMI (kg/m2): 26.1 ( @ 09:05)  BSA (m2): 1.96 ( @ 09:05)    LABS:      140  |  101  |  14  ----------------------------<  211<H>  4.3   |  26  |  1.2    Ca    8.9      28 Mar 2019 19:53  Mg     1.8         TPro  7.8  /  Alb  4.2  /  TBili  0.3  /  DBili      /  AST  27  /  ALT  22  /  AlkPhos  139<H>                            11.5   9.35  )-----------( 199      ( 28 Mar 2019 19:53 )             35.6       Urine Studies:  Urinalysis Basic - ( 28 Mar 2019 14:00 )    Color: Yellow / Appearance: Clear / S.020 / pH:   Gluc:  / Ketone: Negative  / Bili: Negative / Urobili: 0.2 mg/dL   Blood:  / Protein: 30 mg/dL / Nitrite: Negative   Leuk Esterase: Negative / RBC:  / WBC 1-2 /HPF   Sq Epi:  / Non Sq Epi: Occasional /HPF / Bacteria:           RADIOLOGY & ADDITIONAL STUDIES: NEPHROLOGY CONSULTATION NOTE    74 yo male presents after a syncopal episode vs seizure at home. Pts wife states pt stated he wasnt feeling well and wanted to go upstairs so as she and her son were trying to get him upstairs his legs became weak, his eyes rolled back, but remained open and he made grunting noises and had LOC for about a minute. Son states pt was shaking slightly but not same intensity he has in past with seizure. Pt had episode of fecal incontinence - baseline - with this episode. When pt woke up he was confused but no more lethargic than usual.h     PAST MEDICAL & SURGICAL HISTORY:  High blood cholesterol  Cerebrovascular accident (CVA) due to stenosis of left middle cerebral artery  Coronary artery disease without angina pectoris, unspecified vessel or lesion type, unspecified whether native or transplanted heart  Depression, unspecified depression type  Essential hypertension  Controlled type 2 diabetes mellitus without complication, unspecified long term insulin use status  H/O brain surgery  [place] filter in place  History of penile implant  History of lumbar surgery  H/O umbilical hernia repair  History of appendectomy    Allergies:  No Known Allergies    Home Medications Reviewed    SOCIAL HISTORY:  Denies ETOH,Smoking,   FAMILY HISTORY:  No pertinent family history in first degree relatives        REVIEW OF SYSTEMS:  poor historian  All other review of systems is negative unless indicated above.    PHYSICAL EXAM:  NAD  poorly verbal  + HP  + craniotomy scar  moist mm  no jvd  cta bl  rrr  soft, nt  no cvat  no cce    Hospital Medications:   MEDICATIONS  (STANDING):  atorvastatin 40 milliGRAM(s) Oral at bedtime  dextrose 5%. 1000 milliLiter(s) (50 mL/Hr) IV Continuous <Continuous>  dextrose 50% Injectable 12.5 Gram(s) IV Push once  dextrose 50% Injectable 25 Gram(s) IV Push once  dextrose 50% Injectable 25 Gram(s) IV Push once  dipyridamole 200 mG/aspirin 25 mG 1 Capsule(s) Oral two times a day  insulin glargine Injectable (LANTUS) 30 Unit(s) SubCutaneous at bedtime  insulin lispro (HumaLOG) corrective regimen sliding scale   SubCutaneous three times a day before meals  insulin lispro Injectable (HumaLOG) 2 Unit(s) SubCutaneous three times a day before meals  insulin lispro Injectable (HumaLOG) 2 Unit(s) SubCutaneous once  levETIRAcetam 500 milliGRAM(s) Oral two times a day  losartan 50 milliGRAM(s) Oral daily  metFORMIN 850 milliGRAM(s) Oral two times a day  metoprolol tartrate 50 milliGRAM(s) Oral two times a day  NIFEdipine XL 60 milliGRAM(s) Oral daily  pantoprazole    Tablet 40 milliGRAM(s) Oral before breakfast  QUEtiapine 25 milliGRAM(s) Oral two times a day        VITALS:  T(F): 97.4 (19 @ 14:19), Max: 98.5 (19 @ 22:15)  HR: 76 (19 @ 14:19)  BP: 147/68 (19 @ 14:19)  RR: 16 (19 @ 14:19)  SpO2: 95% (19 @ 17:36)  Wt(kg): --     @ 07:01  -   @ 17:13  --------------------------------------------------------  IN: 0 mL / OUT: 200 mL / NET: -200 mL      Height (cm): 175.26 ( @ 09:05)  Weight (kg): 80.2 ( @ 09:05)  BMI (kg/m2): 26.1 ( @ 09:05)  BSA (m2): 1.96 ( @ 09:05)    LABS:      140  |  101  |  14  ----------------------------<  211<H>  4.3   |  26  |  1.2    Ca    8.9      28 Mar 2019 19:53  Mg     1.8         TPro  7.8  /  Alb  4.2  /  TBili  0.3  /  DBili      /  AST  27  /  ALT  22  /  AlkPhos  139<H>                            11.5   9.35  )-----------( 199      ( 28 Mar 2019 19:53 )             35.6       Urine Studies:  Urinalysis Basic - ( 28 Mar 2019 14:00 )    Color: Yellow / Appearance: Clear / S.020 / pH:   Gluc:  / Ketone: Negative  / Bili: Negative / Urobili: 0.2 mg/dL   Blood:  / Protein: 30 mg/dL / Nitrite: Negative   Leuk Esterase: Negative / RBC:  / WBC 1-2 /HPF   Sq Epi:  / Non Sq Epi: Occasional /HPF / Bacteria:           RADIOLOGY & ADDITIONAL STUDIES:

## 2019-03-29 NOTE — CONSULT NOTE ADULT - ASSESSMENT
Patient with above history. Syncope not clear cardiac or neuro Check ortho bp. Monitor. Can consider out patient event monitor
HTN  seizure  no syncope / no orthostatics  ckd 3  epilepsy  old cva with significant residual neuro deficitis  dm    plan:    no change in bp meds  on tele  lantus / humalog  inc keppra per neuro  d/w family

## 2019-03-29 NOTE — CONSULT NOTE ADULT - SUBJECTIVE AND OBJECTIVE BOX
Neurology Consultation note    Name  RISHI HERRERA    HPI:  74 yo male presents after a syncopal episode vs seizure at home. Pts wife states pt stated he wasnt feeling well and wanted to go upstairs so as she and her son were trying to get him upstairs his legs became weak, his eyes rolled back, but remained open and he made grunting noises and had LOC for about a minute. Son states pt was shaking slightly but not same intensity he has in past with seizure. Pt had episode of fecal incontinence - baseline - with this episode. When pt woke up he was confused but no more lethargic than usual.    Pt denies any CP, SOB, dizziness prior to episode. (28 Mar 2019 16:20)      NEUROLOGY  PATIENT IS A 74 YO MAN WITH HX OF CVA X 5 IN THE PAST AND LOCALIZATION RELATED EPILPESY WHO PRESENTS WITH WITNESSED BREAKTHROUGH SZ. PATIENT FOLLOWS WITH DR LIVAN MONAHAN. SZ PREVIOUSLY CONTROLLED  Q 12. [facility] NOTED SEVERAL MINUTES OF LOC WITH CONVULSIONS OF THE ARMS. EYES WERE MIDLINE. PT WITH FECAL INCONTINENCE. WIFE REPORTS PATIENT "TURNED GRAY" DURING EVENT. NOW HE IS AT HIS BASELINE WITH A COMPLETE EXPRESSIVE APHASIA AND R HP. NO FURTHER EVENTS NOTED.      Vital Signs Last 24 Hrs  T(C): 36.3 (29 Mar 2019 14:19), Max: 36.9 (28 Mar 2019 22:15)  T(F): 97.4 (29 Mar 2019 14:19), Max: 98.5 (28 Mar 2019 22:15)  HR: 76 (29 Mar 2019 14:19) (73 - 95)  BP: 147/68 (29 Mar 2019 14:19) (133/60 - 147/68)  BP(mean): --  RR: 16 (29 Mar 2019 14:19) (16 - 16)  SpO2: 95% (28 Mar 2019 17:36) (95% - 95%)    Neurological Exam:   MS: AWAKE AND ALERT, NEAR COMPLETE EXPRESSIVE APHASIA, FOLLOWS COMMANDS  R CENTRAL FACIAL  R UE CONTRACTED AND PLEGIC, LIFTS RLE AGAINST GRAVITY  R HS LOSS    Medications  atorvastatin 40 milliGRAM(s) Oral at bedtime  dextrose 40% Gel 15 Gram(s) Oral once PRN  dextrose 5%. 1000 milliLiter(s) IV Continuous <Continuous>  dextrose 50% Injectable 12.5 Gram(s) IV Push once  dextrose 50% Injectable 25 Gram(s) IV Push once  dextrose 50% Injectable 25 Gram(s) IV Push once  diazepam    Tablet 10 milliGRAM(s) Oral two times a day PRN  dipyridamole 200 mG/aspirin 25 mG 1 Capsule(s) Oral two times a day  glucagon  Injectable 1 milliGRAM(s) IntraMuscular once PRN  insulin glargine Injectable (LANTUS) 30 Unit(s) SubCutaneous at bedtime  insulin lispro (HumaLOG) corrective regimen sliding scale   SubCutaneous three times a day before meals  insulin lispro Injectable (HumaLOG) 2 Unit(s) SubCutaneous three times a day before meals  insulin lispro Injectable (HumaLOG) 2 Unit(s) SubCutaneous once  levETIRAcetam 500 milliGRAM(s) Oral two times a day  LORazepam    IVPB 2 milliGRAM(s) IV Intermittent every 6 hours PRN  losartan 50 milliGRAM(s) Oral daily  metFORMIN 850 milliGRAM(s) Oral two times a day  metoprolol tartrate 50 milliGRAM(s) Oral two times a day  NIFEdipine XL 60 milliGRAM(s) Oral daily  pantoprazole    Tablet 40 milliGRAM(s) Oral before breakfast  QUEtiapine 25 milliGRAM(s) Oral two times a day      Lab  03-28    140  |  101  |  14  ----------------------------<  211<H>  4.3   |  26  |  1.2    Ca    8.9      28 Mar 2019 19:53  Mg     1.8     03-28    TPro  7.8  /  Alb  4.2  /  TBili  0.3  /  DBili  x   /  AST  27  /  ALT  22  /  AlkPhos  139<H>  03-28                          11.5   9.35  )-----------( 199      ( 28 Mar 2019 19:53 )             35.6     LIVER FUNCTIONS - ( 28 Mar 2019 13:20 )  Alb: 4.2 g/dL / Pro: 7.8 g/dL / ALK PHOS: 139 U/L / ALT: 22 U/L / AST: 27 U/L / GGT: x             1.8        Radiology  CTH NO ACUTE CHANGE      Assessment: 74 YO MAN WITH HX AS ABOVE INCLUDING CVA X 5 IN THE PAST AND KNOWN EPILEPSY, PRESENTS WITH BREAKTHROUGH SZ X 1  PATIENT WITH RECENT URI WHICH MAY HAVE PROVOKED  PATIENT ALSO WITH LOW MG ON ADM    Plan:  REEH  INCREASE KEPPRA  MG Q 12  MAINTAIN MG >2  SZ PRECAUTIONS   WILL FOLLOW

## 2019-03-29 NOTE — CONSULT NOTE ADULT - SUBJECTIVE AND OBJECTIVE BOX
CARDIOLOGY CONSULT NOTE     CHIEF COMPLAINT/REASON FOR CONSULT:    HPI:  74 yo male presents after a syncopal episode vs seizure at home. Pts wife states pt stated he wasnt feeling well and wanted to go upstairs so as she and her son were trying to get him upstairs his legs became weak, his eyes rolled back, but remained open and he made grunting noises and had LOC for about a minute. Son states pt was shaking slightly but not same intensity he has in past with seizure. Pt had episode of fecal incontinence - baseline - with this episode. When pt woke up he was confused but no more lethargic than usual.    Pt denies any CP, SOB, dizziness prior to episode. (28 Mar 2019 16:20)      PAST MEDICAL & SURGICAL HISTORY:  High blood cholesterol  Cerebrovascular accident (CVA) due to stenosis of left middle cerebral artery  Coronary artery disease without angina pectoris, unspecified vessel or lesion type, unspecified whether native or transplanted heart  Depression, unspecified depression type  Essential hypertension  Controlled type 2 diabetes mellitus without complication, unspecified long term insulin use status  H/O brain surgery  Brad[place] filter in place  History of penile implant  History of lumbar surgery  H/O umbilical hernia repair  History of appendectomy      Cardiac Risks:   [x ]HTN, [x ] DM, [ ] Smoking, [ ] FH,  [ ] Lipids        MEDICATIONS:  MEDICATIONS  (STANDING):  atorvastatin 40 milliGRAM(s) Oral at bedtime  dextrose 5%. 1000 milliLiter(s) (50 mL/Hr) IV Continuous <Continuous>  dextrose 50% Injectable 12.5 Gram(s) IV Push once  dextrose 50% Injectable 25 Gram(s) IV Push once  dextrose 50% Injectable 25 Gram(s) IV Push once  dipyridamole 200 mG/aspirin 25 mG 1 Capsule(s) Oral two times a day  insulin glargine Injectable (LANTUS) 30 Unit(s) SubCutaneous at bedtime  insulin lispro (HumaLOG) corrective regimen sliding scale   SubCutaneous three times a day before meals  insulin lispro Injectable (HumaLOG) 2 Unit(s) SubCutaneous three times a day before meals  insulin lispro Injectable (HumaLOG) 2 Unit(s) SubCutaneous once  levETIRAcetam 500 milliGRAM(s) Oral two times a day  losartan 50 milliGRAM(s) Oral daily  metFORMIN 850 milliGRAM(s) Oral two times a day  metoprolol tartrate 50 milliGRAM(s) Oral two times a day  NIFEdipine XL 60 milliGRAM(s) Oral daily  pantoprazole    Tablet 40 milliGRAM(s) Oral before breakfast  QUEtiapine 25 milliGRAM(s) Oral two times a day      FAMILY HISTORY:  No pertinent family history in first degree relatives      SOCIAL HISTORY:      [ ] Marital status   Allergies    No Known Allergies        	    REVIEW OF SYSTEMS:  CONSTITUTIONAL: No fever, weight loss, or fatigue  EYES: No eye pain, visual disturbances, or discharge  ENMT:  No difficulty hearing, tinnitus, vertigo; No sinus or throat pain  NECK: No pain or stiffness  RESPIRATORY: No cough, wheezing, chills or hemoptysis; No Shortness of Breath  CARDIOVASCULAR: No chest pain, palpitations, passing out, dizziness, or leg swelling  GASTROINTESTINAL: No abdominal or epigastric pain. No nausea, vomiting, or hematemesis; No diarrhea or constipation. No melena or hematochezia.  GENITOURINARY: No dysuria, frequency, hematuria, or incontinence  NEUROLOGICAL: No headaches, memory loss, loss of strength, numbness, or tremors  SKIN: No itching, burning, rashes, or lesions   	    PHYSICAL EXAM:  T(C): 35.8 (03-29-19 @ 04:36), Max: 36.9 (03-28-19 @ 22:15)  HR: 73 (03-29-19 @ 04:36) (73 - 95)  BP: 134/63 (03-29-19 @ 04:36) (133/60 - 140/78)  RR: 16 (03-29-19 @ 04:36) (16 - 16)  SpO2: 95% (03-28-19 @ 17:36) (95% - 98%)  Wt(kg): --  I&O's Summary      Appearance: Normal	  Psychiatry: A & O x 3, Mood & affect appropriate  HEENT:   Normal oral mucosa, PERRL, EOMI	  Lymphatic: No lymphadenopathy  Cardiovascular: Normal S1 S2,RRR, No JVD, No murmurs  Respiratory: Lungs clear to auscultation	  Gastrointestinal:  Soft, Non-tender, + BS	  Skin: No rashes, No ecchymoses, No cyanosis	  Neurologic: Non-focal  Extremities: Normal range of motion, No clubbing, cyanosis or edema  Vascular: Peripheral pulses palpable 2+ bilaterally      ECG:  	< from: 12 Lead ECG (03.28.19 @ 13:41) >    Diagnosis Line Normal sinus rhythm  ST & T wave abnormality, consider inferolateral ischemia  Abnormal ECG    Confirmed by MARIUSZ TIDWELL MD ([id number]) on 3/28/2019 4:39:04 PM    < end of copied text >      	  LABS:	 	    CARDIAC MARKERS:                                    11.5   9.35  )-----------( 199      ( 28 Mar 2019 19:53 )             35.6     03-28    140  |  101  |  14  ----------------------------<  211<H>  4.3   |  26  |  1.2    Ca    8.9      28 Mar 2019 19:53  Mg     1.8     03-28    TPro  7.8  /  Alb  4.2  /  TBili  0.3  /  DBili  x   /  AST  27  /  ALT  22  /  AlkPhos  139<H>  03-28    PT/INR - ( 28 Mar 2019 13:20 )   PT: 12.70 sec;   INR: 1.11 ratio         PTT - ( 28 Mar 2019 13:20 )  PTT:33.3 sec

## 2019-03-30 LAB
GLUCOSE BLDC GLUCOMTR-MCNC: 112 MG/DL — HIGH (ref 70–99)
GLUCOSE BLDC GLUCOMTR-MCNC: 173 MG/DL — HIGH (ref 70–99)
GLUCOSE BLDC GLUCOMTR-MCNC: 175 MG/DL — HIGH (ref 70–99)
GLUCOSE BLDC GLUCOMTR-MCNC: 72 MG/DL — SIGNIFICANT CHANGE UP (ref 70–99)

## 2019-03-30 RX ADMIN — Medication 50 MILLIGRAM(S): at 06:10

## 2019-03-30 RX ADMIN — PANTOPRAZOLE SODIUM 40 MILLIGRAM(S): 20 TABLET, DELAYED RELEASE ORAL at 06:10

## 2019-03-30 RX ADMIN — ASPIRIN AND DIPYRIDAMOLE 1 CAPSULE(S): 25; 200 CAPSULE, EXTENDED RELEASE ORAL at 17:46

## 2019-03-30 RX ADMIN — METFORMIN HYDROCHLORIDE 850 MILLIGRAM(S): 850 TABLET ORAL at 06:10

## 2019-03-30 RX ADMIN — Medication 50 MILLIGRAM(S): at 17:46

## 2019-03-30 RX ADMIN — Medication 2 UNIT(S): at 08:36

## 2019-03-30 RX ADMIN — QUETIAPINE FUMARATE 25 MILLIGRAM(S): 200 TABLET, FILM COATED ORAL at 06:10

## 2019-03-30 RX ADMIN — ATORVASTATIN CALCIUM 40 MILLIGRAM(S): 80 TABLET, FILM COATED ORAL at 21:35

## 2019-03-30 RX ADMIN — INSULIN GLARGINE 30 UNIT(S): 100 INJECTION, SOLUTION SUBCUTANEOUS at 21:35

## 2019-03-30 RX ADMIN — LEVETIRACETAM 750 MILLIGRAM(S): 250 TABLET, FILM COATED ORAL at 17:46

## 2019-03-30 RX ADMIN — Medication 1: at 08:37

## 2019-03-30 RX ADMIN — ASPIRIN AND DIPYRIDAMOLE 1 CAPSULE(S): 25; 200 CAPSULE, EXTENDED RELEASE ORAL at 06:10

## 2019-03-30 RX ADMIN — METFORMIN HYDROCHLORIDE 850 MILLIGRAM(S): 850 TABLET ORAL at 17:46

## 2019-03-30 RX ADMIN — LEVETIRACETAM 750 MILLIGRAM(S): 250 TABLET, FILM COATED ORAL at 06:11

## 2019-03-30 RX ADMIN — Medication 60 MILLIGRAM(S): at 06:10

## 2019-03-30 RX ADMIN — QUETIAPINE FUMARATE 25 MILLIGRAM(S): 200 TABLET, FILM COATED ORAL at 17:46

## 2019-03-30 RX ADMIN — LOSARTAN POTASSIUM 50 MILLIGRAM(S): 100 TABLET, FILM COATED ORAL at 06:10

## 2019-03-31 LAB
GLUCOSE BLDC GLUCOMTR-MCNC: 133 MG/DL — HIGH (ref 70–99)
GLUCOSE BLDC GLUCOMTR-MCNC: 148 MG/DL — HIGH (ref 70–99)
GLUCOSE BLDC GLUCOMTR-MCNC: 187 MG/DL — HIGH (ref 70–99)
GLUCOSE BLDC GLUCOMTR-MCNC: 188 MG/DL — HIGH (ref 70–99)

## 2019-03-31 RX ADMIN — QUETIAPINE FUMARATE 25 MILLIGRAM(S): 200 TABLET, FILM COATED ORAL at 05:39

## 2019-03-31 RX ADMIN — ATORVASTATIN CALCIUM 40 MILLIGRAM(S): 80 TABLET, FILM COATED ORAL at 21:57

## 2019-03-31 RX ADMIN — Medication 1: at 12:31

## 2019-03-31 RX ADMIN — QUETIAPINE FUMARATE 25 MILLIGRAM(S): 200 TABLET, FILM COATED ORAL at 17:04

## 2019-03-31 RX ADMIN — METFORMIN HYDROCHLORIDE 850 MILLIGRAM(S): 850 TABLET ORAL at 05:39

## 2019-03-31 RX ADMIN — LEVETIRACETAM 750 MILLIGRAM(S): 250 TABLET, FILM COATED ORAL at 17:03

## 2019-03-31 RX ADMIN — LEVETIRACETAM 750 MILLIGRAM(S): 250 TABLET, FILM COATED ORAL at 05:40

## 2019-03-31 RX ADMIN — ASPIRIN AND DIPYRIDAMOLE 1 CAPSULE(S): 25; 200 CAPSULE, EXTENDED RELEASE ORAL at 05:40

## 2019-03-31 RX ADMIN — Medication 50 MILLIGRAM(S): at 05:40

## 2019-03-31 RX ADMIN — ASPIRIN AND DIPYRIDAMOLE 1 CAPSULE(S): 25; 200 CAPSULE, EXTENDED RELEASE ORAL at 17:04

## 2019-03-31 RX ADMIN — LOSARTAN POTASSIUM 50 MILLIGRAM(S): 100 TABLET, FILM COATED ORAL at 16:10

## 2019-03-31 RX ADMIN — Medication 1: at 17:09

## 2019-03-31 RX ADMIN — PANTOPRAZOLE SODIUM 40 MILLIGRAM(S): 20 TABLET, DELAYED RELEASE ORAL at 05:39

## 2019-03-31 RX ADMIN — METFORMIN HYDROCHLORIDE 850 MILLIGRAM(S): 850 TABLET ORAL at 17:04

## 2019-03-31 RX ADMIN — Medication 2 UNIT(S): at 17:09

## 2019-03-31 RX ADMIN — Medication 2 UNIT(S): at 12:31

## 2019-03-31 RX ADMIN — Medication 60 MILLIGRAM(S): at 05:39

## 2019-03-31 RX ADMIN — INSULIN GLARGINE 30 UNIT(S): 100 INJECTION, SOLUTION SUBCUTANEOUS at 23:01

## 2019-03-31 RX ADMIN — Medication 50 MILLIGRAM(S): at 17:04

## 2019-04-01 ENCOUNTER — TRANSCRIPTION ENCOUNTER (OUTPATIENT)
Age: 74
End: 2019-04-01

## 2019-04-01 VITALS
DIASTOLIC BLOOD PRESSURE: 76 MMHG | TEMPERATURE: 97 F | SYSTOLIC BLOOD PRESSURE: 160 MMHG | HEART RATE: 78 BPM | RESPIRATION RATE: 18 BRPM

## 2019-04-01 LAB — GLUCOSE BLDC GLUCOMTR-MCNC: 215 MG/DL — HIGH (ref 70–99)

## 2019-04-01 RX ADMIN — Medication 2 UNIT(S): at 08:36

## 2019-04-01 RX ADMIN — LEVETIRACETAM 750 MILLIGRAM(S): 250 TABLET, FILM COATED ORAL at 06:31

## 2019-04-01 RX ADMIN — LOSARTAN POTASSIUM 50 MILLIGRAM(S): 100 TABLET, FILM COATED ORAL at 06:30

## 2019-04-01 RX ADMIN — Medication 60 MILLIGRAM(S): at 06:30

## 2019-04-01 RX ADMIN — METFORMIN HYDROCHLORIDE 850 MILLIGRAM(S): 850 TABLET ORAL at 06:31

## 2019-04-01 RX ADMIN — Medication 50 MILLIGRAM(S): at 06:30

## 2019-04-01 RX ADMIN — QUETIAPINE FUMARATE 25 MILLIGRAM(S): 200 TABLET, FILM COATED ORAL at 06:30

## 2019-04-01 RX ADMIN — PANTOPRAZOLE SODIUM 40 MILLIGRAM(S): 20 TABLET, DELAYED RELEASE ORAL at 06:30

## 2019-04-01 RX ADMIN — Medication 2: at 08:36

## 2019-04-01 RX ADMIN — ASPIRIN AND DIPYRIDAMOLE 1 CAPSULE(S): 25; 200 CAPSULE, EXTENDED RELEASE ORAL at 06:31

## 2019-04-01 NOTE — DISCHARGE NOTE NURSING/CASE MANAGEMENT/SOCIAL WORK - NSDCDPATPORTLINK_GEN_ALL_CORE
You can access the [facility] Portal, offered by [facility], by registering with the following website: [email or web address]

## 2019-04-01 NOTE — SWALLOW BEDSIDE ASSESSMENT ADULT - SWALLOW EVAL: DIAGNOSIS
pt with essentially functional oral swallow; no s/s of pharyngeal dysphagia; pt reports of no prior hx of dysphagia or globus

## 2019-04-01 NOTE — DISCHARGE NOTE PROVIDER - HOSPITAL COURSE
to be completed by attending 73 year old w history of CVA w right hemiparesis brought in by wife secondary to syncopal episode vs seizure'    during hospitalization cardiac work up negative. neurology  consult done advised to increase Keppra     patient remain stable during hospitalization

## 2019-04-01 NOTE — PROGRESS NOTE ADULT - SUBJECTIVE AND OBJECTIVE BOX
NEPHROLOGY FOLLOW UP NOTE    no further seizures  + dysphagia / choking  bp's modestly up  no overnight events    PAST MEDICAL & SURGICAL HISTORY:  High blood cholesterol  Cerebrovascular accident (CVA) due to stenosis of left middle cerebral artery  Coronary artery disease without angina pectoris, unspecified vessel or lesion type, unspecified whether native or transplanted heart  Depression, unspecified depression type  Essential hypertension  Controlled type 2 diabetes mellitus without complication, unspecified long term insulin use status  H/O brain surgery  [place] filter in place  History of penile implant  History of lumbar surgery  H/O umbilical hernia repair  History of appendectomy    Allergies:  No Known Allergies    Home Medications Reviewed    SOCIAL HISTORY:  Denies ETOH,Smoking,   FAMILY HISTORY:  No pertinent family history in first degree relatives        REVIEW OF SYSTEMS:  poor historian  All other review of systems is negative unless indicated above.    PHYSICAL EXAM:  NAD  poorly verbal  + HP  + craniotomy scar  moist mm  no jvd  cta bl  rrr  soft, nt  no cvat  no cce    Hospital Medications:   MEDICATIONS  (STANDING):  atorvastatin 40 milliGRAM(s) Oral at bedtime  dextrose 5%. 1000 milliLiter(s) (50 mL/Hr) IV Continuous <Continuous>  dextrose 50% Injectable 12.5 Gram(s) IV Push once  dextrose 50% Injectable 25 Gram(s) IV Push once  dextrose 50% Injectable 25 Gram(s) IV Push once  dipyridamole 200 mG/aspirin 25 mG 1 Capsule(s) Oral two times a day  insulin glargine Injectable (LANTUS) 30 Unit(s) SubCutaneous at bedtime  insulin lispro (HumaLOG) corrective regimen sliding scale   SubCutaneous three times a day before meals  insulin lispro Injectable (HumaLOG) 2 Unit(s) SubCutaneous three times a day before meals  insulin lispro Injectable (HumaLOG) 2 Unit(s) SubCutaneous once  levETIRAcetam 750 milliGRAM(s) Oral two times a day  losartan 50 milliGRAM(s) Oral daily  metFORMIN 850 milliGRAM(s) Oral two times a day  metoprolol tartrate 50 milliGRAM(s) Oral two times a day  NIFEdipine XL 60 milliGRAM(s) Oral daily  pantoprazole    Tablet 40 milliGRAM(s) Oral before breakfast  QUEtiapine 25 milliGRAM(s) Oral two times a day        VITALS:  T(F): 98 (19 @ 13:38), Max: 98 (19 @ 13:38)  HR: 99 (19 @ 13:38)  BP: 145/73 (19 @ 13:38)  RR: 16 (19 @ 13:38)  SpO2: --  Wt(kg): --     @ 07:01  -   @ 07:00  --------------------------------------------------------  IN: 0 mL / OUT: 200 mL / NET: -200 mL      Height (cm): 175.26 ( @ 10:31)  Weight (kg): 80.2 ( @ 10:31)  BMI (kg/m2): 26.1 ( 10:31)  BSA (m2): 1.96 ( @ 10:31)    LABS:      140  |  101  |  14  ----------------------------<  211<H>  4.3   |  26  |  1.2    Ca    8.9      28 Mar 2019 19:53  Mg     1.8                                 11.5   9.35  )-----------( 199      ( 28 Mar 2019 19:53 )             35.6       Urine Studies:  Urinalysis Basic - ( 28 Mar 2019 14:00 )    Color: Yellow / Appearance: Clear / S.020 / pH:   Gluc:  / Ketone: Negative  / Bili: Negative / Urobili: 0.2 mg/dL   Blood:  / Protein: 30 mg/dL / Nitrite: Negative   Leuk Esterase: Negative / RBC:  / WBC 1-2 /HPF   Sq Epi:  / Non Sq Epi: Occasional /HPF / Bacteria:           RADIOLOGY & ADDITIONAL STUDIES:
NEPHROLOGY FOLLOW UP NOTE    no further seizures  bp's better  poorly verbal  no overnight events    PAST MEDICAL & SURGICAL HISTORY:  High blood cholesterol  Cerebrovascular accident (CVA) due to stenosis of left middle cerebral artery  Coronary artery disease without angina pectoris, unspecified vessel or lesion type, unspecified whether native or transplanted heart  Depression, unspecified depression type  Essential hypertension  Controlled type 2 diabetes mellitus without complication, unspecified long term insulin use status  H/O brain surgery  Bradly filter in place  History of penile implant  History of lumbar surgery  H/O umbilical hernia repair  History of appendectomy    Allergies:  No Known Allergies    Home Medications Reviewed    SOCIAL HISTORY:  Denies ETOH,Smoking,   FAMILY HISTORY:  No pertinent family history in first degree relatives        REVIEW OF SYSTEMS:  poor historian  All other review of systems is negative unless indicated above.    PHYSICAL EXAM:  NAD  poorly verbal  + HP  + craniotomy scar  moist mm  no jvd  cta bl  rrr  soft, nt  no cvat  no cce    Hospital Medications:   MEDICATIONS  (STANDING):  atorvastatin 40 milliGRAM(s) Oral at bedtime  dextrose 5%. 1000 milliLiter(s) (50 mL/Hr) IV Continuous <Continuous>  dextrose 50% Injectable 12.5 Gram(s) IV Push once  dextrose 50% Injectable 25 Gram(s) IV Push once  dextrose 50% Injectable 25 Gram(s) IV Push once  dipyridamole 200 mG/aspirin 25 mG 1 Capsule(s) Oral two times a day  insulin glargine Injectable (LANTUS) 30 Unit(s) SubCutaneous at bedtime  insulin lispro (HumaLOG) corrective regimen sliding scale   SubCutaneous three times a day before meals  insulin lispro Injectable (HumaLOG) 2 Unit(s) SubCutaneous three times a day before meals  insulin lispro Injectable (HumaLOG) 2 Unit(s) SubCutaneous once  levETIRAcetam 750 milliGRAM(s) Oral two times a day  losartan 50 milliGRAM(s) Oral daily  metFORMIN 850 milliGRAM(s) Oral two times a day  metoprolol tartrate 50 milliGRAM(s) Oral two times a day  NIFEdipine XL 60 milliGRAM(s) Oral daily  pantoprazole    Tablet 40 milliGRAM(s) Oral before breakfast  QUEtiapine 25 milliGRAM(s) Oral two times a day        VITALS:  T(F): 97.7 (19 @ 14:46), Max: 98 (19 @ 22:14)  HR: 94 (19 @ 14:46)  BP: 137/63 (19 @ 14:46)  RR: 16 (19 @ 14:46)  SpO2: --  Wt(kg): --     @ 07:01  -   @ 07:00  --------------------------------------------------------  IN: 0 mL / OUT: 200 mL / NET: -200 mL     @ 07:01  -   @ 07:00  --------------------------------------------------------  IN: 0 mL / OUT: 0 mL / NET: 0 mL     @ 07:01  -   @ 15:15  --------------------------------------------------------  IN: 1200 mL / OUT: 800 mL / NET: 400 mL          LABS:            Urine Studies:  Urinalysis Basic - ( 28 Mar 2019 14:00 )    Color: Yellow / Appearance: Clear / S.020 / pH:   Gluc:  / Ketone: Negative  / Bili: Negative / Urobili: 0.2 mg/dL   Blood:  / Protein: 30 mg/dL / Nitrite: Negative   Leuk Esterase: Negative / RBC:  / WBC 1-2 /HPF   Sq Epi:  / Non Sq Epi: Occasional /HPF / Bacteria:           RADIOLOGY & ADDITIONAL STUDIES:
NEPHROLOGY FOLLOW UP NOTE    no further seizures  bp's fair  no overnight events  no sz    PAST MEDICAL & SURGICAL HISTORY:  High blood cholesterol  Cerebrovascular accident (CVA) due to stenosis of left middle cerebral artery  Coronary artery disease without angina pectoris, unspecified vessel or lesion type, unspecified whether native or transplanted heart  Depression, unspecified depression type  Essential hypertension  Controlled type 2 diabetes mellitus without complication, unspecified long term insulin use status  H/O brain surgery  [place] filter in place  History of penile implant  History of lumbar surgery  H/O umbilical hernia repair  History of appendectomy    Allergies:  No Known Allergies    Home Medications Reviewed    SOCIAL HISTORY:  Denies ETOH,Smoking,   FAMILY HISTORY:  No pertinent family history in first degree relatives        REVIEW OF SYSTEMS:  poor historian  All other review of systems is negative unless indicated above.    PHYSICAL EXAM:  NAD  poorly verbal  + HP  + craniotomy scar  moist mm  no jvd  cta bl  rrr  soft, nt  no cvat  no cce      Hospital Medications:   MEDICATIONS  (STANDING):  atorvastatin 40 milliGRAM(s) Oral at bedtime  dextrose 5%. 1000 milliLiter(s) (50 mL/Hr) IV Continuous <Continuous>  dextrose 50% Injectable 12.5 Gram(s) IV Push once  dextrose 50% Injectable 25 Gram(s) IV Push once  dextrose 50% Injectable 25 Gram(s) IV Push once  dipyridamole 200 mG/aspirin 25 mG 1 Capsule(s) Oral two times a day  insulin glargine Injectable (LANTUS) 30 Unit(s) SubCutaneous at bedtime  insulin lispro (HumaLOG) corrective regimen sliding scale   SubCutaneous three times a day before meals  insulin lispro Injectable (HumaLOG) 2 Unit(s) SubCutaneous three times a day before meals  insulin lispro Injectable (HumaLOG) 2 Unit(s) SubCutaneous once  levETIRAcetam 750 milliGRAM(s) Oral two times a day  losartan 50 milliGRAM(s) Oral daily  metFORMIN 850 milliGRAM(s) Oral two times a day  metoprolol tartrate 50 milliGRAM(s) Oral two times a day  NIFEdipine XL 60 milliGRAM(s) Oral daily  pantoprazole    Tablet 40 milliGRAM(s) Oral before breakfast  QUEtiapine 25 milliGRAM(s) Oral two times a day        VITALS:  T(F): 97 (19 @ 05:43), Max: 98 (19 @ 22:55)  HR: 78 (19 @ 05:43)  BP: 160/76 (19 @ 05:43)  RR: 18 (19 @ 05:43)  SpO2: --  Wt(kg): --     @ 07:01  -   @ 07:00  --------------------------------------------------------  IN: 0 mL / OUT: 0 mL / NET: 0 mL     @ 07:01  -   @ 07:00  --------------------------------------------------------  IN: 1200 mL / OUT: 900 mL / NET: 300 mL      Height (cm): 175.26 ( @ 15:35)  Weight (kg): 80.2 ( @ 15:35)  BMI (kg/m2): 26.1 ( @ 15:35)  BSA (m2): 1.96 ( @ 15:35)    LABS:            Urine Studies:  Urinalysis Basic - ( 28 Mar 2019 14:00 )    Color: Yellow / Appearance: Clear / S.020 / pH:   Gluc:  / Ketone: Negative  / Bili: Negative / Urobili: 0.2 mg/dL   Blood:  / Protein: 30 mg/dL / Nitrite: Negative   Leuk Esterase: Negative / RBC:  / WBC 1-2 /HPF   Sq Epi:  / Non Sq Epi: Occasional /HPF / Bacteria:           RADIOLOGY & ADDITIONAL STUDIES:            RADIOLOGY & ADDITIONAL STUDIES:
SUBJECTIVE:    Patient is a 73y old Male who presents with a chief complaint of Syncope vs Seizure (31 Mar 2019 15:14)    Currently admitted to medicine with the primary diagnosis of Syncope     Today is hospital day 3d. This morning he is resting comfortably in bed and reports no new issues or overnight events.     PAST MEDICAL & SURGICAL HISTORY  High blood cholesterol  Cerebrovascular accident (CVA) due to stenosis of left middle cerebral artery  Coronary artery disease without angina pectoris, unspecified vessel or lesion type, unspecified whether native or transplanted heart  Depression, unspecified depression type  Essential hypertension  Controlled type 2 diabetes mellitus without complication, unspecified long term insulin use status  H/O brain surgery  [place] filter in place  History of penile implant  History of lumbar surgery  H/O umbilical hernia repair  History of appendectomy    SOCIAL HISTORY:  Negative for smoking/alcohol/drug use.     ALLERGIES:  No Known Allergies    MEDICATIONS:  STANDING MEDICATIONS  atorvastatin 40 milliGRAM(s) Oral at bedtime  dextrose 5%. 1000 milliLiter(s) IV Continuous <Continuous>  dextrose 50% Injectable 12.5 Gram(s) IV Push once  dextrose 50% Injectable 25 Gram(s) IV Push once  dextrose 50% Injectable 25 Gram(s) IV Push once  dipyridamole 200 mG/aspirin 25 mG 1 Capsule(s) Oral two times a day  insulin glargine Injectable (LANTUS) 30 Unit(s) SubCutaneous at bedtime  insulin lispro (HumaLOG) corrective regimen sliding scale   SubCutaneous three times a day before meals  insulin lispro Injectable (HumaLOG) 2 Unit(s) SubCutaneous three times a day before meals  insulin lispro Injectable (HumaLOG) 2 Unit(s) SubCutaneous once  levETIRAcetam 750 milliGRAM(s) Oral two times a day  losartan 50 milliGRAM(s) Oral daily  metFORMIN 850 milliGRAM(s) Oral two times a day  metoprolol tartrate 50 milliGRAM(s) Oral two times a day  NIFEdipine XL 60 milliGRAM(s) Oral daily  pantoprazole    Tablet 40 milliGRAM(s) Oral before breakfast  QUEtiapine 25 milliGRAM(s) Oral two times a day    PRN MEDICATIONS  dextrose 40% Gel 15 Gram(s) Oral once PRN  diazepam    Tablet 10 milliGRAM(s) Oral two times a day PRN  glucagon  Injectable 1 milliGRAM(s) IntraMuscular once PRN  LORazepam    IVPB 2 milliGRAM(s) IV Intermittent every 6 hours PRN    VITALS:   T(F): 97.7  HR: 94  BP: 137/63  RR: 16  SpO2: --    LABS:                        RADIOLOGY:    PHYSICAL EXAM:  GEN: No acute distress  LUNGS: Clear to auscultation bilaterally   HEART: Regular  ABD: Soft, non-tender, non-distended.  EXT: NC/NC/NE/2+PP/DOMINGO/Skin Intact.   NEURO: AAOX3 cva w right hp    Intravenous access:   NG tube:   Guerrero Catheter:
SUBJECTIVE:    Patient is a 73y old Male who presents with a chief complaint of Syncope vs Seizure (29 Mar 2019 17:11)    Currently admitted to medicine with the primary diagnosis of Syncope     Today is hospital day 2d.       marilou ellison   d/w RN  PAST MEDICAL & SURGICAL HISTORY  High blood cholesterol  Cerebrovascular accident (CVA) due to stenosis of left middle cerebral artery  Coronary artery disease without angina pectoris, unspecified vessel or lesion type, unspecified whether native or transplanted heart  Depression, unspecified depression type  Essential hypertension  Controlled type 2 diabetes mellitus without complication, unspecified long term insulin use status  H/O brain surgery  [place] filter in place  History of penile implant  History of lumbar surgery  H/O umbilical hernia repair  History of appendectomy    SOCIAL HISTORY:  Negative for smoking/alcohol/drug use.     ALLERGIES:  No Known Allergies    MEDICATIONS:  STANDING MEDICATIONS  atorvastatin 40 milliGRAM(s) Oral at bedtime  dextrose 5%. 1000 milliLiter(s) IV Continuous <Continuous>  dextrose 50% Injectable 12.5 Gram(s) IV Push once  dextrose 50% Injectable 25 Gram(s) IV Push once  dextrose 50% Injectable 25 Gram(s) IV Push once  dipyridamole 200 mG/aspirin 25 mG 1 Capsule(s) Oral two times a day  insulin glargine Injectable (LANTUS) 30 Unit(s) SubCutaneous at bedtime  insulin lispro (HumaLOG) corrective regimen sliding scale   SubCutaneous three times a day before meals  insulin lispro Injectable (HumaLOG) 2 Unit(s) SubCutaneous three times a day before meals  insulin lispro Injectable (HumaLOG) 2 Unit(s) SubCutaneous once  levETIRAcetam 750 milliGRAM(s) Oral two times a day  losartan 50 milliGRAM(s) Oral daily  metFORMIN 850 milliGRAM(s) Oral two times a day  metoprolol tartrate 50 milliGRAM(s) Oral two times a day  NIFEdipine XL 60 milliGRAM(s) Oral daily  pantoprazole    Tablet 40 milliGRAM(s) Oral before breakfast  QUEtiapine 25 milliGRAM(s) Oral two times a day    PRN MEDICATIONS  dextrose 40% Gel 15 Gram(s) Oral once PRN  diazepam    Tablet 10 milliGRAM(s) Oral two times a day PRN  glucagon  Injectable 1 milliGRAM(s) IntraMuscular once PRN  LORazepam    IVPB 2 milliGRAM(s) IV Intermittent every 6 hours PRN    VITALS:   T(F): 96.9  HR: 69  BP: 181/79  RR: 16  SpO2: --    LABS:                        11.5   9.35  )-----------( 199      ( 28 Mar 2019 19:53 )             35.6         140  |  101  |  14  ----------------------------<  211<H>  4.3   |  26  |  1.2    Ca    8.9      28 Mar 2019 19:53  Mg     1.8         TPro  7.8  /  Alb  4.2  /  TBili  0.3  /  DBili  x   /  AST  27  /  ALT  22  /  AlkPhos  139<H>      PT/INR - ( 28 Mar 2019 13:20 )   PT: 12.70 sec;   INR: 1.11 ratio         PTT - ( 28 Mar 2019 13:20 )  PTT:33.3 sec  Urinalysis Basic - ( 28 Mar 2019 14:00 )    Color: Yellow / Appearance: Clear / S.020 / pH: x  Gluc: x / Ketone: Negative  / Bili: Negative / Urobili: 0.2 mg/dL   Blood: x / Protein: 30 mg/dL / Nitrite: Negative   Leuk Esterase: Negative / RBC: x / WBC 1-2 /HPF   Sq Epi: x / Non Sq Epi: Occasional /HPF / Bacteria: x            CARDIAC MARKERS ( 29 Mar 2019 07:22 )  x     / <0.01 ng/mL / 148 U/L / x     / 2.4 ng/mL  CARDIAC MARKERS ( 28 Mar 2019 21:51 )  x     / <0.01 ng/mL / 162 U/L / x     / 2.6 ng/mL  CARDIAC MARKERS ( 28 Mar 2019 13:20 )  x     / <0.01 ng/mL / x     / x     / x          RADIOLOGY:    PHYSICAL EXAM:  GEN: No acute distress  LUNGS: Clear to auscultation bilaterally   HEART: Regular  ABD: Soft, non-tender, non-distended.  EXT: NC/NC/NE/2+PP/DOMINGO/Skin Intact.   NEURO: AAOX3    Intravenous access:   NG tube:   Guerrero Catheter:
SUBJECTIVE:    Patient is a 73y old Male who presents with a chief complaint of Syncope vs Seizure (28 Mar 2019 16:20)    Currently admitted to medicine with the primary diagnosis of Syncope     Today is hospital day 1d. This morning he is resting comfortably in bed and reports no new issues or overnight events.     PAST MEDICAL & SURGICAL HISTORY  High blood cholesterol  Cerebrovascular accident (CVA) due to stenosis of left middle cerebral artery  Coronary artery disease without angina pectoris, unspecified vessel or lesion type, unspecified whether native or transplanted heart  Depression, unspecified depression type  Essential hypertension  Controlled type 2 diabetes mellitus without complication, unspecified long term insulin use status  H/O brain surgery  Bradly filter in place  History of penile implant  History of lumbar surgery  H/O umbilical hernia repair  History of appendectomy    SOCIAL HISTORY:  Negative for smoking/alcohol/drug use.     ALLERGIES:  No Known Allergies    MEDICATIONS:  STANDING MEDICATIONS  atorvastatin 40 milliGRAM(s) Oral at bedtime  dextrose 5%. 1000 milliLiter(s) IV Continuous <Continuous>  dextrose 50% Injectable 12.5 Gram(s) IV Push once  dextrose 50% Injectable 25 Gram(s) IV Push once  dextrose 50% Injectable 25 Gram(s) IV Push once  dipyridamole 200 mG/aspirin 25 mG 1 Capsule(s) Oral two times a day  insulin glargine Injectable (LANTUS) 30 Unit(s) SubCutaneous at bedtime  insulin lispro (HumaLOG) corrective regimen sliding scale   SubCutaneous three times a day before meals  insulin lispro Injectable (HumaLOG) 2 Unit(s) SubCutaneous three times a day before meals  insulin lispro Injectable (HumaLOG) 2 Unit(s) SubCutaneous once  levETIRAcetam 500 milliGRAM(s) Oral two times a day  losartan 50 milliGRAM(s) Oral daily  metFORMIN 850 milliGRAM(s) Oral two times a day  metoprolol tartrate 50 milliGRAM(s) Oral two times a day  NIFEdipine XL 60 milliGRAM(s) Oral daily  pantoprazole    Tablet 40 milliGRAM(s) Oral before breakfast  QUEtiapine 25 milliGRAM(s) Oral two times a day    PRN MEDICATIONS  dextrose 40% Gel 15 Gram(s) Oral once PRN  diazepam    Tablet 10 milliGRAM(s) Oral two times a day PRN  glucagon  Injectable 1 milliGRAM(s) IntraMuscular once PRN  LORazepam    IVPB 2 milliGRAM(s) IV Intermittent every 6 hours PRN    VITALS:   T(F): 96.4  HR: 73  BP: 134/63  RR: 16  SpO2: 95%    LABS:                        11.5   9.35  )-----------( 199      ( 28 Mar 2019 19:53 )             35.6         140  |  101  |  14  ----------------------------<  211<H>  4.3   |  26  |  1.2    Ca    8.9      28 Mar 2019 19:53  Mg     1.8         TPro  7.8  /  Alb  4.2  /  TBili  0.3  /  DBili  x   /  AST  27  /  ALT  22  /  AlkPhos  139<H>      PT/INR - ( 28 Mar 2019 13:20 )   PT: 12.70 sec;   INR: 1.11 ratio         PTT - ( 28 Mar 2019 13:20 )  PTT:33.3 sec  Urinalysis Basic - ( 28 Mar 2019 14:00 )    Color: Yellow / Appearance: Clear / S.020 / pH: x  Gluc: x / Ketone: Negative  / Bili: Negative / Urobili: 0.2 mg/dL   Blood: x / Protein: 30 mg/dL / Nitrite: Negative   Leuk Esterase: Negative / RBC: x / WBC 1-2 /HPF   Sq Epi: x / Non Sq Epi: Occasional /HPF / Bacteria: x        Creatine Kinase, Serum: 148 U/L (19 @ 07:22)  Troponin T, Serum: <0.01 ng/mL (19 @ 07:22)  Creatine Kinase, Serum: 162 U/L (19 @ 21:51)  Troponin T, Serum: <0.01 ng/mL (19 @ 21:51)  Troponin T, Serum: <0.01 ng/mL (19 @ 13:20)      CARDIAC MARKERS ( 29 Mar 2019 07:22 )  x     / <0.01 ng/mL / 148 U/L / x     / 2.4 ng/mL  CARDIAC MARKERS ( 28 Mar 2019 21:51 )  x     / <0.01 ng/mL / 162 U/L / x     / 2.6 ng/mL  CARDIAC MARKERS ( 28 Mar 2019 13:20 )  x     / <0.01 ng/mL / x     / x     / x          RADIOLOGY:    PHYSICAL EXAM:  GEN: No acute distress  LUNGS: Clear to auscultation bilaterally   HEART: Regular  ABD: Soft, non-tender, non-distended.  EXT: NC/NC/NE/2+PP/DOMINGO/Skin Intact.   NEURO: AAOX3    Intravenous access:   NG tube:   Guerrero Catheter:

## 2019-04-01 NOTE — SWALLOW BEDSIDE ASSESSMENT ADULT - SPECIFY REASON(S)
hx of dysphagia; RN on 03/31/19 reports that patient was complaining of globus specifically with pills

## 2019-04-01 NOTE — DISCHARGE NOTE PROVIDER - CARE PROVIDER_API CALL
William Parish (MD)  Internal Medicine  [address] [place] [address]  Phone: [phone]  Fax: [phone]  Follow Up Time:     Jasson Cole)  Internal Medicine; Nephrology  [address] [place] [address]  Phone: [phone]  Fax: [phone]  Follow Up Time:

## 2019-04-01 NOTE — PROGRESS NOTE ADULT - REASON FOR ADMISSION
Syncope vs Seizure

## 2019-04-01 NOTE — SWALLOW BEDSIDE ASSESSMENT ADULT - ASR SWALLOW ASPIRATION MONITOR
change of breathing pattern/position upright (90Y)/cough/gurgly voice/oral hygiene/fever/pneumonia/throat clearing/upper respiratory infection

## 2019-04-01 NOTE — PROGRESS NOTE ADULT - ASSESSMENT
HTN  seizure  no syncope / no orthostatics  ckd 3  epilepsy  old cva with significant residual neuro deficitis  dm    plan:      ---------------------------------------------    cont bp meds  on tele  Lantus / Humalog  inc Keppra per neuro  @@@  s/s john romano precautions  discharge in AM
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
74 YO MAN WITH HX AS ABOVE INCLUDING CVA X 5 IN THE PAST AND KNOWN EPILEPSY, PRESENTS WITH BREAKTHROUGH SZ X 1  PATIENT WITH RECENT URI WHICH MAY HAVE PROVOKED  PATIENT ALSO WITH LOW MG ON ADM    Plan:  REEH  INCREASE KEPPRA  MG Q 12  MAINTAIN MG >2  SZ PRECAUTIONS
74 yo male with h/o CVA with R hemiplegia and seizures s/p brain sx presents after syncope vs seizure.

## 2019-04-03 DIAGNOSIS — N18.3 CHRONIC KIDNEY DISEASE, STAGE 3 (MODERATE): ICD-10-CM

## 2019-04-03 DIAGNOSIS — I69.351 HEMIPLEGIA AND HEMIPARESIS FOLLOWING CEREBRAL INFARCTION AFFECTING RIGHT DOMINANT SIDE: ICD-10-CM

## 2019-04-03 DIAGNOSIS — Z79.84 LONG TERM (CURRENT) USE OF ORAL HYPOGLYCEMIC DRUGS: ICD-10-CM

## 2019-04-03 DIAGNOSIS — Z95.828 PRESENCE OF OTHER VASCULAR IMPLANTS AND GRAFTS: ICD-10-CM

## 2019-04-03 DIAGNOSIS — E78.00 PURE HYPERCHOLESTEROLEMIA, UNSPECIFIED: ICD-10-CM

## 2019-04-03 DIAGNOSIS — I25.10 ATHEROSCLEROTIC HEART DISEASE OF NATIVE CORONARY ARTERY WITHOUT ANGINA PECTORIS: ICD-10-CM

## 2019-04-03 DIAGNOSIS — Z79.899 OTHER LONG TERM (CURRENT) DRUG THERAPY: ICD-10-CM

## 2019-04-03 DIAGNOSIS — R55 SYNCOPE AND COLLAPSE: ICD-10-CM

## 2019-04-03 DIAGNOSIS — E11.22 TYPE 2 DIABETES MELLITUS WITH DIABETIC CHRONIC KIDNEY DISEASE: ICD-10-CM

## 2019-04-03 DIAGNOSIS — I12.9 HYPERTENSIVE CHRONIC KIDNEY DISEASE WITH STAGE 1 THROUGH STAGE 4 CHRONIC KIDNEY DISEASE, OR UNSPECIFIED CHRONIC KIDNEY DISEASE: ICD-10-CM

## 2019-04-03 DIAGNOSIS — G40.909 EPILEPSY, UNSPECIFIED, NOT INTRACTABLE, WITHOUT STATUS EPILEPTICUS: ICD-10-CM

## 2019-04-03 DIAGNOSIS — F32.9 MAJOR DEPRESSIVE DISORDER, SINGLE EPISODE, UNSPECIFIED: ICD-10-CM

## 2019-06-26 NOTE — DISCHARGE NOTE ADULT - MEDICATION SUMMARY - MEDICATIONS TO TAKE
I will START or STAY ON the medications listed below when I get home from the hospital:    aspirin 325 mg oral tablet  -- 1 tab(s) by mouth once a day  -- Indication: For anti platlet    acetaminophen 160 mg/5 mL oral suspension  -- 20.31 milliliter(s) by mouth every 6 hours, As needed, Moderate Pain (4 - 6)  -- Indication: For Pain    acetaminophen 160 mg/5 mL oral suspension  -- 20.31 milliliter(s) by mouth every 6 hours, As needed, For Temp greater than 100F  -- Indication: For Pain    losartan 50 mg oral tablet  -- 1 tab(s) by mouth once a day  -- Indication: For blood pressure    levETIRAcetam 1000 mg oral tablet  -- 1 tab(s) by mouth 2 times a day  -- Indication: For Prevent seizures    FLUoxetine 20 mg oral tablet  -- 1 tab(s) by mouth once a day  -- Indication: For anti depressant    HumaLOG 100 units/mL subcutaneous solution  --  subcutaneous   -- Indication: For as pre admission    metFORMIN 850 mg oral tablet  -- 1 tab(s) by mouth once a day (in the morning)  -- Indication: For Diabetes    atorvastatin 40 mg oral tablet  -- 1 tab(s) by mouth once a day  -- Indication: For Cholesterol    Nifedical XL 60 mg oral tablet, extended release  -- 1 tab(s) by mouth once a day  -- Indication: For blood pressure    pantoprazole 40 mg oral delayed release tablet  -- 1 tab(s) by mouth once a day  -- Indication: For antacid    Multiple Vitamins oral tablet  -- 1 tab(s) by mouth once a day  -- Indication: For vitamin Island Pedicle Flap-Requiring Vessel Identification Text: The defect edges were debeveled with a #15 scalpel blade.  Given the location of the defect, shape of the defect and the proximity to free margins an island pedicle advancement flap was deemed most appropriate.  Using a sterile surgical marker, an appropriate advancement flap was drawn, based on the axial vessel mentioned above, incorporating the defect, outlining the appropriate donor tissue and placing the expected incisions within the relaxed skin tension lines where possible.    The area thus outlined was incised deep to adipose tissue with a #15 scalpel blade.  The skin margins were undermined to an appropriate distance in all directions around the primary defect and laterally outward around the island pedicle utilizing iris scissors.  There was minimal undermining beneath the pedicle flap.

## 2019-10-19 ENCOUNTER — EMERGENCY (EMERGENCY)
Facility: HOSPITAL | Age: 74
LOS: 0 days | Discharge: HOME | End: 2019-10-19
Attending: EMERGENCY MEDICINE | Admitting: EMERGENCY MEDICINE
Payer: COMMERCIAL

## 2019-10-19 VITALS
OXYGEN SATURATION: 100 % | RESPIRATION RATE: 18 BRPM | WEIGHT: 164.91 LBS | DIASTOLIC BLOOD PRESSURE: 81 MMHG | HEART RATE: 71 BPM | SYSTOLIC BLOOD PRESSURE: 157 MMHG

## 2019-10-19 VITALS — TEMPERATURE: 98 F

## 2019-10-19 DIAGNOSIS — Z23 ENCOUNTER FOR IMMUNIZATION: ICD-10-CM

## 2019-10-19 DIAGNOSIS — Z98.890 OTHER SPECIFIED POSTPROCEDURAL STATES: Chronic | ICD-10-CM

## 2019-10-19 DIAGNOSIS — Z96.89 PRESENCE OF OTHER SPECIFIED FUNCTIONAL IMPLANTS: Chronic | ICD-10-CM

## 2019-10-19 DIAGNOSIS — Z95.828 PRESENCE OF OTHER VASCULAR IMPLANTS AND GRAFTS: Chronic | ICD-10-CM

## 2019-10-19 DIAGNOSIS — S01.311A LACERATION WITHOUT FOREIGN BODY OF RIGHT EAR, INITIAL ENCOUNTER: ICD-10-CM
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
## 2019-10-19 DIAGNOSIS — Z90.49 ACQUIRED ABSENCE OF OTHER SPECIFIED PARTS OF DIGESTIVE TRACT: Chronic | ICD-10-CM
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
## 2019-10-19 DIAGNOSIS — S09.90XA UNSPECIFIED INJURY OF HEAD, INITIAL ENCOUNTER: ICD-10-CM

## 2019-10-19 PROCEDURE — 99284 EMERGENCY DEPT VISIT MOD MDM: CPT | Mod: 25

## 2019-10-19 PROCEDURE — 70450 CT HEAD/BRAIN W/O DYE: CPT | Mod: 26

## 2019-10-19 PROCEDURE — 70486 CT MAXILLOFACIAL W/O DYE: CPT | Mod: 26

## 2019-10-19 PROCEDURE — 12011 RPR F/E/E/N/L/M 2.5 CM/<: CPT

## 2019-10-19 RX ORDER — TETANUS TOXOID, REDUCED DIPHTHERIA TOXOID AND ACELLULAR PERTUSSIS VACCINE, ADSORBED 5; 2.5; 8; 8; 2.5 [IU]/.5ML; [IU]/.5ML; UG/.5ML; UG/.5ML; UG/.5ML
0.5 SUSPENSION INTRAMUSCULAR ONCE
Refills: 0 | Status: COMPLETED | OUTPATIENT
Start: 2019-10-19 | End: 2019-10-19

## 2019-10-19 RX ADMIN — TETANUS TOXOID, REDUCED DIPHTHERIA TOXOID AND ACELLULAR PERTUSSIS VACCINE, ADSORBED 0.5 MILLILITER(S): 5; 2.5; 8; 8; 2.5 SUSPENSION INTRAMUSCULAR at 12:32

## 2019-10-19 NOTE — ED ADULT NURSE NOTE - NSIMPLEMENTINTERV_GEN_ALL_ED
Implemented All Fall Risk Interventions:  [place] to call system. Call bell, personal items and telephone within reach. Instruct patient to call for assistance. Room bathroom lighting operational. Non-slip footwear when patient is off stretcher. Physically safe environment: no spills, clutter or unnecessary equipment. Stretcher in lowest position, wheels locked, appropriate side rails in place. Provide visual cue, wrist band, yellow gown, etc. Monitor gait and stability. Monitor for mental status changes and reorient to person, place, and time. Review medications for side effects contributing to fall risk. Reinforce activity limits and safety measures with patient and family.

## 2019-10-19 NOTE — ED PROVIDER NOTE - CLINICAL SUMMARY MEDICAL DECISION MAKING FREE TEXT BOX
Pt on blood thinner with ear lac/head trauma, PE as above, imaging reveiwed, as in area of cosmesis and gaping will repair, abx, pmd f/u. Family counseled regarding conditions which should prompt return.

## 2019-10-19 NOTE — ED ADULT TRIAGE NOTE - CHIEF COMPLAINT QUOTE
yesterday wife states that pt threw himself out of the wheel chair and cut L ear against the side of a table. Wife states thatpt does that when he gets upset- ni LOC

## 2019-10-19 NOTE — ED PROVIDER NOTE - OBJECTIVE STATEMENT
75 yo male, pmh of cva with right side residual weakness and dysphagia on AC, cad, htn, hld, dm, presents to ed for laceration of right ear lobe. As per wife, yesterday, pt threw himself out of his chair because he was upset, hit right side of head, witnessed by wife. Wife states no loc, change in mental status, or N/V. Pt without complaints at this time; denies fever, cp, sob, back pain, neck pain, nv, abd pain, visual changes.

## 2019-10-19 NOTE — ED PROVIDER NOTE - PHYSICAL EXAMINATION
Physical Exam    Vital Signs: I have reviewed the initial vital signs.  Constitutional: well-nourished, appears stated age, no acute distress  Eyes: Conjunctiva pink, Sclera clear, PERRLA, EOMI.  ENT: OP is clear without exudates, normal dentition, normal gingival, tongue without swelling, TMs clear b/l, nasal turbinates without erythema or blood, + ttp over right mandible with from.   Cardiovascular: S1 and S2, regular rate, regular rhythm, well-perfused extremities, radial pulses equal and 2+  Respiratory: unlabored respiratory effort, clear to auscultation bilaterally no wheezing, rales and rhonchi  Gastrointestinal: soft, non-tender abdomen, no pulsatile mass, normal bowl sounds  Musculoskeletal: supple neck, no lower extremity edema, no midline tenderness, neck from, no c spine ttp, right arm in fixed flexion at elbow as per baseline, from of jaw.   Integumentary: warm, dry, no rash, no ecchymosis or abrasions. + curved 2cm laceration to right earlobe.   Neurologic: awake, alert, NVI,

## 2019-10-19 NOTE — ED PROVIDER NOTE - PATIENT PORTAL LINK FT
You can access the FollowMyHealth Patient Portal offered by [facility] by registering at the following website: [email or web address]. By joining [employer]’s FollowMyHealth portal, you will also be able to view your health information using other applications (apps) compatible with our system.

## 2019-10-19 NOTE — ED PROVIDER NOTE - NSFOLLOWUPINSTRUCTIONS_ED_ALL_ED_FT
Laceration- Please return in 5-7 days for suture removal.    A laceration is a cut that goes through all of the layers of the skin and into the tissue that is right under the skin. Some lacerations heal on their own. Others need to be closed with skin adhesive strips, skin glue, stitches (sutures), or staples. Proper laceration care minimizes the risk of infection and helps the laceration to heal better.  If non-absorbable stitches or staples have been placed, they must be taken out within the time frame instructed by your healthcare provider.    SEEK IMMEDIATE MEDICAL CARE IF YOU HAVE ANY OF THE FOLLOWING SYMPTOMS: swelling around the wound, worsening pain, drainage from the wound, red streaking going away from your wound, inability to move finger or toe near the laceration, or discoloration of skin near the laceration.       Closed Head Injury    A closed head injury is an injury to your head that may or may not involve a traumatic brain injury (TBI). Symptoms of TBI can be short or long lasting and include headache, dizziness, interference with memory or speech, fatigue, confusion, changes in sleep, mood changes, nausea, depression/anxiety, and dulling of senses. Make sure to obtain proper rest which includes getting plenty of sleep, avoiding excessive visual stimulation, and avoiding activities that may cause physical or mental stress. Avoid any situation where there is potential for another head injury, including sports.    SEEK IMMEDIATE MEDICAL CARE IF YOU HAVE ANY OF THE FOLLOWING SYMPTOMS: unusual drowsiness, vomiting, severe dizziness, seizures, lightheadedness, muscular weakness, different pupil sizes, visual changes, or clear or bloody discharge from your ears or nose.

## 2019-10-19 NOTE — ED PROVIDER NOTE - NS ED ROS FT
Constitutional: (-) fever, (-) chills  Eyes: (-) visual changes  ENT: (-) nasal congestions  Cardiovascular: (-) chest pain, (-) syncope  Respiratory: (-) cough, (-) shortness of breath, (-) dyspnea,   Gastrointestinal: (-) vomiting, (-)nausea,  Musculoskeletal: (-) neck pain, (-) back pain, (-) joint pain,  Integumentary: (+) laceration  Neurological: (-) headache, (-) loc, (-) dizziness, (-) tingling, (-)numbness,  Allergic/Immunologic: (-) pruritus

## 2019-10-26 ENCOUNTER — EMERGENCY (EMERGENCY)
Facility: HOSPITAL | Age: 74
LOS: 0 days | Discharge: HOME | End: 2019-10-26
Attending: EMERGENCY MEDICINE | Admitting: EMERGENCY MEDICINE

## 2019-10-26 VITALS
OXYGEN SATURATION: 96 % | TEMPERATURE: 97 F | SYSTOLIC BLOOD PRESSURE: 166 MMHG | DIASTOLIC BLOOD PRESSURE: 89 MMHG | RESPIRATION RATE: 18 BRPM | HEART RATE: 74 BPM

## 2019-10-26 VITALS — WEIGHT: 164.02 LBS | HEIGHT: 68 IN

## 2019-10-26 DIAGNOSIS — Z96.89 PRESENCE OF OTHER SPECIFIED FUNCTIONAL IMPLANTS: Chronic | ICD-10-CM

## 2019-10-26 DIAGNOSIS — Z95.828 PRESENCE OF OTHER VASCULAR IMPLANTS AND GRAFTS: Chronic | ICD-10-CM

## 2019-10-26 DIAGNOSIS — S01.311D LACERATION WITHOUT FOREIGN BODY OF RIGHT EAR, SUBSEQUENT ENCOUNTER: ICD-10-CM

## 2019-10-26 DIAGNOSIS — Z98.890 OTHER SPECIFIED POSTPROCEDURAL STATES: Chronic | ICD-10-CM

## 2019-10-26 DIAGNOSIS — Z90.49 ACQUIRED ABSENCE OF OTHER SPECIFIED PARTS OF DIGESTIVE TRACT: Chronic | ICD-10-CM
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
## 2019-10-26 NOTE — ED PROVIDER NOTE - PATIENT PORTAL LINK FT
You can access the FollowMyHealth Patient Portal offered by [facility] by registering at the following website: [email or web address]. By joining [employer]’s FollowMyHealth portal, you will also be able to view your health information using other applications (apps) compatible with our system.

## 2019-10-26 NOTE — ED PROVIDER NOTE - OBJECTIVE STATEMENT
73 yo male, [facility] as charted, presents to ed for suture removal. 5 days ago, no dc, no fever, no complaints at this time.

## 2019-10-26 NOTE — ED PROVIDER NOTE - CHPI ED SYMPTOMS NEG
Normal for race no chills/no bleeding at site/no drainage/no inflammation/no red streaks/no purulent drainage/no redness/no fever/no pain/no bleeding

## 2019-10-26 NOTE — ED PROVIDER NOTE - ATTENDING CONTRIBUTION TO CARE
I was present for and supervised the key and critical aspects of the procedures performed during the care of the patient. Patient presents for suture removal no signs of infection at this time I will discharge at this time

## 2019-12-23 NOTE — DISCHARGE NOTE ADULT - NSCORESITESY/N_GEN_A_CORE_RD
Comments: Tachypneic, breathing with accessory muscle use on BiPAP  Abdominal:      General: Bowel sounds are normal.      Palpations: Abdomen is soft. Tenderness: There is no tenderness. Musculoskeletal:      Right lower leg: Edema present. Left lower leg: Edema present. Comments: 3+ pitting edema BLE   Skin:     General: Skin is warm and dry. Capillary Refill: Capillary refill takes less than 2 seconds. Neurological:      Mental Status: She is alert and oriented to person, place, and time. Cranial Nerves: No cranial nerve deficit. Access:   -Central Access Day #: N/A  -Peripheral Access Day#:1  -Arterial line Day#:N/A                                  Cano Day#:1  NGT Day#: N/A                                            ETT Day#:N/A  Vent Settings:    / / /FiO2 : 40 %    No results for input(s): PHART, UKS4ZRU, PO2ART in the last 72 hours. DATA:       Labs:  CBC:   Recent Labs     12/23/19  1001   WBC 6.6   HGB 11.2*   HCT 35.6*          BMP:   Recent Labs     12/23/19  1001      K 4.1   CL 99   CO2 22   BUN 16   CREATININE 1.0   GLUCOSE 288*     LFT's: No results for input(s): AST, ALT, ALB, BILITOT, ALKPHOS in the last 72 hours. Troponin:   Recent Labs     12/23/19  1001   TROPONINI <0.01     BNP:No results for input(s): BNP in the last 72 hours. ABGs: No results for input(s): PHART, BHW8BXZ, PO2ART in the last 72 hours. INR: No results for input(s): INR in the last 72 hours. U/A:No results for input(s): NITRITE, COLORU, PHUR, LABCAST, WBCUA, RBCUA, MUCUS, TRICHOMONAS, YEAST, BACTERIA, CLARITYU, SPECGRAV, LEUKOCYTESUR, UROBILINOGEN, BILIRUBINUR, BLOODU, GLUCOSEU, AMORPHOUS in the last 72 hours. Invalid input(s): KETONESU    XR CHEST PORTABLE   Final Result      Cardiomegaly with bilateral interstitial infiltrates suggesting edema versus pneumonia. EKG: Afib with RVR.  No ST changes  Echo: pending  Micro: N/A    ASSESSMENT AND No

## 2020-01-09 NOTE — ED ADULT NURSE NOTE - NS ED NURSE REPORT GIVEN DT
01/09/20 1439 Klisch, Christine M, RN       Stroke Education Note     The following information has been reviewed with the patient and family:     1. Warning signs of stroke     2. Calling 911 if having warning signs of stroke     3. All modifiable risk factors: hypertension, CAD, atrial fib, diabetes, hypercholesterolemia, smoking, substance abuse, diet, physical inactivity, obesity, sleep apnea.     4. Patient's risk factors for stroke which include: SMOKER, HTN HLD.Carotid disease     5. Follow-up plan for after discharge     6. Discharge medications which include: aspirin, plavix, lopressor,norvasc     In addition, the [facility] Stroke Class Handout has been given to the patient and family.     Learner's response to risk factors / lifestyle modification education: Committment to change      Christine M Klisch, RN         28-Mar-2019 18:28

## 2020-02-04 NOTE — ED PROVIDER NOTE - HEME LYMPH, MLM
Consultation - [address] 79 y o  male MRN: [id number]  Unit/Bed#: W -59 Encounter: [id number]          History of Present Illness   Physician Requesting Consult: Sharon Mejia MD  Reason for Consult / Principal Problem:  Assessment of capacity and assess if able to take care of self     Carlos A Olivarez is a 79 y o  male who presents to the ED brought in by his family members for psychiatric evaluation  As per family, patient recently became homeless due to poor financial decisions/gambling/tax evasion  As per chart review, patient's nephew met with patient and witnessed patient stating I might as well kill myself"  Patient states a narrative about what brought him to the hospital stating he became homeless recently after having his house sold to a 's sale due to poor financial planning, debt and tax evasion  Patient minimizes the situation by explaining that the house was way too big for him to upkeep anyway  He is the caregiver of his niece, who is disabled with rheumathoid arthritis, for years now  Patient denies any medical illnesses, denies any mental health illnesses, and denies any mental health/ behavioral inpatient / outpatient admissions  Patient admits to being estranged from family and admits to not being used to ask for help due to being raised with pride, however, resents that family member did not offer more help, and that was the reason he became irritable and agitated, and made a remark along the lines of 'I might as well kill myself'  Patient admits it was inappropriate and regrets having used that phrase  He states it was a mindless remark with no real underlying intention of comiting suicide  Patient denies any signs or symptoms consistent with depression, anxiety, PTSD, or lior  Patient states he was in the [employer] for 4 years, but denies PTSD      He had been in a shelter for 4 days, but refuses to go back and can not relate with some of the other guests from the shelter  He states he "prays for them"  Patient does not have a supportive network  He states he is one of 4 siblings, and has some relatives/ cousins that don't seem to want to be involved with patient's care/ needs  Psychiatric Review Of Systems:  Problems with sleep: no  Appetite changes: no  Weight changes: no  Low energy/anergy: no  Low interest/pleasure/anhedonia: no  Somatic symptoms: no  Anxiety/panic: no  Sneha: no  Guilt/hopeless: no  Self injurious behavior/risky behavior: no    Historical Information   Psychiatric medication trial: None  Inpatient hospitalizations: Denies  Suicide attempts: Denies  Violent behavior: Denies  Outpatient treatment: denies    Substance Abuse History:  Social History     Tobacco Use    Smoking status: Former Smoker    Smokeless tobacco: Never Used    Tobacco comment: "I smoked years ago"   Substance Use Topics    Alcohol use: Yes     Comment: social "burbon once in a while"    Drug use: No      Patient denies current or previous substance use  I have assessed this patient for substance use within the past 12 months  History of IP/OP rehabilitation program: Denies    Family Psychiatric History:   Patient denies any known family history of psychiatric illness, suicide attempt, or substance abuse    Social History  Education: post college graduate work or degree  Learning Disabilities: denies  Marital history: single  Living arrangement: Presently homeless  Occupational History: as per patient, 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  Functioning Relationships: poor support system  Does not have any children and was never     Other Pertinent History: Financial, Legal: tax evasion and  Service: branch: [employer]      Traumatic History:   Abuse: denies  Other Traumatic Events: denies    Past Medical History:   Diagnosis Date    Arthritis     Gout     Hypertension     Hyperthyroidism     Memory loss  Sleep disturbance     Thyroid disease        Medical Review Of Systems:  Review of Systems - Negative except as noted in HPI    Meds/Allergies   all current active meds have been reviewed  No Known Allergies    Objective   Vital signs in last 24 hours:  Temp:  [97 8 °F (36 6 °C)-98 2 °F (36 8 °C)] 98 2 °F (36 8 °C)  HR:  [59-62] 60  Resp:  [18] 18  BP: (107-149)/(57-75) 133/73    Mental Status Evaluation:  Appearance:  alert, dressed in hospital attire, appears stated age and appropriate grooming and hygiene   Behavior:  pleasant, cooperative, sitting comfortably, good eye contact, no abnormal movements and normal gait and balance   Speech:  spontaneous, clear, increased rate, normal volume, highly verbal and coherent   Mood:  euthymic   Affect:  mood-congruent   Thought Process:  organized, logical, coherent, tangential, loose associations, normal rate of thoughts   Thought Content: no verbalized delusions, no overt paranoia, no obsessive thinking   Perceptual disturbances: no reported auditory hallucinations, no reported visual hallucinations and does not appear to be responding to internal stimuli at this time   Risk Potential: No active or passive suicidal or homicidal ideation, Low potential for aggression   Cognition: oriented to person, place, time, and situation, appears to be of average intelligence, age-appropriate attention span and concentration and cognition not formally tested   Insight:  Fair   Judgment: [address]     Laboratory results:  I have personally reviewed all pertinent laboratory/tests results  Assessment/Plan     Patient with no past psychiatric history, not displaying any signs or symptoms consistent with depression, anxiety, PTSD or lior, and not suicidal  Patient not homicidal   Patient with capacity to make medical decisions  Case was discussed with Dr Wily Crabtree      Risks, benefits and possible side effects of Medications:   Risks, benefits, and possible side effects of medications explained to patient and patient verbalizes understanding          Boyd Licona MD No adenopathy or splenomegaly. No cervical or inguinal lymphadenopathy.

## 2020-07-31 NOTE — DISCHARGE NOTE NURSING/CASE MANAGEMENT/SOCIAL WORK - NSDCPEPTSTRK_GEN_ALL_CORE
Prescribed medications/Call 911 for stroke/Signs and symptoms of stroke/Need for follow up after discharge/Risk factors for stroke/Stroke education booklet/Stroke support groups for patients, families, and friends/Stroke warning signs and symptoms
No

## 2020-09-09 NOTE — DISCHARGE NOTE PROVIDER - NSDCHOSPICE_GEN_A_CORE
----- Message from NICO Aviles sent at 9/8/2020  8:13 AM PDT -----  Please let pt know labs look good  
Sent my chart message  
No

## 2021-04-28 ENCOUNTER — INPATIENT (INPATIENT)
Facility: HOSPITAL | Age: 76
LOS: 12 days | Discharge: NURSING, REHAB OR LONG-TERM CARE | End: 2021-05-11
Attending: STUDENT IN AN ORGANIZED HEALTH CARE EDUCATION/TRAINING PROGRAM | Admitting: STUDENT IN AN ORGANIZED HEALTH CARE EDUCATION/TRAINING PROGRAM
Payer: COMMERCIAL

## 2021-04-28 VITALS — WEIGHT: 164.91 LBS | HEIGHT: 68 IN

## 2021-04-28 DIAGNOSIS — Z98.890 OTHER SPECIFIED POSTPROCEDURAL STATES: Chronic | ICD-10-CM

## 2021-04-28 DIAGNOSIS — G40.909 EPILEPSY, UNSPECIFIED, NOT INTRACTABLE, WITHOUT STATUS EPILEPTICUS: ICD-10-CM

## 2021-04-28 DIAGNOSIS — Z96.89 PRESENCE OF OTHER SPECIFIED FUNCTIONAL IMPLANTS: Chronic | ICD-10-CM

## 2021-04-28 DIAGNOSIS — Z90.49 ACQUIRED ABSENCE OF OTHER SPECIFIED PARTS OF DIGESTIVE TRACT: Chronic | ICD-10-CM

## 2021-04-28 DIAGNOSIS — Z95.828 PRESENCE OF OTHER VASCULAR IMPLANTS AND GRAFTS: Chronic | ICD-10-CM

## 2021-04-28 LAB
ALBUMIN SERPL ELPH-MCNC: 3.6 G/DL — SIGNIFICANT CHANGE UP (ref 3.5–5.2)
ALBUMIN SERPL ELPH-MCNC: 4 G/DL — SIGNIFICANT CHANGE UP (ref 3.5–5.2)
ALP SERPL-CCNC: 193 U/L — HIGH (ref 30–115)
ALP SERPL-CCNC: 211 U/L — HIGH (ref 30–115)
ALT FLD-CCNC: 25 U/L — SIGNIFICANT CHANGE UP (ref 0–41)
ALT FLD-CCNC: 30 U/L — SIGNIFICANT CHANGE UP (ref 0–41)
ANION GAP SERPL CALC-SCNC: 17 MMOL/L — HIGH (ref 7–14)
ANION GAP SERPL CALC-SCNC: 19 MMOL/L — HIGH (ref 7–14)
APPEARANCE UR: ABNORMAL
APTT BLD: 28.4 SEC — SIGNIFICANT CHANGE UP (ref 27–39.2)
AST SERPL-CCNC: 21 U/L — SIGNIFICANT CHANGE UP (ref 0–41)
AST SERPL-CCNC: 22 U/L — SIGNIFICANT CHANGE UP (ref 0–41)
BASOPHILS # BLD AUTO: 0.08 K/UL — SIGNIFICANT CHANGE UP (ref 0–0.2)
BASOPHILS NFR BLD AUTO: 0.3 % — SIGNIFICANT CHANGE UP (ref 0–1)
BILIRUB SERPL-MCNC: 0.3 MG/DL — SIGNIFICANT CHANGE UP (ref 0.2–1.2)
BILIRUB SERPL-MCNC: 0.4 MG/DL — SIGNIFICANT CHANGE UP (ref 0.2–1.2)
BILIRUB UR-MCNC: NEGATIVE — SIGNIFICANT CHANGE UP
BUN SERPL-MCNC: 23 MG/DL — HIGH (ref 10–20)
BUN SERPL-MCNC: 27 MG/DL — HIGH (ref 10–20)
CALCIUM SERPL-MCNC: 10.2 MG/DL — HIGH (ref 8.5–10.1)
CALCIUM SERPL-MCNC: 9.3 MG/DL — SIGNIFICANT CHANGE UP (ref 8.5–10.1)
CHLORIDE SERPL-SCNC: 100 MMOL/L — SIGNIFICANT CHANGE UP (ref 98–110)
CHLORIDE SERPL-SCNC: 104 MMOL/L — SIGNIFICANT CHANGE UP (ref 98–110)
CK SERPL-CCNC: 170 U/L — SIGNIFICANT CHANGE UP (ref 0–225)
CO2 SERPL-SCNC: 19 MMOL/L — SIGNIFICANT CHANGE UP (ref 17–32)
CO2 SERPL-SCNC: 23 MMOL/L — SIGNIFICANT CHANGE UP (ref 17–32)
COLOR SPEC: YELLOW — SIGNIFICANT CHANGE UP
CREAT SERPL-MCNC: 1.6 MG/DL — HIGH (ref 0.7–1.5)
CREAT SERPL-MCNC: 1.7 MG/DL — HIGH (ref 0.7–1.5)
DIFF PNL FLD: ABNORMAL
EOSINOPHIL # BLD AUTO: 0.03 K/UL — SIGNIFICANT CHANGE UP (ref 0–0.7)
EOSINOPHIL NFR BLD AUTO: 0.1 % — SIGNIFICANT CHANGE UP (ref 0–8)
EPI CELLS # UR: ABNORMAL /HPF
GLUCOSE BLDC GLUCOMTR-MCNC: 260 MG/DL — HIGH (ref 70–99)
GLUCOSE BLDC GLUCOMTR-MCNC: 275 MG/DL — HIGH (ref 70–99)
GLUCOSE BLDC GLUCOMTR-MCNC: 315 MG/DL — HIGH (ref 70–99)
GLUCOSE BLDC GLUCOMTR-MCNC: 321 MG/DL — HIGH (ref 70–99)
GLUCOSE BLDC GLUCOMTR-MCNC: 391 MG/DL — HIGH (ref 70–99)
GLUCOSE SERPL-MCNC: 294 MG/DL — HIGH (ref 70–99)
GLUCOSE SERPL-MCNC: 380 MG/DL — HIGH (ref 70–99)
GLUCOSE UR QL: NEGATIVE MG/DL — SIGNIFICANT CHANGE UP
HCT VFR BLD CALC: 36.9 % — LOW (ref 42–52)
HCT VFR BLD CALC: 41.5 % — LOW (ref 42–52)
HGB BLD-MCNC: 12 G/DL — LOW (ref 14–18)
HGB BLD-MCNC: 13.4 G/DL — LOW (ref 14–18)
IMM GRANULOCYTES NFR BLD AUTO: 0.7 % — HIGH (ref 0.1–0.3)
INR BLD: 1.04 RATIO — SIGNIFICANT CHANGE UP (ref 0.65–1.3)
KETONES UR-MCNC: NEGATIVE — SIGNIFICANT CHANGE UP
LEUKOCYTE ESTERASE UR-ACNC: ABNORMAL
LYMPHOCYTES # BLD AUTO: 1.9 K/UL — SIGNIFICANT CHANGE UP (ref 1.2–3.4)
LYMPHOCYTES # BLD AUTO: 6.2 % — LOW (ref 20.5–51.1)
MAGNESIUM SERPL-MCNC: 1.9 MG/DL — SIGNIFICANT CHANGE UP (ref 1.8–2.4)
MAGNESIUM SERPL-MCNC: 2.3 MG/DL — SIGNIFICANT CHANGE UP (ref 1.8–2.4)
MCHC RBC-ENTMCNC: 30.3 PG — SIGNIFICANT CHANGE UP (ref 27–31)
MCHC RBC-ENTMCNC: 30.5 PG — SIGNIFICANT CHANGE UP (ref 27–31)
MCHC RBC-ENTMCNC: 32.3 G/DL — SIGNIFICANT CHANGE UP (ref 32–37)
MCHC RBC-ENTMCNC: 32.5 G/DL — SIGNIFICANT CHANGE UP (ref 32–37)
MCV RBC AUTO: 93.2 FL — SIGNIFICANT CHANGE UP (ref 80–94)
MCV RBC AUTO: 94.5 FL — HIGH (ref 80–94)
MONOCYTES # BLD AUTO: 1.63 K/UL — HIGH (ref 0.1–0.6)
MONOCYTES NFR BLD AUTO: 5.4 % — SIGNIFICANT CHANGE UP (ref 1.7–9.3)
NEUTROPHILS # BLD AUTO: 26.6 K/UL — HIGH (ref 1.4–6.5)
NEUTROPHILS NFR BLD AUTO: 87.3 % — HIGH (ref 42.2–75.2)
NEUTS BAND # BLD: 5 % — SIGNIFICANT CHANGE UP (ref 0–6)
NITRITE UR-MCNC: NEGATIVE — SIGNIFICANT CHANGE UP
NRBC # BLD: 0 /100 WBCS — SIGNIFICANT CHANGE UP (ref 0–0)
NRBC # BLD: 0 /100 WBCS — SIGNIFICANT CHANGE UP (ref 0–0)
NRBC # BLD: 0 /100 — SIGNIFICANT CHANGE UP (ref 0–0)
PH UR: 5.5 — SIGNIFICANT CHANGE UP (ref 5–8)
PLAT MORPH BLD: NORMAL — SIGNIFICANT CHANGE UP
PLATELET # BLD AUTO: 353 K/UL — SIGNIFICANT CHANGE UP (ref 130–400)
PLATELET # BLD AUTO: 417 K/UL — HIGH (ref 130–400)
POTASSIUM SERPL-MCNC: 3.8 MMOL/L — SIGNIFICANT CHANGE UP (ref 3.5–5)
POTASSIUM SERPL-MCNC: 4.7 MMOL/L — SIGNIFICANT CHANGE UP (ref 3.5–5)
POTASSIUM SERPL-SCNC: 3.8 MMOL/L — SIGNIFICANT CHANGE UP (ref 3.5–5)
POTASSIUM SERPL-SCNC: 4.7 MMOL/L — SIGNIFICANT CHANGE UP (ref 3.5–5)
PROT SERPL-MCNC: 7 G/DL — SIGNIFICANT CHANGE UP (ref 6–8)
PROT SERPL-MCNC: 7.6 G/DL — SIGNIFICANT CHANGE UP (ref 6–8)
PROT UR-MCNC: 30 MG/DL
PROTHROM AB SERPL-ACNC: 12 SEC — SIGNIFICANT CHANGE UP (ref 9.95–12.87)
RAPID RVP RESULT: SIGNIFICANT CHANGE UP
RBC # BLD: 3.96 M/UL — LOW (ref 4.7–6.1)
RBC # BLD: 4.39 M/UL — LOW (ref 4.7–6.1)
RBC # FLD: 12.8 % — SIGNIFICANT CHANGE UP (ref 11.5–14.5)
RBC # FLD: 13.2 % — SIGNIFICANT CHANGE UP (ref 11.5–14.5)
RBC BLD AUTO: NORMAL — SIGNIFICANT CHANGE UP
RBC CASTS # UR COMP ASSIST: ABNORMAL /HPF
SARS-COV-2 RNA SPEC QL NAA+PROBE: SIGNIFICANT CHANGE UP
SODIUM SERPL-SCNC: 140 MMOL/L — SIGNIFICANT CHANGE UP (ref 135–146)
SODIUM SERPL-SCNC: 142 MMOL/L — SIGNIFICANT CHANGE UP (ref 135–146)
SP GR SPEC: 1.02 — SIGNIFICANT CHANGE UP (ref 1.01–1.03)
TROPONIN T SERPL-MCNC: <0.01 NG/ML — SIGNIFICANT CHANGE UP
UROBILINOGEN FLD QL: 0.2 MG/DL — SIGNIFICANT CHANGE UP (ref 0.2–0.2)
VARIANT LYMPHS # BLD: 1 % — SIGNIFICANT CHANGE UP (ref 0–5)
WBC # BLD: 30.46 K/UL — HIGH (ref 4.8–10.8)
WBC # BLD: 42.36 K/UL — CRITICAL HIGH (ref 4.8–10.8)
WBC # FLD AUTO: 30.46 K/UL — HIGH (ref 4.8–10.8)
WBC # FLD AUTO: 42.36 K/UL — CRITICAL HIGH (ref 4.8–10.8)
WBC UR QL: >50 /HPF

## 2021-04-28 PROCEDURE — 72170 X-RAY EXAM OF PELVIS: CPT | Mod: 26

## 2021-04-28 PROCEDURE — 74177 CT ABD & PELVIS W/CONTRAST: CPT | Mod: 26,MA

## 2021-04-28 PROCEDURE — 99223 1ST HOSP IP/OBS HIGH 75: CPT

## 2021-04-28 PROCEDURE — 71045 X-RAY EXAM CHEST 1 VIEW: CPT | Mod: 26

## 2021-04-28 PROCEDURE — 71260 CT THORAX DX C+: CPT | Mod: 26,MA

## 2021-04-28 PROCEDURE — 99254 IP/OBS CNSLTJ NEW/EST MOD 60: CPT

## 2021-04-28 PROCEDURE — 99497 ADVNCD CARE PLAN 30 MIN: CPT | Mod: 25

## 2021-04-28 PROCEDURE — 72125 CT NECK SPINE W/O DYE: CPT | Mod: 26,MA

## 2021-04-28 PROCEDURE — 70450 CT HEAD/BRAIN W/O DYE: CPT | Mod: 26,MA

## 2021-04-28 PROCEDURE — 99285 EMERGENCY DEPT VISIT HI MDM: CPT

## 2021-04-28 RX ORDER — INSULIN LISPRO 100/ML
8 VIAL (ML) SUBCUTANEOUS
Refills: 0 | Status: DISCONTINUED | OUTPATIENT
Start: 2021-04-28 | End: 2021-05-06

## 2021-04-28 RX ORDER — SODIUM CHLORIDE 9 MG/ML
1000 INJECTION INTRAMUSCULAR; INTRAVENOUS; SUBCUTANEOUS
Refills: 0 | Status: DISCONTINUED | OUTPATIENT
Start: 2021-04-28 | End: 2021-05-02

## 2021-04-28 RX ORDER — CEFTRIAXONE 500 MG/1
1000 INJECTION, POWDER, FOR SOLUTION INTRAMUSCULAR; INTRAVENOUS EVERY 24 HOURS
Refills: 0 | Status: DISCONTINUED | OUTPATIENT
Start: 2021-04-28 | End: 2021-04-28

## 2021-04-28 RX ORDER — GLUCAGON INJECTION, SOLUTION 0.5 MG/.1ML
1 INJECTION, SOLUTION SUBCUTANEOUS ONCE
Refills: 0 | Status: DISCONTINUED | OUTPATIENT
Start: 2021-04-28 | End: 2021-05-11

## 2021-04-28 RX ORDER — SODIUM CHLORIDE 9 MG/ML
1000 INJECTION INTRAMUSCULAR; INTRAVENOUS; SUBCUTANEOUS
Refills: 0 | Status: DISCONTINUED | OUTPATIENT
Start: 2021-04-28 | End: 2021-04-28

## 2021-04-28 RX ORDER — INSULIN GLARGINE 100 [IU]/ML
24 INJECTION, SOLUTION SUBCUTANEOUS AT BEDTIME
Refills: 0 | Status: DISCONTINUED | OUTPATIENT
Start: 2021-04-28 | End: 2021-05-06

## 2021-04-28 RX ORDER — SODIUM CHLORIDE 9 MG/ML
1000 INJECTION, SOLUTION INTRAVENOUS
Refills: 0 | Status: DISCONTINUED | OUTPATIENT
Start: 2021-04-28 | End: 2021-05-11

## 2021-04-28 RX ORDER — CEFTRIAXONE 500 MG/1
2000 INJECTION, POWDER, FOR SOLUTION INTRAMUSCULAR; INTRAVENOUS ONCE
Refills: 0 | Status: COMPLETED | OUTPATIENT
Start: 2021-04-28 | End: 2021-04-28

## 2021-04-28 RX ORDER — HEPARIN SODIUM 5000 [USP'U]/ML
5000 INJECTION INTRAVENOUS; SUBCUTANEOUS EVERY 8 HOURS
Refills: 0 | Status: DISCONTINUED | OUTPATIENT
Start: 2021-04-28 | End: 2021-05-05

## 2021-04-28 RX ORDER — LEVETIRACETAM 250 MG/1
1000 TABLET, FILM COATED ORAL
Refills: 0 | Status: DISCONTINUED | OUTPATIENT
Start: 2021-04-28 | End: 2021-05-11

## 2021-04-28 RX ORDER — METOPROLOL TARTRATE 50 MG
50 TABLET ORAL
Refills: 0 | Status: DISCONTINUED | OUTPATIENT
Start: 2021-04-28 | End: 2021-05-11

## 2021-04-28 RX ORDER — VANCOMYCIN HCL 1 G
1000 VIAL (EA) INTRAVENOUS ONCE
Refills: 0 | Status: COMPLETED | OUTPATIENT
Start: 2021-04-28 | End: 2021-04-28

## 2021-04-28 RX ORDER — DEXTROSE 50 % IN WATER 50 %
15 SYRINGE (ML) INTRAVENOUS ONCE
Refills: 0 | Status: DISCONTINUED | OUTPATIENT
Start: 2021-04-28 | End: 2021-05-11

## 2021-04-28 RX ORDER — DIAZEPAM 5 MG
10 TABLET ORAL
Refills: 0 | Status: DISCONTINUED | OUTPATIENT
Start: 2021-04-28 | End: 2021-04-30

## 2021-04-28 RX ORDER — ACETAMINOPHEN 500 MG
650 TABLET ORAL ONCE
Refills: 0 | Status: COMPLETED | OUTPATIENT
Start: 2021-04-28 | End: 2021-04-28

## 2021-04-28 RX ORDER — DEXTROSE 50 % IN WATER 50 %
12.5 SYRINGE (ML) INTRAVENOUS ONCE
Refills: 0 | Status: DISCONTINUED | OUTPATIENT
Start: 2021-04-28 | End: 2021-05-11

## 2021-04-28 RX ORDER — INSULIN LISPRO 100/ML
VIAL (ML) SUBCUTANEOUS
Refills: 0 | Status: DISCONTINUED | OUTPATIENT
Start: 2021-04-28 | End: 2021-05-11

## 2021-04-28 RX ORDER — INSULIN GLARGINE 100 [IU]/ML
24 INJECTION, SOLUTION SUBCUTANEOUS EVERY MORNING
Refills: 0 | Status: DISCONTINUED | OUTPATIENT
Start: 2021-04-28 | End: 2021-04-28

## 2021-04-28 RX ORDER — DEXTROSE 50 % IN WATER 50 %
25 SYRINGE (ML) INTRAVENOUS ONCE
Refills: 0 | Status: DISCONTINUED | OUTPATIENT
Start: 2021-04-28 | End: 2021-05-11

## 2021-04-28 RX ORDER — PANTOPRAZOLE SODIUM 20 MG/1
40 TABLET, DELAYED RELEASE ORAL
Refills: 0 | Status: DISCONTINUED | OUTPATIENT
Start: 2021-04-28 | End: 2021-05-06

## 2021-04-28 RX ORDER — LEVETIRACETAM 250 MG/1
750 TABLET, FILM COATED ORAL
Refills: 0 | Status: DISCONTINUED | OUTPATIENT
Start: 2021-04-28 | End: 2021-04-28

## 2021-04-28 RX ORDER — QUETIAPINE FUMARATE 200 MG/1
25 TABLET, FILM COATED ORAL
Refills: 0 | Status: DISCONTINUED | OUTPATIENT
Start: 2021-04-28 | End: 2021-05-11

## 2021-04-28 RX ORDER — MEROPENEM 1 G/30ML
1000 INJECTION INTRAVENOUS EVERY 8 HOURS
Refills: 0 | Status: DISCONTINUED | OUTPATIENT
Start: 2021-04-28 | End: 2021-04-28

## 2021-04-28 RX ORDER — ATORVASTATIN CALCIUM 80 MG/1
40 TABLET, FILM COATED ORAL AT BEDTIME
Refills: 0 | Status: DISCONTINUED | OUTPATIENT
Start: 2021-04-28 | End: 2021-05-11

## 2021-04-28 RX ORDER — NIFEDIPINE 30 MG
60 TABLET, EXTENDED RELEASE 24 HR ORAL DAILY
Refills: 0 | Status: DISCONTINUED | OUTPATIENT
Start: 2021-04-28 | End: 2021-05-06

## 2021-04-28 RX ORDER — ASPIRIN AND DIPYRIDAMOLE 25; 200 MG/1; MG/1
1 CAPSULE, EXTENDED RELEASE ORAL
Refills: 0 | Status: DISCONTINUED | OUTPATIENT
Start: 2021-04-28 | End: 2021-05-06

## 2021-04-28 RX ORDER — MEROPENEM 1 G/30ML
1000 INJECTION INTRAVENOUS EVERY 12 HOURS
Refills: 0 | Status: DISCONTINUED | OUTPATIENT
Start: 2021-04-28 | End: 2021-05-02

## 2021-04-28 RX ADMIN — Medication 8 UNIT(S): at 16:36

## 2021-04-28 RX ADMIN — Medication 250 MILLIGRAM(S): at 18:00

## 2021-04-28 RX ADMIN — HEPARIN SODIUM 5000 UNIT(S): 5000 INJECTION INTRAVENOUS; SUBCUTANEOUS at 14:51

## 2021-04-28 RX ADMIN — Medication 4: at 09:47

## 2021-04-28 RX ADMIN — HEPARIN SODIUM 5000 UNIT(S): 5000 INJECTION INTRAVENOUS; SUBCUTANEOUS at 21:52

## 2021-04-28 RX ADMIN — Medication 50 MILLIGRAM(S): at 17:28

## 2021-04-28 RX ADMIN — MEROPENEM 100 MILLIGRAM(S): 1 INJECTION INTRAVENOUS at 17:13

## 2021-04-28 RX ADMIN — SODIUM CHLORIDE 1000 MILLILITER(S): 9 INJECTION INTRAMUSCULAR; INTRAVENOUS; SUBCUTANEOUS at 07:11

## 2021-04-28 RX ADMIN — SODIUM CHLORIDE 75 MILLILITER(S): 9 INJECTION INTRAMUSCULAR; INTRAVENOUS; SUBCUTANEOUS at 20:05

## 2021-04-28 RX ADMIN — SODIUM CHLORIDE 1000 MILLILITER(S): 9 INJECTION INTRAMUSCULAR; INTRAVENOUS; SUBCUTANEOUS at 03:30

## 2021-04-28 RX ADMIN — CEFTRIAXONE 100 MILLIGRAM(S): 500 INJECTION, POWDER, FOR SOLUTION INTRAMUSCULAR; INTRAVENOUS at 03:30

## 2021-04-28 RX ADMIN — Medication 650 MILLIGRAM(S): at 19:23

## 2021-04-28 RX ADMIN — ATORVASTATIN CALCIUM 40 MILLIGRAM(S): 80 TABLET, FILM COATED ORAL at 21:52

## 2021-04-28 RX ADMIN — Medication 60 MILLIGRAM(S): at 09:54

## 2021-04-28 RX ADMIN — PANTOPRAZOLE SODIUM 40 MILLIGRAM(S): 20 TABLET, DELAYED RELEASE ORAL at 09:49

## 2021-04-28 RX ADMIN — Medication 4: at 16:36

## 2021-04-28 RX ADMIN — LEVETIRACETAM 1000 MILLIGRAM(S): 250 TABLET, FILM COATED ORAL at 17:28

## 2021-04-28 RX ADMIN — INSULIN GLARGINE 24 UNIT(S): 100 INJECTION, SOLUTION SUBCUTANEOUS at 21:55

## 2021-04-28 RX ADMIN — Medication 650 MILLIGRAM(S): at 20:04

## 2021-04-28 RX ADMIN — Medication 8 UNIT(S): at 09:47

## 2021-04-28 RX ADMIN — Medication 8 UNIT(S): at 12:34

## 2021-04-28 RX ADMIN — ASPIRIN AND DIPYRIDAMOLE 1 CAPSULE(S): 25; 200 CAPSULE, EXTENDED RELEASE ORAL at 17:27

## 2021-04-28 RX ADMIN — Medication 650 MILLIGRAM(S): at 17:26

## 2021-04-28 RX ADMIN — Medication 650 MILLIGRAM(S): at 21:04

## 2021-04-28 NOTE — ED PROVIDER NOTE - CLINICAL SUMMARY MEDICAL DECISION MAKING FREE TEXT BOX
75yM pmhx CVA 2016 with residual  right sided weakness, aphasia,  HTN HLD CKD seizures on keppra 750mg BID - followed by Dr Rosenbaum  - pw seizure today.  Per wife at bedside who is care taker, pt was baseline , no cough vomiting diarrhea  ,  normally walks inly with assistance -  has  minor fall on SUnday  2 days ago  - while wife was helping him out of shower he fell  onto his buttocks,  ambulating  at baseline afterwards no  head trauma -  tonight, pts son helped him to bathroom and while on the toilet bowl had  seizure  described as limp, and staring with mild generalized tremor -  pt has had these presentations of his seizures in the past - last seizure was 2 years ago .   duration of seizure per wfie was about 2minutes.   in ED pt alert  thin appearing cvs rrr abd soft nontender  no signs of trauma to chest abdpen  ,  right arm chronically tonic in flexion at  elpw and adducted to chest,    labs with wbc  30,   abx started 75yM pmhx CVA 2016 with residual  right sided weakness, aphasia,  HTN HLD CKD seizures on keppra 750mg BID - followed by Dr Rosenbaum  - pw seizure today.  Per wife at bedside who is care taker, pt was baseline , no cough vomiting diarrhea  ,  normally walks inly with assistance -  has  minor fall on SUnday  2 days ago  - while wife was helping him out of shower he fell  onto his buttocks,  ambulating  at baseline afterwards no  head trauma -  tonight, pts son helped him to bathroom and while on the toilet bowl had  seizure  described as limp, and staring with mild generalized tremor -  pt has had these presentations of his seizures in the past - last seizure was 2 years ago .   duration of seizure per wfie was about 2minutes.   in ED pt alert  thin appearing cvs rrr abd soft nontender  no signs of trauma to chest abdpen  ,  right arm chronically tonic in flexion at  elpw and adducted to chest,    labs with wbc  30,   abx started -  + UTI  wbc > 50 -  CT malka abd pelvis - no other  infectious sources,  traumatic injuries  include cute displaced 10th and minimally displaced 11th right rib fractures.  	  	2.  Acute minimally displaced fractures of the right transverse processes of L1, L2, and L3.    [facility]  trauamt  Dr colbert -   as this occurred on 3 days ago -  pt  does not need transfer north  for trauma eval    [facility] neurology Dr mccoy - no need for epilepsy unit - can be admitted to medicine floor     pt admitted stable well appearing condition

## 2021-04-28 NOTE — ED PROVIDER NOTE - CARE PLAN
Principal Discharge DX:	Seizure  Secondary Diagnosis:	UTI (urinary tract infection)  Secondary Diagnosis:	Fracture of rib  Secondary Diagnosis:	Fracture of transverse process of lumbar vertebra, closed, initial encounter

## 2021-04-28 NOTE — H&P ADULT - CONVERSATION DETAILS
Spoke with HCP regarding living will and what his wishes would be if his heart were to stop beating or his respiratory status worsened. pt states at this point hcp would want all life sustaining methods to be carried out and therefore remain FULL CODE

## 2021-04-28 NOTE — H&P ADULT - ASSESSMENT
75 yr old male with hx of Seizure disorder, Hx of CVA with residual right sided weakness, HTN, DM, CKD 3 admitted for seizure.    # Break through Seizure vs Seizure secondary to underlying Cystitis    - CT Head No Cont (04.28.21): No CT evidence of acute intracranial pathology. Stable chronic infarct in the distribution of the left MCA.  - c/w Keppra  - sz precautions  - check Keppra level   - neurology consult     # Cystitis   - hemodynamically stable  - WBC 30  - CT Abdomen w/ IV Cont (04.28.21): Urinary bladder increased wall thickening, mucosal hyper-enhancement and pericystic stranding; correlate for urinary bladder cystitis. Acute minimally displaced fractures of the right transverse processes of L1, L2, and L3.  - start Ceftriaxone  - f/u blood and urine Cx     # JORDYN on CKD 3  - Cr 1.7 (baseline 1.1)  - start NS@75  - check CK    # Hx of CVA  - significant right sided residual neuro deficit  - c/w ASA and atorvastatin     # DM  - monitor FS  - start sliding scale insulin 24U; 8U+8U+8U    # HTN  - c/w nifedipine, metoprolol  - hold losartan for JORDYN     # CKD 3   - Cr at baseline    # DVT ppx   - start heparin     # Functional Quadriplegia     # DASH diet    # Full code 75 yr old male with hx of Seizure disorder, Hx of CVA with residual right sided weakness, HTN, DM, CKD 3 admitted for seizure.    # Break through Seizure vs Seizure secondary to underlying Cystitis    - CT Head No Cont (04.28.21): No CT evidence of acute intracranial pathology. Stable chronic infarct in the distribution of the left MCA.  - c/w Keppra  - sz precautions  - check Keppra level   - neurology consult     # Cystitis   - hemodynamically stable  - WBC 30  - CT Abdomen w/ IV Cont (04.28.21): Urinary bladder increased wall thickening, mucosal hyper-enhancement and pericystic stranding; correlate for urinary bladder cystitis. Acute minimally displaced fractures of the right transverse processes of L1, L2, and L3.  - start Ceftriaxone  - f/u blood and urine Cx     # JORDYN on CKD 3  - Cr 1.7 (baseline 1.1)  - start NS@75  - check CK    # Hx of CVA  - significant right sided residual neuro deficit  - c/w ASA and atorvastatin     # Rib Fracture  - tylenol prn     # DM  - monitor FS  - start sliding scale insulin 24U; 8U+8U+8U    # HTN  - c/w nifedipine, metoprolol  - hold losartan for JORDYN     # CKD 3   - Cr at baseline    # DVT ppx   - start heparin     # Functional Quadriplegia     # DASH diet    # Full code

## 2021-04-28 NOTE — CHART NOTE - NSCHARTNOTEFT_GEN_A_CORE
Vital Signs Last 24 Hrs  T(C): 38.5 (28 Apr 2021 16:49), Max: 38.5 (28 Apr 2021 16:49)  T(F): 101.3 (28 Apr 2021 16:49), Max: 101.3 (28 Apr 2021 16:49)  HR: 113 (28 Apr 2021 13:42) (81 - 116)  BP: 153/73 (28 Apr 2021 13:42) (128/67 - 153/73)  RR: 18 (28 Apr 2021 13:42) (18 - 19)  SpO2: 97% (28 Apr 2021 08:12) (96% - 97%)    called by nurse for high fevers; blood cultures and urine cultures STAT  -dose of IV vanco and meropenem ordered; ID consult placed   -monitor on broad spectrum IV antibiotics with lab monitoring   -neurosurgery consult places for minimally displaced L1, L2, L3, transverse process fractures  (TLSO brace)  -pain control prn   -spoke to wife in length this morning; aware of the overall guarded prognosis

## 2021-04-28 NOTE — ED PROVIDER NOTE - SECONDARY DIAGNOSIS.
UTI (urinary tract infection) Fracture of rib Fracture of transverse process of lumbar vertebra, closed, initial encounter

## 2021-04-28 NOTE — H&P ADULT - NSHPLABSRESULTS_GEN_ALL_CORE
CT Head No Cont (04.28.21): No CT evidence of acute intracranial pathology. Stable chronic infarct in the distribution of the left MCA.    CT Chest w/ IV Cont (04.28.21): Acute displaced 10th and minimally displaced 11th right rib fractures. No pneumothorax.    CT Abdomen w/ IV Cont (04.28.21): Urinary bladder increased wall thickening, mucosal hyper-enhancement and pericystic stranding; correlate for urinary bladder cystitis. Acute minimally displaced fractures of the right transverse processes of L1, L2, and L3.

## 2021-04-28 NOTE — ED PROVIDER NOTE - PHYSICAL EXAMINATION
Physical Exam    Vital Signs: I have reviewed the initial vital signs.  Constitutional: chronically ill appearing  Eyes: Conjunctiva pink, Sclera clear, PERRLA, EOMI.  Cardiovascular: S1 and S2, regular rate, regular rhythm, well-perfused extremities, radial pulses equal and 2+  Respiratory: unlabored respiratory effort, clear to auscultation bilaterally no wheezing, rales and rhonchi  Gastrointestinal: soft, non-tender abdomen, no pulsatile mass, normal bowl sounds  Musculoskeletal: supple neck, no lower extremity edema, no midline tenderness  Integumentary: warm, dry, no rash  Neurologic: awake, alert + right sided defecit and rigth arm chronically flexed.

## 2021-04-28 NOTE — H&P ADULT - NSICDXPASTSURGICALHX_GEN_ALL_CORE_FT
Pt calling to request refill on medication carvedilol. Called inform pt prescription refilled. No answer left message on voicemail. Thanks, Richard   PAST SURGICAL HISTORY:  [place] filter in place     H/O brain surgery     H/O umbilical hernia repair     History of appendectomy     History of lumbar surgery     History of penile implant

## 2021-04-28 NOTE — ED PROVIDER NOTE - PSH
[place] filter in place    H/O brain surgery    H/O umbilical hernia repair    History of appendectomy    History of lumbar surgery    History of penile implant

## 2021-04-28 NOTE — CONSULT NOTE ADULT - ASSESSMENT
A 75 yr y/o male with hx of Seizure disorder, Hx of CVA with residual right sided weakness, HTN, DM, CKD 3 admitted for break through seizure. CT head and c-spine insignificant.  CT a/p/chest  scan Rib 10- 11 fracture and  displaced fracture at L1-3 and cystitis. Seizure most likely due to UTI, wbc  30.       Plan    -increase Keppra to 1000 mg BID  -follow up Keppra level   -keep magnesium > 2  -REEG,  if negative pt can be discharged on Keppra 1000 mg BID  from neurology stand point  -pt needs to follow up outpt with Dr. Cueto for right arm spasticity botox injection evaluation and seizure monitoring  A 75 yr y/o male with hx of Seizure disorder, Hx of CVA with residual right sided weakness, HTN, DM, CKD 3 admitted for break through seizure. CT head and c-spine insignificant.  CT a/p/chest  scan Rib 10- 11 fracture and  displaced fracture at L1-3 and cystitis. Seizure most likely due to UTI, wbc  30 currently back to baseline.        Plan:  -increase Keppra to 1000 mg BID  -follow up Keppra level   -keep magnesium > 2  -REEG,  if negative pt can be discharged on Keppra 1000 mg BID  from neurology stand point  -pt needs to follow up outpt with Dr. Cueto for routine stroke screening, right arm spasticity botox injection evaluation and AED adjustment if needed

## 2021-04-28 NOTE — H&P ADULT - NSHPSOCIALHISTORY_GEN_ALL_CORE
Marital Status:  ( x  )    (   ) Single    (   )    (  )   Lives with: (  ) alone  (  ) children   ( x ) spouse   (  ) parents  (  ) other  Recent Travel: No recent travel  Occupation:    Substance Use (street drugs): ( x ) never used  (  ) other:  Tobacco Usage:  ( x  ) never smoked   (   ) former smoker   (   ) current smoker  (     ) pack year  Alcohol Usage: None Marital Status:  ( x  )    (   ) Single    (   )    (  )   Lives with: (  ) alone  (  ) children   ( x ) spouse   (  ) parents  (  ) other  Recent Travel: No recent travel    Substance Use (street drugs): ( x ) never used  (  ) other:  Tobacco Usage:  ( x  ) never smoked   (   ) former smoker   (   ) current smoker  (     ) pack year  Alcohol Usage: None Marital Status:  ( x  )    (   ) Single    (   )    (  )   Lives with: (  ) alone  (  ) children   ( x ) spouse   (  ) parents  (  ) other  Recent Travel: No recent travel    Substance Use (street drugs): ( x ) never used  (  ) other:  Tobacco Usage:  ( x  ) never smoked   (   ) former smoker   (   ) current smoker  (     ) pack year  Alcohol Usage: None  PATIENT IS FULL CODE

## 2021-04-28 NOTE — H&P ADULT - HISTORY OF PRESENT ILLNESS
75 yr old male with hx of Seizure disorder, Hx of CVA with residual right sided weakness, HTN, DM, CKD 3 admitted for seizure.   Patient is non-verbral. Hx taken from wife at bedside.    75 yr old male with hx of Seizure disorder, Hx of CVA with residual right sided weakness, HTN, DM, CKD 3 admitted for witnessed seizure. As per wife, patient   Patient is non-verbral. Hx taken from wife at bedside.    75 yr old male with hx of Seizure disorder, Hx of CVA with residual right sided weakness, HTN, DM, CKD 3 admitted for witnessed seizure. As per wife, patient was sitting on the bowel and had a seizure for 2 mins. Event was witnessed by son and wife. Wife also notes patient has been falling from the bed multiple time. Wife denies any fever, chills, sob, chest pain, abdominal pain, no urinary or bowel issues.     In ED: V/s stable, UA (+)

## 2021-04-28 NOTE — ED PROVIDER NOTE - OBJECTIVE STATEMENT
75 year old male past medical history of CVA with right sided weakness, seizures on keppra brought in by ambulance for seizure. Pt 2 days ago had fall in shower where he landed on buttock. tonight patient was in bathroom and son was helping him and patient became limp and staring with generalized shaking. patient last seizure was 2 years ago and seizure tonight lasted 2min. patient has not missed any doses of meds.

## 2021-04-28 NOTE — CONSULT NOTE ADULT - SUBJECTIVE AND OBJECTIVE BOX
Neurology consult note    Name  RISHI HERRERA    HPI:  Patient is non-verbral. Hx taken from wife at bedside.    75 yr old male with hx of Seizure disorder, Hx of CVA with residual right sided weakness, HTN, DM, CKD 3 admitted for witnessed seizure. As per wife, patient was sitting on the bowel and had a seizure for 2 mins. Event was witnessed by son and wife. Wife also notes patient has been falling from the bed multiple time. Wife denies any fever, chills, sob, chest pain, abdominal pain, no urinary or bowel issues.     In ED: V/s stable, UA (+)   (28 Apr 2021 05:55)    Neurology Interval History   -wife at bedside, reports pt looks much better, pt non-verbal at baseline. As per wife last night at midnight pt was placed at toilet bowl and her son witnessed pt was seizing for 2 minutes.  PT  starring, not moving eyes and not responsive.  Pt did not pass out. No fall. No foaming or tongue bitting. Per wife pt fell few days ago and found on CT scan Rib 10- 11 fracture and  displaced fracture at L1-3. Pt used to follow up with Dr. Cueto for seizure last,  outpt visit was 2019.  Pt reports pt has been taking Keppra 750 bid and Magnesium. In ed pt found febrile and UTI positive.         ICU Vital Signs Last 24 Hrs  T(C): 35.6 (28 Apr 2021 08:12), Max: 37.9 (28 Apr 2021 02:56)  T(F): 96 (28 Apr 2021 08:12), Max: 100.2 (28 Apr 2021 02:56)  HR: 110 (28 Apr 2021 08:12) (81 - 116)  BP: 143/69 (28 Apr 2021 08:12) (128/67 - 151/74)  RR: 18 (28 Apr 2021 08:12) (18 - 19)  SpO2: 97% (28 Apr 2021 08:12) (96% - 97%)      Neurological Exam:   Mental status: Awake, alert to person.  Pt non- communicative at baseline. Follows up commands.   Cranial nerves: pupils equally round and reactive to light, no nystagmus, extraocular muscles intact,  right cheek slight drooping.   Motor: Right arm and wrist flexed, spastic unable to move, Left arm strength 5/5 , hand  4/5.  Right lower extremity 2/5 proximally 0/5 distally.   Coordination: finger-to-nose  on left side intact.    Reflexes: 2+ in upper and lower extremities, bilaterally      Medications  atorvastatin 40 milliGRAM(s) Oral at bedtime  cefTRIAXone   IVPB 1000 milliGRAM(s) IV Intermittent every 24 hours  dextrose 40% Gel 15 Gram(s) Oral once  dextrose 5%. 1000 milliLiter(s) IV Continuous <Continuous>  dextrose 5%. 1000 milliLiter(s) IV Continuous <Continuous>  dextrose 50% Injectable 25 Gram(s) IV Push once  dextrose 50% Injectable 12.5 Gram(s) IV Push once  dextrose 50% Injectable 25 Gram(s) IV Push once  diazepam    Tablet 10 milliGRAM(s) Oral two times a day PRN  dipyridamole 200 mG/aspirin 25 mG 1 Capsule(s) Oral two times a day  glucagon  Injectable 1 milliGRAM(s) IntraMuscular once  heparin   Injectable 5000 Unit(s) SubCutaneous every 8 hours  insulin glargine Injectable (LANTUS) 24 Unit(s) SubCutaneous at bedtime  insulin lispro (ADMELOG) corrective regimen sliding scale   SubCutaneous three times a day before meals  insulin lispro Injectable (ADMELOG) 8 Unit(s) SubCutaneous before breakfast  insulin lispro Injectable (ADMELOG) 8 Unit(s) SubCutaneous before lunch  insulin lispro Injectable (ADMELOG) 8 Unit(s) SubCutaneous before dinner  levETIRAcetam 750 milliGRAM(s) Oral two times a day  metoprolol tartrate 50 milliGRAM(s) Oral two times a day  NIFEdipine XL 60 milliGRAM(s) Oral daily  pantoprazole    Tablet 40 milliGRAM(s) Oral before breakfast  QUEtiapine Oral Tab/Cap - Peds 25 milliGRAM(s) Oral two times a day  sodium chloride 0.9%. 1000 milliLiter(s) IV Continuous <Continuous>      Lab                        13.4   30.46 )-----------( 417      ( 28 Apr 2021 01:55 )             41.5     04-28    140  |  100  |  27<H>  ----------------------------<  294<H>  4.7   |  23  |  1.7<H>    Ca    10.2<H>      28 Apr 2021 01:55  Mg     2.3     04-28    TPro  7.6  /  Alb  4.0  /  TBili  0.4  /  DBili  x   /  AST  22  /  ALT  30  /  AlkPhos  211<H>  04-28    CAPILLARY BLOOD GLUCOSE      POCT Blood Glucose.: 321 mg/dL (28 Apr 2021 09:45)  POCT Blood Glucose.: 260 mg/dL (28 Apr 2021 08:38)    LIVER FUNCTIONS - ( 28 Apr 2021 01:55 )  Alb: 4.0 g/dL / Pro: 7.6 g/dL / ALK PHOS: 211 U/L / ALT: 30 U/L / AST: 22 U/L / GGT: x           PT/INR - ( 28 Apr 2021 01:55 )   PT: 12.00 sec;   INR: 1.04 ratio         PTT - ( 28 Apr 2021 01:55 )  PTT:28.4 sec    Radiology  < from: CT Chest w/ IV Cont (04.28.21 @ 04:46) >    IMPRESSION:    1.  Acute displaced 10th and minimally displaced 11th right rib fractures. No pneumothorax.    2.  Acute minimally displaced fractures of the right transverse processes of L1, L2, and L3.    3.  Urinary bladder increased wall thickening, mucosal hyperenhancement and pericystic stranding; correlate for urinary bladder cystitis.        < end of copied text >      < from: CT Head No Cont (04.28.21 @ 02:28) >  IMPRESSION:    No CT evidenceof acute intracranial pathology.    Stable chronic infarct in the distribution of the left MCA.        < end of copied text >      < from: CT Cervical Spine No Cont (04.28.21 @ 02:29) >  IMPRESSION:    No acute cervical spine fracture or subluxation.      ZINA COLIN M.D., RESIDENT RADIOLOGIST    < end of copied text >     Neurology consult note    Name  RISHI HERRERA    HPI:  Patient is non-verbral. Hx taken from wife at bedside.    75 yr old male with hx of Seizure disorder, Hx of CVA with residual right sided weakness, HTN, DM, CKD 3 admitted for witnessed seizure. As per wife, patient was sitting on the bowel and had a seizure for 2 mins. Event was witnessed by son and wife. Wife also notes patient has been falling from the bed multiple time. Wife denies any fever, chills, sob, chest pain, abdominal pain, no urinary or bowel issues.  Wife reports pt looks much better, pt non-verbal at baseline. As per wife last night at midnight pt was placed at toilet bowl and her son witnessed pt was seizing for 2 minutes.  PT  starring, not moving eyes and not responsive.  Pt did not pass out. No fall. No foaming or tongue bitting. Per wife pt fell few days ago and found on CT scan Rib 10- 11 fracture and  displaced fracture at L1-3. Pt used to follow up with Dr. Cueto for seizure last, outpt visit was 2019 and has had poor followup since then.  Pt reports pt has been taking Keppra 750 bid and Magnesium. In ed pt found febrile and UTI positive.     ICU Vital Signs Last 24 Hrs  T(C): 35.6 (28 Apr 2021 08:12), Max: 37.9 (28 Apr 2021 02:56)  T(F): 96 (28 Apr 2021 08:12), Max: 100.2 (28 Apr 2021 02:56)  HR: 110 (28 Apr 2021 08:12) (81 - 116)  BP: 143/69 (28 Apr 2021 08:12) (128/67 - 151/74)  RR: 18 (28 Apr 2021 08:12) (18 - 19)  SpO2: 97% (28 Apr 2021 08:12) (96% - 97%)      Neurological Exam:   Mental status: Awake, alert to person.  Pt non- communicative at baseline. Follows up commands.   Cranial nerves: pupils equally round and reactive to light, no nystagmus, extraocular muscles intact,  right cheek slight drooping.   Motor: Right arm and wrist flexed, spastic unable to move, Left arm strength 5/5 , hand  4/5.  Right lower extremity 2/5 proximally 0/5 distally.   Coordination: finger-to-nose  on left side intact.    Reflexes: 2+ in upper and lower extremities, bilaterally    Medications  atorvastatin 40 milliGRAM(s) Oral at bedtime  cefTRIAXone   IVPB 1000 milliGRAM(s) IV Intermittent every 24 hours  dextrose 40% Gel 15 Gram(s) Oral once  dextrose 5%. 1000 milliLiter(s) IV Continuous <Continuous>  dextrose 5%. 1000 milliLiter(s) IV Continuous <Continuous>  dextrose 50% Injectable 25 Gram(s) IV Push once  dextrose 50% Injectable 12.5 Gram(s) IV Push once  dextrose 50% Injectable 25 Gram(s) IV Push once  diazepam    Tablet 10 milliGRAM(s) Oral two times a day PRN  dipyridamole 200 mG/aspirin 25 mG 1 Capsule(s) Oral two times a day  glucagon  Injectable 1 milliGRAM(s) IntraMuscular once  heparin   Injectable 5000 Unit(s) SubCutaneous every 8 hours  insulin glargine Injectable (LANTUS) 24 Unit(s) SubCutaneous at bedtime  insulin lispro (ADMELOG) corrective regimen sliding scale   SubCutaneous three times a day before meals  insulin lispro Injectable (ADMELOG) 8 Unit(s) SubCutaneous before breakfast  insulin lispro Injectable (ADMELOG) 8 Unit(s) SubCutaneous before lunch  insulin lispro Injectable (ADMELOG) 8 Unit(s) SubCutaneous before dinner  levETIRAcetam 750 milliGRAM(s) Oral two times a day  metoprolol tartrate 50 milliGRAM(s) Oral two times a day  NIFEdipine XL 60 milliGRAM(s) Oral daily  pantoprazole    Tablet 40 milliGRAM(s) Oral before breakfast  QUEtiapine Oral Tab/Cap - Peds 25 milliGRAM(s) Oral two times a day  sodium chloride 0.9%. 1000 milliLiter(s) IV Continuous <Continuous>      Lab                        13.4   30.46 )-----------( 417      ( 28 Apr 2021 01:55 )             41.5     04-28    140  |  100  |  27<H>  ----------------------------<  294<H>  4.7   |  23  |  1.7<H>    Ca    10.2<H>      28 Apr 2021 01:55  Mg     2.3     04-28    TPro  7.6  /  Alb  4.0  /  TBili  0.4  /  DBili  x   /  AST  22  /  ALT  30  /  AlkPhos  211<H>  04-28    CAPILLARY BLOOD GLUCOSE      POCT Blood Glucose.: 321 mg/dL (28 Apr 2021 09:45)  POCT Blood Glucose.: 260 mg/dL (28 Apr 2021 08:38)    LIVER FUNCTIONS - ( 28 Apr 2021 01:55 )  Alb: 4.0 g/dL / Pro: 7.6 g/dL / ALK PHOS: 211 U/L / ALT: 30 U/L / AST: 22 U/L / GGT: x           PT/INR - ( 28 Apr 2021 01:55 )   PT: 12.00 sec;   INR: 1.04 ratio         PTT - ( 28 Apr 2021 01:55 )  PTT:28.4 sec    Radiology  < from: CT Chest w/ IV Cont (04.28.21 @ 04:46) >    IMPRESSION:    1.  Acute displaced 10th and minimally displaced 11th right rib fractures. No pneumothorax.    2.  Acute minimally displaced fractures of the right transverse processes of L1, L2, and L3.    3.  Urinary bladder increased wall thickening, mucosal hyperenhancement and pericystic stranding; correlate for urinary bladder cystitis.        < end of copied text >      < from: CT Head No Cont (04.28.21 @ 02:28) >  IMPRESSION:    No CT evidenceof acute intracranial pathology.    Stable chronic infarct in the distribution of the left MCA.        < end of copied text >      < from: CT Cervical Spine No Cont (04.28.21 @ 02:29) >  IMPRESSION:    No acute cervical spine fracture or subluxation.      ZINA COLIN M.D., RESIDENT RADIOLOGIST    < end of copied text >

## 2021-04-29 LAB
A1C WITH ESTIMATED AVERAGE GLUCOSE RESULT: 7.8 % — HIGH (ref 4–5.6)
ANION GAP SERPL CALC-SCNC: 16 MMOL/L — HIGH (ref 7–14)
BUN SERPL-MCNC: 25 MG/DL — HIGH (ref 10–20)
CALCIUM SERPL-MCNC: 8.8 MG/DL — SIGNIFICANT CHANGE UP (ref 8.5–10.1)
CHLORIDE SERPL-SCNC: 105 MMOL/L — SIGNIFICANT CHANGE UP (ref 98–110)
CO2 SERPL-SCNC: 22 MMOL/L — SIGNIFICANT CHANGE UP (ref 17–32)
CREAT SERPL-MCNC: 1.5 MG/DL — SIGNIFICANT CHANGE UP (ref 0.7–1.5)
CULTURE RESULTS: NO GROWTH — SIGNIFICANT CHANGE UP
ESTIMATED AVERAGE GLUCOSE: 177 MG/DL — HIGH (ref 68–114)
GLUCOSE BLDC GLUCOMTR-MCNC: 221 MG/DL — HIGH (ref 70–99)
GLUCOSE BLDC GLUCOMTR-MCNC: 226 MG/DL — HIGH (ref 70–99)
GLUCOSE BLDC GLUCOMTR-MCNC: 231 MG/DL — HIGH (ref 70–99)
GLUCOSE BLDC GLUCOMTR-MCNC: 250 MG/DL — HIGH (ref 70–99)
GLUCOSE SERPL-MCNC: 213 MG/DL — HIGH (ref 70–99)
HCT VFR BLD CALC: 33.6 % — LOW (ref 42–52)
HGB BLD-MCNC: 11 G/DL — LOW (ref 14–18)
MCHC RBC-ENTMCNC: 30.9 PG — SIGNIFICANT CHANGE UP (ref 27–31)
MCHC RBC-ENTMCNC: 32.7 G/DL — SIGNIFICANT CHANGE UP (ref 32–37)
MCV RBC AUTO: 94.4 FL — HIGH (ref 80–94)
NRBC # BLD: 0 /100 WBCS — SIGNIFICANT CHANGE UP (ref 0–0)
PLATELET # BLD AUTO: 285 K/UL — SIGNIFICANT CHANGE UP (ref 130–400)
POTASSIUM SERPL-MCNC: 3.6 MMOL/L — SIGNIFICANT CHANGE UP (ref 3.5–5)
POTASSIUM SERPL-SCNC: 3.6 MMOL/L — SIGNIFICANT CHANGE UP (ref 3.5–5)
RBC # BLD: 3.56 M/UL — LOW (ref 4.7–6.1)
RBC # FLD: 13.2 % — SIGNIFICANT CHANGE UP (ref 11.5–14.5)
SODIUM SERPL-SCNC: 143 MMOL/L — SIGNIFICANT CHANGE UP (ref 135–146)
SPECIMEN SOURCE: SIGNIFICANT CHANGE UP
WBC # BLD: 33.66 K/UL — HIGH (ref 4.8–10.8)
WBC # FLD AUTO: 33.66 K/UL — HIGH (ref 4.8–10.8)

## 2021-04-29 PROCEDURE — 99233 SBSQ HOSP IP/OBS HIGH 50: CPT

## 2021-04-29 RX ORDER — SODIUM CHLORIDE 9 MG/ML
1000 INJECTION INTRAMUSCULAR; INTRAVENOUS; SUBCUTANEOUS
Refills: 0 | Status: DISCONTINUED | OUTPATIENT
Start: 2021-04-29 | End: 2021-05-02

## 2021-04-29 RX ORDER — VANCOMYCIN HCL 1 G
1000 VIAL (EA) INTRAVENOUS EVERY 12 HOURS
Refills: 0 | Status: DISCONTINUED | OUTPATIENT
Start: 2021-04-29 | End: 2021-05-02

## 2021-04-29 RX ORDER — LIDOCAINE 4 G/100G
1 CREAM TOPICAL EVERY 24 HOURS
Refills: 0 | Status: DISCONTINUED | OUTPATIENT
Start: 2021-04-29 | End: 2021-05-11

## 2021-04-29 RX ORDER — ACETAMINOPHEN 500 MG
650 TABLET ORAL EVERY 6 HOURS
Refills: 0 | Status: DISCONTINUED | OUTPATIENT
Start: 2021-04-29 | End: 2021-05-11

## 2021-04-29 RX ADMIN — MEROPENEM 100 MILLIGRAM(S): 1 INJECTION INTRAVENOUS at 17:46

## 2021-04-29 RX ADMIN — Medication 60 MILLIGRAM(S): at 06:06

## 2021-04-29 RX ADMIN — Medication 8 UNIT(S): at 16:45

## 2021-04-29 RX ADMIN — Medication 2: at 16:46

## 2021-04-29 RX ADMIN — PANTOPRAZOLE SODIUM 40 MILLIGRAM(S): 20 TABLET, DELAYED RELEASE ORAL at 06:06

## 2021-04-29 RX ADMIN — LEVETIRACETAM 1000 MILLIGRAM(S): 250 TABLET, FILM COATED ORAL at 06:06

## 2021-04-29 RX ADMIN — Medication 2: at 12:04

## 2021-04-29 RX ADMIN — INSULIN GLARGINE 24 UNIT(S): 100 INJECTION, SOLUTION SUBCUTANEOUS at 21:37

## 2021-04-29 RX ADMIN — QUETIAPINE FUMARATE 25 MILLIGRAM(S): 200 TABLET, FILM COATED ORAL at 17:47

## 2021-04-29 RX ADMIN — Medication 8 UNIT(S): at 12:04

## 2021-04-29 RX ADMIN — Medication 650 MILLIGRAM(S): at 18:43

## 2021-04-29 RX ADMIN — MEROPENEM 100 MILLIGRAM(S): 1 INJECTION INTRAVENOUS at 06:05

## 2021-04-29 RX ADMIN — HEPARIN SODIUM 5000 UNIT(S): 5000 INJECTION INTRAVENOUS; SUBCUTANEOUS at 06:06

## 2021-04-29 RX ADMIN — Medication 2: at 08:19

## 2021-04-29 RX ADMIN — ATORVASTATIN CALCIUM 40 MILLIGRAM(S): 80 TABLET, FILM COATED ORAL at 21:37

## 2021-04-29 RX ADMIN — LEVETIRACETAM 1000 MILLIGRAM(S): 250 TABLET, FILM COATED ORAL at 17:47

## 2021-04-29 RX ADMIN — Medication 10 MILLIGRAM(S): at 06:17

## 2021-04-29 RX ADMIN — Medication 8 UNIT(S): at 08:19

## 2021-04-29 RX ADMIN — LIDOCAINE 1 PATCH: 4 CREAM TOPICAL at 21:38

## 2021-04-29 RX ADMIN — Medication 50 MILLIGRAM(S): at 17:47

## 2021-04-29 RX ADMIN — HEPARIN SODIUM 5000 UNIT(S): 5000 INJECTION INTRAVENOUS; SUBCUTANEOUS at 21:37

## 2021-04-29 RX ADMIN — ASPIRIN AND DIPYRIDAMOLE 1 CAPSULE(S): 25; 200 CAPSULE, EXTENDED RELEASE ORAL at 06:06

## 2021-04-29 RX ADMIN — HEPARIN SODIUM 5000 UNIT(S): 5000 INJECTION INTRAVENOUS; SUBCUTANEOUS at 14:15

## 2021-04-29 RX ADMIN — ASPIRIN AND DIPYRIDAMOLE 1 CAPSULE(S): 25; 200 CAPSULE, EXTENDED RELEASE ORAL at 17:47

## 2021-04-29 RX ADMIN — Medication 250 MILLIGRAM(S): at 21:36

## 2021-04-29 RX ADMIN — Medication 50 MILLIGRAM(S): at 06:06

## 2021-04-29 NOTE — PROGRESS NOTE ADULT - SUBJECTIVE AND OBJECTIVE BOX
Imaging reviewed.    transverse process fractures of L1-3.  No indication for neurosurgical intervention.  Pain control  PT  Patient may ambulate as tolerated.  Brace for comfort only, not mandatory    May follow-up as outpatient

## 2021-04-29 NOTE — CONSULT NOTE ADULT - ASSESSMENT
ASSESSMENT  75 yr old male with hx of Seizure disorder, Hx of CVA with residual right sided weakness, HTN, DM, CKD 3 admitted for witnessed seizure.    IMPRESSION  #Sepsis during admission (Fevers, WBC>12) secondary to UTI  - CT Abdomen and Pelvis w/ IV Cont (04.28.21 @ 04:46): Urinary bladder increased wall thickening, mucosal hyperenhancement and pericystic stranding; correlate for urinary bladder cystitis.  - WBC Count: 42.36: (04.28.21 @ 11:00)    #Seizures  #Fall with acute rib and transverse process fractures  - CT Abdomen and Pelvis w/ IV Cont (04.28.21 @ 04:46):  Acute displaced 10th and minimally displaced 11th right rib fractures. No pneumothorax. Acute minimally displaced fractures of the right transverse processes of L1, L2, and L3.    #Hx of CVA  #CKD III  #DM  #Abx allergy: NKDA      RECOMMENDATIONS  - profound leukocytosis in setting of possible UTI, seizures, and acute fall which may all be contributing; no diarrhea -- has abdominal distension although wife says that this is his baseline   - ok to continue meropenem while awaiting cultures  - stop vancomycin if blood cultures without MRSA  - trend WBC    Please call or message on [employer] Teams if with any questions.  Spectra [phone]      Spe

## 2021-04-29 NOTE — PROGRESS NOTE ADULT - ASSESSMENT
ED presentation:   75 yr old male with hx of Seizure disorder, Hx of CVA with residual right sided weakness, HTN, DM, CKD 3 admitted for witnessed seizure. As per wife, patient was sitting on the bowel and had a seizure for 2 mins. Event was witnessed by son and wife. Wife also notes patient has been falling from the bed multiple time. Wife denies any fever, chills, sob, chest pain, abdominal pain, no urinary or bowel issues.     1) Sepsis not present on admission/UTI   -IV abx, ID consult  -monitor WBC   -follow up blood and urine cultures     2) Seizure disorder  -REEG negative for epileptiform activity   -Neurology following   -Keppra dose increased to 1000mg twice daily     3) DM II  -on sq insulin   -monitor FS    4) Old CVA with Right sided residual deficits/Functional Quadriplegia   -continue with Aggrenox and Statins     5) Debility  -rehab/pt as tolerated     6) Rib fractures 10th and 11th   -pain control prn  -incentive spirometry     7) L1, L2, L3 minimally displaced fractures   -seen by Neurosurgery  -no need for emergent surgery  -TLSO brace and PT as tolerated   -pain control     8) JORDYN on suspected CKD stage III  -improved   -continue with IVF     dvt and gi ppx       # Progress Note Handoff  PENDING as follows  consults: ID   Test: Cultures, CBC   Family discussion: Discussed with patient and wife at bedside in length; answered all questions; copies of imaging studies and labwork given to wife; aware of the current plan of broad spectrum IV antibiotics and wants to speak to ID attending today   Disposition:  from home; ACUTE     Attending Physician Dr. Judy Miller # [id number]     Spent over 45 mins today's plan of care

## 2021-04-29 NOTE — CHART NOTE - NSCHARTNOTEFT_GEN_A_CORE
Pt with transverse process fractures of L1-3.  Pt was evaluated by neurosurgery this AM who recommended no acute surgical intervention.   He was cleared to ambulated as tolerated. PT ordered.  Rx TLSO brace was given to RN manager.     D/w Dr. Miller

## 2021-04-29 NOTE — CONSULT NOTE ADULT - SUBJECTIVE AND OBJECTIVE BOX
RISHI HERRERA  75y, Male  Allergy: No Known Allergies      CHIEF COMPLAINT: seizure (2021 10:47)      LOS  1d    HPI:  Patient is non-verbral. Hx taken from wife at bedside.    75 yr old male with hx of Seizure disorder, Hx of CVA with residual right sided weakness, HTN, DM, CKD 3 admitted for witnessed seizure. As per wife, patient was sitting on the bowel and had a seizure for 2 mins. Event was witnessed by son and wife. Wife also notes patient has been falling from the bed multiple time. Wife denies any fever, chills, sob, chest pain, abdominal pain, no urinary or bowel issues.    In ED: V/s stable, UA (+)   (2021 05:55)    INFECTIOUS DISEASE HISTORY:  History as above.  Febrile during admission with T max 38.5  CT C/A/P with acute displaced 10th and minimally displaced 11th right rib fractures; acute minimaly displaced fractures of right L1-L3 transverse processes.  Noted to have hyperenhanced urinary bladder with pericystic stranding  Initially started on ceftriaxone, but broadenced to vancomycin + meropenem, in setting of fever.  No Previous culture history  WBC Count: 30.46 K/uL (.21 @ 01:55) -> 42.6 --> 33.6      PAST MEDICAL & SURGICAL HISTORY:  Controlled type 2 diabetes mellitus without complication, unspecified long term insulin use status    Essential hypertension    Depression, unspecified depression type    Coronary artery disease without angina pectoris, unspecified vessel or lesion type, unspecified whether native or transplanted heart    Cerebrovascular accident (CVA) due to stenosis of left middle cerebral artery    High blood cholesterol    History of appendectomy    H/O umbilical hernia repair    History of lumbar surgery    History of penile implant    Bradly filter in place    H/O brain surgery        FAMILY HISTORY  No pertinent family history in first degree relatives        SOCIAL HISTORY  Social History:  Marital Status:  ( x  )    (   ) Single    (   )    (  )   Lives with: (  ) alone  (  ) children   ( x ) spouse   (  ) parents  (  ) other  Recent Travel: No recent travel    Substance Use (street drugs): ( x ) never used  (  ) other:  Tobacco Usage:  ( x  ) never smoked   (   ) former smoker   (   ) current smoker  (     ) pack year  Alcohol Usage: None  PATIENT IS FULL CODE (2021 05:55)        ROS  General: Denies rigors, nightsweats  HEENT: Denies headache, rhinorrhea, sore throat, eye pain  CV: Denies CP, palpitations  PULM: Denies wheezing, hemoptysis  GI: Denies hematemesis, hematochezia, melena  : Denies discharge, hematuria  MSK: Denies arthralgias, myalgias  SKIN: Denies rash, lesions  NEURO: Denies paresthesias, weakness  PSYCH: Denies depression, anxiety    VITALS:  T(F): 97.3, Max: 101.3 (21 @ 16:49)  HR: 85  BP: 122/58  RR: 16Vital Signs Last 24 Hrs  T(C): 36.3 (2021 04:51), Max: 38.5 (2021 16:49)  T(F): 97.3 (2021 04:51), Max: 101.3 (2021 16:49)  HR: 85 (2021 04:51) (77 - 120)  BP: 122/58 (2021 04:51) (96/51 - 153/73)  BP(mean): --  RR: 16 (2021 04:51) (16 - 18)  SpO2: 95% (2021 19:00) (95% - 97%)    PHYSICAL EXAM:  Gen: NAD, resting in bed  HEENT: Normocephalic, atraumatic  Neck: supple, no lymphadenopathy  CV: Regular rate & regular rhythm  Lungs: decreased BS at bases, no fremitus  Abdomen: Soft, BS present  Ext: Warm, well perfused  Neuro: non focal, awake  Skin: no rash, no erythema  Lines: no phlebitis    TESTS & MEASUREMENTS:                        12.0  42.36 )-----------( 353      ( 2021 11:00 )             36.9        142  |  104  |  23<H>  ----------------------------<  380<H>  3.8   |  19  |  1.6<H>    Ca    9.3      2021 11:00  Mg     1.9         TPro  7.0  /  Alb  3.6  /  TBili  0.3  /  DBili  x   /  AST  21  /  ALT  25  /  AlkPhos  193<H>      eGFR if Non African American: 41 mL/min/1.73M2 (21 @ 11:00)  eGFR if : 48 mL/min/1.73M2 (21 @ 11:00)    LIVER FUNCTIONS - ( 2021 11:00 )  Alb: 3.6 g/dL / Pro: 7.0 g/dL / ALK PHOS: 193 U/L / ALT: 25 U/L / AST: 21 U/L / GGT: x          Urinalysis Basic - ( 2021 04:05 )    Color: Yellow / Appearance: Slightly Cloudy / S.020 / pH: x  Gluc: x / Ketone: Negative  / Bili: Negative / Urobili: 0.2 mg/dL  Blood: x / Protein: 30 mg/dL / Nitrite: Negative  Leuk Esterase: Large / RBC: 3-5 /HPF / WBC >50 /HPF  Sq Epi: x / Non Sq Epi: Few /HPF / Bacteria: x              INFECTIOUS DISEASES TESTING      RADIOLOGY & ADDITIONAL TESTS:  I have personally reviewed the last Chest xray  CXR      CT  CT Chest w/ IV Cont:  EXAM:  CT ABDOMEN AND PELVIS IC        EXAM:  CT CHEST IC            PROCEDURE DATE:  2021            INTERPRETATION:  CLINICAL HISTORY/REASON FOR EXAM: Fall. Trauma to chest, abdomen and pelvis. Seizure.    TECHNIQUE: Contiguous axial CT images were obtained from the thoracic inlet to the pubic symphysis following administration of intravenous contrast. Oral contrast was not administered. Reformatted images in the coronal and sagittal planes were acquired. 3D (MIP) images obtained.    COMPARISON: CT ABDOMEN/PELVIS 2018.    FINDINGS:    CHEST:    LUNGS, PLEURA, AIRWAYS: Bilateral calcified pleural plaques. Dependent atelectasis. Patent central tracheobronchial tree. No lobar consolidation, pleural effusion, or pneumothorax. No bronchiectasis or honeycombing.    THORACIC NODES: No thoracic or axillary lymphadenopathy.    MEDIASTINUM/GREAT VESSELS: No pericardial effusion. Heart size is within normal limits. Coronary artery and aortic valve calcifications. The aorta and main pulmonary artery are of normal caliber.    ABDOMEN/PELVIS:    HEPATOBILIARY: Unremarkable.    SPLEEN: Unremarkable.    PANCREAS: Unremarkable.    ADRENAL GLANDS: Unremarkable.    KIDNEYS: Symmetric renal enhancement. No hydronephrosis.    ABDOMINOPELVIC NODES: No lymphadenopathy.    PELVIC ORGANS: Guerrero catheter within a decompressed urinary bladder. Foci of air within the urinary bladder consistent with instrumentation. Urinary bladder increased wall thickening, mucosal hyperenhancement and pericystic stranding; correlate for urinary bladder cystitis. Penile implant with reservoir anterior to the uterine bladder. Large left hydrocele.    PERITONEUM/MESENTERY/BOWEL: Moderate stool load within the rectum without perirectal inflammatory change. Descending colon diverticulosis. No bowel obstruction, ascites or pneumoperitoneum.    BONES/SOFT TISSUES: Degenerative change of the spine. Acute displaced 10th and minimally displaced 11th right rib fractures. Acute minimally displaced fractures of the right transverse processes of L1, L2, and L3. Small bilateral fat-containing inguinal hernias.    OTHER: Vascular calcifications. Infrarenal IVC filter noted.      IMPRESSION:    1.  Acute displaced 10th and minimally displaced 11th right rib fractures. No pneumothorax.    2.  Acute minimally displaced fractures of the right transverse processes of L1, L2, and L3.    3.  Urinary bladder increased wall thickening, mucosal hyperenhancement and pericystic stranding; correlate for urinary bladder cystitis.            ZINA COLIN M.D., RESIDENT RADIOLOGIST  This document has been electronically signed.  IMAN WALTERS MD; Attending Radiologist  This document has been electronically signed. 2021  5:34AM (21 @ 04:46)      CARDIOLOGY TESTING      MEDICATIONS  atorvastatin 40 Oral at bedtime  dextrose 40% Gel 15 Oral once  dextrose 5%. 1000 IV Continuous <Continuous>  dextrose 5%. 1000 IV Continuous <Continuous>  dextrose 50% Injectable 25 IV Push once  dextrose 50% Injectable 12.5 IV Push once  dextrose 50% Injectable 25 IV Push once  dipyridamole 200 mG/aspirin 25 mG 1 Oral two times a day  glucagon  Injectable 1 IntraMuscular once  heparin   Injectable 5000 SubCutaneous every 8 hours  insulin glargine Injectable (LANTUS) 24 SubCutaneous at bedtime  insulin lispro (ADMELOG) corrective regimen sliding scale  SubCutaneous three times a day before meals  insulin lispro Injectable (ADMELOG) 8 SubCutaneous before breakfast  insulin lispro Injectable (ADMELOG) 8 SubCutaneous before lunch  insulin lispro Injectable (ADMELOG) 8 SubCutaneous before dinner  levETIRAcetam 1000 Oral two times a day  meropenem  IVPB 1000 IV Intermittent every 12 hours  metoprolol tartrate 50 Oral two times a day  NIFEdipine XL 60 Oral daily  pantoprazole    Tablet 40 Oral before breakfast  QUEtiapine Oral Tab/Cap - Peds 25 Oral two times a day  sodium chloride 0.9%. 1000 IV Continuous <Continuous>      Weight  Weight (kg): 76.3 (21 @ 08:12)    ANTIBIOTICS:  meropenem  IVPB 1000 milliGRAM(s) IV Intermittent every 12 hours      ALLERGIES:  No Known Allergies

## 2021-04-29 NOTE — PHYSICAL THERAPY INITIAL EVALUATION ADULT - SPECIFY REASON(S)
Attempted to see patient for bedside PT, Pt with Lumbar fractures, awaiting TLSO for amb.  Will follow up accordingly.

## 2021-04-29 NOTE — PROGRESS NOTE ADULT - SUBJECTIVE AND OBJECTIVE BOX
RISHI HERRERA  75y  Male      Patient is a 75y old  Male who presents with a chief complaint of seizure (2021 07:15)      INTERVAL HPI/OVERNIGHT EVENTS:    pt seen and examined at bedside this morning and in the afternoon in the presence of wife Bonita  -WBC downtrended; on IV meropenem; ID consult pending (IV vanco x 1 dose given yesterday)  -REEG with no epileptiform activity   -TLSO brace ordered for PT as tolerated   -incentive spirometry encouraged   -wife aware of the current plan of care     REVIEW OF SYSTEMS:  -denies any complaints to me     -Vital Signs Last 24 Hrs  T(C): 36.3 (2021 12:50), Max: 38.5 (2021 16:49)  T(F): 97.3 (2021 12:50), Max: 101.3 (2021 16:49)  HR: 99 (2021 12:50) (77 - 120)  BP: 118/57 (2021 12:50) (96/51 - 122/58)  RR: 16 (2021 12:50) (16 - 18)  SpO2: 95% (2021 19:00) (95% - 95%)    PHYSICAL EXAM:  GENERAL: NAD, responding to verbal stimuli   HEAD:  Atraumatic, Normocephalic  EYES: EOMI, PERRLA, conjunctiva and sclera clear  NERVOUS SYSTEM:  Alert & Oriented X 2  CHEST/LUNG: Clear to percussion bilaterally; No rales, rhonchi, wheezing, or rubs  CV/HEART: Regular rate and rhythm; No murmurs, rubs, or gallops  GI/ABDOMEN: Soft, Nontender, Nondistended; Bowel sounds present  EXTREMITIES:  2+ Peripheral Pulses, No clubbing, cyanosis, or edema  SKIN: No rashes or lesions    LAB:                        11.0   33.66 )-----------( 285      ( 2021 06:58 )             33.6         143  |  105  |  25<H>  ----------------------------<  213<H>  3.6   |  22  |  1.5    Ca    8.8      2021 06:58  Mg     1.9         TPro  7.0  /  Alb  3.6  /  TBili  0.3  /  DBili  x   /  AST  21  /  ALT  25  /  AlkPhos  193<H>      CARDIAC MARKERS ( 2021 11:00 )  x     / x     / 170 U/L / x     / x      CARDIAC MARKERS ( 2021 01:55 )  x     / <0.01 ng/mL / x     / x     / x        Daily     Daily   CAPILLARY BLOOD GLUCOSE    POCT Blood Glucose.: 250 mg/dL (2021 11:27)  POCT Blood Glucose.: 226 mg/dL (2021 07:19)  POCT Blood Glucose.: 275 mg/dL (2021 21:30)  POCT Blood Glucose.: 315 mg/dL (2021 16:14)    Urinalysis Basic - ( 2021 04:05 )    Color: Yellow / Appearance: Slightly Cloudy / S.020 / pH: x  Gluc: x / Ketone: Negative  / Bili: Negative / Urobili: 0.2 mg/dL   Blood: x / Protein: 30 mg/dL / Nitrite: Negative   Leuk Esterase: Large / RBC: 3-5 /HPF / WBC >50 /HPF   Sq Epi: x / Non Sq Epi: Few /HPF / Bacteria: x      LIVER FUNCTIONS - ( 2021 11:00 )  Alb: 3.6 g/dL / Pro: 7.0 g/dL / ALK PHOS: 193 U/L / ALT: 25 U/L / AST: 21 U/L / GGT: x           RADIOLOGY:    Imaging Personally Reviewed:  [y] YES  [ ] NO    HEALTH ISSUES - PROBLEM Dx:    MEDICATIONS  (STANDING):  atorvastatin 40 milliGRAM(s) Oral at bedtime  dextrose 40% Gel 15 Gram(s) Oral once  dextrose 5%. 1000 milliLiter(s) (50 mL/Hr) IV Continuous <Continuous>  dextrose 5%. 1000 milliLiter(s) (100 mL/Hr) IV Continuous <Continuous>  dextrose 50% Injectable 25 Gram(s) IV Push once  dextrose 50% Injectable 12.5 Gram(s) IV Push once  dextrose 50% Injectable 25 Gram(s) IV Push once  dipyridamole 200 mG/aspirin 25 mG 1 Capsule(s) Oral two times a day  glucagon  Injectable 1 milliGRAM(s) IntraMuscular once  heparin   Injectable 5000 Unit(s) SubCutaneous every 8 hours  insulin glargine Injectable (LANTUS) 24 Unit(s) SubCutaneous at bedtime  insulin lispro (ADMELOG) corrective regimen sliding scale   SubCutaneous three times a day before meals  insulin lispro Injectable (ADMELOG) 8 Unit(s) SubCutaneous before breakfast  insulin lispro Injectable (ADMELOG) 8 Unit(s) SubCutaneous before lunch  insulin lispro Injectable (ADMELOG) 8 Unit(s) SubCutaneous before dinner  levETIRAcetam 1000 milliGRAM(s) Oral two times a day  meropenem  IVPB 1000 milliGRAM(s) IV Intermittent every 12 hours  metoprolol tartrate 50 milliGRAM(s) Oral two times a day  NIFEdipine XL 60 milliGRAM(s) Oral daily  pantoprazole    Tablet 40 milliGRAM(s) Oral before breakfast  QUEtiapine Oral Tab/Cap - Peds 25 milliGRAM(s) Oral two times a day  sodium chloride 0.9%. 1000 milliLiter(s) (75 mL/Hr) IV Continuous <Continuous>    MEDICATIONS  (PRN):  diazepam    Tablet 10 milliGRAM(s) Oral two times a day PRN AGITATION

## 2021-04-30 LAB
ANION GAP SERPL CALC-SCNC: 11 MMOL/L — SIGNIFICANT CHANGE UP (ref 7–14)
BUN SERPL-MCNC: 20 MG/DL — SIGNIFICANT CHANGE UP (ref 10–20)
CALCIUM SERPL-MCNC: 8.3 MG/DL — LOW (ref 8.5–10.1)
CHLORIDE SERPL-SCNC: 108 MMOL/L — SIGNIFICANT CHANGE UP (ref 98–110)
CK MB CFR SERPL CALC: 1.2 NG/ML — SIGNIFICANT CHANGE UP (ref 0.6–6.3)
CK SERPL-CCNC: 36 U/L — SIGNIFICANT CHANGE UP (ref 0–225)
CO2 SERPL-SCNC: 23 MMOL/L — SIGNIFICANT CHANGE UP (ref 17–32)
COVID-19 SPIKE DOMAIN AB INTERP: POSITIVE
COVID-19 SPIKE DOMAIN ANTIBODY RESULT: >250 U/ML — HIGH
CREAT SERPL-MCNC: 1.3 MG/DL — SIGNIFICANT CHANGE UP (ref 0.7–1.5)
GLUCOSE BLDC GLUCOMTR-MCNC: 140 MG/DL — HIGH (ref 70–99)
GLUCOSE BLDC GLUCOMTR-MCNC: 219 MG/DL — HIGH (ref 70–99)
GLUCOSE BLDC GLUCOMTR-MCNC: 220 MG/DL — HIGH (ref 70–99)
GLUCOSE BLDC GLUCOMTR-MCNC: 312 MG/DL — HIGH (ref 70–99)
GLUCOSE SERPL-MCNC: 162 MG/DL — HIGH (ref 70–99)
HCT VFR BLD CALC: 29.7 % — LOW (ref 42–52)
HGB BLD-MCNC: 9.8 G/DL — LOW (ref 14–18)
MCHC RBC-ENTMCNC: 31 PG — SIGNIFICANT CHANGE UP (ref 27–31)
MCHC RBC-ENTMCNC: 33 G/DL — SIGNIFICANT CHANGE UP (ref 32–37)
MCV RBC AUTO: 94 FL — SIGNIFICANT CHANGE UP (ref 80–94)
NRBC # BLD: 0 /100 WBCS — SIGNIFICANT CHANGE UP (ref 0–0)
PLATELET # BLD AUTO: 255 K/UL — SIGNIFICANT CHANGE UP (ref 130–400)
POTASSIUM SERPL-MCNC: 3.4 MMOL/L — LOW (ref 3.5–5)
POTASSIUM SERPL-SCNC: 3.4 MMOL/L — LOW (ref 3.5–5)
RBC # BLD: 3.16 M/UL — LOW (ref 4.7–6.1)
RBC # FLD: 13.2 % — SIGNIFICANT CHANGE UP (ref 11.5–14.5)
SARS-COV-2 IGG+IGM SERPL QL IA: >250 U/ML — HIGH
SARS-COV-2 IGG+IGM SERPL QL IA: POSITIVE
SODIUM SERPL-SCNC: 142 MMOL/L — SIGNIFICANT CHANGE UP (ref 135–146)
TROPONIN T SERPL-MCNC: <0.01 NG/ML — SIGNIFICANT CHANGE UP
VANCOMYCIN TROUGH SERPL-MCNC: 10.9 UG/ML — HIGH (ref 5–10)
WBC # BLD: 24.37 K/UL — HIGH (ref 4.8–10.8)
WBC # FLD AUTO: 24.37 K/UL — HIGH (ref 4.8–10.8)

## 2021-04-30 PROCEDURE — 99232 SBSQ HOSP IP/OBS MODERATE 35: CPT

## 2021-04-30 PROCEDURE — 99233 SBSQ HOSP IP/OBS HIGH 50: CPT

## 2021-04-30 RX ORDER — POTASSIUM CHLORIDE 20 MEQ
40 PACKET (EA) ORAL ONCE
Refills: 0 | Status: COMPLETED | OUTPATIENT
Start: 2021-04-30 | End: 2021-04-30

## 2021-04-30 RX ADMIN — Medication 650 MILLIGRAM(S): at 21:02

## 2021-04-30 RX ADMIN — Medication 250 MILLIGRAM(S): at 18:05

## 2021-04-30 RX ADMIN — INSULIN GLARGINE 24 UNIT(S): 100 INJECTION, SOLUTION SUBCUTANEOUS at 22:13

## 2021-04-30 RX ADMIN — LIDOCAINE 1 PATCH: 4 CREAM TOPICAL at 17:40

## 2021-04-30 RX ADMIN — HEPARIN SODIUM 5000 UNIT(S): 5000 INJECTION INTRAVENOUS; SUBCUTANEOUS at 05:57

## 2021-04-30 RX ADMIN — HEPARIN SODIUM 5000 UNIT(S): 5000 INJECTION INTRAVENOUS; SUBCUTANEOUS at 13:07

## 2021-04-30 RX ADMIN — Medication 4: at 17:27

## 2021-04-30 RX ADMIN — Medication 10 MILLIGRAM(S): at 20:33

## 2021-04-30 RX ADMIN — LEVETIRACETAM 1000 MILLIGRAM(S): 250 TABLET, FILM COATED ORAL at 05:56

## 2021-04-30 RX ADMIN — MEROPENEM 100 MILLIGRAM(S): 1 INJECTION INTRAVENOUS at 17:43

## 2021-04-30 RX ADMIN — ASPIRIN AND DIPYRIDAMOLE 1 CAPSULE(S): 25; 200 CAPSULE, EXTENDED RELEASE ORAL at 05:56

## 2021-04-30 RX ADMIN — Medication 650 MILLIGRAM(S): at 05:55

## 2021-04-30 RX ADMIN — Medication 60 MILLIGRAM(S): at 05:56

## 2021-04-30 RX ADMIN — ATORVASTATIN CALCIUM 40 MILLIGRAM(S): 80 TABLET, FILM COATED ORAL at 22:13

## 2021-04-30 RX ADMIN — PANTOPRAZOLE SODIUM 40 MILLIGRAM(S): 20 TABLET, DELAYED RELEASE ORAL at 06:11

## 2021-04-30 RX ADMIN — HEPARIN SODIUM 5000 UNIT(S): 5000 INJECTION INTRAVENOUS; SUBCUTANEOUS at 22:13

## 2021-04-30 RX ADMIN — Medication 2: at 08:08

## 2021-04-30 RX ADMIN — Medication 8 UNIT(S): at 08:08

## 2021-04-30 RX ADMIN — Medication 30 MILLILITER(S): at 20:33

## 2021-04-30 RX ADMIN — Medication 250 MILLIGRAM(S): at 06:11

## 2021-04-30 RX ADMIN — LIDOCAINE 1 PATCH: 4 CREAM TOPICAL at 08:09

## 2021-04-30 RX ADMIN — Medication 50 MILLIGRAM(S): at 17:40

## 2021-04-30 RX ADMIN — ASPIRIN AND DIPYRIDAMOLE 1 CAPSULE(S): 25; 200 CAPSULE, EXTENDED RELEASE ORAL at 17:39

## 2021-04-30 RX ADMIN — Medication 40 MILLIEQUIVALENT(S): at 17:45

## 2021-04-30 RX ADMIN — QUETIAPINE FUMARATE 25 MILLIGRAM(S): 200 TABLET, FILM COATED ORAL at 17:40

## 2021-04-30 RX ADMIN — LIDOCAINE 1 PATCH: 4 CREAM TOPICAL at 09:00

## 2021-04-30 RX ADMIN — MEROPENEM 100 MILLIGRAM(S): 1 INJECTION INTRAVENOUS at 05:55

## 2021-04-30 RX ADMIN — LEVETIRACETAM 1000 MILLIGRAM(S): 250 TABLET, FILM COATED ORAL at 17:39

## 2021-04-30 RX ADMIN — Medication 8 UNIT(S): at 17:27

## 2021-04-30 RX ADMIN — Medication 650 MILLIGRAM(S): at 20:32

## 2021-04-30 RX ADMIN — QUETIAPINE FUMARATE 25 MILLIGRAM(S): 200 TABLET, FILM COATED ORAL at 05:56

## 2021-04-30 RX ADMIN — Medication 8 UNIT(S): at 13:02

## 2021-04-30 RX ADMIN — Medication 50 MILLIGRAM(S): at 05:56

## 2021-04-30 RX ADMIN — LIDOCAINE 1 PATCH: 4 CREAM TOPICAL at 19:11

## 2021-04-30 NOTE — PHYSICAL THERAPY INITIAL EVALUATION ADULT - TRANSFER SAFETY CONCERNS NOTED: SIT/STAND, REHAB EVAL
R foot held in supination/decreased step length/inability to maintain weight-bearing restrictions w/o assist/decreased weight-shifting ability

## 2021-04-30 NOTE — CHART NOTE - NSCHARTNOTEFT_GEN_A_CORE
Was alerted by nurse pt was c/o chest pain, pt was seen and examined at bedside states he has chest pain but he is a poor historian and somewhat incoherent. On PE CVS: S1&S2 Present, no MRG. Pulm: Bilateral air entry present, no wheezing, rhonchi or creps. /57 HR 85, O2 sat 92% on RA. Right sided chest wall tenderness noted over right lateral and mid chest, EKG shows NSR@83BPM, non specific ST change over V1, will check troponin. Chest wall pain likely costochondritis likely due to hx of fall and hx of rib fracture. Trial of tylenol, cont to monitor patient, discussed with RN. Was alerted by nurse pt was c/o chest pain, pt was seen and examined at bedside states he has chest pain but he is a poor historian and somewhat incoherent. On PE CVS: S1&S2 Present, no MRG. Pulm: Bilateral air entry present, no wheezing, rhonchi or creps. /57 HR 85, O2 sat 92% on RA. Right sided chest wall tenderness noted over right lateral and mid chest, EKG shows NSR@83BPM, no acute changes noted, will check troponin. Chest wall pain likely costochondritis likely due to hx of fall and hx of rib fracture. Trial of tylenol, cont to monitor patient, discussed with RN. Was alerted by nurse pt was c/o chest pain, pt was seen and examined at bedside states he has chest pain but he is a poor historian and somewhat incoherent. On PE CVS: S1&S2 Present, no MRG. Pulm: Bilateral air entry present, no wheezing, rhonchi or creps. /57 HR 85, O2 sat 92% on RA. Right sided chest wall tenderness noted over right lateral and mid chest, EKG shows NSR@83BPM, no acute changes noted, initial trop negative, will check repeat troponin. Chest wall pain likely costochondritis likely due to hx of fall and hx of rib fracture and tenderness to palpation of right chest. Trial of tylenol, cont to monitor patient, discussed with RN.

## 2021-04-30 NOTE — PHYSICAL THERAPY INITIAL EVALUATION ADULT - RANGE OF MOTION EXAMINATION, REHAB EVAL
R UE held in flexion spasticity.  R LE able to AAROM 1/4 range of motion. R foot in supinated position, with increased tone.

## 2021-04-30 NOTE — PROGRESS NOTE ADULT - SUBJECTIVE AND OBJECTIVE BOX
Neurology Follow up note    Name  RISHI HERRERA    HPI:  HPI:  Patient is non-verbral. Hx taken from wife at bedside when consult was done.     75 yr old male with hx of Seizure disorder, Hx of CVA with residual right sided weakness, HTN, DM, CKD 3 admitted for witnessed seizure. As per wife, patient was sitting on the bowel and had a seizure for 2 mins. Event was witnessed by son and wife. Wife also notes patient has been falling from the bed multiple time. Wife denies any fever, chills, sob, chest pain, abdominal pain, no urinary or bowel issues.  Wife reports pt looks much better, pt non-verbal at baseline. As per wife last night at midnight pt was placed at toilet bowl and her son witnessed pt was seizing for 2 minutes.  PT  starring, not moving eyes and not responsive.  Pt did not pass out. No fall. No foaming or tongue bitting. Per wife pt fell few days ago and found on CT scan Rib 10- 11 fracture and  displaced fracture at L1-3. Pt used to follow up with Dr. Cueto for seizure last, outpt visit was 2019 and has had poor followup since then.  Pt reports pt has been taking Keppra 750 bid and Magnesium. In ed pt found febrile and UTI positive.     Neurology Interval History:   -Pt non verbal at baseline, when asked regarding his name keeps repeating "mama" which he calls his wife. No change on physical exam , right arm and wrist contracted from prior CVA. EEG - negative for seizure only some generalize slowing.     Vital Signs Last 24 Hrs  T(C): 37.8 (30 Apr 2021 09:20), Max: 38.7 (29 Apr 2021 18:40)  T(F): 100 (30 Apr 2021 09:20), Max: 101.6 (29 Apr 2021 18:40)  HR: 89 (30 Apr 2021 04:49) (89 - 107)  BP: 116/56 (30 Apr 2021 04:49) (116/56 - 121/57)  RR: 16 (30 Apr 2021 04:49) (16 - 16)      Neurological Exam:   Mental status: Awake, alert to person.  Pt non- communicative at baseline. Follows up commands.   Cranial nerves: pupils equally round and reactive to light, no nystagmus, extraocular muscles intact,  right cheek slight drooping.   Motor: Right arm and wrist flexed, spastic unable to move, Left arm strength 5/5 , hand  4/5.  Right lower extremity 2/5 proximally 0/5 distally.   Coordination: finger-to-nose  on left side intact.    Reflexes: 2+ in upper and lower extremities, bilaterally    Medications  acetaminophen   Tablet .. 650 milliGRAM(s) Oral every 6 hours PRN  atorvastatin 40 milliGRAM(s) Oral at bedtime  dextrose 40% Gel 15 Gram(s) Oral once  dextrose 5%. 1000 milliLiter(s) IV Continuous <Continuous>  dextrose 5%. 1000 milliLiter(s) IV Continuous <Continuous>  dextrose 50% Injectable 25 Gram(s) IV Push once  dextrose 50% Injectable 12.5 Gram(s) IV Push once  dextrose 50% Injectable 25 Gram(s) IV Push once  diazepam    Tablet 10 milliGRAM(s) Oral two times a day PRN  dipyridamole 200 mG/aspirin 25 mG 1 Capsule(s) Oral two times a day  glucagon  Injectable 1 milliGRAM(s) IntraMuscular once  heparin   Injectable 5000 Unit(s) SubCutaneous every 8 hours  insulin glargine Injectable (LANTUS) 24 Unit(s) SubCutaneous at bedtime  insulin lispro (ADMELOG) corrective regimen sliding scale   SubCutaneous three times a day before meals  insulin lispro Injectable (ADMELOG) 8 Unit(s) SubCutaneous before breakfast  insulin lispro Injectable (ADMELOG) 8 Unit(s) SubCutaneous before lunch  insulin lispro Injectable (ADMELOG) 8 Unit(s) SubCutaneous before dinner  levETIRAcetam 1000 milliGRAM(s) Oral two times a day  lidocaine   Patch 1 Patch Transdermal every 24 hours  meropenem  IVPB 1000 milliGRAM(s) IV Intermittent every 12 hours  metoprolol tartrate 50 milliGRAM(s) Oral two times a day  NIFEdipine XL 60 milliGRAM(s) Oral daily  pantoprazole    Tablet 40 milliGRAM(s) Oral before breakfast  QUEtiapine Oral Tab/Cap - Peds 25 milliGRAM(s) Oral two times a day  sodium chloride 0.9%. 1000 milliLiter(s) IV Continuous <Continuous>  sodium chloride 0.9%. 1000 milliLiter(s) IV Continuous <Continuous>  vancomycin  IVPB 1000 milliGRAM(s) IV Intermittent every 12 hours      Lab                        11.0   33.66 )-----------( 285      ( 29 Apr 2021 06:58 )             33.6     04-30    142  |  108  |  20  ----------------------------<  162<H>  3.4<L>   |  23  |  1.3    Ca    8.3<L>      30 Apr 2021 06:47  Mg     1.9     04-28    TPro  7.0  /  Alb  3.6  /  TBili  0.3  /  DBili  x   /  AST  21  /  ALT  25  /  AlkPhos  193<H>  04-28    CAPILLARY BLOOD GLUCOSE      POCT Blood Glucose.: 220 mg/dL (30 Apr 2021 07:32)  POCT Blood Glucose.: 221 mg/dL (29 Apr 2021 20:56)  POCT Blood Glucose.: 231 mg/dL (29 Apr 2021 16:32)  POCT Blood Glucose.: 250 mg/dL (29 Apr 2021 11:27)    LIVER FUNCTIONS - ( 28 Apr 2021 11:00 )  Alb: 3.6 g/dL / Pro: 7.0 g/dL / ALK PHOS: 193 U/L / ALT: 25 U/L / AST: 21 U/L / GGT: x           < from: CT Head No Cont (04.28.21 @ 02:28) >  IMPRESSION:    No CT evidenceof acute intracranial pathology.    Stable chronic infarct in the distribution of the left MCA.    ZINA COLIN M.D., RESIDENT RADIOLOGIST    < end of copied text >    < from: EEG (04.28.21 @ 12:15) >  Epileptiform Activity  No  Impression:  -  Abnormal due to the presence of: generalized slowing as above  -Signed by:  Merlin MCGRAW MD; Attending Neurologist    < end of copied text >     Neurology Follow up note    Name  RISHI HERRERA    HPI:  HPI:  Patient is non-verbral. Hx taken from wife at bedside when consult was done.     75 yr old male with hx of Seizure disorder, Hx of CVA with residual right sided weakness, HTN, DM, CKD 3 admitted for witnessed seizure. As per wife, patient was sitting on the bowel and had a seizure for 2 mins. Event was witnessed by son and wife. Wife also notes patient has been falling from the bed multiple time. Wife denies any fever, chills, sob, chest pain, abdominal pain, no urinary or bowel issues.  Wife reports pt looks much better, pt non-verbal at baseline. As per wife last night at midnight pt was placed at toilet bowl and her son witnessed pt was seizing for 2 minutes.  PT  starring, not moving eyes and not responsive.  Pt did not pass out. No fall. No foaming or tongue bitting. Per wife pt fell few days ago and found on CT scan Rib 10- 11 fracture and  displaced fracture at L1-3. Pt used to follow up with Dr. Cueto for seizure last, outpt visit was 2019 and has had poor followup since then.  Pt reports pt has been taking Keppra 750 bid and Magnesium. In ed pt found febrile and UTI positive.     Neurology Interval History:   -Pt non verbal at baseline, when asked regarding his name keeps repeating "mama" which he calls his wife. No change on physical exam , right arm and wrist contracted from prior CVA. EEG - negative for seizure only some generalize slowing.     Vital Signs Last 24 Hrs  T(C): 37.8 (30 Apr 2021 09:20), Max: 38.7 (29 Apr 2021 18:40)  T(F): 100 (30 Apr 2021 09:20), Max: 101.6 (29 Apr 2021 18:40)  HR: 89 (30 Apr 2021 04:49) (89 - 107)  BP: 116/56 (30 Apr 2021 04:49) (116/56 - 121/57)  RR: 16 (30 Apr 2021 04:49) (16 - 16)      Neurological Exam:   Mental status: Awake, alert to person.  Pt non- communicative at baseline. Follows up commands.   Cranial nerves: pupils equally round and reactive to light, no nystagmus, extraocular muscles intact,  right cheek slight drooping. No nuchal rigidity.   Motor: Right arm and wrist flexed, spastic unable to move, Left arm strength 5/5 , hand  4/5.  Right lower extremity 2/5 proximally 0/5 distally.   Coordination: finger-to-nose  on left side intact.    Reflexes: 2+ in upper and lower extremities, bilaterally    Medications  acetaminophen   Tablet .. 650 milliGRAM(s) Oral every 6 hours PRN  atorvastatin 40 milliGRAM(s) Oral at bedtime  dextrose 40% Gel 15 Gram(s) Oral once  dextrose 5%. 1000 milliLiter(s) IV Continuous <Continuous>  dextrose 5%. 1000 milliLiter(s) IV Continuous <Continuous>  dextrose 50% Injectable 25 Gram(s) IV Push once  dextrose 50% Injectable 12.5 Gram(s) IV Push once  dextrose 50% Injectable 25 Gram(s) IV Push once  diazepam    Tablet 10 milliGRAM(s) Oral two times a day PRN  dipyridamole 200 mG/aspirin 25 mG 1 Capsule(s) Oral two times a day  glucagon  Injectable 1 milliGRAM(s) IntraMuscular once  heparin   Injectable 5000 Unit(s) SubCutaneous every 8 hours  insulin glargine Injectable (LANTUS) 24 Unit(s) SubCutaneous at bedtime  insulin lispro (ADMELOG) corrective regimen sliding scale   SubCutaneous three times a day before meals  insulin lispro Injectable (ADMELOG) 8 Unit(s) SubCutaneous before breakfast  insulin lispro Injectable (ADMELOG) 8 Unit(s) SubCutaneous before lunch  insulin lispro Injectable (ADMELOG) 8 Unit(s) SubCutaneous before dinner  levETIRAcetam 1000 milliGRAM(s) Oral two times a day  lidocaine   Patch 1 Patch Transdermal every 24 hours  meropenem  IVPB 1000 milliGRAM(s) IV Intermittent every 12 hours  metoprolol tartrate 50 milliGRAM(s) Oral two times a day  NIFEdipine XL 60 milliGRAM(s) Oral daily  pantoprazole    Tablet 40 milliGRAM(s) Oral before breakfast  QUEtiapine Oral Tab/Cap - Peds 25 milliGRAM(s) Oral two times a day  sodium chloride 0.9%. 1000 milliLiter(s) IV Continuous <Continuous>  sodium chloride 0.9%. 1000 milliLiter(s) IV Continuous <Continuous>  vancomycin  IVPB 1000 milliGRAM(s) IV Intermittent every 12 hours      Lab                        11.0   33.66 )-----------( 285      ( 29 Apr 2021 06:58 )             33.6     04-30    142  |  108  |  20  ----------------------------<  162<H>  3.4<L>   |  23  |  1.3    Ca    8.3<L>      30 Apr 2021 06:47  Mg     1.9     04-28    TPro  7.0  /  Alb  3.6  /  TBili  0.3  /  DBili  x   /  AST  21  /  ALT  25  /  AlkPhos  193<H>  04-28    CAPILLARY BLOOD GLUCOSE      POCT Blood Glucose.: 220 mg/dL (30 Apr 2021 07:32)  POCT Blood Glucose.: 221 mg/dL (29 Apr 2021 20:56)  POCT Blood Glucose.: 231 mg/dL (29 Apr 2021 16:32)  POCT Blood Glucose.: 250 mg/dL (29 Apr 2021 11:27)    LIVER FUNCTIONS - ( 28 Apr 2021 11:00 )  Alb: 3.6 g/dL / Pro: 7.0 g/dL / ALK PHOS: 193 U/L / ALT: 25 U/L / AST: 21 U/L / GGT: x           < from: CT Head No Cont (04.28.21 @ 02:28) >  IMPRESSION:    No CT evidenceof acute intracranial pathology.    Stable chronic infarct in the distribution of the left MCA.    ZINA COLIN M.D., RESIDENT RADIOLOGIST    < end of copied text >    < from: EEG (04.28.21 @ 12:15) >  Epileptiform Activity  No  Impression:  -  Abnormal due to the presence of: generalized slowing as above  -Signed by:  Merlin MCGRAW MD; Attending Neurologist    < end of copied text >     Neurology Follow up note    Name  RISHI HERRERA    HPI:  Patient is non-verbral. Hx taken from wife at bedside when consult was done.     75 yr old male with hx of Seizure disorder, Hx of CVA with residual right sided weakness, HTN, DM, CKD 3 admitted for witnessed seizure. As per wife, patient was sitting on the bowel and had a seizure for 2 mins. Event was witnessed by son and wife. Wife also notes patient has been falling from the bed multiple time. Wife denies any fever, chills, sob, chest pain, abdominal pain, no urinary or bowel issues.  Wife reports pt looks much better, pt non-verbal at baseline. As per wife last night at midnight pt was placed at toilet bowl and her son witnessed pt was seizing for 2 minutes.  PT  starring, not moving eyes and not responsive.  Pt did not pass out. No fall. No foaming or tongue bitting. Per wife pt fell few days ago and found on CT scan Rib 10- 11 fracture and  displaced fracture at L1-3. Pt used to follow up with Dr. Cueto for seizure last, outpt visit was 2019 and has had poor followup since then.  Pt reports pt has been taking Keppra 750 bid and Magnesium. In ed pt found febrile and UTI positive.     Neurology Interval History:   -Pt non verbal at baseline, when asked regarding his name keeps repeating "mama" which he calls his wife. No change on physical exam , right arm and wrist contracted from prior CVA. EEG - negative for seizure only some generalize slowing.     Vital Signs Last 24 Hrs  T(C): 37.8 (30 Apr 2021 09:20), Max: 38.7 (29 Apr 2021 18:40)  T(F): 100 (30 Apr 2021 09:20), Max: 101.6 (29 Apr 2021 18:40)  HR: 89 (30 Apr 2021 04:49) (89 - 107)  BP: 116/56 (30 Apr 2021 04:49) (116/56 - 121/57)  RR: 16 (30 Apr 2021 04:49) (16 - 16)      Neurological Exam:   Mental status: Awake, alert to person.  Pt non- communicative at baseline. Follows up commands.   Cranial nerves: pupils equally round and reactive to light, no nystagmus, extraocular muscles intact,  right cheek slight drooping. No nuchal rigidity.   Motor: Right arm and wrist flexed, spastic unable to move, Left arm strength 5/5 , hand  4/5.  Right lower extremity 2/5 proximally 0/5 distally.   Coordination: finger-to-nose  on left side intact.    Reflexes: 2+ in upper and lower extremities, bilaterally    Medications  acetaminophen   Tablet .. 650 milliGRAM(s) Oral every 6 hours PRN  atorvastatin 40 milliGRAM(s) Oral at bedtime  dextrose 40% Gel 15 Gram(s) Oral once  dextrose 5%. 1000 milliLiter(s) IV Continuous <Continuous>  dextrose 5%. 1000 milliLiter(s) IV Continuous <Continuous>  dextrose 50% Injectable 25 Gram(s) IV Push once  dextrose 50% Injectable 12.5 Gram(s) IV Push once  dextrose 50% Injectable 25 Gram(s) IV Push once  diazepam    Tablet 10 milliGRAM(s) Oral two times a day PRN  dipyridamole 200 mG/aspirin 25 mG 1 Capsule(s) Oral two times a day  glucagon  Injectable 1 milliGRAM(s) IntraMuscular once  heparin   Injectable 5000 Unit(s) SubCutaneous every 8 hours  insulin glargine Injectable (LANTUS) 24 Unit(s) SubCutaneous at bedtime  insulin lispro (ADMELOG) corrective regimen sliding scale   SubCutaneous three times a day before meals  insulin lispro Injectable (ADMELOG) 8 Unit(s) SubCutaneous before breakfast  insulin lispro Injectable (ADMELOG) 8 Unit(s) SubCutaneous before lunch  insulin lispro Injectable (ADMELOG) 8 Unit(s) SubCutaneous before dinner  levETIRAcetam 1000 milliGRAM(s) Oral two times a day  lidocaine   Patch 1 Patch Transdermal every 24 hours  meropenem  IVPB 1000 milliGRAM(s) IV Intermittent every 12 hours  metoprolol tartrate 50 milliGRAM(s) Oral two times a day  NIFEdipine XL 60 milliGRAM(s) Oral daily  pantoprazole    Tablet 40 milliGRAM(s) Oral before breakfast  QUEtiapine Oral Tab/Cap - Peds 25 milliGRAM(s) Oral two times a day  sodium chloride 0.9%. 1000 milliLiter(s) IV Continuous <Continuous>  sodium chloride 0.9%. 1000 milliLiter(s) IV Continuous <Continuous>  vancomycin  IVPB 1000 milliGRAM(s) IV Intermittent every 12 hours      Lab                        11.0   33.66 )-----------( 285      ( 29 Apr 2021 06:58 )             33.6     04-30    142  |  108  |  20  ----------------------------<  162<H>  3.4<L>   |  23  |  1.3    Ca    8.3<L>      30 Apr 2021 06:47  Mg     1.9     04-28    TPro  7.0  /  Alb  3.6  /  TBili  0.3  /  DBili  x   /  AST  21  /  ALT  25  /  AlkPhos  193<H>  04-28    CAPILLARY BLOOD GLUCOSE      POCT Blood Glucose.: 220 mg/dL (30 Apr 2021 07:32)  POCT Blood Glucose.: 221 mg/dL (29 Apr 2021 20:56)  POCT Blood Glucose.: 231 mg/dL (29 Apr 2021 16:32)  POCT Blood Glucose.: 250 mg/dL (29 Apr 2021 11:27)    LIVER FUNCTIONS - ( 28 Apr 2021 11:00 )  Alb: 3.6 g/dL / Pro: 7.0 g/dL / ALK PHOS: 193 U/L / ALT: 25 U/L / AST: 21 U/L / GGT: x           < from: CT Head No Cont (04.28.21 @ 02:28) >  IMPRESSION:    No CT evidenceof acute intracranial pathology.    Stable chronic infarct in the distribution of the left MCA.    ZINA COLIN M.D., RESIDENT RADIOLOGIST    < end of copied text >    < from: EEG (04.28.21 @ 12:15) >  Epileptiform Activity  No  Impression:  -  Abnormal due to the presence of: generalized slowing as above  -Signed by:  Merlin MCGRAW MD; Attending Neurologist    < end of copied text >

## 2021-04-30 NOTE — PROGRESS NOTE ADULT - ASSESSMENT
ASSESSMENT  75 yr old male with hx of Seizure disorder, Hx of CVA with residual right sided weakness, HTN, DM, CKD 3 admitted for witnessed seizure.    IMPRESSION  #Sepsis during admission (Fevers, WBC>12) secondary to UTI  - CT Abdomen and Pelvis w/ IV Cont (04.28.21 @ 04:46): Urinary bladder increased wall thickening, mucosal hyperenhancement and pericystic stranding; correlate for urinary bladder cystitis.  - WBC Count: 42.36: (04.28.21 @ 11:00)  - Blood Cx 4/28 NG  - Urine Cx 4/28 NG    #Seizures  #Fall with acute rib and transverse process fractures  - CT Abdomen and Pelvis w/ IV Cont (04.28.21 @ 04:46):  Acute displaced 10th and minimally displaced 11th right rib fractures. No pneumothorax. Acute minimally displaced fractures of the right transverse processes of L1, L2, and L3.    #Hx of CVA  #CKD III  #DM  #Abx allergy: NKDA    Creatinine, Serum: 1.3 mg/dL (04.30.21 @ 06:47)  Weight (kg): 76.3 (28 Apr 2021 08:12)  CrCl 52    RECOMMENDATIONS  - all cultures have remained negative -- still remains febrile  - repeat X ray tomorrow   - please obtain procalcitonin  - would narrow meropenem to cefepime 1g q 12 hours  - continued to trend WBC   - please ensure bowel movement   - would obtain LE US dopplers     Please call or message on [employer] Teams if with any questions.  Spectra [phone]

## 2021-04-30 NOTE — PHYSICAL THERAPY INITIAL EVALUATION ADULT - IMPAIRMENTS CONTRIBUTING TO GAIT DEVIATIONS, PT EVAL
ataxic/impaired balance/impaired coordination/decreased flexibility/impaired motor control/abnormal muscle tone/narrow base of support/decreased ROM/scissoring/decreased sensation/impaired sensory feedback/decreased strength

## 2021-04-30 NOTE — PHYSICAL THERAPY INITIAL EVALUATION ADULT - IMPAIRED TRANSFERS: SIT/STAND, REHAB EVAL
ataxic/impaired balance/impaired coordination/decreased flexibility/impaired motor control/abnormal muscle tone/narrow base of support/impaired postural control/decreased ROM/scissoring/decreased sensation/impaired sensory feedback/decreased strength

## 2021-04-30 NOTE — PHYSICAL THERAPY INITIAL EVALUATION ADULT - BALANCE DISTURBANCE, IDENTIFIED IMPAIRMENT CONTRIBUTE, REHAB EVAL
impaired coordination/impaired motor control/abnormal muscle tone/impaired postural control/impaired sensory feedback/decreased strength

## 2021-04-30 NOTE — PROGRESS NOTE ADULT - ASSESSMENT
A 75 yr y/o male with hx of Seizure disorder, Hx of CVA with residual right sided weakness, HTN, DM, CKD 3 admitted for break through seizure. CT head and c-spine insignificant.  CT a/p/chest  scan Rib 10- 11 fracture and  displaced fracture at L1-3 and cystitis. EEG- negative for seizure.  Seizure most likely due to UTI, pt continues to be febrile Tmax 101.6 ,wbc still high 30>42>33 today.     Plan:  -continue  Keppra to 1000 mg BID  -follow up Keppra level   -keep magnesium > 2  -UTI treatement as per primary team  -REEG reviewed negative pt can be discharged on Keppra 1000 mg BID  from neurology stand point  -pt needs to follow up outpt with Dr. Cueto for routine stroke screening, right arm spasticity botox injection evaluation and AED adjustment if needed    THIS IS INCOMPLETE NOTE PLAN TO BE DISCUSSED WITH THE ATTENDING A 75 yr y/o male with hx of Seizure disorder, Hx of CVA with residual right sided weakness, HTN, DM, CKD 3 admitted for break through seizure. CT head and c-spine insignificant.  CT a/p/chest  scan Rib 10- 11 fracture and  displaced fracture at L1-3 and cystitis. EEG- negative for seizure. No nuchal rigidity on exam.  Seizure most likely due to UTI, pt continues to be febrile Tmax 101.6 ,wbc still high 30>42>33 today.     Plan:    - send immunofixation, SPEP, UPEP  -continue  Keppra to 1000 mg BID  -follow up Keppra level   -keep magnesium > 2  - further UTI treatment and work up for occult infection as per primary team.   -REEG reviewed negative pt can be discharged on Keppra 1000 mg BID  from neurology stand point  -pt needs to follow up outpt with Dr. Cueto for routine stroke screening, right arm spasticity botox injection evaluation and AED adjustment if needed

## 2021-04-30 NOTE — PHYSICAL THERAPY INITIAL EVALUATION ADULT - ADDITIONAL COMMENTS
Pt had CVA 5 years ago, he has R Hemiplegia, and has been only able to transfer few steps from bed to w/c. As per wife, pt has 5 steps outside, and steps inside to bedroom, rail on left to ascend in both areas.  Pt's wife indicates pt descends steps backwards, and has since the CVA. Pt had CVA 5 years ago, he has R Hemiplegia, and has been only able to transfer few steps from bed to w/c. As per wife, pt has 5 steps outside, and steps inside to bedroom, rail on left to ascend in both areas.  Pt's wife indicates pt descends steps backwards, and has since the CVA.  Wife reports patient is currently at baseline of functional skills.

## 2021-04-30 NOTE — PROGRESS NOTE ADULT - SUBJECTIVE AND OBJECTIVE BOX
RISHI HERRERA  75y  Male      Patient is a 75y old  Male who presents with a chief complaint of seizure (2021 07:15)      INTERVAL HPI/OVERNIGHT EVENTS:  21:  pt seen and examined at bedside this morning and in the afternoon in the presence of wife Bonita  -WBC downtrended; on IV meropenem; ID consult pending (IV vanco x 1 dose given yesterday)  -REEG with no epileptiform activity   -TLSO brace ordered for PT as tolerated   -incentive spirometry encouraged   -wife aware of the current plan of care     21:  pt seen and examined at bedside   -no change in current abx regimen; cultures pending  -ID following  -wife aware of the overall poor prognosis and current plan of care   -encourage incentive spirometry and use of TLSO brace during PT   -skin care as per nursing     REVIEW OF SYSTEMS:  -denies any complaints to me     Vital Signs Last 24 Hrs  T(C): 38.1 (2021 04:49), Max: 38.7 (2021 18:40)  T(F): 100.5 (2021 04:49), Max: 101.6 (2021 18:40)  HR: 89 (2021 04:49) (89 - 107)  BP: 116/56 (2021 04:49) (116/56 - 121/57)  RR: 16 (2021 04:49) (16 - 16)    PHYSICAL EXAM:  GENERAL: NAD, responding to verbal stimuli and smiles/nods his head, shakes hand   HEAD:  Atraumatic, Normocephalic  EYES: EOMI, PERRLA, conjunctiva and sclera clear  NERVOUS SYSTEM:  Alert & Oriented X 2  CHEST/LUNG: Clear to percussion bilaterally; No rales, rhonchi, wheezing, or rubs  CV/HEART: Regular rate and rhythm; No murmurs, rubs, or gallops  GI/ABDOMEN: Soft, Nontender, Nondistended; Bowel sounds present  EXTREMITIES:  2+ Peripheral Pulses, No clubbing, cyanosis, or edema  SKIN: No rashes or lesions    LAB:  am labs pending and will follow                           11.0   33.66 )-----------( 285      ( 2021 06:58 )             33.6         143  |  105  |  25<H>  ----------------------------<  213<H>  3.6   |  22  |  1.5    Ca    8.8      2021 06:58  Mg     1.9         TPro  7.0  /  Alb  3.6  /  TBili  0.3  /  DBili  x   /  AST  21  /  ALT  25  /  AlkPhos  193<H>      CARDIAC MARKERS ( 2021 11:00 )  x     / x     / 170 U/L / x     / x      CARDIAC MARKERS ( 2021 01:55 )  x     / <0.01 ng/mL / x     / x     / x        Daily     Daily   CAPILLARY BLOOD GLUCOSE    POCT Blood Glucose.: 250 mg/dL (2021 11:27)  POCT Blood Glucose.: 226 mg/dL (2021 07:19)  POCT Blood Glucose.: 275 mg/dL (2021 21:30)  POCT Blood Glucose.: 315 mg/dL (2021 16:14)    Urinalysis Basic - ( 2021 04:05 )    Color: Yellow / Appearance: Slightly Cloudy / S.020 / pH: x  Gluc: x / Ketone: Negative  / Bili: Negative / Urobili: 0.2 mg/dL   Blood: x / Protein: 30 mg/dL / Nitrite: Negative   Leuk Esterase: Large / RBC: 3-5 /HPF / WBC >50 /HPF   Sq Epi: x / Non Sq Epi: Few /HPF / Bacteria: x      LIVER FUNCTIONS - ( 2021 11:00 )  Alb: 3.6 g/dL / Pro: 7.0 g/dL / ALK PHOS: 193 U/L / ALT: 25 U/L / AST: 21 U/L / GGT: x           RADIOLOGY:    Imaging Personally Reviewed:  [y] YES  [ ] NO    HEALTH ISSUES - PROBLEM Dx:    MEDICATIONS  (STANDING):  atorvastatin 40 milliGRAM(s) Oral at bedtime  dextrose 40% Gel 15 Gram(s) Oral once  dextrose 5%. 1000 milliLiter(s) (50 mL/Hr) IV Continuous <Continuous>  dextrose 5%. 1000 milliLiter(s) (100 mL/Hr) IV Continuous <Continuous>  dextrose 50% Injectable 25 Gram(s) IV Push once  dextrose 50% Injectable 12.5 Gram(s) IV Push once  dextrose 50% Injectable 25 Gram(s) IV Push once  dipyridamole 200 mG/aspirin 25 mG 1 Capsule(s) Oral two times a day  glucagon  Injectable 1 milliGRAM(s) IntraMuscular once  heparin   Injectable 5000 Unit(s) SubCutaneous every 8 hours  insulin glargine Injectable (LANTUS) 24 Unit(s) SubCutaneous at bedtime  insulin lispro (ADMELOG) corrective regimen sliding scale   SubCutaneous three times a day before meals  insulin lispro Injectable (ADMELOG) 8 Unit(s) SubCutaneous before breakfast  insulin lispro Injectable (ADMELOG) 8 Unit(s) SubCutaneous before lunch  insulin lispro Injectable (ADMELOG) 8 Unit(s) SubCutaneous before dinner  levETIRAcetam 1000 milliGRAM(s) Oral two times a day  lidocaine   Patch 1 Patch Transdermal every 24 hours  meropenem  IVPB 1000 milliGRAM(s) IV Intermittent every 12 hours  metoprolol tartrate 50 milliGRAM(s) Oral two times a day  NIFEdipine XL 60 milliGRAM(s) Oral daily  pantoprazole    Tablet 40 milliGRAM(s) Oral before breakfast  QUEtiapine Oral Tab/Cap - Peds 25 milliGRAM(s) Oral two times a day  sodium chloride 0.9%. 1000 milliLiter(s) (75 mL/Hr) IV Continuous <Continuous>  sodium chloride 0.9%. 1000 milliLiter(s) (75 mL/Hr) IV Continuous <Continuous>  vancomycin  IVPB 1000 milliGRAM(s) IV Intermittent every 12 hours    MEDICATIONS  (PRN):  acetaminophen   Tablet .. 650 milliGRAM(s) Oral every 6 hours PRN Temp greater or equal to 38C (100.4F), Mild Pain (1 - 3)  diazepam    Tablet 10 milliGRAM(s) Oral two times a day PRN AGITATION

## 2021-04-30 NOTE — PROGRESS NOTE ADULT - SUBJECTIVE AND OBJECTIVE BOX
RISHI HERRERA  75y, Male  Allergy: No Known Allergies      LOS  2d    CHIEF COMPLAINT: seizure (30 Apr 2021 09:24)      INTERVAL EVENTS/HPI  - No acute events overnight  - T(F): , Max: 101.6 (04-29-21 @ 18:40)  - remains febrile   - poor historian -- WBC is improving   - WBC Count: 24.37 (04-30-21 @ 06:47)  WBC Count: 33.66 (04-29-21 @ 06:58)     - Creatinine, Serum: 1.3 (04-30-21 @ 06:47)  Creatinine, Serum: 1.5 (04-29-21 @ 06:58)       ROS  General: Denies rigors, nightsweats  HEENT: Denies headache, rhinorrhea, sore throat, eye pain  CV: Denies CP, palpitations  PULM: Denies wheezing, hemoptysis  GI: Denies hematemesis, hematochezia, melena  : Denies discharge, hematuria  MSK: Denies arthralgias, myalgias  SKIN: Denies rash, lesions  NEURO: Denies paresthesias, weakness  PSYCH: Denies depression, anxiety    VITALS:  T(F): 99.2, Max: 101.6 (04-29-21 @ 18:40)  HR: 71  BP: 118/58  RR: 18Vital Signs Last 24 Hrs  T(C): 37.3 (30 Apr 2021 13:49), Max: 38.7 (29 Apr 2021 18:40)  T(F): 99.2 (30 Apr 2021 13:49), Max: 101.6 (29 Apr 2021 18:40)  HR: 71 (30 Apr 2021 13:49) (71 - 107)  BP: 118/58 (30 Apr 2021 13:49) (116/56 - 121/57)  BP(mean): --  RR: 18 (30 Apr 2021 13:49) (16 - 18)  SpO2: --    PHYSICAL EXAM:  Gen: NAD, resting in bed  HEENT: Normocephalic, atraumatic  Neck: supple, no lymphadenopathy  CV: Regular rate & regular rhythm  Lungs: decreased BS at bases, no fremitus  Abdomen: Soft, BS present, distended  Ext: Warm, well perfused  Neuro: non focal, awake  Skin: no rash, no erythema  Lines: no phlebitis    FH: Non-contributory  Social Hx: Non-contributory    TESTS & MEASUREMENTS:                        9.8    24.37 )-----------( 255      ( 30 Apr 2021 06:47 )             29.7     04-30    142  |  108  |  20  ----------------------------<  162<H>  3.4<L>   |  23  |  1.3    Ca    8.3<L>      30 Apr 2021 06:47      eGFR if Non African American: 53 mL/min/1.73M2 (04-30-21 @ 06:47)  eGFR if : 61 mL/min/1.73M2 (04-30-21 @ 06:47)          Culture - Blood (collected 04-28-21 @ 18:34)  Source: .Blood None  Preliminary Report (04-30-21 @ 01:02):    No growth to date.    Culture - Urine (collected 04-28-21 @ 17:40)  Source: .Urine Catheterized  Final Report (04-29-21 @ 19:37):    No growth    Culture - Blood (collected 04-28-21 @ 11:00)  Source: .Blood Blood  Preliminary Report (04-29-21 @ 23:01):    No growth to date.    Culture - Blood (collected 04-28-21 @ 04:10)  Source: .Blood Blood  Preliminary Report (04-29-21 @ 18:01):    No growth to date.    Culture - Blood (collected 04-28-21 @ 04:10)  Source: .Blood Blood  Preliminary Report (04-29-21 @ 18:01):    No growth to date.            INFECTIOUS DISEASES TESTING  Rapid RVP Result: NotDetec (04-28-21 @ 01:45)      INFLAMMATORY MARKERS      RADIOLOGY & ADDITIONAL TESTS:  I have personally reviewed the last available Chest xray  CXR      CT  CT Chest w/ IV Cont:   EXAM:  CT ABDOMEN AND PELVIS IC        EXAM:  CT CHEST IC            PROCEDURE DATE:  04/28/2021            INTERPRETATION:  CLINICAL HISTORY/REASON FOR EXAM: Fall. Trauma to chest, abdomen and pelvis. Seizure.    TECHNIQUE: Contiguous axial CT images were obtained from the thoracic inlet to the pubic symphysis following administration of intravenous contrast. Oral contrast was not administered. Reformatted images in the coronal and sagittal planes were acquired. 3D (MIP) images obtained.    COMPARISON: CT ABDOMEN/PELVIS 11/27/2018.    FINDINGS:    CHEST:    LUNGS, PLEURA, AIRWAYS: Bilateral calcified pleural plaques. Dependent atelectasis. Patent central tracheobronchial tree. No lobar consolidation, pleural effusion, or pneumothorax. No bronchiectasis or honeycombing.    THORACIC NODES: No thoracic or axillary lymphadenopathy.    MEDIASTINUM/GREAT VESSELS: No pericardial effusion. Heart size is within normal limits. Coronary artery and aortic valve calcifications. The aorta and main pulmonary artery are of normal caliber.    ABDOMEN/PELVIS:    HEPATOBILIARY: Unremarkable.    SPLEEN: Unremarkable.    PANCREAS: Unremarkable.    ADRENAL GLANDS: Unremarkable.    KIDNEYS: Symmetric renal enhancement. No hydronephrosis.    ABDOMINOPELVIC NODES: No lymphadenopathy.    PELVIC ORGANS: Guerrero catheter within a decompressed urinary bladder. Foci of air within the urinary bladder consistent with instrumentation. Urinary bladder increased wall thickening, mucosal hyperenhancement and pericystic stranding; correlate for urinary bladder cystitis. Penile implant with reservoir anterior to the uterine bladder. Large left hydrocele.    PERITONEUM/MESENTERY/BOWEL: Moderate stool load within the rectum without perirectal inflammatory change. Descending colon diverticulosis. No bowel obstruction, ascites or pneumoperitoneum.    BONES/SOFT TISSUES: Degenerative change of the spine. Acute displaced 10th and minimally displaced 11th right rib fractures. Acute minimally displaced fractures of the right transverse processes of L1, L2, and L3. Small bilateral fat-containing inguinal hernias.    OTHER: Vascular calcifications. Infrarenal IVC filter noted.      IMPRESSION:    1.  Acute displaced 10th and minimally displaced 11th right rib fractures. No pneumothorax.    2.  Acute minimally displaced fractures of the right transverse processes of L1, L2, and L3.    3.  Urinary bladder increased wall thickening, mucosal hyperenhancement and pericystic stranding; correlate for urinary bladder cystitis.            ZINA COLIN M.D., RESIDENT RADIOLOGIST  This document has been electronically signed.  IMAN WALTERS MD; Attending Radiologist  This document has been electronically signed. Apr 28 2021  5:34AM (04-28-21 @ 04:46)      CARDIOLOGY TESTING  12 Lead ECG:   Ventricular Rate 81 BPM    Atrial Rate 159 BPM    QRS Duration 70 ms    Q-T Interval 350 ms    QTC Calculation(Bazett) 406 ms    R Axis -4 degrees    T Axis 93 degrees    Diagnosis Line Normal sinus rhythm  Low voltage QRS  Septal infarct , age undetermined  Nonspecific ST-T changes  Confirmed by MARIUSZ TIDWELL MD ([id number]) on 4/28/2021 9:03:22 AM  Also confirmed by MARIUSZ TIDWELL MD ([id number]),  River cooper ([id number])  on  4/29/2021 11:02:31 AM (04-28-21 @ 02:40)  12 Lead ECG:   Ventricular Rate 91 BPM    Atrial Rate 100 BPM    P-R Interval 126 ms    QRS Duration 106 ms    Q-T Interval 420 ms    QTC Calculation(Bazett) 516 ms    P Axis 37 degrees    R Axis 269 degrees    T Axis 107 degrees    Diagnosis Line Sinus rhythm with Fusion complexes Baseline artifact  Low voltage QRS  Nonspecific ST-T changes    Confirmed by MARIUSZ TIDWELL MD [id number]) on 4/28/2021 9:02:55 AM  Also confirmed by MARIUSZ TIDWELL MD ([id number]),  River cooper ([id number])  on  4/29/2021 11:02:52 AM (04-28-21 @ 01:54)      MEDICATIONS  atorvastatin 40 Oral at bedtime  dextrose 40% Gel 15 Oral once  dextrose 5%. 1000 IV Continuous <Continuous>  dextrose 5%. 1000 IV Continuous <Continuous>  dextrose 50% Injectable 25 IV Push once  dextrose 50% Injectable 12.5 IV Push once  dextrose 50% Injectable 25 IV Push once  dipyridamole 200 mG/aspirin 25 mG 1 Oral two times a day  glucagon  Injectable 1 IntraMuscular once  heparin   Injectable 5000 SubCutaneous every 8 hours  insulin glargine Injectable (LANTUS) 24 SubCutaneous at bedtime  insulin lispro (ADMELOG) corrective regimen sliding scale  SubCutaneous three times a day before meals  insulin lispro Injectable (ADMELOG) 8 SubCutaneous before breakfast  insulin lispro Injectable (ADMELOG) 8 SubCutaneous before lunch  insulin lispro Injectable (ADMELOG) 8 SubCutaneous before dinner  levETIRAcetam 1000 Oral two times a day  lidocaine   Patch 1 Transdermal every 24 hours  meropenem  IVPB 1000 IV Intermittent every 12 hours  metoprolol tartrate 50 Oral two times a day  NIFEdipine XL 60 Oral daily  pantoprazole    Tablet 40 Oral before breakfast  potassium chloride   Powder 40 Oral once  QUEtiapine Oral Tab/Cap - Peds 25 Oral two times a day  sodium chloride 0.9%. 1000 IV Continuous <Continuous>  sodium chloride 0.9%. 1000 IV Continuous <Continuous>  vancomycin  IVPB 1000 IV Intermittent every 12 hours      WEIGHT  Weight (kg): 76.3 (04-28-21 @ 08:12)  Creatinine, Serum: 1.3 mg/dL (04-30-21 @ 06:47)      ANTIBIOTICS:  meropenem  IVPB 1000 milliGRAM(s) IV Intermittent every 12 hours  vancomycin  IVPB 1000 milliGRAM(s) IV Intermittent every 12 hours      All available historical records have been reviewed

## 2021-04-30 NOTE — PROGRESS NOTE ADULT - ASSESSMENT
ED presentation:   75 yr old male with hx of Seizure disorder, Hx of CVA with residual right sided weakness, HTN, DM, CKD 3 admitted for witnessed seizure. As per wife, patient was sitting on the bowel and had a seizure for 2 mins. Event was witnessed by son and wife. Wife also notes patient has been falling from the bed multiple time. Wife denies any fever, chills, sob, chest pain, abdominal pain, no urinary or bowel issues.     1) Sepsis not present on admission/UTI   -IV abx, ID consult appreciated  -monitor WBC   -follow up blood and urine cultures --- Pending     2) Seizure disorder  -REEG negative for epileptiform activity   -Neurology following   -Keppra dose increased to 1000mg twice daily     3) DM II  -on sq insulin   -monitor FS    4) Old CVA with Right sided residual deficits/Functional Quadriplegia   -continue with Aggrenox and Statins     5) Debility  -rehab/pt as tolerated     6) Rib fractures 10th and 11th   -pain control prn  -incentive spirometry     7) L1, L2, L3 minimally displaced fractures   -seen by Neurosurgery  -no need for emergent surgery  -TLSO brace and PT as tolerated   -pain control     8) JORDYN on suspected CKD stage III  -improved   -continue with IVF     dvt and gi ppx       # Progress Note Handoff  PENDING as follows  consults: ID   Test: Cultures, CBC   Family discussion: Discussed with patient and wife at bedside in length; answered all questions; copies of imaging studies and labwork given to wife; aware of the current plan of broad spectrum IV antibiotics  Disposition:  from home; ACUTE     Attending Physician Dr. Judy Miller # [id number]     Spent over 45 mins today's plan of care

## 2021-04-30 NOTE — PHYSICAL THERAPY INITIAL EVALUATION ADULT - GENERAL OBSERVATIONS, REHAB EVAL
13:15-13:50 Chart reviewed. Patient available to be seen for physical therapy, denies pain, confirmed with RN. Pt rec'd in bed, +joe, wife at bedside, pt in NAD.

## 2021-05-01 LAB
ANION GAP SERPL CALC-SCNC: 10 MMOL/L — SIGNIFICANT CHANGE UP (ref 7–14)
BUN SERPL-MCNC: 17 MG/DL — SIGNIFICANT CHANGE UP (ref 10–20)
CALCIUM SERPL-MCNC: 8.2 MG/DL — LOW (ref 8.5–10.1)
CHLORIDE SERPL-SCNC: 105 MMOL/L — SIGNIFICANT CHANGE UP (ref 98–110)
CO2 SERPL-SCNC: 22 MMOL/L — SIGNIFICANT CHANGE UP (ref 17–32)
CREAT SERPL-MCNC: 1.2 MG/DL — SIGNIFICANT CHANGE UP (ref 0.7–1.5)
GLUCOSE BLDC GLUCOMTR-MCNC: 177 MG/DL — HIGH (ref 70–99)
GLUCOSE BLDC GLUCOMTR-MCNC: 188 MG/DL — HIGH (ref 70–99)
GLUCOSE BLDC GLUCOMTR-MCNC: 205 MG/DL — HIGH (ref 70–99)
GLUCOSE BLDC GLUCOMTR-MCNC: 274 MG/DL — HIGH (ref 70–99)
GLUCOSE SERPL-MCNC: 212 MG/DL — HIGH (ref 70–99)
HCT VFR BLD CALC: 32.2 % — LOW (ref 42–52)
HGB BLD-MCNC: 10.6 G/DL — LOW (ref 14–18)
MAGNESIUM SERPL-MCNC: 1.8 MG/DL — SIGNIFICANT CHANGE UP (ref 1.8–2.4)
MCHC RBC-ENTMCNC: 30.7 PG — SIGNIFICANT CHANGE UP (ref 27–31)
MCHC RBC-ENTMCNC: 32.9 G/DL — SIGNIFICANT CHANGE UP (ref 32–37)
MCV RBC AUTO: 93.3 FL — SIGNIFICANT CHANGE UP (ref 80–94)
NRBC # BLD: 0 /100 WBCS — SIGNIFICANT CHANGE UP (ref 0–0)
PLATELET # BLD AUTO: 281 K/UL — SIGNIFICANT CHANGE UP (ref 130–400)
POTASSIUM SERPL-MCNC: 3.5 MMOL/L — SIGNIFICANT CHANGE UP (ref 3.5–5)
POTASSIUM SERPL-SCNC: 3.5 MMOL/L — SIGNIFICANT CHANGE UP (ref 3.5–5)
RBC # BLD: 3.45 M/UL — LOW (ref 4.7–6.1)
RBC # FLD: 13 % — SIGNIFICANT CHANGE UP (ref 11.5–14.5)
SODIUM SERPL-SCNC: 137 MMOL/L — SIGNIFICANT CHANGE UP (ref 135–146)
TROPONIN T SERPL-MCNC: <0.01 NG/ML — SIGNIFICANT CHANGE UP
WBC # BLD: 18.67 K/UL — HIGH (ref 4.8–10.8)
WBC # FLD AUTO: 18.67 K/UL — HIGH (ref 4.8–10.8)

## 2021-05-01 PROCEDURE — 71045 X-RAY EXAM CHEST 1 VIEW: CPT | Mod: 26

## 2021-05-01 PROCEDURE — 99233 SBSQ HOSP IP/OBS HIGH 50: CPT

## 2021-05-01 RX ORDER — POTASSIUM CHLORIDE 20 MEQ
20 PACKET (EA) ORAL ONCE
Refills: 0 | Status: COMPLETED | OUTPATIENT
Start: 2021-05-01 | End: 2021-05-01

## 2021-05-01 RX ADMIN — Medication 50 MILLIGRAM(S): at 18:06

## 2021-05-01 RX ADMIN — Medication 1: at 17:28

## 2021-05-01 RX ADMIN — HEPARIN SODIUM 5000 UNIT(S): 5000 INJECTION INTRAVENOUS; SUBCUTANEOUS at 13:31

## 2021-05-01 RX ADMIN — LIDOCAINE 1 PATCH: 4 CREAM TOPICAL at 16:09

## 2021-05-01 RX ADMIN — HEPARIN SODIUM 5000 UNIT(S): 5000 INJECTION INTRAVENOUS; SUBCUTANEOUS at 22:08

## 2021-05-01 RX ADMIN — PANTOPRAZOLE SODIUM 40 MILLIGRAM(S): 20 TABLET, DELAYED RELEASE ORAL at 05:26

## 2021-05-01 RX ADMIN — Medication 8 UNIT(S): at 07:38

## 2021-05-01 RX ADMIN — LIDOCAINE 1 PATCH: 4 CREAM TOPICAL at 05:25

## 2021-05-01 RX ADMIN — Medication 60 MILLIGRAM(S): at 05:14

## 2021-05-01 RX ADMIN — ASPIRIN AND DIPYRIDAMOLE 1 CAPSULE(S): 25; 200 CAPSULE, EXTENDED RELEASE ORAL at 05:14

## 2021-05-01 RX ADMIN — QUETIAPINE FUMARATE 25 MILLIGRAM(S): 200 TABLET, FILM COATED ORAL at 18:06

## 2021-05-01 RX ADMIN — Medication 50 MILLIGRAM(S): at 05:14

## 2021-05-01 RX ADMIN — QUETIAPINE FUMARATE 25 MILLIGRAM(S): 200 TABLET, FILM COATED ORAL at 05:14

## 2021-05-01 RX ADMIN — LIDOCAINE 1 PATCH: 4 CREAM TOPICAL at 19:30

## 2021-05-01 RX ADMIN — INSULIN GLARGINE 24 UNIT(S): 100 INJECTION, SOLUTION SUBCUTANEOUS at 22:13

## 2021-05-01 RX ADMIN — LEVETIRACETAM 1000 MILLIGRAM(S): 250 TABLET, FILM COATED ORAL at 18:06

## 2021-05-01 RX ADMIN — LEVETIRACETAM 1000 MILLIGRAM(S): 250 TABLET, FILM COATED ORAL at 05:14

## 2021-05-01 RX ADMIN — Medication 250 MILLIGRAM(S): at 06:00

## 2021-05-01 RX ADMIN — SODIUM CHLORIDE 75 MILLILITER(S): 9 INJECTION INTRAMUSCULAR; INTRAVENOUS; SUBCUTANEOUS at 22:08

## 2021-05-01 RX ADMIN — HEPARIN SODIUM 5000 UNIT(S): 5000 INJECTION INTRAVENOUS; SUBCUTANEOUS at 05:15

## 2021-05-01 RX ADMIN — Medication 8 UNIT(S): at 11:27

## 2021-05-01 RX ADMIN — MEROPENEM 100 MILLIGRAM(S): 1 INJECTION INTRAVENOUS at 18:06

## 2021-05-01 RX ADMIN — Medication 2: at 07:38

## 2021-05-01 RX ADMIN — ATORVASTATIN CALCIUM 40 MILLIGRAM(S): 80 TABLET, FILM COATED ORAL at 22:08

## 2021-05-01 RX ADMIN — Medication 3: at 11:27

## 2021-05-01 RX ADMIN — ASPIRIN AND DIPYRIDAMOLE 1 CAPSULE(S): 25; 200 CAPSULE, EXTENDED RELEASE ORAL at 18:14

## 2021-05-01 RX ADMIN — MEROPENEM 100 MILLIGRAM(S): 1 INJECTION INTRAVENOUS at 05:15

## 2021-05-01 RX ADMIN — Medication 250 MILLIGRAM(S): at 18:06

## 2021-05-01 RX ADMIN — Medication 8 UNIT(S): at 18:05

## 2021-05-01 NOTE — PROGRESS NOTE ADULT - ASSESSMENT
ED presentation:   75 yr old male with hx of Seizure disorder, Hx of CVA with residual right sided weakness, HTN, DM, CKD 3 admitted for witnessed seizure. As per wife, patient was sitting on the bowel and had a seizure for 2 mins. Event was witnessed by son and wife. Wife also notes patient has been falling from the bed multiple time. Wife denies any fever, chills, sob, chest pain, abdominal pain, no urinary or bowel issues.     1) Sepsis not present on admission/UTI   -IV abx; prelim blood cultures negative   -monitor WBC --- downtrending   -ID following     2) Seizure disorder  -REEG negative for epileptiform activity   -Neurology following   -Keppra dose increased to 1000mg twice daily     3) DM II  -on sq insulin   -monitor FS    4) Old CVA with Right sided residual deficits/Functional Quadriplegia   -continue with Aggrenox and Statins     5) Debility  -rehab/pt as tolerated     6) Rib fractures 10th and 11th   -pain control prn  -incentive spirometry     7) L1, L2, L3 minimally displaced fractures   -seen by Neurosurgery  -no need for emergent surgery  -TLSO brace and PT as tolerated   -pain control     8) JORDYN on suspected CKD stage III  -improved   -d/c IVF    dvt and gi ppx       # Progress Note Handoff  PENDING as follows  consults: ID   Test: Cultures, CBC   Family discussion: Discussed with patient and wife on a daily basis; aware of the current plan of care; labs given and agreeable for current abx regimen   Disposition:  from home; ACUTE     Attending Physician Dr. Judy Miller # [id number]     Spent over 45 mins today's plan of care

## 2021-05-01 NOTE — PROGRESS NOTE ADULT - SUBJECTIVE AND OBJECTIVE BOX
RISHI HERRERA  75y  Male      Patient is a 75y old  Male who presents with a chief complaint of seizure (2021 07:15)      INTERVAL HPI/OVERNIGHT EVENTS:  21:  pt seen and examined at bedside this morning and in the afternoon in the presence of wife Bonita  -WBC downtrended; on IV meropenem; ID consult pending (IV vanco x 1 dose given yesterday)  -REEG with no epileptiform activity   -TLSO brace ordered for PT as tolerated   -incentive spirometry encouraged   -wife aware of the current plan of care     21:  pt seen and examined at bedside   -no change in current abx regimen; cultures pending  -ID following  -wife aware of the overall poor prognosis and current plan of care   -encourage incentive spirometry and use of TLSO brace during PT   -skin care as per nursing     21:  pt seen and examined at bedside   -doing better   -overnight events noted; discussed with wife and made aware of the negative cardiac enzymes and current status   -WBC improving; continue with current IV antibiotics regimen   -skin care as per nursing   -daily cbcs  -LE duplex negative for DVT     REVIEW OF SYSTEMS:  -denies any complaints to me     Vital Signs Last 24 Hrs  T(C): 37.2 (01 May 2021 12:25), Max: 37.5 (01 May 2021 05:00)  T(F): 99 (01 May 2021 12:25), Max: 99.5 (01 May 2021 05:00)  HR: 91 (01 May 2021 12:25) (71 - 91)  BP: 125/60 (01 May 2021 12:25) (117/57 - 125/60)  RR: 16 (01 May 2021 12:25) (16 - 18)  SpO2: 93% (2021 20:18) (93% - 93%)    PHYSICAL EXAM:  GENERAL: NAD, responding to verbal stimuli and smiles/nods his head, shakes hand   HEAD:  Atraumatic, Normocephalic  EYES: EOMI, PERRLA, conjunctiva and sclera clear  NERVOUS SYSTEM:  Alert & Oriented X 2  CHEST/LUNG: Clear to percussion bilaterally; No rales, rhonchi, wheezing, or rubs  CV/HEART: Regular rate and rhythm; No murmurs, rubs, or gallops  GI/ABDOMEN: Soft, Nontender, Nondistended; Bowel sounds present  EXTREMITIES:  2+ Peripheral Pulses, No clubbing, cyanosis, or edema  SKIN: No rashes or lesions    LAB:                        10.6   18.67 )-----------( 281      ( 01 May 2021 08:01 )             32.2     05-    137  |  105  |  17  ----------------------------<  212<H>  3.5   |  22  |  1.2    Ca    8.2<L>      01 May 2021 08:01  Mg     1.8     05-    CARDIAC MARKERS ( 2021 11:00 )  x     / x     / 170 U/L / x     / x      CARDIAC MARKERS ( 2021 01:55 )  x     / <0.01 ng/mL / x     / x     / x        Daily     Daily   CAPILLARY BLOOD GLUCOSE    POCT Blood Glucose.: 250 mg/dL (2021 11:27)  POCT Blood Glucose.: 226 mg/dL (2021 07:19)  POCT Blood Glucose.: 275 mg/dL (2021 21:30)  POCT Blood Glucose.: 315 mg/dL (2021 16:14)    Urinalysis Basic - ( 2021 04:05 )    Color: Yellow / Appearance: Slightly Cloudy / S.020 / pH: x  Gluc: x / Ketone: Negative  / Bili: Negative / Urobili: 0.2 mg/dL   Blood: x / Protein: 30 mg/dL / Nitrite: Negative   Leuk Esterase: Large / RBC: 3-5 /HPF / WBC >50 /HPF   Sq Epi: x / Non Sq Epi: Few /HPF / Bacteria: x      LIVER FUNCTIONS - ( 2021 11:00 )  Alb: 3.6 g/dL / Pro: 7.0 g/dL / ALK PHOS: 193 U/L / ALT: 25 U/L / AST: 21 U/L / GGT: x           RADIOLOGY:    Imaging Personally Reviewed:  [y] YES  [ ] NO    HEALTH ISSUES - PROBLEM Dx:      MEDICATIONS  (STANDING):  atorvastatin 40 milliGRAM(s) Oral at bedtime  dextrose 40% Gel 15 Gram(s) Oral once  dextrose 5%. 1000 milliLiter(s) (50 mL/Hr) IV Continuous <Continuous>  dextrose 5%. 1000 milliLiter(s) (100 mL/Hr) IV Continuous <Continuous>  dextrose 50% Injectable 25 Gram(s) IV Push once  dextrose 50% Injectable 12.5 Gram(s) IV Push once  dextrose 50% Injectable 25 Gram(s) IV Push once  dipyridamole 200 mG/aspirin 25 mG 1 Capsule(s) Oral two times a day  glucagon  Injectable 1 milliGRAM(s) IntraMuscular once  heparin   Injectable 5000 Unit(s) SubCutaneous every 8 hours  insulin glargine Injectable (LANTUS) 24 Unit(s) SubCutaneous at bedtime  insulin lispro (ADMELOG) corrective regimen sliding scale   SubCutaneous three times a day before meals  insulin lispro Injectable (ADMELOG) 8 Unit(s) SubCutaneous before breakfast  insulin lispro Injectable (ADMELOG) 8 Unit(s) SubCutaneous before lunch  insulin lispro Injectable (ADMELOG) 8 Unit(s) SubCutaneous before dinner  levETIRAcetam 1000 milliGRAM(s) Oral two times a day  lidocaine   Patch 1 Patch Transdermal every 24 hours  meropenem  IVPB 1000 milliGRAM(s) IV Intermittent every 12 hours  metoprolol tartrate 50 milliGRAM(s) Oral two times a day  NIFEdipine XL 60 milliGRAM(s) Oral daily  pantoprazole    Tablet 40 milliGRAM(s) Oral before breakfast  potassium chloride   Powder 20 milliEquivalent(s) Oral once  QUEtiapine Oral Tab/Cap - Peds 25 milliGRAM(s) Oral two times a day  sodium chloride 0.9%. 1000 milliLiter(s) (75 mL/Hr) IV Continuous <Continuous>  sodium chloride 0.9%. 1000 milliLiter(s) (75 mL/Hr) IV Continuous <Continuous>  vancomycin  IVPB 1000 milliGRAM(s) IV Intermittent every 12 hours    MEDICATIONS  (PRN):  acetaminophen   Tablet .. 650 milliGRAM(s) Oral every 6 hours PRN Temp greater or equal to 38C (100.4F), Mild Pain (1 - 3)  aluminum hydroxide/magnesium hydroxide/simethicone Suspension 30 milliLiter(s) Oral every 4 hours PRN Dyspepsia  diazepam    Tablet 10 milliGRAM(s) Oral two times a day PRN AGITATION

## 2021-05-02 LAB
ANION GAP SERPL CALC-SCNC: 12 MMOL/L — SIGNIFICANT CHANGE UP (ref 7–14)
BUN SERPL-MCNC: 13 MG/DL — SIGNIFICANT CHANGE UP (ref 10–20)
CALCIUM SERPL-MCNC: 8.7 MG/DL — SIGNIFICANT CHANGE UP (ref 8.5–10.1)
CHLORIDE SERPL-SCNC: 107 MMOL/L — SIGNIFICANT CHANGE UP (ref 98–110)
CO2 SERPL-SCNC: 22 MMOL/L — SIGNIFICANT CHANGE UP (ref 17–32)
CREAT SERPL-MCNC: 1.2 MG/DL — SIGNIFICANT CHANGE UP (ref 0.7–1.5)
GLUCOSE BLDC GLUCOMTR-MCNC: 143 MG/DL — HIGH (ref 70–99)
GLUCOSE BLDC GLUCOMTR-MCNC: 172 MG/DL — HIGH (ref 70–99)
GLUCOSE BLDC GLUCOMTR-MCNC: 224 MG/DL — HIGH (ref 70–99)
GLUCOSE BLDC GLUCOMTR-MCNC: 231 MG/DL — HIGH (ref 70–99)
GLUCOSE SERPL-MCNC: 194 MG/DL — HIGH (ref 70–99)
HCT VFR BLD CALC: 41.5 % — LOW (ref 42–52)
HGB BLD-MCNC: 13.5 G/DL — LOW (ref 14–18)
LEVETIRACETAM SERPL-MCNC: 24.6 UG/ML — SIGNIFICANT CHANGE UP (ref 10–40)
LEVETIRACETAM SERPL-MCNC: 44.3 UG/ML — HIGH (ref 10–40)
MCHC RBC-ENTMCNC: 30.1 PG — SIGNIFICANT CHANGE UP (ref 27–31)
MCHC RBC-ENTMCNC: 32.5 G/DL — SIGNIFICANT CHANGE UP (ref 32–37)
MCV RBC AUTO: 92.4 FL — SIGNIFICANT CHANGE UP (ref 80–94)
NRBC # BLD: 0 /100 WBCS — SIGNIFICANT CHANGE UP (ref 0–0)
PLATELET # BLD AUTO: 421 K/UL — HIGH (ref 130–400)
POTASSIUM SERPL-MCNC: 4.6 MMOL/L — SIGNIFICANT CHANGE UP (ref 3.5–5)
POTASSIUM SERPL-SCNC: 4.6 MMOL/L — SIGNIFICANT CHANGE UP (ref 3.5–5)
RBC # BLD: 4.49 M/UL — LOW (ref 4.7–6.1)
RBC # FLD: 12.9 % — SIGNIFICANT CHANGE UP (ref 11.5–14.5)
SODIUM SERPL-SCNC: 141 MMOL/L — SIGNIFICANT CHANGE UP (ref 135–146)
WBC # BLD: 23.27 K/UL — HIGH (ref 4.8–10.8)
WBC # FLD AUTO: 23.27 K/UL — HIGH (ref 4.8–10.8)

## 2021-05-02 PROCEDURE — 99233 SBSQ HOSP IP/OBS HIGH 50: CPT

## 2021-05-02 RX ORDER — CEFTRIAXONE 500 MG/1
INJECTION, POWDER, FOR SOLUTION INTRAMUSCULAR; INTRAVENOUS
Refills: 0 | Status: DISCONTINUED | OUTPATIENT
Start: 2021-05-02 | End: 2021-05-06

## 2021-05-02 RX ORDER — CEFTRIAXONE 500 MG/1
2000 INJECTION, POWDER, FOR SOLUTION INTRAMUSCULAR; INTRAVENOUS EVERY 24 HOURS
Refills: 0 | Status: DISCONTINUED | OUTPATIENT
Start: 2021-05-03 | End: 2021-05-06

## 2021-05-02 RX ORDER — CEFTRIAXONE 500 MG/1
2000 INJECTION, POWDER, FOR SOLUTION INTRAMUSCULAR; INTRAVENOUS ONCE
Refills: 0 | Status: COMPLETED | OUTPATIENT
Start: 2021-05-02 | End: 2021-05-02

## 2021-05-02 RX ADMIN — HEPARIN SODIUM 5000 UNIT(S): 5000 INJECTION INTRAVENOUS; SUBCUTANEOUS at 14:58

## 2021-05-02 RX ADMIN — Medication 8 UNIT(S): at 16:51

## 2021-05-02 RX ADMIN — ASPIRIN AND DIPYRIDAMOLE 1 CAPSULE(S): 25; 200 CAPSULE, EXTENDED RELEASE ORAL at 06:47

## 2021-05-02 RX ADMIN — LIDOCAINE 1 PATCH: 4 CREAM TOPICAL at 16:51

## 2021-05-02 RX ADMIN — LEVETIRACETAM 1000 MILLIGRAM(S): 250 TABLET, FILM COATED ORAL at 06:43

## 2021-05-02 RX ADMIN — LIDOCAINE 1 PATCH: 4 CREAM TOPICAL at 19:38

## 2021-05-02 RX ADMIN — MEROPENEM 100 MILLIGRAM(S): 1 INJECTION INTRAVENOUS at 06:42

## 2021-05-02 RX ADMIN — QUETIAPINE FUMARATE 25 MILLIGRAM(S): 200 TABLET, FILM COATED ORAL at 06:44

## 2021-05-02 RX ADMIN — Medication 8 UNIT(S): at 07:54

## 2021-05-02 RX ADMIN — INSULIN GLARGINE 24 UNIT(S): 100 INJECTION, SOLUTION SUBCUTANEOUS at 21:06

## 2021-05-02 RX ADMIN — Medication 50 MILLIGRAM(S): at 06:43

## 2021-05-02 RX ADMIN — QUETIAPINE FUMARATE 25 MILLIGRAM(S): 200 TABLET, FILM COATED ORAL at 17:42

## 2021-05-02 RX ADMIN — CEFTRIAXONE 100 MILLIGRAM(S): 500 INJECTION, POWDER, FOR SOLUTION INTRAMUSCULAR; INTRAVENOUS at 14:57

## 2021-05-02 RX ADMIN — LIDOCAINE 1 PATCH: 4 CREAM TOPICAL at 04:41

## 2021-05-02 RX ADMIN — Medication 2: at 16:51

## 2021-05-02 RX ADMIN — Medication 250 MILLIGRAM(S): at 06:47

## 2021-05-02 RX ADMIN — Medication 60 MILLIGRAM(S): at 06:44

## 2021-05-02 RX ADMIN — HEPARIN SODIUM 5000 UNIT(S): 5000 INJECTION INTRAVENOUS; SUBCUTANEOUS at 06:44

## 2021-05-02 RX ADMIN — LEVETIRACETAM 1000 MILLIGRAM(S): 250 TABLET, FILM COATED ORAL at 17:42

## 2021-05-02 RX ADMIN — HEPARIN SODIUM 5000 UNIT(S): 5000 INJECTION INTRAVENOUS; SUBCUTANEOUS at 21:07

## 2021-05-02 RX ADMIN — PANTOPRAZOLE SODIUM 40 MILLIGRAM(S): 20 TABLET, DELAYED RELEASE ORAL at 06:43

## 2021-05-02 RX ADMIN — Medication 50 MILLIGRAM(S): at 17:42

## 2021-05-02 RX ADMIN — Medication 1: at 07:54

## 2021-05-02 RX ADMIN — ASPIRIN AND DIPYRIDAMOLE 1 CAPSULE(S): 25; 200 CAPSULE, EXTENDED RELEASE ORAL at 17:42

## 2021-05-02 NOTE — PROGRESS NOTE ADULT - ASSESSMENT
ASSESSMENT  75 yr old male with hx of Seizure disorder, Hx of CVA with residual right sided weakness, HTN, DM, CKD 3 admitted for witnessed seizure.    IMPRESSION  #Sepsis during admission (Fevers, WBC>12) secondary to UTI  - CT Abdomen and Pelvis w/ IV Cont (04.28.21 @ 04:46): Urinary bladder increased wall thickening, mucosal hyperenhancement and pericystic stranding; correlate for urinary bladder cystitis.  - WBC Count: 42.36: (04.28.21 @ 11:00) -> improving  - Blood Cx 4/28 NG  - Urine Cx 4/28 NG  - US Dopplers negative  - CXR 5/1 with atelectasis     #Seizures  #Fall with acute rib and transverse process fractures  - CT Abdomen and Pelvis w/ IV Cont (04.28.21 @ 04:46):  Acute displaced 10th and minimally displaced 11th right rib fractures. No pneumothorax. Acute minimally displaced fractures of the right transverse processes of L1, L2, and L3.    #Hx of CVA  #CKD III  #DM  #Abx allergy: NKDA    Creatinine, Serum: 1.3 mg/dL (04.30.21 @ 06:47)  Weight (kg): 76.3 (28 Apr 2021 08:12)  CrCl 52    RECOMMENDATIONS  - fever curve seems to be improving, as well as WBC  - would narrow meropenem to ceftriaxone 2g daily as cultures are negative  - stop vancomycin   - please obtain procalcitonin  - continued to trend WBC   - bowel regimen     This is a preliminary incomplete pended note, all final recommendations to follow after interview and examination of the patient.      Please call or message on [employer] Teams if with any questions.  Spectra [phone]   ASSESSMENT  75 yr old male with hx of Seizure disorder, Hx of CVA with residual right sided weakness, HTN, DM, CKD 3 admitted for witnessed seizure.    IMPRESSION  #Sepsis during admission (Fevers, WBC>12) secondary to UTI  - CT Abdomen and Pelvis w/ IV Cont (04.28.21 @ 04:46): Urinary bladder increased wall thickening, mucosal hyperenhancement and pericystic stranding; correlate for urinary bladder cystitis.  - WBC Count: 42.36: (04.28.21 @ 11:00) -> improving  - Blood Cx 4/28 NG  - Urine Cx 4/28 NG  - US Dopplers negative  - CXR 5/1 with atelectasis     #Seizures  #Fall with acute rib and transverse process fractures  - CT Abdomen and Pelvis w/ IV Cont (04.28.21 @ 04:46):  Acute displaced 10th and minimally displaced 11th right rib fractures. No pneumothorax. Acute minimally displaced fractures of the right transverse processes of L1, L2, and L3.    #Hx of CVA  #CKD III  #DM  #Abx allergy: NKDA    Creatinine, Serum: 1.3 mg/dL (04.30.21 @ 06:47)  Weight (kg): 76.3 (28 Apr 2021 08:12)  CrCl 52    RECOMMENDATIONS  - fever curve seems to be improving, as well as WBC  - would narrow meropenem to ceftriaxone 2g daily as cultures are negative  - stop vancomycin   - please obtain procalcitonin  - continued to trend WBC   - bowel regimen       Please call or message on [employer] Teams if with any questions.  Spectra [phone]

## 2021-05-02 NOTE — PROGRESS NOTE ADULT - SUBJECTIVE AND OBJECTIVE BOX
RISHI HERRERA  75y  Male      Patient is a 75y old  Male who presents with a chief complaint of seizure (2021 07:15)      INTERVAL HPI/OVERNIGHT EVENTS:  21:  pt seen and examined at bedside this morning and in the afternoon in the presence of wife Bonita  -WBC downtrended; on IV meropenem; ID consult pending (IV vanco x 1 dose given yesterday)  -REEG with no epileptiform activity   -TLSO brace ordered for PT as tolerated   -incentive spirometry encouraged   -wife aware of the current plan of care     21:  pt seen and examined at bedside   -no change in current abx regimen; cultures pending  -ID following  -wife aware of the overall poor prognosis and current plan of care   -encourage incentive spirometry and use of TLSO brace during PT   -skin care as per nursing     21:  pt seen and examined at bedside   -doing better   -overnight events noted; discussed with wife and made aware of the negative cardiac enzymes and current status   -WBC improving; continue with current IV antibiotics regimen   -skin care as per nursing   -daily cbcs  -LE duplex negative for DVT     21:  pt seen and examined at bedside   -changed abx; ID follow up noted  -spoke to wife Bob; updates  given  -skin care as per nursing with frequent turning and positioning     REVIEW OF SYSTEMS:  -denies any complaints to me     Vital Signs Last 24 Hrs  T(C): 37.7 (02 May 2021 11:00), Max: 37.9 (01 May 2021 20:00)  T(F): 99.8 (02 May 2021 11:00), Max: 100.2 (01 May 2021 20:00)  HR: 113 (02 May 2021 13:30) (101 - 118)  BP: 124/64 (02 May 2021 13:30) (124/64 - 151/72)  RR: 16 (02 May 2021 13:30) (16 - 16)      PHYSICAL EXAM:  GENERAL: mild agitation this morning   HEAD:  Atraumatic, Normocephalic  EYES: EOMI, PERRLA, conjunctiva and sclera clear  NERVOUS SYSTEM:  Alert & Oriented X 1; agitated   CHEST/LUNG: Clear to percussion bilaterally; No rales, rhonchi, wheezing, or rubs  CV/HEART: Regular rate and rhythm; No murmurs, rubs, or gallops  GI/ABDOMEN: Soft, Nontender, Nondistended; Bowel sounds present  EXTREMITIES:  2+ Peripheral Pulses, No clubbing, cyanosis, or edema  SKIN: No rashes or lesions    LAB:                          13.5   23.27 )-----------( 421      ( 02 May 2021 08:04 )             41.5          05-02    141  |  107  |  13  ----------------------------<  194<H>  4.6   |  22  |  1.2    Ca    8.7      02 May 2021 08:04  Mg     1.8     05-01      CARDIAC MARKERS ( 2021 11:00 )  x     / x     / 170 U/L / x     / x      CARDIAC MARKERS ( 2021 01:55 )  x     / <0.01 ng/mL / x     / x     / x        Daily     Daily   CAPILLARY BLOOD GLUCOSE    POCT Blood Glucose.: 250 mg/dL (2021 11:27)  POCT Blood Glucose.: 226 mg/dL (2021 07:19)  POCT Blood Glucose.: 275 mg/dL (2021 21:30)  POCT Blood Glucose.: 315 mg/dL (2021 16:14)    Urinalysis Basic - ( 2021 04:05 )    Color: Yellow / Appearance: Slightly Cloudy / S.020 / pH: x  Gluc: x / Ketone: Negative  / Bili: Negative / Urobili: 0.2 mg/dL   Blood: x / Protein: 30 mg/dL / Nitrite: Negative   Leuk Esterase: Large / RBC: 3-5 /HPF / WBC >50 /HPF   Sq Epi: x / Non Sq Epi: Few /HPF / Bacteria: x      LIVER FUNCTIONS - ( 2021 11:00 )  Alb: 3.6 g/dL / Pro: 7.0 g/dL / ALK PHOS: 193 U/L / ALT: 25 U/L / AST: 21 U/L / GGT: x           RADIOLOGY:    Imaging Personally Reviewed:  [y] YES  [ ] NO    HEALTH ISSUES - PROBLEM Dx:      MEDICATIONS  (STANDING):  atorvastatin 40 milliGRAM(s) Oral at bedtime  cefTRIAXone   IVPB      dextrose 40% Gel 15 Gram(s) Oral once  dextrose 5%. 1000 milliLiter(s) (50 mL/Hr) IV Continuous <Continuous>  dextrose 5%. 1000 milliLiter(s) (100 mL/Hr) IV Continuous <Continuous>  dextrose 50% Injectable 25 Gram(s) IV Push once  dextrose 50% Injectable 12.5 Gram(s) IV Push once  dextrose 50% Injectable 25 Gram(s) IV Push once  dipyridamole 200 mG/aspirin 25 mG 1 Capsule(s) Oral two times a day  glucagon  Injectable 1 milliGRAM(s) IntraMuscular once  heparin   Injectable 5000 Unit(s) SubCutaneous every 8 hours  insulin glargine Injectable (LANTUS) 24 Unit(s) SubCutaneous at bedtime  insulin lispro (ADMELOG) corrective regimen sliding scale   SubCutaneous three times a day before meals  insulin lispro Injectable (ADMELOG) 8 Unit(s) SubCutaneous before breakfast  insulin lispro Injectable (ADMELOG) 8 Unit(s) SubCutaneous before lunch  insulin lispro Injectable (ADMELOG) 8 Unit(s) SubCutaneous before dinner  levETIRAcetam 1000 milliGRAM(s) Oral two times a day  lidocaine   Patch 1 Patch Transdermal every 24 hours  metoprolol tartrate 50 milliGRAM(s) Oral two times a day  NIFEdipine XL 60 milliGRAM(s) Oral daily  pantoprazole    Tablet 40 milliGRAM(s) Oral before breakfast  QUEtiapine Oral Tab/Cap - Peds 25 milliGRAM(s) Oral two times a day    MEDICATIONS  (PRN):  acetaminophen   Tablet .. 650 milliGRAM(s) Oral every 6 hours PRN Temp greater or equal to 38C (100.4F), Mild Pain (1 - 3)  aluminum hydroxide/magnesium hydroxide/simethicone Suspension 30 milliLiter(s) Oral every 4 hours PRN Dyspepsia  diazepam    Tablet 10 milliGRAM(s) Oral two times a day PRN AGITATION

## 2021-05-02 NOTE — PROGRESS NOTE ADULT - SUBJECTIVE AND OBJECTIVE BOX
RISHI HERRERA  75y, Male  Allergy: No Known Allergies      LOS  4d    CHIEF COMPLAINT: seizure (01 May 2021 13:29)      INTERVAL EVENTS/HPI  - No acute events overnight  - T(F): , Max: 100.2 (05-01-21 @ 20:00)  - Denies any worsening symptoms  - Tolerating medication  - WBC Count: 18.67 (05-01-21 @ 08:01)  WBC Count: 24.37 (04-30-21 @ 06:47)     - Creatinine, Serum: 1.2 (05-01-21 @ 08:01)       ROS  General: Denies rigors, nightsweats  HEENT: Denies headache, rhinorrhea, sore throat, eye pain  CV: Denies CP, palpitations  PULM: Denies wheezing, hemoptysis  GI: Denies hematemesis, hematochezia, melena  : Denies discharge, hematuria  MSK: Denies arthralgias, myalgias  SKIN: Denies rash, lesions  NEURO: Denies paresthesias, weakness  PSYCH: Denies depression, anxiety    VITALS:  T(F): 99.6, Max: 100.2 (05-01-21 @ 20:00)  HR: 118  BP: 138/80  RR: 16Vital Signs Last 24 Hrs  T(C): 37.6 (02 May 2021 04:44), Max: 37.9 (01 May 2021 20:00)  T(F): 99.6 (02 May 2021 04:44), Max: 100.2 (01 May 2021 20:00)  HR: 118 (02 May 2021 04:44) (91 - 118)  BP: 138/80 (02 May 2021 04:44) (125/60 - 151/72)  BP(mean): --  RR: 16 (02 May 2021 04:44) (16 - 16)  SpO2: --    PHYSICAL EXAM:  Gen: NAD, resting in bed  HEENT: Normocephalic, atraumatic  Neck: supple, no lymphadenopathy  CV: Regular rate & regular rhythm  Lungs: decreased BS at bases, no fremitus  Abdomen: Soft, BS present  Ext: Warm, well perfused  Neuro: non focal, awake  Skin: no rash, no erythema  Lines: no phlebitis    FH: Non-contributory  Social Hx: Non-contributory    TESTS & MEASUREMENTS:                        10.6   18.67 )-----------( 281      ( 01 May 2021 08:01 )             32.2     05-01    137  |  105  |  17  ----------------------------<  212<H>  3.5   |  22  |  1.2    Ca    8.2<L>      01 May 2021 08:01  Mg     1.8     05-01      eGFR if Non African American: 58 mL/min/1.73M2 (05-01-21 @ 08:01)  eGFR if : 68 mL/min/1.73M2 (05-01-21 @ 08:01)          Culture - Blood (collected 04-28-21 @ 18:34)  Source: .Blood None  Preliminary Report (04-30-21 @ 01:02):    No growth to date.    Culture - Urine (collected 04-28-21 @ 17:40)  Source: .Urine Catheterized  Final Report (04-29-21 @ 19:37):    No growth    Culture - Blood (collected 04-28-21 @ 11:00)  Source: .Blood Blood  Preliminary Report (04-29-21 @ 23:01):    No growth to date.    Culture - Blood (collected 04-28-21 @ 04:10)  Source: .Blood Blood  Preliminary Report (04-29-21 @ 18:01):    No growth to date.    Culture - Blood (collected 04-28-21 @ 04:10)  Source: .Blood Blood  Preliminary Report (04-29-21 @ 18:01):    No growth to date.            INFECTIOUS DISEASES TESTING  Rapid RVP Result: NotDete (04-28-21 @ 01:45)      INFLAMMATORY MARKERS      RADIOLOGY & ADDITIONAL TESTS:  I have personally reviewed the last available Chest xray  CXR      CT      CARDIOLOGY TESTING  12 Lead ECG:   Ventricular Rate 83 BPM    Atrial Rate 83 BPM    P-R Interval 164 ms    QRS Duration 80 ms    Q-T Interval 354 ms    QTC Calculation(Bazett) 415 ms    P Axis 26 degrees    R Axis -4 degrees    T Axis 69 degrees    Diagnosis Line Normal sinus rhythm  Poor R wave progression  Confirmed by MARIUSZ TIDWELL MD ([id number]) on 5/1/2021 9:14:13 AM (04-30-21 @ 20:22)  12 Lead ECG:   Ventricular Rate 81 BPM    Atrial Rate 159 BPM    QRS Duration 70 ms    Q-T Interval 350 ms    QTC Calculation(Bazett) 406 ms    R Axis -4 degrees    T Axis 93 degrees    Diagnosis Line Normal sinus rhythm  Low voltage QRS  Septal infarct , age undetermined  Nonspecific ST-T changes  Confirmed by MARIUSZ TIDWELL MD ([id number]) on 4/28/2021 9:03:22 AM  Also confirmed by MARIUSZ TIDWELL MD ([id number]),  River cooper ([id number])  on  4/29/2021 11:02:31 AM (04-28-21 @ 02:40)      MEDICATIONS  atorvastatin 40 Oral at bedtime  dextrose 40% Gel 15 Oral once  dextrose 5%. 1000 IV Continuous <Continuous>  dextrose 5%. 1000 IV Continuous <Continuous>  dextrose 50% Injectable 25 IV Push once  dextrose 50% Injectable 12.5 IV Push once  dextrose 50% Injectable 25 IV Push once  dipyridamole 200 mG/aspirin 25 mG 1 Oral two times a day  glucagon  Injectable 1 IntraMuscular once  heparin   Injectable 5000 SubCutaneous every 8 hours  insulin glargine Injectable (LANTUS) 24 SubCutaneous at bedtime  insulin lispro (ADMELOG) corrective regimen sliding scale  SubCutaneous three times a day before meals  insulin lispro Injectable (ADMELOG) 8 SubCutaneous before breakfast  insulin lispro Injectable (ADMELOG) 8 SubCutaneous before lunch  insulin lispro Injectable (ADMELOG) 8 SubCutaneous before dinner  levETIRAcetam 1000 Oral two times a day  lidocaine   Patch 1 Transdermal every 24 hours  meropenem  IVPB 1000 IV Intermittent every 12 hours  metoprolol tartrate 50 Oral two times a day  NIFEdipine XL 60 Oral daily  pantoprazole    Tablet 40 Oral before breakfast  QUEtiapine Oral Tab/Cap - Peds 25 Oral two times a day  sodium chloride 0.9%. 1000 IV Continuous <Continuous>  sodium chloride 0.9%. 1000 IV Continuous <Continuous>  vancomycin  IVPB 1000 IV Intermittent every 12 hours      WEIGHT  Weight (kg): 76.3 (04-28-21 @ 08:12)  Creatinine, Serum: 1.2 mg/dL (05-01-21 @ 08:01)      ANTIBIOTICS:  meropenem  IVPB 1000 milliGRAM(s) IV Intermittent every 12 hours  vancomycin  IVPB 1000 milliGRAM(s) IV Intermittent every 12 hours      All available historical records have been reviewed       RISHI HERRERA  75y, Male  Allergy: No Known Allergies      LOS  4d    CHIEF COMPLAINT: seizure (01 May 2021 13:29)      INTERVAL EVENTS/HPI  - No acute events overnight  - T(F): , Max: 100.2 (05-01-21 @ 20:00) - low grade temps -- last fevers on 4/30  - Denies any worsening symptoms - reports pain along right side has improved  - WBC Count: 18.67 (05-01-21 @ 08:01)  WBC Count: 24.37 (04-30-21 @ 06:47)     - Creatinine, Serum: 1.2 (05-01-21 @ 08:01)       ROS  General: Denies rigors, nightsweats  HEENT: Denies headache, rhinorrhea, sore throat, eye pain  CV: Denies CP, palpitations  PULM: Denies wheezing, hemoptysis  GI: Denies hematemesis, hematochezia, melena  : Denies discharge, hematuria  MSK: Denies arthralgias, myalgias  SKIN: Denies rash, lesions  NEURO: Denies paresthesias, weakness  PSYCH: Denies depression, anxiety    VITALS:  T(F): 99.6, Max: 100.2 (05-01-21 @ 20:00)  HR: 118  BP: 138/80  RR: 16Vital Signs Last 24 Hrs  T(C): 37.6 (02 May 2021 04:44), Max: 37.9 (01 May 2021 20:00)  T(F): 99.6 (02 May 2021 04:44), Max: 100.2 (01 May 2021 20:00)  HR: 118 (02 May 2021 04:44) (91 - 118)  BP: 138/80 (02 May 2021 04:44) (125/60 - 151/72)  BP(mean): --  RR: 16 (02 May 2021 04:44) (16 - 16)  SpO2: --    PHYSICAL EXAM:  Gen: NAD, resting in bed  HEENT: Normocephalic, atraumatic  Neck: supple, no lymphadenopathy  CV: Regular rate & regular rhythm  Lungs: decreased BS at bases, no fremitus  Abdomen: Soft, BS present  Ext: Warm, well perfused  Neuro: non focal, awake  Skin: no rash, no erythema  Lines: no phlebitis    FH: Non-contributory  Social Hx: Non-contributory    TESTS & MEASUREMENTS:                        10.6   18.67 )-----------( 281      ( 01 May 2021 08:01 )             32.2     05-01    137  |  105  |  17  ----------------------------<  212<H>  3.5   |  22  |  1.2    Ca    8.2<L>      01 May 2021 08:01  Mg     1.8     05-01      eGFR if Non African American: 58 mL/min/1.73M2 (05-01-21 @ 08:01)  eGFR if : 68 mL/min/1.73M2 (05-01-21 @ 08:01)          Culture - Blood (collected 04-28-21 @ 18:34)  Source: .Blood None  Preliminary Report (04-30-21 @ 01:02):    No growth to date.    Culture - Urine (collected 04-28-21 @ 17:40)  Source: .Urine Catheterized  Final Report (04-29-21 @ 19:37):    No growth    Culture - Blood (collected 04-28-21 @ 11:00)  Source: .Blood Blood  Preliminary Report (04-29-21 @ 23:01):    No growth to date.    Culture - Blood (collected 04-28-21 @ 04:10)  Source: .Blood Blood  Preliminary Report (04-29-21 @ 18:01):    No growth to date.    Culture - Blood (collected 04-28-21 @ 04:10)  Source: .Blood Blood  Preliminary Report (04-29-21 @ 18:01):    No growth to date.            INFECTIOUS DISEASES TESTING  Rapid RVP Result: NotDetec (04-28-21 @ 01:45)      INFLAMMATORY MARKERS      RADIOLOGY & ADDITIONAL TESTS:  I have personally reviewed the last available Chest xray  CXR      CT      CARDIOLOGY TESTING  12 Lead ECG:   Ventricular Rate 83 BPM    Atrial Rate 83 BPM    P-R Interval 164 ms    QRS Duration 80 ms    Q-T Interval 354 ms    QTC Calculation(Bazett) 415 ms    P Axis 26 degrees    R Axis -4 degrees    T Axis 69 degrees    Diagnosis Line Normal sinus rhythm  Poor R wave progression  Confirmed by MARIUSZ TIDWELL MD ([id number]) on 5/1/2021 9:14:13 AM (04-30-21 @ 20:22)  12 Lead ECG:   Ventricular Rate 81 BPM    Atrial Rate 159 BPM    QRS Duration 70 ms    Q-T Interval 350 ms    QTC Calculation(Bazett) 406 ms    R Axis -4 degrees    T Axis 93 degrees    Diagnosis Line Normal sinus rhythm  Low voltage QRS  Septal infarct , age undetermined  Nonspecific ST-T changes  Confirmed by MARIUSZ TIDWELL MD ([id number]) on 4/28/2021 9:03:22 AM  Also confirmed by MARIUSZ TIDWELL MD ([id number]),  River cooper ([id number])  on  4/29/2021 11:02:31 AM (04-28-21 @ 02:40)      MEDICATIONS  atorvastatin 40 Oral at bedtime  dextrose 40% Gel 15 Oral once  dextrose 5%. 1000 IV Continuous <Continuous>  dextrose 5%. 1000 IV Continuous <Continuous>  dextrose 50% Injectable 25 IV Push once  dextrose 50% Injectable 12.5 IV Push once  dextrose 50% Injectable 25 IV Push once  dipyridamole 200 mG/aspirin 25 mG 1 Oral two times a day  glucagon  Injectable 1 IntraMuscular once  heparin   Injectable 5000 SubCutaneous every 8 hours  insulin glargine Injectable (LANTUS) 24 SubCutaneous at bedtime  insulin lispro (ADMELOG) corrective regimen sliding scale  SubCutaneous three times a day before meals  insulin lispro Injectable (ADMELOG) 8 SubCutaneous before breakfast  insulin lispro Injectable (ADMELOG) 8 SubCutaneous before lunch  insulin lispro Injectable (ADMELOG) 8 SubCutaneous before dinner  levETIRAcetam 1000 Oral two times a day  lidocaine   Patch 1 Transdermal every 24 hours  meropenem  IVPB 1000 IV Intermittent every 12 hours  metoprolol tartrate 50 Oral two times a day  NIFEdipine XL 60 Oral daily  pantoprazole    Tablet 40 Oral before breakfast  QUEtiapine Oral Tab/Cap - Peds 25 Oral two times a day  sodium chloride 0.9%. 1000 IV Continuous <Continuous>  sodium chloride 0.9%. 1000 IV Continuous <Continuous>  vancomycin  IVPB 1000 IV Intermittent every 12 hours      WEIGHT  Weight (kg): 76.3 (04-28-21 @ 08:12)  Creatinine, Serum: 1.2 mg/dL (05-01-21 @ 08:01)      ANTIBIOTICS:  meropenem  IVPB 1000 milliGRAM(s) IV Intermittent every 12 hours  vancomycin  IVPB 1000 milliGRAM(s) IV Intermittent every 12 hours      All available historical records have been reviewed

## 2021-05-02 NOTE — PROGRESS NOTE ADULT - ASSESSMENT
ED presentation:   75 yr old male with hx of Seizure disorder, Hx of CVA with residual right sided weakness, HTN, DM, CKD 3 admitted for witnessed seizure. As per wife, patient was sitting on the bowel and had a seizure for 2 mins. Event was witnessed by son and wife. Wife also notes patient has been falling from the bed multiple time. Wife denies any fever, chills, sob, chest pain, abdominal pain, no urinary or bowel issues.     1) Sepsis not present on admission/UTI   -IV abx; prelim blood cultures negative   -monitor WBC   -ID follow up noted; abx adjusted     2) Seizure disorder  -REEG negative for epileptiform activity   -Neurology following   -Keppra dose increased to 1000mg twice daily     3) DM II  -on sq insulin   -monitor FS    4) Old CVA with Right sided residual deficits/Functional Quadriplegia   -continue with Aggrenox and Statins     5) Debility  -rehab/pt as tolerated     6) Rib fractures 10th and 11th   -pain control prn  -incentive spirometry     7) L1, L2, L3 minimally displaced fractures   -seen by Neurosurgery  -no need for emergent surgery  -TLSO brace and PT as tolerated   -pain control     8) JORDYN on suspected CKD stage III  -improved   -d/c IVF    dvt and gi ppx       # Progress Note Handoff  PENDING as follows  consults: ID   Test: Cultures, CBC   Family discussion: Discussed with patient and wife on a daily basis and today; aware of the current plan of care; labs given and agreeable for current abx regimen   Disposition:  from home; ACUTE     Attending Physician Dr. Judy Miller # [id number]     Spent over 45 mins today's plan of care

## 2021-05-03 LAB
ANION GAP SERPL CALC-SCNC: 10 MMOL/L — SIGNIFICANT CHANGE UP (ref 7–14)
BUN SERPL-MCNC: 15 MG/DL — SIGNIFICANT CHANGE UP (ref 10–20)
CALCIUM SERPL-MCNC: 8.9 MG/DL — SIGNIFICANT CHANGE UP (ref 8.5–10.1)
CHLORIDE SERPL-SCNC: 108 MMOL/L — SIGNIFICANT CHANGE UP (ref 98–110)
CO2 SERPL-SCNC: 26 MMOL/L — SIGNIFICANT CHANGE UP (ref 17–32)
CREAT SERPL-MCNC: 1 MG/DL — SIGNIFICANT CHANGE UP (ref 0.7–1.5)
CULTURE RESULTS: SIGNIFICANT CHANGE UP
GLUCOSE BLDC GLUCOMTR-MCNC: 118 MG/DL — HIGH (ref 70–99)
GLUCOSE BLDC GLUCOMTR-MCNC: 121 MG/DL — HIGH (ref 70–99)
GLUCOSE BLDC GLUCOMTR-MCNC: 170 MG/DL — HIGH (ref 70–99)
GLUCOSE BLDC GLUCOMTR-MCNC: 81 MG/DL — SIGNIFICANT CHANGE UP (ref 70–99)
GLUCOSE SERPL-MCNC: 97 MG/DL — SIGNIFICANT CHANGE UP (ref 70–99)
HCT VFR BLD CALC: 35 % — LOW (ref 42–52)
HGB BLD-MCNC: 11.3 G/DL — LOW (ref 14–18)
MCHC RBC-ENTMCNC: 30.1 PG — SIGNIFICANT CHANGE UP (ref 27–31)
MCHC RBC-ENTMCNC: 32.3 G/DL — SIGNIFICANT CHANGE UP (ref 32–37)
MCV RBC AUTO: 93.3 FL — SIGNIFICANT CHANGE UP (ref 80–94)
NRBC # BLD: 0 /100 WBCS — SIGNIFICANT CHANGE UP (ref 0–0)
PLATELET # BLD AUTO: 308 K/UL — SIGNIFICANT CHANGE UP (ref 130–400)
POTASSIUM SERPL-MCNC: 4.3 MMOL/L — SIGNIFICANT CHANGE UP (ref 3.5–5)
POTASSIUM SERPL-SCNC: 4.3 MMOL/L — SIGNIFICANT CHANGE UP (ref 3.5–5)
PROCALCITONIN SERPL-MCNC: 0.3 NG/ML — HIGH (ref 0.02–0.1)
RBC # BLD: 3.75 M/UL — LOW (ref 4.7–6.1)
RBC # FLD: 13.3 % — SIGNIFICANT CHANGE UP (ref 11.5–14.5)
SODIUM SERPL-SCNC: 144 MMOL/L — SIGNIFICANT CHANGE UP (ref 135–146)
SPECIMEN SOURCE: SIGNIFICANT CHANGE UP
WBC # BLD: 21.47 K/UL — HIGH (ref 4.8–10.8)
WBC # FLD AUTO: 21.47 K/UL — HIGH (ref 4.8–10.8)

## 2021-05-03 PROCEDURE — 99233 SBSQ HOSP IP/OBS HIGH 50: CPT

## 2021-05-03 PROCEDURE — 71045 X-RAY EXAM CHEST 1 VIEW: CPT | Mod: 26

## 2021-05-03 RX ADMIN — LEVETIRACETAM 1000 MILLIGRAM(S): 250 TABLET, FILM COATED ORAL at 05:42

## 2021-05-03 RX ADMIN — CEFTRIAXONE 100 MILLIGRAM(S): 500 INJECTION, POWDER, FOR SOLUTION INTRAMUSCULAR; INTRAVENOUS at 12:09

## 2021-05-03 RX ADMIN — Medication 50 MILLIGRAM(S): at 17:25

## 2021-05-03 RX ADMIN — LIDOCAINE 1 PATCH: 4 CREAM TOPICAL at 20:04

## 2021-05-03 RX ADMIN — PANTOPRAZOLE SODIUM 40 MILLIGRAM(S): 20 TABLET, DELAYED RELEASE ORAL at 05:42

## 2021-05-03 RX ADMIN — LIDOCAINE 1 PATCH: 4 CREAM TOPICAL at 06:28

## 2021-05-03 RX ADMIN — Medication 8 UNIT(S): at 09:06

## 2021-05-03 RX ADMIN — ASPIRIN AND DIPYRIDAMOLE 1 CAPSULE(S): 25; 200 CAPSULE, EXTENDED RELEASE ORAL at 17:25

## 2021-05-03 RX ADMIN — HEPARIN SODIUM 5000 UNIT(S): 5000 INJECTION INTRAVENOUS; SUBCUTANEOUS at 13:48

## 2021-05-03 RX ADMIN — QUETIAPINE FUMARATE 25 MILLIGRAM(S): 200 TABLET, FILM COATED ORAL at 17:25

## 2021-05-03 RX ADMIN — Medication 8 UNIT(S): at 17:24

## 2021-05-03 RX ADMIN — ASPIRIN AND DIPYRIDAMOLE 1 CAPSULE(S): 25; 200 CAPSULE, EXTENDED RELEASE ORAL at 05:42

## 2021-05-03 RX ADMIN — HEPARIN SODIUM 5000 UNIT(S): 5000 INJECTION INTRAVENOUS; SUBCUTANEOUS at 22:25

## 2021-05-03 RX ADMIN — Medication 1: at 12:08

## 2021-05-03 RX ADMIN — Medication 50 MILLIGRAM(S): at 05:42

## 2021-05-03 RX ADMIN — ATORVASTATIN CALCIUM 40 MILLIGRAM(S): 80 TABLET, FILM COATED ORAL at 22:25

## 2021-05-03 RX ADMIN — Medication 60 MILLIGRAM(S): at 05:42

## 2021-05-03 RX ADMIN — LIDOCAINE 1 PATCH: 4 CREAM TOPICAL at 17:25

## 2021-05-03 RX ADMIN — LEVETIRACETAM 1000 MILLIGRAM(S): 250 TABLET, FILM COATED ORAL at 17:24

## 2021-05-03 RX ADMIN — QUETIAPINE FUMARATE 25 MILLIGRAM(S): 200 TABLET, FILM COATED ORAL at 05:42

## 2021-05-03 RX ADMIN — Medication 8 UNIT(S): at 12:08

## 2021-05-03 RX ADMIN — HEPARIN SODIUM 5000 UNIT(S): 5000 INJECTION INTRAVENOUS; SUBCUTANEOUS at 05:42

## 2021-05-03 NOTE — PROGRESS NOTE ADULT - SUBJECTIVE AND OBJECTIVE BOX
RISHI HERRERA  75y, Male  Allergy: No Known Allergies      LOS  5d    CHIEF COMPLAINT: seizure (03 May 2021 09:21)      INTERVAL EVENTS/HPI  - No acute events overnight  - T(F): , Max: 98.1 (05-02-21 @ 21:00)  - no acute complaints   - WBC Count: 21.47 (05-03-21 @ 06:02)  WBC Count: 23.27 (05-02-21 @ 08:04)     - Creatinine, Serum: 1.0 (05-03-21 @ 06:02)  Creatinine, Serum: 1.2 (05-02-21 @ 08:04)       ROS  General: Denies rigors, nightsweats  HEENT: Denies headache, rhinorrhea, sore throat, eye pain  CV: Denies CP, palpitations  PULM: Denies wheezing, hemoptysis  GI: Denies hematemesis, hematochezia, melena  : Denies discharge, hematuria  MSK: Denies arthralgias, myalgias  SKIN: Denies rash, lesions  NEURO: Denies paresthesias, weakness  PSYCH: Denies depression, anxiety    VITALS:  T(F): 97.8, Max: 98.1 (05-02-21 @ 21:00)  HR: 83  BP: 129/57  RR: 16Vital Signs Last 24 Hrs  T(C): 36.6 (03 May 2021 05:38), Max: 36.7 (02 May 2021 21:00)  T(F): 97.8 (03 May 2021 05:38), Max: 98.1 (02 May 2021 21:00)  HR: 83 (03 May 2021 05:38) (83 - 113)  BP: 129/57 (03 May 2021 05:38) (113/58 - 129/57)  BP(mean): --  RR: 16 (03 May 2021 05:38) (16 - 16)  SpO2: --    PHYSICAL EXAM:  Gen: NAD, resting in bed  HEENT: Normocephalic, atraumatic  Neck: supple, no lymphadenopathy  CV: Regular rate & regular rhythm  Lungs: decreased BS at bases, no fremitus  Abdomen: Soft, BS present  Ext: Warm, well perfused  Neuro: non focal, awake  Skin: no rash, no erythema  Lines: no phlebitis    FH: Non-contributory  Social Hx: Non-contributory    TESTS & MEASUREMENTS:                        11.3   21.47 )-----------( 308      ( 03 May 2021 06:02 )             35.0     05-03    144  |  108  |  15  ----------------------------<  97  4.3   |  26  |  1.0    Ca    8.9      03 May 2021 06:02      eGFR if Non African American: 73 mL/min/1.73M2 (05-03-21 @ 06:02)  eGFR if : 84 mL/min/1.73M2 (05-03-21 @ 06:02)          Culture - Blood (collected 04-28-21 @ 18:34)  Source: .Blood None  Preliminary Report (04-30-21 @ 01:02):    No growth to date.    Culture - Urine (collected 04-28-21 @ 17:40)  Source: .Urine Catheterized  Final Report (04-29-21 @ 19:37):    No growth    Culture - Blood (collected 04-28-21 @ 11:00)  Source: .Blood Blood  Preliminary Report (04-29-21 @ 23:01):    No growth to date.    Culture - Blood (collected 04-28-21 @ 04:10)  Source: .Blood Blood  Preliminary Report (04-29-21 @ 18:01):    No growth to date.    Culture - Blood (collected 04-28-21 @ 04:10)  Source: .Blood Blood  Preliminary Report (04-29-21 @ 18:01):    No growth to date.            INFECTIOUS DISEASES TESTING  Rapid RVP Result: NotDetec (04-28-21 @ 01:45)      INFLAMMATORY MARKERS      RADIOLOGY & ADDITIONAL TESTS:  I have personally reviewed the last available Chest xray  CXR      CT      CARDIOLOGY TESTING  12 Lead ECG:   Ventricular Rate 110 BPM    Atrial Rate 110 BPM    P-R Interval 172 ms    QRS Duration 82 ms    Q-T Interval 326 ms    QTC Calculation(Bazett) 441 ms    P Axis 57 degrees    R Axis -15 degrees    T Axis 68 degrees    Diagnosis Line Sinus tachycardia  Low voltage QRS  Poor R wave progression    Confirmed by MARIUSZ TIDWELL MD ([id number]) on 5/2/2021 9:24:48 AM (05-02-21 @ 02:08)  12 Lead ECG:   Ventricular Rate 83 BPM    Atrial Rate 83 BPM    P-R Interval 164 ms    QRS Duration 80 ms    Q-T Interval 354 ms    QTC Calculation(Bazett) 415 ms    P Axis 26 degrees    R Axis -4 degrees    T Axis 69 degrees    Diagnosis Line Normal sinus rhythm  Poor R wave progression  Confirmed by MARIUSZ TIDWELL MD ([id number]) on 5/1/2021 9:14:13 AM (04-30-21 @ 20:22)      MEDICATIONS  atorvastatin 40 Oral at bedtime  cefTRIAXone   IVPB 2000 IV Intermittent every 24 hours  cefTRIAXone   IVPB     dextrose 40% Gel 15 Oral once  dextrose 5%. 1000 IV Continuous <Continuous>  dextrose 5%. 1000 IV Continuous <Continuous>  dextrose 50% Injectable 25 IV Push once  dextrose 50% Injectable 12.5 IV Push once  dextrose 50% Injectable 25 IV Push once  dipyridamole 200 mG/aspirin 25 mG 1 Oral two times a day  glucagon  Injectable 1 IntraMuscular once  heparin   Injectable 5000 SubCutaneous every 8 hours  insulin glargine Injectable (LANTUS) 24 SubCutaneous at bedtime  insulin lispro (ADMELOG) corrective regimen sliding scale  SubCutaneous three times a day before meals  insulin lispro Injectable (ADMELOG) 8 SubCutaneous before breakfast  insulin lispro Injectable (ADMELOG) 8 SubCutaneous before lunch  insulin lispro Injectable (ADMELOG) 8 SubCutaneous before dinner  levETIRAcetam 1000 Oral two times a day  lidocaine   Patch 1 Transdermal every 24 hours  metoprolol tartrate 50 Oral two times a day  NIFEdipine XL 60 Oral daily  pantoprazole    Tablet 40 Oral before breakfast  QUEtiapine Oral Tab/Cap - Peds 25 Oral two times a day      WEIGHT  Weight (kg): 76.3 (04-28-21 @ 08:12)  Creatinine, Serum: 1.0 mg/dL (05-03-21 @ 06:02)      ANTIBIOTICS:  cefTRIAXone   IVPB 2000 milliGRAM(s) IV Intermittent every 24 hours  cefTRIAXone   IVPB          All available historical records have been reviewed

## 2021-05-03 NOTE — PROGRESS NOTE ADULT - ASSESSMENT
ASSESSMENT  75 yr old male with hx of Seizure disorder, Hx of CVA with residual right sided weakness, HTN, DM, CKD 3 admitted for witnessed seizure.    IMPRESSION  #Sepsis during admission (Fevers, WBC>12) secondary to UTI  - CT Abdomen and Pelvis w/ IV Cont (04.28.21 @ 04:46): Urinary bladder increased wall thickening, mucosal hyperenhancement and pericystic stranding; correlate for urinary bladder cystitis.  - WBC Count: 42.36: (04.28.21 @ 11:00) -> improving  - Blood Cx 4/28 NG  - Urine Cx 4/28 NG  - US Dopplers negative  - CXR 5/1 with atelectasis     #Seizures  #Fall with acute rib and transverse process fractures  - CT Abdomen and Pelvis w/ IV Cont (04.28.21 @ 04:46):  Acute displaced 10th and minimally displaced 11th right rib fractures. No pneumothorax. Acute minimally displaced fractures of the right transverse processes of L1, L2, and L3.    #Hx of CVA  #CKD III  #DM  #Abx allergy: NKDA    Creatinine, Serum: 1.3 mg/dL (04.30.21 @ 06:47)  Weight (kg): 76.3 (28 Apr 2021 08:12)  CrCl 52    RECOMMENDATIONS  - fevers improved but WBC still elevated -- all cultures have been negative  - follow-up Procalcitonin  - if WBC continues to rise, would consider heme consult   - otherwise continue ceftriaxone 2g daily for now  - bowel regimen to ensure stool burden is not contributing to WBC     Please call or message on [employer] Teams if with any questions.  Spectra [phone]

## 2021-05-03 NOTE — PROGRESS NOTE ADULT - SUBJECTIVE AND OBJECTIVE BOX
RISHI HERRERA  75y  Male      Patient is a 75y old  Male who presents with a chief complaint of seizure (2021 07:15)      INTERVAL HPI/OVERNIGHT EVENTS:  21:  pt seen and examined at bedside this morning and in the afternoon in the presence of wife Bonita  -WBC downtrended; on IV meropenem; ID consult pending (IV vanco x 1 dose given yesterday)  -REEG with no epileptiform activity   -TLSO brace ordered for PT as tolerated   -incentive spirometry encouraged   -wife aware of the current plan of care     21:  pt seen and examined at bedside   -no change in current abx regimen; cultures pending  -ID following  -wife aware of the overall poor prognosis and current plan of care   -encourage incentive spirometry and use of TLSO brace during PT   -skin care as per nursing     21:  pt seen and examined at bedside   -doing better   -overnight events noted; discussed with wife and made aware of the negative cardiac enzymes and current status   -WBC improving; continue with current IV antibiotics regimen   -skin care as per nursing   -daily cbcs  -LE duplex negative for DVT     21:  pt seen and examined at bedside   -changed abx; ID follow up noted  -spoke to wife Bob; updates  given  -skin care as per nursing with frequent turning and positioning     21:  pt seen and examined at bedside  -still requiring IV antibiotics   -physical therapy as tolerated/bedside ROM   -ID following   -daily labs     REVIEW OF SYSTEMS:  -denies any complaints to me     Vital Signs Last 24 Hrs  T(C): 36.6 (03 May 2021 05:38), Max: 37.7 (02 May 2021 11:00)  T(F): 97.8 (03 May 2021 05:38), Max: 99.8 (02 May 2021 11:00)  HR: 83 (03 May 2021 05:38) (83 - 113)  BP: 129/57 (03 May 2021 05:38) (113/58 - 129/57)  RR: 16 (03 May 2021 05:38) (16 - 16      PHYSICAL EXAM:  GENERAL: NAD  HEAD:  Atraumatic, Normocephalic  EYES: EOMI, PERRLA, conjunctiva and sclera clear  NERVOUS SYSTEM:  Alert & Oriented X 1; agitated   CHEST/LUNG: Clear to percussion bilaterally; No rales, rhonchi, wheezing, or rubs  CV/HEART: Regular rate and rhythm; No murmurs, rubs, or gallops  GI/ABDOMEN: Soft, Nontender, Nondistended; Bowel sounds present  EXTREMITIES:  2+ Peripheral Pulses, No clubbing, cyanosis, or edema  SKIN: No rashes or lesions    LAB:                        11.3   21.47 )-----------( 308      ( 03 May 2021 06:02 )             35.0   05-03    144  |  108  |  15  ----------------------------<  97  4.3   |  26  |  1.0    Ca    8.9      03 May 2021 06:02      Color: Yellow / Appearance: Slightly Cloudy / S.020 / pH: x  Gluc: x / Ketone: Negative  / Bili: Negative / Urobili: 0.2 mg/dL   Blood: x / Protein: 30 mg/dL / Nitrite: Negative   Leuk Esterase: Large / RBC: 3-5 /HPF / WBC >50 /HPF   Sq Epi: x / Non Sq Epi: Few /HPF / Bacteria: x      LIVER FUNCTIONS - ( 2021 11:00 )  Alb: 3.6 g/dL / Pro: 7.0 g/dL / ALK PHOS: 193 U/L / ALT: 25 U/L / AST: 21 U/L / GGT: x           RADIOLOGY:    Imaging Personally Reviewed:  [y] YES  [ ] NO    HEALTH ISSUES - PROBLEM Dx:      MEDICATIONS  (STANDING):  atorvastatin 40 milliGRAM(s) Oral at bedtime  cefTRIAXone   IVPB 2000 milliGRAM(s) IV Intermittent every 24 hours  cefTRIAXone   IVPB      dextrose 40% Gel 15 Gram(s) Oral once  dextrose 5%. 1000 milliLiter(s) (50 mL/Hr) IV Continuous <Continuous>  dextrose 5%. 1000 milliLiter(s) (100 mL/Hr) IV Continuous <Continuous>  dextrose 50% Injectable 25 Gram(s) IV Push once  dextrose 50% Injectable 12.5 Gram(s) IV Push once  dextrose 50% Injectable 25 Gram(s) IV Push once  dipyridamole 200 mG/aspirin 25 mG 1 Capsule(s) Oral two times a day  glucagon  Injectable 1 milliGRAM(s) IntraMuscular once  heparin   Injectable 5000 Unit(s) SubCutaneous every 8 hours  insulin glargine Injectable (LANTUS) 24 Unit(s) SubCutaneous at bedtime  insulin lispro (ADMELOG) corrective regimen sliding scale   SubCutaneous three times a day before meals  insulin lispro Injectable (ADMELOG) 8 Unit(s) SubCutaneous before breakfast  insulin lispro Injectable (ADMELOG) 8 Unit(s) SubCutaneous before lunch  insulin lispro Injectable (ADMELOG) 8 Unit(s) SubCutaneous before dinner  levETIRAcetam 1000 milliGRAM(s) Oral two times a day  lidocaine   Patch 1 Patch Transdermal every 24 hours  metoprolol tartrate 50 milliGRAM(s) Oral two times a day  NIFEdipine XL 60 milliGRAM(s) Oral daily  pantoprazole    Tablet 40 milliGRAM(s) Oral before breakfast  QUEtiapine Oral Tab/Cap - Peds 25 milliGRAM(s) Oral two times a day    MEDICATIONS  (PRN):  acetaminophen   Tablet .. 650 milliGRAM(s) Oral every 6 hours PRN Temp greater or equal to 38C (100.4F), Mild Pain (1 - 3)  aluminum hydroxide/magnesium hydroxide/simethicone Suspension 30 milliLiter(s) Oral every 4 hours PRN Dyspepsia  diazepam    Tablet 10 milliGRAM(s) Oral two times a day PRN AGITATION

## 2021-05-03 NOTE — PROGRESS NOTE ADULT - ASSESSMENT
ED presentation:   75 yr old male with hx of Seizure disorder, Hx of CVA with residual right sided weakness, HTN, DM, CKD 3 admitted for witnessed seizure. As per wife, patient was sitting on the bowel and had a seizure for 2 mins. Event was witnessed by son and wife. Wife also notes patient has been falling from the bed multiple time. Wife denies any fever, chills, sob, chest pain, abdominal pain, no urinary or bowel issues.     1) Sepsis not present on admission/UTI   -IV abx; blood cultures negative   -monitor WBC --- downtrending   -ID following    2) Seizure disorder  -REEG negative for epileptiform activity   -Neurology following   -Keppra dose increased to 1000mg twice daily     3) DM II  -on sq insulin   -monitor FS    4) Old CVA with Right sided residual deficits/Functional Quadriplegia   -continue with Aggrenox and Statins     5) Debility  -rehab/pt as tolerated     6) Rib fractures 10th and 11th   -pain control prn  -incentive spirometry     7) L1, L2, L3 minimally displaced fractures   -seen by Neurosurgery  -no need for emergent surgery  -TLSO brace and PT as tolerated   -pain control     8) JORDYN on suspected CKD stage III  -improved   -d/c IVF    dvt and gi ppx       # Progress Note Handoff  PENDING as follows  consults: ID follow up  Test: WBC  Family discussion: Discussed with patient and wife on a daily basis; aware of the current plan of care; labs given and agreeable for current abx regimen   Disposition:  from home; ACUTE     Attending Physician Dr. Judy Miller # [id number]     Spent over 35 mins today's plan of care

## 2021-05-04 LAB
CULTURE RESULTS: SIGNIFICANT CHANGE UP
GLUCOSE BLDC GLUCOMTR-MCNC: 124 MG/DL — HIGH (ref 70–99)
GLUCOSE BLDC GLUCOMTR-MCNC: 149 MG/DL — HIGH (ref 70–99)
GLUCOSE BLDC GLUCOMTR-MCNC: 170 MG/DL — HIGH (ref 70–99)
GLUCOSE BLDC GLUCOMTR-MCNC: 230 MG/DL — HIGH (ref 70–99)
GLUCOSE BLDC GLUCOMTR-MCNC: 252 MG/DL — HIGH (ref 70–99)
HCT VFR BLD CALC: 37.4 % — LOW (ref 42–52)
HGB BLD-MCNC: 12.2 G/DL — LOW (ref 14–18)
MCHC RBC-ENTMCNC: 30.2 PG — SIGNIFICANT CHANGE UP (ref 27–31)
MCHC RBC-ENTMCNC: 32.6 G/DL — SIGNIFICANT CHANGE UP (ref 32–37)
MCV RBC AUTO: 92.6 FL — SIGNIFICANT CHANGE UP (ref 80–94)
NRBC # BLD: 0 /100 WBCS — SIGNIFICANT CHANGE UP (ref 0–0)
PLATELET # BLD AUTO: 405 K/UL — HIGH (ref 130–400)
RBC # BLD: 4.04 M/UL — LOW (ref 4.7–6.1)
RBC # FLD: 13.2 % — SIGNIFICANT CHANGE UP (ref 11.5–14.5)
SPECIMEN SOURCE: SIGNIFICANT CHANGE UP
WBC # BLD: 18.37 K/UL — HIGH (ref 4.8–10.8)
WBC # FLD AUTO: 18.37 K/UL — HIGH (ref 4.8–10.8)

## 2021-05-04 PROCEDURE — 76870 US EXAM SCROTUM: CPT | Mod: 26

## 2021-05-04 PROCEDURE — 99233 SBSQ HOSP IP/OBS HIGH 50: CPT

## 2021-05-04 RX ADMIN — HEPARIN SODIUM 5000 UNIT(S): 5000 INJECTION INTRAVENOUS; SUBCUTANEOUS at 05:34

## 2021-05-04 RX ADMIN — ASPIRIN AND DIPYRIDAMOLE 1 CAPSULE(S): 25; 200 CAPSULE, EXTENDED RELEASE ORAL at 18:01

## 2021-05-04 RX ADMIN — QUETIAPINE FUMARATE 25 MILLIGRAM(S): 200 TABLET, FILM COATED ORAL at 05:34

## 2021-05-04 RX ADMIN — LEVETIRACETAM 1000 MILLIGRAM(S): 250 TABLET, FILM COATED ORAL at 05:34

## 2021-05-04 RX ADMIN — CEFTRIAXONE 100 MILLIGRAM(S): 500 INJECTION, POWDER, FOR SOLUTION INTRAMUSCULAR; INTRAVENOUS at 13:45

## 2021-05-04 RX ADMIN — HEPARIN SODIUM 5000 UNIT(S): 5000 INJECTION INTRAVENOUS; SUBCUTANEOUS at 21:13

## 2021-05-04 RX ADMIN — Medication 50 MILLIGRAM(S): at 18:00

## 2021-05-04 RX ADMIN — INSULIN GLARGINE 24 UNIT(S): 100 INJECTION, SOLUTION SUBCUTANEOUS at 21:14

## 2021-05-04 RX ADMIN — HEPARIN SODIUM 5000 UNIT(S): 5000 INJECTION INTRAVENOUS; SUBCUTANEOUS at 13:47

## 2021-05-04 RX ADMIN — ATORVASTATIN CALCIUM 40 MILLIGRAM(S): 80 TABLET, FILM COATED ORAL at 21:13

## 2021-05-04 RX ADMIN — Medication 8 UNIT(S): at 17:59

## 2021-05-04 RX ADMIN — LEVETIRACETAM 1000 MILLIGRAM(S): 250 TABLET, FILM COATED ORAL at 18:01

## 2021-05-04 RX ADMIN — LIDOCAINE 1 PATCH: 4 CREAM TOPICAL at 16:14

## 2021-05-04 RX ADMIN — Medication 50 MILLIGRAM(S): at 05:34

## 2021-05-04 RX ADMIN — Medication 2: at 12:28

## 2021-05-04 RX ADMIN — LIDOCAINE 1 PATCH: 4 CREAM TOPICAL at 05:31

## 2021-05-04 RX ADMIN — Medication 8 UNIT(S): at 12:28

## 2021-05-04 RX ADMIN — ASPIRIN AND DIPYRIDAMOLE 1 CAPSULE(S): 25; 200 CAPSULE, EXTENDED RELEASE ORAL at 05:34

## 2021-05-04 RX ADMIN — Medication 60 MILLIGRAM(S): at 05:34

## 2021-05-04 RX ADMIN — QUETIAPINE FUMARATE 25 MILLIGRAM(S): 200 TABLET, FILM COATED ORAL at 18:00

## 2021-05-04 RX ADMIN — PANTOPRAZOLE SODIUM 40 MILLIGRAM(S): 20 TABLET, DELAYED RELEASE ORAL at 05:34

## 2021-05-04 RX ADMIN — LIDOCAINE 1 PATCH: 4 CREAM TOPICAL at 20:08

## 2021-05-04 NOTE — CHART NOTE - NSCHARTNOTEFT_GEN_A_CORE
Discussed case with Hematologist on call; official consult cancelled as patient's leukocytosis is improving and CML suspicion is low on the list as per the phone coversation.  If persistent leukocytosis once discharged from the hospital, then BCR-ABL and work up as per heme/onc reccs in the community

## 2021-05-04 NOTE — PROGRESS NOTE ADULT - ASSESSMENT
ED presentation:   75 yr old male with hx of Seizure disorder, Hx of CVA with residual right sided weakness, HTN, DM, CKD 3 admitted for witnessed seizure. As per wife, patient was sitting on the bowel and had a seizure for 2 mins. Event was witnessed by son and wife. Wife also notes patient has been falling from the bed multiple time. Wife denies any fever, chills, sob, chest pain, abdominal pain, no urinary or bowel issues.     1) Sepsis not present on admission/UTI   -IV Rocephin 2 g for now; blood cultures negative   -persistent leukocytosis   -ID follow up appreciated; placed Hematology consult for further reccs (for elevated WBC)    2) Seizure disorder  -REEG negative for epileptiform activity   -Keppra dose increased to 1000mg twice daily (stable)     3) DM II  -on sq insulin   -monitor FS    4) Old CVA with Right sided residual deficits/Functional Quadriplegia   -continue with Aggrenox and Statins     5) Debility  -rehab/pt as tolerated     6) Rib fractures 10th and 11th   -pain control prn  -incentive spirometry     7) L1, L2, L3 minimally displaced fractures   -seen by Neurosurgery  -no need for emergent surgery  -TLSO brace and PT as tolerated   -pain control     8) JORDYN on suspected CKD stage III  -improved   -d/c IVF    9) Scrotal Swelling  -Ultrasound for now; monitor; been off IVF    dvt and gi ppx   *** d/c joe; TOV     # Progress Note Handoff  PENDING as follows  consults: ID follow up  Test: WBC  Family discussion: Discussed with patient and wife on a daily basis; aware of the current plan of care; labs given and agreeable for current abx regimen   Disposition:  from home; ACUTE     Attending Physician Dr. Judy Miller # [id number]     Spent over 35 mins today's plan of care

## 2021-05-04 NOTE — PROGRESS NOTE ADULT - SUBJECTIVE AND OBJECTIVE BOX
SHARON RISHI  75y  Male      Patient is a 75y old  Male who presents with a chief complaint of seizure (2021 07:15)      INTERVAL HPI/OVERNIGHT EVENTS:  21:  pt seen and examined at bedside this morning and in the afternoon in the presence of wife Bonita  -WBC downtrended; on IV meropenem; ID consult pending (IV vanco x 1 dose given yesterday)  -REEG with no epileptiform activity   -TLSO brace ordered for PT as tolerated   -incentive spirometry encouraged   -wife aware of the current plan of care     21:  pt seen and examined at bedside   -no change in current abx regimen; cultures pending  -ID following  -wife aware of the overall poor prognosis and current plan of care   -encourage incentive spirometry and use of TLSO brace during PT   -skin care as per nursing     21:  pt seen and examined at bedside   -doing better   -overnight events noted; discussed with wife and made aware of the negative cardiac enzymes and current status   -WBC improving; continue with current IV antibiotics regimen   -skin care as per nursing   -daily cbcs  -LE duplex negative for DVT     21:  pt seen and examined at bedside   -changed abx; ID follow up noted  -spoke to wife Bob; updates  given  -skin care as per nursing with frequent turning and positioning     21:  pt seen and examined at bedside  -still requiring IV antibiotics   -physical therapy as tolerated/bedside ROM   -ID following   -daily labs     21:  pt seen and examined at bedside this morning   -still with persistent leukocytosis despite prolonged broad spectrum IV antibiotics; will place hematology consult as per ID reccs  -continue with IV Rocephin 2g for now  -ID follow up appreciated   -skin care as per nursing staff with frequent turning and positioning   -scrotal swelling noticed --- order sonogram   -Also TOV today     REVIEW OF SYSTEMS:  -denies any complaints to me     Vital Signs Last 24 Hrs  T(C): 35.6 (04 May 2021 12:32), Max: 36.8 (03 May 2021 13:45)  T(F): 96.1 (04 May 2021 12:32), Max: 98.3 (03 May 2021 13:45)  HR: 98 (04 May 2021 12:32) (73 - 98)  BP: 142/67 (04 May 2021 12:32) (114/56 - 187/78)  RR: 16 (04 May 2021 12:32) (16 - 18)    PHYSICAL EXAM:  GENERAL: NAD  HEAD:  Atraumatic, Normocephalic  EYES: EOMI, PERRLA, conjunctiva and sclera clear  NERVOUS SYSTEM:  Alert & Oriented X 1; agitated   CHEST/LUNG: Clear to percussion bilaterally; No rales, rhonchi, wheezing, or rubs  CV/HEART: Regular rate and rhythm; No murmurs, rubs, or gallops  GI/ABDOMEN: Soft, Nontender, Nondistended; Bowel sounds present  EXTREMITIES:  2+ Peripheral Pulses, No clubbing, cyanosis, or edema  SKIN: No rashes or lesions    LAB:                        12.2   18.37 )-----------( 405      ( 04 May 2021 11:31 )             37.4              05-03    144  |  108  |  15  ----------------------------<  97  4.3   |  26  |  1.0    Ca    8.9      03 May 2021 06:02    Color: Yellow / Appearance: Slightly Cloudy / S.020 / pH: x  Gluc: x / Ketone: Negative  / Bili: Negative / Urobili: 0.2 mg/dL   Blood: x / Protein: 30 mg/dL / Nitrite: Negative   Leuk Esterase: Large / RBC: 3-5 /HPF / WBC >50 /HPF   Sq Epi: x / Non Sq Epi: Few /HPF / Bacteria: x      LIVER FUNCTIONS - ( 2021 11:00 )  Alb: 3.6 g/dL / Pro: 7.0 g/dL / ALK PHOS: 193 U/L / ALT: 25 U/L / AST: 21 U/L / GGT: x           RADIOLOGY:    Imaging Personally Reviewed:  [y] YES  [ ] NO    HEALTH ISSUES - PROBLEM Dx:      MEDICATIONS  (STANDING):  atorvastatin 40 milliGRAM(s) Oral at bedtime  cefTRIAXone   IVPB 2000 milliGRAM(s) IV Intermittent every 24 hours  cefTRIAXone   IVPB      dextrose 40% Gel 15 Gram(s) Oral once  dextrose 5%. 1000 milliLiter(s) (50 mL/Hr) IV Continuous <Continuous>  dextrose 5%. 1000 milliLiter(s) (100 mL/Hr) IV Continuous <Continuous>  dextrose 50% Injectable 25 Gram(s) IV Push once  dextrose 50% Injectable 12.5 Gram(s) IV Push once  dextrose 50% Injectable 25 Gram(s) IV Push once  dipyridamole 200 mG/aspirin 25 mG 1 Capsule(s) Oral two times a day  glucagon  Injectable 1 milliGRAM(s) IntraMuscular once  heparin   Injectable 5000 Unit(s) SubCutaneous every 8 hours  insulin glargine Injectable (LANTUS) 24 Unit(s) SubCutaneous at bedtime  insulin lispro (ADMELOG) corrective regimen sliding scale   SubCutaneous three times a day before meals  insulin lispro Injectable (ADMELOG) 8 Unit(s) SubCutaneous before breakfast  insulin lispro Injectable (ADMELOG) 8 Unit(s) SubCutaneous before lunch  insulin lispro Injectable (ADMELOG) 8 Unit(s) SubCutaneous before dinner  levETIRAcetam 1000 milliGRAM(s) Oral two times a day  lidocaine   Patch 1 Patch Transdermal every 24 hours  metoprolol tartrate 50 milliGRAM(s) Oral two times a day  NIFEdipine XL 60 milliGRAM(s) Oral daily  pantoprazole    Tablet 40 milliGRAM(s) Oral before breakfast  QUEtiapine Oral Tab/Cap - Peds 25 milliGRAM(s) Oral two times a day    MEDICATIONS  (PRN):  acetaminophen   Tablet .. 650 milliGRAM(s) Oral every 6 hours PRN Temp greater or equal to 38C (100.4F), Mild Pain (1 - 3)  aluminum hydroxide/magnesium hydroxide/simethicone Suspension 30 milliLiter(s) Oral every 4 hours PRN Dyspepsia  diazepam    Tablet 10 milliGRAM(s) Oral two times a day PRN AGITATION

## 2021-05-04 NOTE — PROGRESS NOTE ADULT - ASSESSMENT
ASSESSMENT  75 yr old male with hx of Seizure disorder, Hx of CVA with residual right sided weakness, HTN, DM, CKD 3 admitted for witnessed seizure.    IMPRESSION  #Sepsis during admission (Fevers, WBC>12) secondary to UTI  - CT Abdomen and Pelvis w/ IV Cont (04.28.21 @ 04:46): Urinary bladder increased wall thickening, mucosal hyperenhancement and pericystic stranding; correlate for urinary bladder cystitis.  - WBC Count: 42.36: (04.28.21 @ 11:00) -> improving  - Blood Cx 4/28 NG  - Urine Cx 4/28 NG  - US Dopplers negative  - CXR 5/1 with atelectasis   - Procalcitonin, Serum: 0.30 (05.03.21 @ 06:02)      #Seizures  #Fall with acute rib and transverse process fractures  - CT Abdomen and Pelvis w/ IV Cont (04.28.21 @ 04:46):  Acute displaced 10th and minimally displaced 11th right rib fractures. No pneumothorax. Acute minimally displaced fractures of the right transverse processes of L1, L2, and L3.    #Hx of CVA  #CKD III  #DM  #Abx allergy: NKDA    Creatinine, Serum: 1.3 mg/dL (04.30.21 @ 06:47)  Weight (kg): 76.3 (28 Apr 2021 08:12)  CrCl 52    RECOMMENDATIONS  - fevers improved but WBC still elevated -- all cultures have been negative  - Procalcitonin mildly elevated   - if WBC continues to rise, would consider heme consult   - otherwise continue ceftriaxone 2g daily for now - repeat procalcitonin tomorrow AM with labs  - bowel regimen to ensure stool burden is not contributing to WBC     Please call or message on [employer] Teams if with any questions.  Spectra [phone]

## 2021-05-04 NOTE — PROGRESS NOTE ADULT - SUBJECTIVE AND OBJECTIVE BOX
RISHI HERRERA  75y, Male  Allergy: No Known Allergies      LOS  6d    CHIEF COMPLAINT: seizure (03 May 2021 12:08)      INTERVAL EVENTS/HPI  - No acute events overnight  - T(F): , Max: 98.3 (05-03-21 @ 13:45)  - Denies any worsening symptoms  - Tolerating medication  = poor historian   - WBC Count: 21.47 (05-03-21 @ 06:02)  WBC Count: 23.27 (05-02-21 @ 08:04)     - Creatinine, Serum: 1.0 (05-03-21 @ 06:02)       ROS  General: Denies rigors, nightsweats  HEENT: Denies headache, rhinorrhea, sore throat, eye pain  CV: Denies CP, palpitations  PULM: Denies wheezing, hemoptysis  GI: Denies hematemesis, hematochezia, melena  : Denies discharge, hematuria  MSK: Denies arthralgias, myalgias  SKIN: Denies rash, lesions  NEURO: Denies paresthesias, weakness  PSYCH: Denies depression, anxiety    VITALS:  T(F): 97.4, Max: 98.3 (05-03-21 @ 13:45)  HR: 88  BP: 144/67  RR: 16Vital Signs Last 24 Hrs  T(C): 36.3 (04 May 2021 05:13), Max: 36.8 (03 May 2021 13:45)  T(F): 97.4 (04 May 2021 05:13), Max: 98.3 (03 May 2021 13:45)  HR: 88 (04 May 2021 10:03) (73 - 88)  BP: 144/67 (04 May 2021 10:03) (114/56 - 187/78)  BP(mean): --  RR: 16 (04 May 2021 05:13) (16 - 18)  SpO2: --    PHYSICAL EXAM:  Gen: NAD, resting in bed  HEENT: Normocephalic, atraumatic  Neck: supple, no lymphadenopathy  CV: Regular rate & regular rhythm  Lungs: decreased BS at bases, no fremitus  Abdomen: Soft, BS present  Ext: Warm, well perfused  Neuro: non focal, awake  Skin: no rash, no erythema  Lines: no phlebitis    FH: Non-contributory  Social Hx: Non-contributory    TESTS & MEASUREMENTS:                        11.3   21.47 )-----------( 308      ( 03 May 2021 06:02 )             35.0     05-03    144  |  108  |  15  ----------------------------<  97  4.3   |  26  |  1.0    Ca    8.9      03 May 2021 06:02              Culture - Blood (collected 04-28-21 @ 18:34)  Source: .Blood None  Final Report (05-04-21 @ 01:01):    No Growth Final    Culture - Urine (collected 04-28-21 @ 17:40)  Source: .Urine Catheterized  Final Report (04-29-21 @ 19:37):    No growth    Culture - Blood (collected 04-28-21 @ 11:00)  Source: .Blood Blood  Final Report (05-03-21 @ 23:01):    No Growth Final    Culture - Blood (collected 04-28-21 @ 04:10)  Source: .Blood Blood  Final Report (05-03-21 @ 18:01):    No Growth Final    Culture - Blood (collected 04-28-21 @ 04:10)  Source: .Blood Blood  Final Report (05-03-21 @ 18:01):    No Growth Final            INFECTIOUS DISEASES TESTING  Procalcitonin, Serum: 0.30 (05-03-21 @ 06:02)  Rapid RVP Result: NotDetec (04-28-21 @ 01:45)      INFLAMMATORY MARKERS      RADIOLOGY & ADDITIONAL TESTS:  I have personally reviewed the last available Chest xray  CXR      CT      CARDIOLOGY TESTING  12 Lead ECG:   Ventricular Rate 110 BPM    Atrial Rate 110 BPM    P-R Interval 172 ms    QRS Duration 82 ms    Q-T Interval 326 ms    QTC Calculation(Bazett) 441 ms    P Axis 57 degrees    R Axis -15 degrees    T Axis 68 degrees    Diagnosis Line Sinus tachycardia  Low voltage QRS  Poor R wave progression    Confirmed by MARIUSZ TIDWELL MD ([id number]) on 5/2/2021 9:24:48 AM (05-02-21 @ 02:08)  12 Lead ECG:   Ventricular Rate 83 BPM    Atrial Rate 83 BPM    P-R Interval 164 ms    QRS Duration 80 ms    Q-T Interval 354 ms    QTC Calculation(Bazett) 415 ms    P Axis 26 degrees    R Axis -4 degrees    T Axis 69 degrees    Diagnosis Line Normal sinus rhythm  Poor R wave progression  Confirmed by MARIUSZ TIDWELL MD ([id number]) on 5/1/2021 9:14:13 AM (04-30-21 @ 20:22)      MEDICATIONS  atorvastatin 40 Oral at bedtime  cefTRIAXone   IVPB 2000 IV Intermittent every 24 hours  cefTRIAXone   IVPB     dextrose 40% Gel 15 Oral once  dextrose 5%. 1000 IV Continuous <Continuous>  dextrose 5%. 1000 IV Continuous <Continuous>  dextrose 50% Injectable 25 IV Push once  dextrose 50% Injectable 12.5 IV Push once  dextrose 50% Injectable 25 IV Push once  dipyridamole 200 mG/aspirin 25 mG 1 Oral two times a day  glucagon  Injectable 1 IntraMuscular once  heparin   Injectable 5000 SubCutaneous every 8 hours  insulin glargine Injectable (LANTUS) 24 SubCutaneous at bedtime  insulin lispro (ADMELOG) corrective regimen sliding scale  SubCutaneous three times a day before meals  insulin lispro Injectable (ADMELOG) 8 SubCutaneous before breakfast  insulin lispro Injectable (ADMELOG) 8 SubCutaneous before lunch  insulin lispro Injectable (ADMELOG) 8 SubCutaneous before dinner  levETIRAcetam 1000 Oral two times a day  lidocaine   Patch 1 Transdermal every 24 hours  metoprolol tartrate 50 Oral two times a day  NIFEdipine XL 60 Oral daily  pantoprazole    Tablet 40 Oral before breakfast  QUEtiapine Oral Tab/Cap - Peds 25 Oral two times a day      WEIGHT  Weight (kg): 76.3 (04-28-21 @ 08:12)      ANTIBIOTICS:  cefTRIAXone   IVPB 2000 milliGRAM(s) IV Intermittent every 24 hours  cefTRIAXone   IVPB          All available historical records have been reviewed

## 2021-05-05 LAB
ANION GAP SERPL CALC-SCNC: 15 MMOL/L — HIGH (ref 7–14)
BUN SERPL-MCNC: 14 MG/DL — SIGNIFICANT CHANGE UP (ref 10–20)
CALCIUM SERPL-MCNC: 9 MG/DL — SIGNIFICANT CHANGE UP (ref 8.5–10.1)
CHLORIDE SERPL-SCNC: 104 MMOL/L — SIGNIFICANT CHANGE UP (ref 98–110)
CO2 SERPL-SCNC: 23 MMOL/L — SIGNIFICANT CHANGE UP (ref 17–32)
CREAT SERPL-MCNC: 0.9 MG/DL — SIGNIFICANT CHANGE UP (ref 0.7–1.5)
GLUCOSE BLDC GLUCOMTR-MCNC: 152 MG/DL — HIGH (ref 70–99)
GLUCOSE BLDC GLUCOMTR-MCNC: 163 MG/DL — HIGH (ref 70–99)
GLUCOSE BLDC GLUCOMTR-MCNC: 212 MG/DL — HIGH (ref 70–99)
GLUCOSE BLDC GLUCOMTR-MCNC: 246 MG/DL — HIGH (ref 70–99)
GLUCOSE BLDC GLUCOMTR-MCNC: 247 MG/DL — HIGH (ref 70–99)
GLUCOSE SERPL-MCNC: 156 MG/DL — HIGH (ref 70–99)
HCT VFR BLD CALC: 34.5 % — LOW (ref 42–52)
HGB BLD-MCNC: 11.4 G/DL — LOW (ref 14–18)
MCHC RBC-ENTMCNC: 30.5 PG — SIGNIFICANT CHANGE UP (ref 27–31)
MCHC RBC-ENTMCNC: 33 G/DL — SIGNIFICANT CHANGE UP (ref 32–37)
MCV RBC AUTO: 92.2 FL — SIGNIFICANT CHANGE UP (ref 80–94)
NRBC # BLD: 0 /100 WBCS — SIGNIFICANT CHANGE UP (ref 0–0)
PLATELET # BLD AUTO: 255 K/UL — SIGNIFICANT CHANGE UP (ref 130–400)
POTASSIUM SERPL-MCNC: 4 MMOL/L — SIGNIFICANT CHANGE UP (ref 3.5–5)
POTASSIUM SERPL-SCNC: 4 MMOL/L — SIGNIFICANT CHANGE UP (ref 3.5–5)
RBC # BLD: 3.74 M/UL — LOW (ref 4.7–6.1)
RBC # FLD: 13.1 % — SIGNIFICANT CHANGE UP (ref 11.5–14.5)
SODIUM SERPL-SCNC: 142 MMOL/L — SIGNIFICANT CHANGE UP (ref 135–146)
WBC # BLD: 17.69 K/UL — HIGH (ref 4.8–10.8)
WBC # FLD AUTO: 17.69 K/UL — HIGH (ref 4.8–10.8)

## 2021-05-05 PROCEDURE — 99233 SBSQ HOSP IP/OBS HIGH 50: CPT

## 2021-05-05 RX ORDER — LOSARTAN POTASSIUM 100 MG/1
50 TABLET, FILM COATED ORAL DAILY
Refills: 0 | Status: DISCONTINUED | OUTPATIENT
Start: 2021-05-05 | End: 2021-05-06

## 2021-05-05 RX ADMIN — LIDOCAINE 1 PATCH: 4 CREAM TOPICAL at 04:00

## 2021-05-05 RX ADMIN — ASPIRIN AND DIPYRIDAMOLE 1 CAPSULE(S): 25; 200 CAPSULE, EXTENDED RELEASE ORAL at 05:34

## 2021-05-05 RX ADMIN — PANTOPRAZOLE SODIUM 40 MILLIGRAM(S): 20 TABLET, DELAYED RELEASE ORAL at 05:34

## 2021-05-05 RX ADMIN — LEVETIRACETAM 1000 MILLIGRAM(S): 250 TABLET, FILM COATED ORAL at 05:34

## 2021-05-05 RX ADMIN — Medication 8 UNIT(S): at 16:24

## 2021-05-05 RX ADMIN — LIDOCAINE 1 PATCH: 4 CREAM TOPICAL at 17:24

## 2021-05-05 RX ADMIN — QUETIAPINE FUMARATE 25 MILLIGRAM(S): 200 TABLET, FILM COATED ORAL at 05:34

## 2021-05-05 RX ADMIN — QUETIAPINE FUMARATE 25 MILLIGRAM(S): 200 TABLET, FILM COATED ORAL at 17:22

## 2021-05-05 RX ADMIN — INSULIN GLARGINE 24 UNIT(S): 100 INJECTION, SOLUTION SUBCUTANEOUS at 21:00

## 2021-05-05 RX ADMIN — LIDOCAINE 1 PATCH: 4 CREAM TOPICAL at 16:24

## 2021-05-05 RX ADMIN — ATORVASTATIN CALCIUM 40 MILLIGRAM(S): 80 TABLET, FILM COATED ORAL at 21:00

## 2021-05-05 RX ADMIN — Medication 2: at 13:18

## 2021-05-05 RX ADMIN — HEPARIN SODIUM 5000 UNIT(S): 5000 INJECTION INTRAVENOUS; SUBCUTANEOUS at 16:23

## 2021-05-05 RX ADMIN — Medication 60 MILLIGRAM(S): at 05:34

## 2021-05-05 RX ADMIN — Medication 50 MILLIGRAM(S): at 05:33

## 2021-05-05 RX ADMIN — Medication 8 UNIT(S): at 13:18

## 2021-05-05 RX ADMIN — Medication 50 MILLIGRAM(S): at 17:22

## 2021-05-05 RX ADMIN — HEPARIN SODIUM 5000 UNIT(S): 5000 INJECTION INTRAVENOUS; SUBCUTANEOUS at 05:34

## 2021-05-05 RX ADMIN — ASPIRIN AND DIPYRIDAMOLE 1 CAPSULE(S): 25; 200 CAPSULE, EXTENDED RELEASE ORAL at 17:22

## 2021-05-05 RX ADMIN — CEFTRIAXONE 100 MILLIGRAM(S): 500 INJECTION, POWDER, FOR SOLUTION INTRAMUSCULAR; INTRAVENOUS at 11:14

## 2021-05-05 RX ADMIN — LEVETIRACETAM 1000 MILLIGRAM(S): 250 TABLET, FILM COATED ORAL at 17:22

## 2021-05-05 RX ADMIN — Medication 2: at 16:24

## 2021-05-05 NOTE — DIETITIAN INITIAL EVALUATION ADULT. - REASON FOR ADMISSION
Pt admitted for witnessed seizure; REEG negative for epileptiform activity. Sepsis not present on admission/UTI. Per progress notes, pt still with persistent leukocytosis despite prolonged broad spectrum IV antibiotics; will place hematology consult as per ID recs.

## 2021-05-05 NOTE — DIETITIAN INITIAL EVALUATION ADULT. - OTHER CALCULATIONS
Dosing wt: 168#/76kg--0086-7099 kcal/day (MSJ x 1.2-1.3 AF); Protein: 76-91 gm/day (1-1.2 gm/kg CBW); Fluids: per LIP

## 2021-05-05 NOTE — DIETITIAN INITIAL EVALUATION ADULT. - PHYSCIAL ASSESSMENT
noted to be alert, unable to speak per EMR. BMI: 26.3, IBW: 148#, 2+ edema to scrotum, skin intact/overweight

## 2021-05-05 NOTE — DIETITIAN INITIAL EVALUATION ADULT. - ENERGY INTAKE
=================================================================

Non Stress Test

=================================================================

Datetime Report Generated by CPN: 02/06/2020 11:57

   

   

=================================================================

DEMOGRAPHIC

=================================================================

   

EGA NST:  34.2

   

=================================================================

INDICATION

=================================================================

   

Indication for Study (NST) Other:  2 vessel cord

   

=================================================================

VITAL SIGNS

=================================================================

   

Pulse - NST:  106

RESP - NST:  18

NBPSYS NST:  107

NBPDIA NST:  64

   

=================================================================

MONITORING

=================================================================

   

Monitor Explained:  Monitor Explained; Test Explained; Patient

   Verbalized Understanding

Time on Monitor:  02/06/2020 10:37

   

=================================================================

NST INTERVENTIONS

=================================================================

   

NST Interventions:  PO Hydration; Reposition Patient

Physician Notified NST:  Dr. Alcocer on unit, reviewed fht, advised

   10x10, ok to d/c home

BABY A:  [id number]

   

=================================================================

BABY A

=================================================================

   

Fetal Movement :  Present

Contraction Frequency :  none

FHR Baseline :  145

Accelerations :  10X10

Decelerations :  None

Variability :  Moderate 6-25bpm

NST Review:  Meets Criteria for Reactive NST

NST Review and Verified By :  SEVERO Medina RN

NST Results:  Reactive

   

=================================================================

NST REPORT

=================================================================

   

Report Trigger:  Send Report
Poor (<50%)

## 2021-05-05 NOTE — PROGRESS NOTE ADULT - SUBJECTIVE AND OBJECTIVE BOX
Subjective:    Doing okay. Denies any significant pain      ROS: limited due to mental status but can answer yes/no and simple questions      T(C): 36.5 (05-05-21 @ 13:32), Max: 37.4 (05-04-21 @ 21:00)  HR: 119 (05-05-21 @ 13:32) (87 - 119)  BP: 187/87 (05-05-21 @ 13:32) (143/70 - 187/87)  RR: 16 (05-05-21 @ 13:32) (16 - 16)  SpO2: --    Physical Exam:  General: appears stated age, no acute distress, laying in bed  Eyes: PERRLA/ EOMI  HEENT: Neck supple  Respiratory: Clear to auscultation bilaterally, No wheezing/rales/rhonchi  CV: RRR,  no murmurs/ rubs / gallops  Abdomen: Soft, Nontender, nondistended, bowel sounds present  Extremities: No edema, RUE contracted  Skin: No Rashes, Hematoma, Ecchymosis  Neurology: A&Ox3, RUE contracted, RLE weak but able to move      --- LABS ---                         11.4   17.69 )-----------( 255      ( 05 May 2021 08:39 )             34.5     05-05    142  |  104  |  14  ----------------------------<  156<H>  4.0   |  23  |  0.9    Ca    9.0      05 May 2021 08:39    CAPILLARY BLOOD GLUCOSE  POCT Blood Glucose.: 212 mg/dL (05 May 2021 16:12)  POCT Blood Glucose.: 246 mg/dL (05 May 2021 13:16)  POCT Blood Glucose.: 163 mg/dL (05 May 2021 11:10)  POCT Blood Glucose.: 152 mg/dL (05 May 2021 07:50)  POCT Blood Glucose.: 170 mg/dL (04 May 2021 21:07)      --- CULTURES ---  4/28 COVID negative  4/28 blood culture negative  4/28 Urine culture negative, but UA positive  4/29 COVID Ab positive    --- IMAGES ---    4/28 CT head -- No CT evidenceof acute intracranial pathology. Stable chronic infarct in the distribution of the left MCA.    4/28 CT cervical spine -- No acute cervical spine fracture or subluxation.    4/28 CXR -- Elevated right hemidiaphragm. No acute infiltrates.    4/28 XR Pelvix -- No evidence of acute osseous abnormality.    4/28 CT chest/abd/pel --   1.  Acute displaced 10th and minimally displaced 11th right rib fractures. No pneumothorax.  2.  Acute minimally displaced fractures of the right transverse processes of L1, L2, and L3.  3.  Urinary bladder increased wall thickening, mucosal hyperenhancement and pericystic stranding; correlate for urinary bladder cystitis.    4/30 Venous duplex -- No evidence of deep venousthrombosis in either lower extremity.    5/1 CXR -- Right basilar focal atelectasis, decreased elevation of the right hemidiaphragm.    5/1 CXR -- Right basilar atelectasis.    5/4 US testicle -- Left greater than right hydroceles. Large volume left-sided spermatocele.Penile pump prosthetic within the right hemiscrotum.      --- MEDICATIONS ---   MEDICATIONS  (STANDING):  atorvastatin 40 milliGRAM(s) Oral at bedtime  cefTRIAXone   IVPB 2000 milliGRAM(s) IV Intermittent every 24 hours  dipyridamole 200 mG/aspirin 25 mG 1 Capsule(s) Oral two times a day  glucagon  Injectable 1 milliGRAM(s) IntraMuscular once  insulin glargine Injectable (LANTUS) 24 Unit(s) SubCutaneous at bedtime  insulin lispro (ADMELOG) corrective regimen sliding scale   SubCutaneous three times a day before meals  insulin lispro Injectable (ADMELOG) 8 Unit(s) SubCutaneous before dinner  insulin lispro Injectable (ADMELOG) 8 Unit(s) SubCutaneous before breakfast  insulin lispro Injectable (ADMELOG) 8 Unit(s) SubCutaneous before lunch  levETIRAcetam 1000 milliGRAM(s) Oral two times a day  lidocaine   Patch 1 Patch Transdermal every 24 hours  losartan 50 milliGRAM(s) Oral daily  metoprolol tartrate 50 milliGRAM(s) Oral two times a day  NIFEdipine XL 60 milliGRAM(s) Oral daily  pantoprazole    Tablet 40 milliGRAM(s) Oral before breakfast  QUEtiapine Oral Tab/Cap - Peds 25 milliGRAM(s) Oral two times a day    MEDICATIONS  (PRN):  acetaminophen   Tablet .. 650 milliGRAM(s) Oral every 6 hours PRN Temp greater or equal to 38C (100.4F), Mild Pain (1 - 3)  aluminum hydroxide/magnesium hydroxide/simethicone Suspension 30 milliLiter(s) Oral every 4 hours PRN Dyspepsia  diazepam    Tablet 10 milliGRAM(s) Oral two times a day PRN AGITATION          ASSESSMENT AND PLAN    RISHI HERRERA is a 75y Male with hx of Seizure disorder, Hx of CVA with residual right sided weakness, HTN, DM, CKD 3 admitted for witnessed seizure. As per wife, patient was sitting on the bowel and had a seizure for 2 mins. Event was witnessed by son and wife. Wife also notes patient has been falling from the bed multiple time.       # Leukocytosis -- Unclear etiology. On admission WBC 30  ·	WBC 30 --> 42 --> 18 --> 23 --> 17. remaining persistently elevated despite Abx  ·	Hematology consulted for further evaluation    # Sepsis (not POA) in setting of UTI -- fevers/tachycardia/leukocytosis. UA positive but UCx negative. 4/28 Blood culture negative. CXR no acute findings. CT abd/pel/chest with urinary bladder wall thickening and pericystic stranding. No PE. venous duplex negative. COVID negative.  ·	ID following. Continue Abx at this time  ·	Was given dose Rocephin (4/27) --> Vanc/Meropenem (4/28-5/2) --> currently on Rocephin (as of 5/3). Currently day 9 (as of 5/5)    # Seizure in setting of seizure d/o -- presented with witnessed seizure. CT head/C-spine with stable chronic infarct of Left MCA and C-spine neg  ·	routine EEG negative for epileptiform activity  ·	Increased keppra to 1000mg bid (from home 750mg bid)    # Rib fracture 10th-11th / L1-2-3 minimally displaced fractures -- in setting of multiple falls. Noted on CT imaging.  ·	Incentive spirometry  ·	pain control with lidoderm patch  ·	Neurosurgery evaluated. No need for emergent surgery. TLSO brace and PT    # Scrotal Swelling  ·	5/4 US scrotum with L>R hydrocele and large volume left sided spermatocele. Penile pump prosthetic  ·	No need for intervention in setting of asxs / no pain.       Resolved  # JORDYN on CKD2 -- Cr on admission 1.7. home losartan held temporarily. Resolved    --- CHRONIC MEDICAL CONDITIONS ---  1. DM2 -- holding home metformin while inpatient (also on Toujeo 30u qhs). A1c 7.8%. Continue Lantus 24qhs, lispro 8u TID, SSI  2. CVA with right sided weakness/RUE contraction / Functional quadriplegia -- Dipyramidole/ASA, atorva 40  3. HTN -- nifedipine 60mg, metoprolol 50mg bid, restarted home losartan 50mg   4. GERD -- pantoprazole  5. Agitation/Anxiety -- Seroquel 25mg bid  6. CKD2 -- baseline Cr 0.9-1.0 with GFR 60-90. At baseline.         DVT -- on DAPT with dipyrimadole/ASA  Code -- Full  Dispo -- pending improvement in WBC / hematology eval. Home when medically ready.        Subjective:    Doing okay. Denies any significant pain      ROS: limited due to mental status but can answer yes/no and simple questions      T(C): 36.5 (05-05-21 @ 13:32), Max: 37.4 (05-04-21 @ 21:00)  HR: 119 (05-05-21 @ 13:32) (87 - 119)  BP: 187/87 (05-05-21 @ 13:32) (143/70 - 187/87)  RR: 16 (05-05-21 @ 13:32) (16 - 16)  SpO2: --    Physical Exam:  General: appears stated age, no acute distress, laying in bed  Eyes: PERRLA/ EOMI  HEENT: Neck supple  Respiratory: Clear to auscultation bilaterally, No wheezing/rales/rhonchi  CV: RRR,  no murmurs/ rubs / gallops  Abdomen: Soft, Nontender, nondistended, bowel sounds present  Extremities: No edema, RUE contracted  Skin: No Rashes, Hematoma, Ecchymosis  Neurology: A&Ox3, RUE contracted, RLE weak but able to move      --- LABS ---                         11.4   17.69 )-----------( 255      ( 05 May 2021 08:39 )             34.5     05-05    142  |  104  |  14  ----------------------------<  156<H>  4.0   |  23  |  0.9    Ca    9.0      05 May 2021 08:39    CAPILLARY BLOOD GLUCOSE  POCT Blood Glucose.: 212 mg/dL (05 May 2021 16:12)  POCT Blood Glucose.: 246 mg/dL (05 May 2021 13:16)  POCT Blood Glucose.: 163 mg/dL (05 May 2021 11:10)  POCT Blood Glucose.: 152 mg/dL (05 May 2021 07:50)  POCT Blood Glucose.: 170 mg/dL (04 May 2021 21:07)      --- CULTURES ---  4/28 COVID negative  4/28 blood culture negative  4/28 Urine culture negative, but UA positive  4/29 COVID Ab positive    --- IMAGES ---    4/28 CT head -- No CT evidenceof acute intracranial pathology. Stable chronic infarct in the distribution of the left MCA.    4/28 CT cervical spine -- No acute cervical spine fracture or subluxation.    4/28 CXR -- Elevated right hemidiaphragm. No acute infiltrates.    4/28 XR Pelvix -- No evidence of acute osseous abnormality.    4/28 CT chest/abd/pel --   1.  Acute displaced 10th and minimally displaced 11th right rib fractures. No pneumothorax.  2.  Acute minimally displaced fractures of the right transverse processes of L1, L2, and L3.  3.  Urinary bladder increased wall thickening, mucosal hyperenhancement and pericystic stranding; correlate for urinary bladder cystitis.    4/30 Venous duplex -- No evidence of deep venousthrombosis in either lower extremity.    5/1 CXR -- Right basilar focal atelectasis, decreased elevation of the right hemidiaphragm.    5/1 CXR -- Right basilar atelectasis.    5/4 US testicle -- Left greater than right hydroceles. Large volume left-sided spermatocele.Penile pump prosthetic within the right hemiscrotum.      --- MEDICATIONS ---   MEDICATIONS  (STANDING):  atorvastatin 40 milliGRAM(s) Oral at bedtime  cefTRIAXone   IVPB 2000 milliGRAM(s) IV Intermittent every 24 hours  dipyridamole 200 mG/aspirin 25 mG 1 Capsule(s) Oral two times a day  glucagon  Injectable 1 milliGRAM(s) IntraMuscular once  insulin glargine Injectable (LANTUS) 24 Unit(s) SubCutaneous at bedtime  insulin lispro (ADMELOG) corrective regimen sliding scale   SubCutaneous three times a day before meals  insulin lispro Injectable (ADMELOG) 8 Unit(s) SubCutaneous before dinner  insulin lispro Injectable (ADMELOG) 8 Unit(s) SubCutaneous before breakfast  insulin lispro Injectable (ADMELOG) 8 Unit(s) SubCutaneous before lunch  levETIRAcetam 1000 milliGRAM(s) Oral two times a day  lidocaine   Patch 1 Patch Transdermal every 24 hours  losartan 50 milliGRAM(s) Oral daily  metoprolol tartrate 50 milliGRAM(s) Oral two times a day  NIFEdipine XL 60 milliGRAM(s) Oral daily  pantoprazole    Tablet 40 milliGRAM(s) Oral before breakfast  QUEtiapine Oral Tab/Cap - Peds 25 milliGRAM(s) Oral two times a day    MEDICATIONS  (PRN):  acetaminophen   Tablet .. 650 milliGRAM(s) Oral every 6 hours PRN Temp greater or equal to 38C (100.4F), Mild Pain (1 - 3)  aluminum hydroxide/magnesium hydroxide/simethicone Suspension 30 milliLiter(s) Oral every 4 hours PRN Dyspepsia  diazepam    Tablet 10 milliGRAM(s) Oral two times a day PRN AGITATION          ASSESSMENT AND PLAN    RISHI HERRERA is a 75y Male with hx of Seizure disorder, Hx of CVA with residual right sided weakness, HTN, DM, CKD 3 admitted for witnessed seizure. As per wife, patient was sitting on the bowel and had a seizure for 2 mins. Event was witnessed by son and wife. Wife also notes patient has been falling from the bed multiple time.       # Leukocytosis -- Unclear etiology. On admission WBC 30  ·	WBC 30 --> 42 --> 18 --> 23 --> 17. remaining persistently elevated despite Abx  ·	Hematology consulted for further evaluation. Discussed with Dr. Abrams 5/5. Will send BCR-ABL. Will need repeat CBC in 2 weeks and outpatient followup.    # Sepsis (not POA) in setting of UTI -- fevers/tachycardia/leukocytosis. UA positive but UCx negative. 4/28 Blood culture negative. CXR no acute findings. CT abd/pel/chest with urinary bladder wall thickening and pericystic stranding. No PE. venous duplex negative. COVID negative.  ·	ID following. Continue Abx at this time  ·	Was given dose Rocephin (4/27) --> Vanc/Meropenem (4/28-5/2) --> currently on Rocephin (as of 5/3). Currently day 9 (as of 5/5). Will discontinue 5/6 after discussion with ID as no other indication for infection and UTI would be adaquately treated at this point.    # Seizure in setting of seizure d/o -- presented with witnessed seizure. CT head/C-spine with stable chronic infarct of Left MCA and C-spine neg  ·	routine EEG negative for epileptiform activity  ·	Increased keppra to 1000mg bid (from home 750mg bid)    # Rib fracture 10th-11th / L1-2-3 minimally displaced fractures -- in setting of multiple falls. Noted on CT imaging.  ·	Incentive spirometry  ·	pain control with lidoderm patch  ·	Neurosurgery evaluated. No need for emergent surgery. TLSO brace and PT    # Scrotal Swelling  ·	5/4 US scrotum with L>R hydrocele and large volume left sided spermatocele. Penile pump prosthetic  ·	No need for intervention in setting of asxs / no pain.     # Onychomycosis   ·	Seen by Podiatry 5/5. aseptic debridement done. Recommend outpt f/u Dr. Bradley       Resolved  # JORDYN on CKD2 -- Cr on admission 1.7. home losartan held temporarily. Resolved    --- CHRONIC MEDICAL CONDITIONS ---  1. DM2 -- holding home metformin while inpatient (also on Toujeo 30u qhs). A1c 7.8%. Continue Lantus 24qhs, lispro 8u TID, SSI  2. CVA with right sided weakness/RUE contraction / Functional quadriplegia -- Dipyramidole/ASA, atorva 40  3. HTN -- nifedipine 60mg, metoprolol 50mg bid, restarted home losartan 50mg   4. GERD -- pantoprazole  5. Agitation/Anxiety -- Seroquel 25mg bid  6. CKD2 -- baseline Cr 0.9-1.0 with GFR 60-90. At baseline.         DVT -- on DAPT with dipyrimadole/ASA  Code -- Full  Dispo -- pending improvement in WBC / hematology eval. Home when medically ready.

## 2021-05-05 NOTE — DIETITIAN INITIAL EVALUATION ADULT. - ADD RECOMMEND
1. Liberalize diet to regular to optimize po intake, 2. Order Glucerna BID--all recs discussed with LIP via phone (connected via unit clerk)

## 2021-05-05 NOTE — DIETITIAN INITIAL EVALUATION ADULT. - FEEDING SKILL
Per RN, pt ate nothing for breakfast this morning, but wife is visiting today and states that he'll have [place] toast later. Per RN, pt's wife states that he's not much of a big eater at baseline either and has to be encouraged. Per RN, pt has barely been consuming any of his meals./independent

## 2021-05-05 NOTE — CONSULT NOTE ADULT - SUBJECTIVE AND OBJECTIVE BOX
Podiatry Consult Note    Subjective:    RISHI HERRERA is a 75y year old Male seen at bedside with a chief complaint of  painful thickened, dystrophic, ingrowing and long toenails digits 1-5 bilaterally and preventative foot examination. Patient is medically managed  by Medicine/Hospitalists.  Patient denies any history of trauma to both feet. Patient has no other pedal complaints.  Patient is experiencing pain while standing, walking and in shoe gear.     PAST MEDICAL & SURGICAL HISTORY:  Controlled type 2 diabetes mellitus without complication, unspecified long term insulin use status  Essential hypertension  Depression, unspecified depression type  Coronary artery disease without angina pectoris, unspecified vessel or lesion type, unspecified whether native or transplanted heart  Cerebrovascular accident (CVA) due to stenosis of left middle cerebral artery  High blood cholesterol  History of appendectomy  H/O umbilical hernia repair  History of lumbar surgery  History of penile implant  Bradly filter in place  H/O brain surgery    PSH:History of appendectomy  H/O umbilical hernia repair  History of lumbar surgery  History of penile implant  Bradly filter in place  H/O brain surgery    Allergies:No Known Allergies    Labs:                        11.4   17.69 )-----------( 255      ( 05 May 2021 08:39 )             34.5     WBC Trend  17.69<H> Date (05-05 @ 08:39)  18.37<H> Date (05-04 @ 11:31)  21.47<H> Date (05-03 @ 06:02)  23.27<H> Date (05-02 @ 08:04)  18.67<H> Date (05-01 @ 08:01)  24.37<H> Date (04-30 @ 06:47)  33.66<H> Date (04-29 @ 06:58)  42.36<HH> Date (04-28 @ 11:00)  30.46<H> Date (04-28 @ 01:55)    Chem  05-05    142  |  104  |  14  ----------------------------<  156<H>  4.0   |  23  |  0.9    Ca    9.0      05 May 2021 08:39    T(F): 96.8 (05-05-21 @ 05:30), Max: 99.3 (05-04-21 @ 21:00)  HR: 87 (05-05-21 @ 05:30) (87 - 103)  BP: 150/71 (05-05-21 @ 05:30) (143/70 - 156/81)  RR: 16 (05-05-21 @ 05:30) (16 - 16)  SpO2: --  Wt(kg): --    Objective:   DERM:  Skin warm, dry and supple bilateral. No open lesions or inter-digital macerations noted bilateral.   Toenails 1-5 Right and Left feet thickened, elongated, discolored, and dystrophic with subungual debris. There is pain upon palpation of all fungal and ingrowing nails 1-5 bilaterally.   VASC: Dorsalis Pedis and Posterior Tibial pulses diminished  NEURO: Protective sensation intact to the level of the digits bilateral.  MSK: Muscle strength 5/5 all major muscle groups bilateral. No structural abnormality, bilaterally    Assessment:   Bilateral dystrophic toenails consistent with Onychomycosis.   Pain from Elongated nails, ingrowing, incurvated, and dystrophic nails upon palpation of nails.      Plan:   Discussed diagnosis and treatment with patient  Aseptic debridement and curettage of all fungal and ingrowing nails 1-5 bilateral with sterile nail nipper.  Discussed importance of daily foot examinations, drying of feet after bathing and proper shoe gear.  Patient Would Benefit From Periodic Debridements; Can Follow Up as Outpatient w/ Dr. Bradley Post Discharge   Discussed Plan w/ Attending;     Spectra;

## 2021-05-06 LAB
ANION GAP SERPL CALC-SCNC: 12 MMOL/L — SIGNIFICANT CHANGE UP (ref 7–14)
ANION GAP SERPL CALC-SCNC: 15 MMOL/L — HIGH (ref 7–14)
APTT BLD: 33.8 SEC — SIGNIFICANT CHANGE UP (ref 27–39.2)
BASOPHILS # BLD AUTO: 0.09 K/UL — SIGNIFICANT CHANGE UP (ref 0–0.2)
BASOPHILS NFR BLD AUTO: 0.5 % — SIGNIFICANT CHANGE UP (ref 0–1)
BLD GP AB SCN SERPL QL: SIGNIFICANT CHANGE UP
BUN SERPL-MCNC: 12 MG/DL — SIGNIFICANT CHANGE UP (ref 10–20)
BUN SERPL-MCNC: 15 MG/DL — SIGNIFICANT CHANGE UP (ref 10–20)
CALCIUM SERPL-MCNC: 9.1 MG/DL — SIGNIFICANT CHANGE UP (ref 8.5–10.1)
CALCIUM SERPL-MCNC: 9.2 MG/DL — SIGNIFICANT CHANGE UP (ref 8.5–10.1)
CHLORIDE SERPL-SCNC: 102 MMOL/L — SIGNIFICANT CHANGE UP (ref 98–110)
CHLORIDE SERPL-SCNC: 103 MMOL/L — SIGNIFICANT CHANGE UP (ref 98–110)
CO2 SERPL-SCNC: 23 MMOL/L — SIGNIFICANT CHANGE UP (ref 17–32)
CO2 SERPL-SCNC: 26 MMOL/L — SIGNIFICANT CHANGE UP (ref 17–32)
CREAT SERPL-MCNC: 0.8 MG/DL — SIGNIFICANT CHANGE UP (ref 0.7–1.5)
CREAT SERPL-MCNC: 1 MG/DL — SIGNIFICANT CHANGE UP (ref 0.7–1.5)
EOSINOPHIL # BLD AUTO: 1.14 K/UL — HIGH (ref 0–0.7)
EOSINOPHIL NFR BLD AUTO: 6.8 % — SIGNIFICANT CHANGE UP (ref 0–8)
GLUCOSE BLDC GLUCOMTR-MCNC: 156 MG/DL — HIGH (ref 70–99)
GLUCOSE BLDC GLUCOMTR-MCNC: 159 MG/DL — HIGH (ref 70–99)
GLUCOSE BLDC GLUCOMTR-MCNC: 209 MG/DL — HIGH (ref 70–99)
GLUCOSE BLDC GLUCOMTR-MCNC: 260 MG/DL — HIGH (ref 70–99)
GLUCOSE SERPL-MCNC: 179 MG/DL — HIGH (ref 70–99)
GLUCOSE SERPL-MCNC: 191 MG/DL — HIGH (ref 70–99)
HCT VFR BLD CALC: 28.9 % — LOW (ref 42–52)
HCT VFR BLD CALC: 34.6 % — LOW (ref 42–52)
HCT VFR BLD CALC: 37.9 % — LOW (ref 42–52)
HGB BLD-MCNC: 11.5 G/DL — LOW (ref 14–18)
HGB BLD-MCNC: 12.2 G/DL — LOW (ref 14–18)
HGB BLD-MCNC: 9.6 G/DL — LOW (ref 14–18)
IMM GRANULOCYTES NFR BLD AUTO: 3.1 % — HIGH (ref 0.1–0.3)
INR BLD: 1.03 RATIO — SIGNIFICANT CHANGE UP (ref 0.65–1.3)
LACTATE SERPL-SCNC: 1 MMOL/L — SIGNIFICANT CHANGE UP (ref 0.7–2)
LACTATE SERPL-SCNC: 3.9 MMOL/L — HIGH (ref 0.7–2)
LYMPHOCYTES # BLD AUTO: 13.4 % — LOW (ref 20.5–51.1)
LYMPHOCYTES # BLD AUTO: 2.25 K/UL — SIGNIFICANT CHANGE UP (ref 1.2–3.4)
MANUAL SMEAR VERIFICATION: SIGNIFICANT CHANGE UP
MCHC RBC-ENTMCNC: 30.3 PG — SIGNIFICANT CHANGE UP (ref 27–31)
MCHC RBC-ENTMCNC: 30.3 PG — SIGNIFICANT CHANGE UP (ref 27–31)
MCHC RBC-ENTMCNC: 30.4 PG — SIGNIFICANT CHANGE UP (ref 27–31)
MCHC RBC-ENTMCNC: 32.2 G/DL — SIGNIFICANT CHANGE UP (ref 32–37)
MCHC RBC-ENTMCNC: 33.2 G/DL — SIGNIFICANT CHANGE UP (ref 32–37)
MCHC RBC-ENTMCNC: 33.2 G/DL — SIGNIFICANT CHANGE UP (ref 32–37)
MCV RBC AUTO: 91.3 FL — SIGNIFICANT CHANGE UP (ref 80–94)
MCV RBC AUTO: 91.5 FL — SIGNIFICANT CHANGE UP (ref 80–94)
MCV RBC AUTO: 94 FL — SIGNIFICANT CHANGE UP (ref 80–94)
MONOCYTES # BLD AUTO: 1.24 K/UL — HIGH (ref 0.1–0.6)
MONOCYTES NFR BLD AUTO: 7.4 % — SIGNIFICANT CHANGE UP (ref 1.7–9.3)
MYELOCYTES NFR BLD: 1 % — HIGH (ref 0–0)
NEUTROPHILS # BLD AUTO: 11.52 K/UL — HIGH (ref 1.4–6.5)
NEUTROPHILS NFR BLD AUTO: 68.8 % — SIGNIFICANT CHANGE UP (ref 42.2–75.2)
NRBC # BLD: 0 /100 WBCS — SIGNIFICANT CHANGE UP (ref 0–0)
NRBC # BLD: 0 /100 — SIGNIFICANT CHANGE UP (ref 0–0)
PLAT MORPH BLD: NORMAL — SIGNIFICANT CHANGE UP
PLATELET # BLD AUTO: 469 K/UL — HIGH (ref 130–400)
PLATELET # BLD AUTO: 484 K/UL — HIGH (ref 130–400)
PLATELET # BLD AUTO: 532 K/UL — HIGH (ref 130–400)
POTASSIUM SERPL-MCNC: 3.9 MMOL/L — SIGNIFICANT CHANGE UP (ref 3.5–5)
POTASSIUM SERPL-MCNC: 4.1 MMOL/L — SIGNIFICANT CHANGE UP (ref 3.5–5)
POTASSIUM SERPL-SCNC: 3.9 MMOL/L — SIGNIFICANT CHANGE UP (ref 3.5–5)
POTASSIUM SERPL-SCNC: 4.1 MMOL/L — SIGNIFICANT CHANGE UP (ref 3.5–5)
PROTHROM AB SERPL-ACNC: 11.9 SEC — SIGNIFICANT CHANGE UP (ref 9.95–12.87)
RBC # BLD: 3.16 M/UL — LOW (ref 4.7–6.1)
RBC # BLD: 3.79 M/UL — LOW (ref 4.7–6.1)
RBC # BLD: 4.03 M/UL — LOW (ref 4.7–6.1)
RBC # FLD: 13.2 % — SIGNIFICANT CHANGE UP (ref 11.5–14.5)
RBC BLD AUTO: NORMAL — SIGNIFICANT CHANGE UP
SODIUM SERPL-SCNC: 140 MMOL/L — SIGNIFICANT CHANGE UP (ref 135–146)
SODIUM SERPL-SCNC: 141 MMOL/L — SIGNIFICANT CHANGE UP (ref 135–146)
WBC # BLD: 16.76 K/UL — HIGH (ref 4.8–10.8)
WBC # BLD: 19.19 K/UL — HIGH (ref 4.8–10.8)
WBC # BLD: 24.34 K/UL — HIGH (ref 4.8–10.8)
WBC # FLD AUTO: 16.76 K/UL — HIGH (ref 4.8–10.8)
WBC # FLD AUTO: 19.19 K/UL — HIGH (ref 4.8–10.8)
WBC # FLD AUTO: 24.34 K/UL — HIGH (ref 4.8–10.8)

## 2021-05-06 PROCEDURE — 99223 1ST HOSP IP/OBS HIGH 75: CPT

## 2021-05-06 PROCEDURE — 99233 SBSQ HOSP IP/OBS HIGH 50: CPT

## 2021-05-06 PROCEDURE — 74174 CTA ABD&PLVS W/CONTRAST: CPT | Mod: 26

## 2021-05-06 RX ORDER — SODIUM CHLORIDE 9 MG/ML
1000 INJECTION INTRAMUSCULAR; INTRAVENOUS; SUBCUTANEOUS
Refills: 0 | Status: DISCONTINUED | OUTPATIENT
Start: 2021-05-06 | End: 2021-05-11

## 2021-05-06 RX ORDER — PANTOPRAZOLE SODIUM 20 MG/1
80 TABLET, DELAYED RELEASE ORAL ONCE
Refills: 0 | Status: COMPLETED | OUTPATIENT
Start: 2021-05-06 | End: 2021-05-06

## 2021-05-06 RX ORDER — SOD SULF/SODIUM/NAHCO3/KCL/PEG
4000 SOLUTION, RECONSTITUTED, ORAL ORAL ONCE
Refills: 0 | Status: COMPLETED | OUTPATIENT
Start: 2021-05-06 | End: 2021-05-06

## 2021-05-06 RX ORDER — PANTOPRAZOLE SODIUM 20 MG/1
8 TABLET, DELAYED RELEASE ORAL
Qty: 80 | Refills: 0 | Status: DISCONTINUED | OUTPATIENT
Start: 2021-05-06 | End: 2021-05-08

## 2021-05-06 RX ADMIN — PANTOPRAZOLE SODIUM 10 MG/HR: 20 TABLET, DELAYED RELEASE ORAL at 16:01

## 2021-05-06 RX ADMIN — Medication 50 MILLIGRAM(S): at 06:11

## 2021-05-06 RX ADMIN — LIDOCAINE 1 PATCH: 4 CREAM TOPICAL at 21:33

## 2021-05-06 RX ADMIN — PANTOPRAZOLE SODIUM 10 MG/HR: 20 TABLET, DELAYED RELEASE ORAL at 21:27

## 2021-05-06 RX ADMIN — Medication 8 UNIT(S): at 08:03

## 2021-05-06 RX ADMIN — QUETIAPINE FUMARATE 25 MILLIGRAM(S): 200 TABLET, FILM COATED ORAL at 06:11

## 2021-05-06 RX ADMIN — Medication 1: at 08:02

## 2021-05-06 RX ADMIN — Medication 60 MILLIGRAM(S): at 06:09

## 2021-05-06 RX ADMIN — PANTOPRAZOLE SODIUM 10 MG/HR: 20 TABLET, DELAYED RELEASE ORAL at 18:15

## 2021-05-06 RX ADMIN — ATORVASTATIN CALCIUM 40 MILLIGRAM(S): 80 TABLET, FILM COATED ORAL at 21:32

## 2021-05-06 RX ADMIN — LEVETIRACETAM 1000 MILLIGRAM(S): 250 TABLET, FILM COATED ORAL at 06:12

## 2021-05-06 RX ADMIN — LEVETIRACETAM 1000 MILLIGRAM(S): 250 TABLET, FILM COATED ORAL at 21:31

## 2021-05-06 RX ADMIN — SODIUM CHLORIDE 100 MILLILITER(S): 9 INJECTION INTRAMUSCULAR; INTRAVENOUS; SUBCUTANEOUS at 18:14

## 2021-05-06 RX ADMIN — LIDOCAINE 1 PATCH: 4 CREAM TOPICAL at 04:05

## 2021-05-06 RX ADMIN — PANTOPRAZOLE SODIUM 80 MILLIGRAM(S): 20 TABLET, DELAYED RELEASE ORAL at 15:54

## 2021-05-06 RX ADMIN — ASPIRIN AND DIPYRIDAMOLE 1 CAPSULE(S): 25; 200 CAPSULE, EXTENDED RELEASE ORAL at 06:10

## 2021-05-06 RX ADMIN — Medication 3: at 11:50

## 2021-05-06 RX ADMIN — LOSARTAN POTASSIUM 50 MILLIGRAM(S): 100 TABLET, FILM COATED ORAL at 06:11

## 2021-05-06 RX ADMIN — SODIUM CHLORIDE 100 MILLILITER(S): 9 INJECTION INTRAMUSCULAR; INTRAVENOUS; SUBCUTANEOUS at 21:28

## 2021-05-06 RX ADMIN — Medication 8 UNIT(S): at 11:50

## 2021-05-06 RX ADMIN — Medication 4000 MILLILITER(S): at 23:43

## 2021-05-06 RX ADMIN — PANTOPRAZOLE SODIUM 40 MILLIGRAM(S): 20 TABLET, DELAYED RELEASE ORAL at 06:11

## 2021-05-06 RX ADMIN — Medication 50 MILLIGRAM(S): at 21:31

## 2021-05-06 RX ADMIN — QUETIAPINE FUMARATE 25 MILLIGRAM(S): 200 TABLET, FILM COATED ORAL at 21:30

## 2021-05-06 NOTE — CONSULT NOTE ADULT - ATTENDING COMMENTS
I have personally seen and examined this patient.  I have fully participated in the care of this patient.  I have reviewed all pertinent clinical information, including history, physical exam, plan and note.   I have reviewed all pertinent clinical information and reviewed all relevant imaging and diagnostic studies personally.  Recommendations as above.  Agree with above assessment except as noted.
74 yo male with rectal bleeding;  plans as noted

## 2021-05-06 NOTE — CONSULT NOTE ADULT - ASSESSMENT
75y[facility] CVA last one 2019 on aspirin and dipyridamole, HTN, DM presents for seizure and fall. Patient was getting ready for discharge and then had a large bloody bowel movement with clots. No hematemesis reported. Patient also reports LUQ pain. Questionable melenic stools as well. Last colonoscopy reported by wife to be with DR. palomino 5 years ago and normal.    Problem 1-Rectal bleeding with clots   questionable melenic stools as well reported  ddx diverticulosis, PUD, malignancy  Rec  -CT angio abdomen and pelvis if positive for active bleeding will need IR consult and intervention otherwise if negative prep for EGD/Colonoscopy tomorrow, prep as below explained this in detail to hospitalist Dr. Renteria and patient's wife at [phone]  Colonoscopy Prep pending negative CT angio please order after CT angio results if negative   -NPO aftermidnight   -AM CBC, AM BMP, AM INR, PT, PTT  -2 units platelets on standby given patient is on  aspirin and dipyridamole last dose today, spoke to blood bank they will order and have it ready for tomorrow   -2 units PRBC on standby,   -two tap water enemas as well given stool load on ct scan  -Go-lytely 4L Prior day before Colonoscopy   -Four tablets Dulcolax two at 2pm and two at 6pm Prior to Colonoscopy   -Negative COVID-19 test within 3 days of procedure   -Clear Liquids Day, no red dye before Colonoscopy  -Maintain Hemodynamic Stability   -Monitor CBC  -Negative COVID-19 Test within 3 days for intervention   -Optimize Electrolytes  -Transfuse prn to hgb >8  -Two large bore IV lines  -ICU evaluation  -Monitor Vital Signs  -Keep patient NPO  -Monitor Stool For blood, frequency, consistency, melena  -Active Type and Screen  -Iron Studies, Folate, Vitamin B12 levels   -Dr. Palomino unable to come  -Dr. Palomino's medical record office not answering to obtain records    Problem 2-Elevated ALP  Acute displaced 10th and minimally displaced 11th right rib fractures. No pneumothorax.  Acute minimally displaced fractures of the right transverse processes of L1, L2, and L3.  Rec  -can check GGT     Problem 3-Urinary bladder increased wall thickening, mucosal hyperenhancement and pericystic stranding; correlate for urinary bladder cystitis.  Rec  -Care as per primary team

## 2021-05-06 NOTE — PROGRESS NOTE ADULT - SUBJECTIVE AND OBJECTIVE BOX
CHART NOTE  HEM/ONC ATTENDING NOTE    Discussed with both Dr Miller and DR. Renteria    76 yo man admitted with UTI, treated with antibiotics  found to have elevated WBC (>40k), decreasing to 16-17K  BCR-ABL sent out; will follow in 2 weeks as outpatient

## 2021-05-06 NOTE — PROGRESS NOTE ADULT - ASSESSMENT
ASSESSMENT  75 yr old male with hx of Seizure disorder, Hx of CVA with residual right sided weakness, HTN, DM, CKD 3 admitted for witnessed seizure.    IMPRESSION  #Upper GI Bleed  #Sepsis during admission (Fevers, WBC>12) secondary to UTI  - CT Abdomen and Pelvis w/ IV Cont (04.28.21 @ 04:46): Urinary bladder increased wall thickening, mucosal hyperenhancement and pericystic stranding; correlate for urinary bladder cystitis.  - WBC Count: 42.36: (04.28.21 @ 11:00) -> improving  - Blood Cx 4/28 NG  - Urine Cx 4/28 NG  - US Dopplers negative  - CXR 5/1 with atelectasis   - Procalcitonin, Serum: 0.30 (05.03.21 @ 06:02)      #Seizures  #Fall with acute rib and transverse process fractures  - CT Abdomen and Pelvis w/ IV Cont (04.28.21 @ 04:46):  Acute displaced 10th and minimally displaced 11th right rib fractures. No pneumothorax. Acute minimally displaced fractures of the right transverse processes of L1, L2, and L3.    #Hx of CVA  #CKD III  #DM  #Abx allergy: NKDA    Creatinine, Serum: 1.3 mg/dL (04.30.21 @ 06:47)  Weight (kg): 76.3 (28 Apr 2021 08:12)  CrCl 52    RECOMMENDATIONS  - appreciate heme-onc consult  - Occult GI bleed could be contributing to persistently elevated WBC  - trend H/H   - monitor off antibiotics    Please call or message on [employer] Teams if with any questions.  Spectra [phone]

## 2021-05-06 NOTE — CHART NOTE - NSCHARTNOTEFT_GEN_A_CORE
Labs reviewed, Hgb 12.2, lactic acid 3.9.  Will start NS at 100/hr x 1 liter and repeat Hgb at 2200.

## 2021-05-06 NOTE — PROGRESS NOTE ADULT - SUBJECTIVE AND OBJECTIVE BOX
RISHI HERRERA  75y, Male  Allergy: No Known Allergies      LOS  8d    CHIEF COMPLAINT: seizure (06 May 2021 16:36)      INTERVAL EVENTS/HPI  - No acute events overnight  - T(F): , Max: 99.2 (05-06-21 @ 16:51)  - noted events overnight; concern for GI bleed   - WBC Count: 19.19 (05-06-21 @ 15:40)  WBC Count: 16.76 (05-06-21 @ 08:08)     - Creatinine, Serum: 1.0 (05-06-21 @ 15:40)  Creatinine, Serum: 0.8 (05-06-21 @ 08:08)       ROS  General: Denies rigors, nightsweats  HEENT: Denies headache, rhinorrhea, sore throat, eye pain  CV: Denies CP, palpitations  PULM: Denies wheezing, hemoptysis  GI: Denies hematemesis, hematochezia, melena  : Denies discharge, hematuria  MSK: Denies arthralgias, myalgias  SKIN: Denies rash, lesions  NEURO: Denies paresthesias, weakness  PSYCH: Denies depression, anxiety    VITALS:  T(F): 99.2, Max: 99.2 (05-06-21 @ 16:51)  HR: 114  BP: 123/66  RR: 18Vital Signs Last 24 Hrs  T(C): 37.3 (06 May 2021 16:51), Max: 37.3 (06 May 2021 16:51)  T(F): 99.2 (06 May 2021 16:51), Max: 99.2 (06 May 2021 16:51)  HR: 114 (06 May 2021 16:51) (84 - 114)  BP: 123/66 (06 May 2021 16:51) (120/56 - 148/71)  BP(mean): 89 (06 May 2021 16:51) (89 - 89)  RR: 18 (06 May 2021 16:51) (16 - 18)  SpO2: 98% (06 May 2021 16:51) (97% - 98%)    PHYSICAL EXAM:  Gen: NAD, resting in bed  HEENT: Normocephalic, atraumatic  Neck: supple, no lymphadenopathy  CV: Regular rate & regular rhythm  Lungs: decreased BS at bases, no fremitus  Abdomen: Soft, BS present  Ext: Warm, well perfused  Neuro: non focal, awake  Skin: no rash, no erythema  Lines: no phlebitis    FH: Non-contributory  Social Hx: Non-contributory    TESTS & MEASUREMENTS:                        12.2   19.19 )-----------( 532      ( 06 May 2021 15:40 )             37.9     05-06    140  |  102  |  15  ----------------------------<  191<H>  4.1   |  23  |  1.0    Ca    9.1      06 May 2021 15:40      eGFR if Non African American: 73 mL/min/1.73M2 (05-06-21 @ 15:40)  eGFR if : 84 mL/min/1.73M2 (05-06-21 @ 15:40)  eGFR if Non African American: 87 mL/min/1.73M2 (05-06-21 @ 08:08)  eGFR if : 101 mL/min/1.73M2 (05-06-21 @ 08:08)          Culture - Blood (collected 04-28-21 @ 18:34)  Source: .Blood None  Final Report (05-04-21 @ 01:01):    No Growth Final    Culture - Urine (collected 04-28-21 @ 17:40)  Source: .Urine Catheterized  Final Report (04-29-21 @ 19:37):    No growth    Culture - Blood (collected 04-28-21 @ 11:00)  Source: .Blood Blood  Final Report (05-03-21 @ 23:01):    No Growth Final    Culture - Blood (collected 04-28-21 @ 04:10)  Source: .Blood Blood  Final Report (05-03-21 @ 18:01):    No Growth Final    Culture - Blood (collected 04-28-21 @ 04:10)  Source: .Blood Blood  Final Report (05-03-21 @ 18:01):    No Growth Final        Lactate, Blood: 3.9 mmol/L (05-06-21 @ 15:40)      INFECTIOUS DISEASES TESTING  Procalcitonin, Serum: 0.30 (05-03-21 @ 06:02)  Rapid RVP Result: NotDetec (04-28-21 @ 01:45)      INFLAMMATORY MARKERS      RADIOLOGY & ADDITIONAL TESTS:  I have personally reviewed the last available Chest xray  CXR      CT      CARDIOLOGY TESTING  12 Lead ECG:   Ventricular Rate 110 BPM    Atrial Rate 110 BPM    P-R Interval 172 ms    QRS Duration 82 ms    Q-T Interval 326 ms    QTC Calculation(Bazett) 441 ms    P Axis 57 degrees    R Axis -15 degrees    T Axis 68 degrees    Diagnosis Line Sinus tachycardia  Low voltage QRS  Poor R wave progression    Confirmed by MARIUSZ TIDWELL MD ([id number]) on 5/2/2021 9:24:48 AM (05-02-21 @ 02:08)  12 Lead ECG:   Ventricular Rate 83 BPM    Atrial Rate 83 BPM    P-R Interval 164 ms    QRS Duration 80 ms    Q-T Interval 354 ms    QTC Calculation(Bazett) 415 ms    P Axis 26 degrees    R Axis -4 degrees    T Axis 69 degrees    Diagnosis Line Normal sinus rhythm  Poor R wave progression  Confirmed by MARIUSZ TIDWELL MD ([id number]) on 5/1/2021 9:14:13 AM (04-30-21 @ 20:22)      MEDICATIONS  atorvastatin 40 Oral at bedtime  dextrose 40% Gel 15 Oral once  dextrose 5%. 1000 IV Continuous <Continuous>  dextrose 5%. 1000 IV Continuous <Continuous>  dextrose 50% Injectable 25 IV Push once  dextrose 50% Injectable 12.5 IV Push once  dextrose 50% Injectable 25 IV Push once  glucagon  Injectable 1 IntraMuscular once  insulin glargine Injectable (LANTUS) 24 SubCutaneous at bedtime  insulin lispro (ADMELOG) corrective regimen sliding scale  SubCutaneous three times a day before meals  insulin lispro Injectable (ADMELOG) 8 SubCutaneous before breakfast  insulin lispro Injectable (ADMELOG) 8 SubCutaneous before lunch  insulin lispro Injectable (ADMELOG) 8 SubCutaneous before dinner  levETIRAcetam 1000 Oral two times a day  lidocaine   Patch 1 Transdermal every 24 hours  losartan 50 Oral daily  metoprolol tartrate 50 Oral two times a day  NIFEdipine XL 60 Oral daily  pantoprazole Infusion 8 IV Continuous <Continuous>  QUEtiapine Oral Tab/Cap - Peds 25 Oral two times a day  sodium chloride 0.9%. 1000 IV Continuous <Continuous>      WEIGHT  Weight (kg): 73.6 (05-06-21 @ 16:51)  Creatinine, Serum: 1.0 mg/dL (05-06-21 @ 15:40)  Creatinine, Serum: 0.8 mg/dL (05-06-21 @ 08:08)      ANTIBIOTICS:      All available historical records have been reviewed

## 2021-05-06 NOTE — PROGRESS NOTE ADULT - SUBJECTIVE AND OBJECTIVE BOX
Subjective:    Was doing fine this morning with no complaints. Later in evening around 3PM had BM with large clot and active significant bright red blood oozing out from rectum.       ROS: limited due to mental status but can answer yes/no and simple questions      Vital Signs Last 24 Hrs  T(C): 35.7 (06 May 2021 15:25), Max: 36.3 (06 May 2021 05:00)  T(F): 96.2 (06 May 2021 15:25), Max: 97.3 (06 May 2021 05:00)  HR: 113 (06 May 2021 15:25) (84 - 113)  BP: 120/56 (06 May 2021 15:25) (120/56 - 148/71)  BP(mean): --  RR: 16 (06 May 2021 15:25) (16 - 18)  SpO2: 97% (06 May 2021 15:25) (97% - 98%)    Physical Exam:  General: appears stated age, no acute distress, laying in bed  Eyes: PERRLA/ EOMI  HEENT: Neck supple  Respiratory: Clear to auscultation bilaterally, No wheezing/rales/rhonchi  CV: RRR,  no murmurs/ rubs / gallops  Abdomen: Soft, Nontender, nondistended, bowel sounds present, active bleeding from rectum  Extremities: No edema, RUE contracted  Skin: No Rashes, Hematoma, Ecchymosis  Neurology: A&Ox3, RUE contracted, RLE weak but able to move      --- LABS ---                                    11.5   16.76 )-----------( 484      ( 06 May 2021 08:08 )             34.6     05-06    141  |  103  |  12  ----------------------------<  179<H>  3.9   |  26  |  0.8    Ca    9.2      06 May 2021 08:08    CAPILLARY BLOOD GLUCOSE  POCT Blood Glucose.: 260 mg/dL (06 May 2021 11:32)  POCT Blood Glucose.: 159 mg/dL (06 May 2021 07:32)  POCT Blood Glucose.: 247 mg/dL (05 May 2021 20:54)  POCT Blood Glucose.: 212 mg/dL (05 May 2021 16:12)        --- CULTURES ---  4/28 COVID negative  4/28 blood culture negative  4/28 Urine culture negative, but UA positive  4/29 COVID Ab positive    --- IMAGES ---    4/28 CT head -- No CT evidenceof acute intracranial pathology. Stable chronic infarct in the distribution of the left MCA.    4/28 CT cervical spine -- No acute cervical spine fracture or subluxation.    4/28 CXR -- Elevated right hemidiaphragm. No acute infiltrates.    4/28 XR Pelvix -- No evidence of acute osseous abnormality.    4/28 CT chest/abd/pel --   1.  Acute displaced 10th and minimally displaced 11th right rib fractures. No pneumothorax.  2.  Acute minimally displaced fractures of the right transverse processes of L1, L2, and L3.  3.  Urinary bladder increased wall thickening, mucosal hyperenhancement and pericystic stranding; correlate for urinary bladder cystitis.    4/30 Venous duplex -- No evidence of deep venousthrombosis in either lower extremity.    5/1 CXR -- Right basilar focal atelectasis, decreased elevation of the right hemidiaphragm.    5/1 CXR -- Right basilar atelectasis.    5/4 US testicle -- Left greater than right hydroceles. Large volume left-sided spermatocele.Penile pump prosthetic within the right hemiscrotum.      --- MEDICATIONS ---   MEDICATIONS  (STANDING):  atorvastatin 40 milliGRAM(s) Oral at bedtime  glucagon  Injectable 1 milliGRAM(s) IntraMuscular once  insulin glargine Injectable (LANTUS) 24 Unit(s) SubCutaneous at bedtime  insulin lispro (ADMELOG) corrective regimen sliding scale   SubCutaneous three times a day before meals  insulin lispro Injectable (ADMELOG) 8 Unit(s) SubCutaneous before breakfast  insulin lispro Injectable (ADMELOG) 8 Unit(s) SubCutaneous before lunch  insulin lispro Injectable (ADMELOG) 8 Unit(s) SubCutaneous before dinner  levETIRAcetam 1000 milliGRAM(s) Oral two times a day  lidocaine   Patch 1 Patch Transdermal every 24 hours  losartan 50 milliGRAM(s) Oral daily  metoprolol tartrate 50 milliGRAM(s) Oral two times a day  NIFEdipine XL 60 milliGRAM(s) Oral daily  pantoprazole    Tablet 40 milliGRAM(s) Oral before breakfast  pantoprazole  Injectable 80 milliGRAM(s) IV Push once  pantoprazole Infusion 8 mG/Hr (10 mL/Hr) IV Continuous <Continuous>  QUEtiapine Oral Tab/Cap - Peds 25 milliGRAM(s) Oral two times a day    MEDICATIONS  (PRN):  acetaminophen   Tablet .. 650 milliGRAM(s) Oral every 6 hours PRN Temp greater or equal to 38C (100.4F), Mild Pain (1 - 3)  aluminum hydroxide/magnesium hydroxide/simethicone Suspension 30 milliLiter(s) Oral every 4 hours PRN Dyspepsia        ASSESSMENT AND PLAN    RISHI HERRERA is a 75y Male with hx of Seizure disorder, Hx of CVA with residual right sided weakness, HTN, DM, CKD 3 admitted for witnessed seizure. As per wife, patient was sitting on the bowel and had a seizure for 2 mins. Event was witnessed by son and wife. Wife also notes patient has been falling from the bed multiple time.       # Active lower GIB -- 5/6 patient started having painless active significant bleed per rectum with clots  ·	Pending STAT CBC/bmp/INR/PTT/type and screen  ·	Will prepare 2 units PRBCs  ·	Two 18g IVs  ·	STAT CTA abd/pel  ·	GI consulted  ·	Transfer to ICU  ·	Pantoprazole 80mg push --> PPI drip   ·	Held home ASA/dipyramidole    # Leukocytosis -- Unclear etiology. On admission WBC 30  ·	WBC 30 --> 42 --> 18 --> 23 --> 17. remaining persistently elevated despite Abx  ·	Hematology consulted for further evaluation. Discussed with Dr. Abrams 5/5. Sent BCR-ABL. Will need repeat CBC in 2 weeks and outpatient followup.    # Sepsis (not POA) in setting of UTI, Resolved -- fevers/tachycardia/leukocytosis. UA positive but UCx negative. 4/28 Blood culture negative. CXR no acute findings. CT abd/pel/chest with urinary bladder wall thickening and pericystic stranding. No PE. venous duplex negative. COVID negative.  ·	ID following. Continue Abx at this time  ·	Was given dose Rocephin (4/27) --> Vanc/Meropenem (4/28-5/2) -->  Rocephin (as of 5/3-5/5).Completed 9 day course    # Seizure in setting of seizure d/o -- presented with witnessed seizure. CT head/C-spine with stable chronic infarct of Left MCA and C-spine neg  ·	routine EEG negative for epileptiform activity  ·	Increased keppra to 1000mg bid (from home 750mg bid)    # Rib fracture 10th-11th / L1-2-3 minimally displaced fractures -- in setting of multiple falls. Noted on CT imaging.  ·	Incentive spirometry  ·	pain control with lidoderm patch  ·	Neurosurgery evaluated. No need for emergent surgery. TLSO brace and PT    # Scrotal Swelling  ·	5/4 US scrotum with L>R hydrocele and large volume left sided spermatocele. Penile pump prosthetic  ·	No need for intervention in setting of asxs / no pain.     # Onychomycosis   ·	Seen by Podiatry 5/5. aseptic debridement done. Recommend outpt f/u Dr. Bradley       Resolved  # JORDYN on CKD2 -- Cr on admission 1.7. home losartan held temporarily. Resolved    --- CHRONIC MEDICAL CONDITIONS ---  1. DM2 -- holding home metformin while inpatient (also on Toujeo 30u qhs). A1c 7.8%. Continue Lantus 24qhs, lispro 8u TID, SSI  2. CVA with right sided weakness/RUE contraction / Functional quadriplegia -- Dipyramidole/ASA HELD for GIB, atorva 40  3. HTN -- nifedipine 60mg, metoprolol 50mg bid, restarted home losartan 50mg   4. GERD -- pantoprazole  5. Agitation/Anxiety -- Seroquel 25mg bid  6. CKD2 -- baseline Cr 0.9-1.0 with GFR 60-90. At baseline.         DVT -- held due to GIB  Code -- Full   Dispo -- transfer to ICU

## 2021-05-06 NOTE — CONSULT NOTE ADULT - SUBJECTIVE AND OBJECTIVE BOX
Chief complaint/Reason for consult: rectal bleeding    HPI:  Patient is non-verbral. Hx taken from wife at bedside.    75 yr old male with hx of Seizure disorder, Hx of CVA with residual right sided weakness, HTN, DM, CKD 3 admitted for witnessed seizure. As per wife, patient was sitting on the bowel and had a seizure for 2 mins. Event was witnessed by son and wife. Wife also notes patient has been falling from the bed multiple time. Wife denies any fever, chills, sob, chest pain, abdominal pain, no urinary or bowel issues.     In ED: V/s stable, UA (+)   (28 Apr 2021 05:55)    GI Updates: 75yMale pmh CVA last one 2019 on aspirin and dipyridamole, HTN, DM presents for seizure and fall. Patient was getting ready for discharge and then had a large bloody bowel movement with clots. No hematemesis reported. Patient also reports LUQ pain. Questionable melenic stools as well. Last colonoscopy reported by wife to be with DR. palomino 5 years ago and normal.      PAST MEDICAL & SURGICAL HISTORY:   Controlled type 2 diabetes mellitus without complication, unspecified long term insulin use status    Essential hypertension    Depression, unspecified depression type    Coronary artery disease without angina pectoris, unspecified vessel or lesion type, unspecified whether native or transplanted heart    Cerebrovascular accident (CVA) due to stenosis of left middle cerebral artery    High blood cholesterol    History of appendectomy    H/O umbilical hernia repair    History of lumbar surgery    History of penile implant    Bradly filter in place    H/O brain surgery          Family history:  FAMILY HISTORY:  No pertinent family history in first degree relatives      No GI cancers in first or second degree relatives    Social History: No smoking. No alcohol. No illegal drug use.    Allergies:   No Known Allergies      MEDICATIONS: Home Medications:  Aggrenox 25 mg-200 mg oral capsule, extended release: 1 cap(s) orally 2 times a day (27 Nov 2018 15:03)  atorvastatin 40 mg oral tablet: 1 tab(s) orally once a day (at bedtime) (17 Jul 2018 11:16)  levETIRAcetam 750 mg oral tablet: 1 tab(s) orally 2 times a day (01 Apr 2019 11:16)  losartan 50 mg oral tablet: 1 tab(s) orally once a day (17 Jul 2018 11:16)  metFORMIN 1000 mg oral tablet: 1 tab(s) orally 2 times a day (27 Nov 2018 15:05)  metoprolol tartrate 50 mg oral tablet: 1 tab(s) orally 2 times a day (17 Jul 2018 11:16)  NIFEdipine 60 mg oral tablet, extended release: 1 tab(s) orally once a day (17 Jul 2018 11:16)  pantoprazole 40 mg oral delayed release tablet: 1 tab(s) orally once a day (before a meal) (17 Jul 2018 11:16)  SEROquel 25 mg oral tablet: 1 tab(s) orally 2 times a day (27 Nov 2018 15:07)  Toujeo SoloStar 300 units/mL subcutaneous solution: 30 unit(s) subcutaneous once (at bedtime) (14 Jul 2018 17:41)  Valium 10 mg oral tablet: 1 tab(s) orally 2 times a day, As Needed for anxiety (27 Nov 2018 15:08)    MEDICATIONS  (STANDING):  atorvastatin 40 milliGRAM(s) Oral at bedtime  dextrose 40% Gel 15 Gram(s) Oral once  dextrose 5%. 1000 milliLiter(s) (50 mL/Hr) IV Continuous <Continuous>  dextrose 5%. 1000 milliLiter(s) (100 mL/Hr) IV Continuous <Continuous>  dextrose 50% Injectable 25 Gram(s) IV Push once  dextrose 50% Injectable 12.5 Gram(s) IV Push once  dextrose 50% Injectable 25 Gram(s) IV Push once  glucagon  Injectable 1 milliGRAM(s) IntraMuscular once  insulin glargine Injectable (LANTUS) 24 Unit(s) SubCutaneous at bedtime  insulin lispro (ADMELOG) corrective regimen sliding scale   SubCutaneous three times a day before meals  insulin lispro Injectable (ADMELOG) 8 Unit(s) SubCutaneous before breakfast  insulin lispro Injectable (ADMELOG) 8 Unit(s) SubCutaneous before lunch  insulin lispro Injectable (ADMELOG) 8 Unit(s) SubCutaneous before dinner  levETIRAcetam 1000 milliGRAM(s) Oral two times a day  lidocaine   Patch 1 Patch Transdermal every 24 hours  losartan 50 milliGRAM(s) Oral daily  metoprolol tartrate 50 milliGRAM(s) Oral two times a day  NIFEdipine XL 60 milliGRAM(s) Oral daily  pantoprazole Infusion 8 mG/Hr (10 mL/Hr) IV Continuous <Continuous>  QUEtiapine Oral Tab/Cap - Peds 25 milliGRAM(s) Oral two times a day    MEDICATIONS  (PRN):  acetaminophen   Tablet .. 650 milliGRAM(s) Oral every 6 hours PRN Temp greater or equal to 38C (100.4F), Mild Pain (1 - 3)  aluminum hydroxide/magnesium hydroxide/simethicone Suspension 30 milliLiter(s) Oral every 4 hours PRN Dyspepsia        REVIEW OF SYSTEMS  unobtainable       VITALS:   T(F): 96.2 (05-06-21 @ 15:25), Max: 97.3 (05-06-21 @ 05:00)  HR: 113 (05-06-21 @ 15:25) (84 - 113)  BP: 120/56 (05-06-21 @ 15:25) (120/56 - 148/71)  RR: 16 (05-06-21 @ 15:25) (16 - 18)  SpO2: 97% (05-06-21 @ 15:25) (97% - 98%)    PHYSICAL EXAM:  GENERAL: able to say yes or no  HEAD:  Atraumatic, Normocephalic  EYES: conjunctiva and sclera clear  NECK: Supple, No thyromegaly   CHEST/LUNG: Clear to auscultation bilaterally; No wheeze, rhonchi, or rales  HEART: Regular rate and rhythm; normal S1, S2, No murmurs.  ABDOMEN: Soft, +LUQ tenderness , nondistended; Bowel sounds present  NEUROLOGY: No asterixis or tremor  SKIN: Intact, no jaundice  Rectal exam-dark blood and clots in rectal vault        LABS:  05-06    141  |  103  |  12  ----------------------------<  179<H>  3.9   |  26  |  0.8    Ca    9.2      06 May 2021 08:08                            12.2   19.19 )-----------( 532      ( 06 May 2021 15:40 )             37.9       PT/INR - ( 06 May 2021 15:40 )   PT: 11.90 sec;   INR: 1.03 ratio         PTT - ( 06 May 2021 15:40 )  PTT:33.8 sec    IMAGING:    < from: CT Abdomen and Pelvis w/ IV Cont (04.28.21 @ 04:46) >    EXAM:  CT ABDOMEN AND PELVIS IC        EXAM:  CT CHEST IC            PROCEDURE DATE:  04/28/2021            INTERPRETATION:  CLINICAL HISTORY/REASON FOR EXAM: Fall. Trauma to chest, abdomen and pelvis. Seizure.    TECHNIQUE: Contiguous axial CT images were obtained from the thoracic inlet to the pubic symphysis following administration of intravenous contrast. Oral contrast was not administered. Reformatted images in the coronal and sagittal planes were acquired. 3D (MIP) images obtained.    COMPARISON: CT ABDOMEN/PELVIS 11/27/2018.    FINDINGS:    CHEST:    LUNGS, PLEURA, AIRWAYS: Bilateral calcified pleural plaques. Dependent atelectasis. Patent central tracheobronchial tree. No lobar consolidation, pleural effusion, or pneumothorax. No bronchiectasis or honeycombing.    THORACIC NODES: No thoracic or axillary lymphadenopathy.    MEDIASTINUM/GREAT VESSELS: No pericardial effusion. Heart size is within normal limits. Coronary artery and aortic valve calcifications. The aorta and main pulmonary artery are of normal caliber.    ABDOMEN/PELVIS:    HEPATOBILIARY: Unremarkable.    SPLEEN: Unremarkable.    PANCREAS: Unremarkable.    ADRENAL GLANDS: Unremarkable.    KIDNEYS: Symmetric renal enhancement. No hydronephrosis.    ABDOMINOPELVIC NODES: No lymphadenopathy.    PELVIC ORGANS: Guerrero catheter within a decompressed urinary bladder. Foci of air within the urinary bladder consistent with instrumentation. Urinary bladder increased wall thickening, mucosal hyperenhancement and pericystic stranding; correlate for urinary bladder cystitis. Penile implant with reservoir anterior to the uterine bladder. Large left hydrocele.    PERITONEUM/MESENTERY/BOWEL: Moderate stool load within the rectum without perirectal inflammatory change. Descending colon diverticulosis. No bowel obstruction, ascites or pneumoperitoneum.    BONES/SOFT TISSUES: Degenerative change of the spine. Acute displaced 10th and minimally displaced 11th right rib fractures. Acute minimally displaced fractures of the right transverse processes of L1, L2, and L3. Small bilateral fat-containing inguinal hernias.    OTHER: Vascular calcifications. Infrarenal IVC filter noted.      IMPRESSION:    1.  Acute displaced 10th and minimally displaced 11th right rib fractures. No pneumothorax.    2.  Acute minimally displaced fractures of the right transverse processes of L1, L2, and L3.    3.  Urinary bladder increased wall thickening, mucosal hyperenhancement and pericystic stranding; correlate for urinary bladder cystitis.            ZINA COLIN M.D., RESIDENT RADIOLOGIST  This document has been electronically signed.  IMAN WALTERS MD; Attending Radiologist  This document has been electronically signed. Apr 28 2021  5:34AM    < end of copied text >

## 2021-05-07 ENCOUNTER — RESULT REVIEW (OUTPATIENT)
Age: 76
End: 2021-05-07

## 2021-05-07 ENCOUNTER — TRANSCRIPTION ENCOUNTER (OUTPATIENT)
Age: 76
End: 2021-05-07

## 2021-05-07 LAB
ANION GAP SERPL CALC-SCNC: 13 MMOL/L — SIGNIFICANT CHANGE UP (ref 7–14)
APTT BLD: 28.9 SEC — SIGNIFICANT CHANGE UP (ref 27–39.2)
BASOPHILS # BLD AUTO: 0.17 K/UL — SIGNIFICANT CHANGE UP (ref 0–0.2)
BASOPHILS NFR BLD AUTO: 0.8 % — SIGNIFICANT CHANGE UP (ref 0–1)
BUN SERPL-MCNC: 13 MG/DL — SIGNIFICANT CHANGE UP (ref 10–20)
CALCIUM SERPL-MCNC: 8.8 MG/DL — SIGNIFICANT CHANGE UP (ref 8.5–10.1)
CHLORIDE SERPL-SCNC: 102 MMOL/L — SIGNIFICANT CHANGE UP (ref 98–110)
CO2 SERPL-SCNC: 26 MMOL/L — SIGNIFICANT CHANGE UP (ref 17–32)
CREAT SERPL-MCNC: 1.1 MG/DL — SIGNIFICANT CHANGE UP (ref 0.7–1.5)
EOSINOPHIL # BLD AUTO: 1.2 K/UL — HIGH (ref 0–0.7)
EOSINOPHIL NFR BLD AUTO: 5.6 % — SIGNIFICANT CHANGE UP (ref 0–8)
GLUCOSE BLDC GLUCOMTR-MCNC: 140 MG/DL — HIGH (ref 70–99)
GLUCOSE BLDC GLUCOMTR-MCNC: 157 MG/DL — HIGH (ref 70–99)
GLUCOSE BLDC GLUCOMTR-MCNC: 173 MG/DL — HIGH (ref 70–99)
GLUCOSE SERPL-MCNC: 197 MG/DL — HIGH (ref 70–99)
HCT VFR BLD CALC: 31 % — LOW (ref 42–52)
HGB BLD-MCNC: 10.1 G/DL — LOW (ref 14–18)
IMM GRANULOCYTES NFR BLD AUTO: 5.2 % — HIGH (ref 0.1–0.3)
INR BLD: 1.01 RATIO — SIGNIFICANT CHANGE UP (ref 0.65–1.3)
LACTATE SERPL-SCNC: 1.4 MMOL/L — SIGNIFICANT CHANGE UP (ref 0.7–2)
LYMPHOCYTES # BLD AUTO: 12.3 % — LOW (ref 20.5–51.1)
LYMPHOCYTES # BLD AUTO: 2.63 K/UL — SIGNIFICANT CHANGE UP (ref 1.2–3.4)
MAGNESIUM SERPL-MCNC: 1.7 MG/DL — LOW (ref 1.8–2.4)
MCHC RBC-ENTMCNC: 30.7 PG — SIGNIFICANT CHANGE UP (ref 27–31)
MCHC RBC-ENTMCNC: 32.6 G/DL — SIGNIFICANT CHANGE UP (ref 32–37)
MCV RBC AUTO: 94.2 FL — HIGH (ref 80–94)
MONOCYTES # BLD AUTO: 1.32 K/UL — HIGH (ref 0.1–0.6)
MONOCYTES NFR BLD AUTO: 6.2 % — SIGNIFICANT CHANGE UP (ref 1.7–9.3)
NEUTROPHILS # BLD AUTO: 14.91 K/UL — HIGH (ref 1.4–6.5)
NEUTROPHILS NFR BLD AUTO: 69.9 % — SIGNIFICANT CHANGE UP (ref 42.2–75.2)
NRBC # BLD: 0 /100 WBCS — SIGNIFICANT CHANGE UP (ref 0–0)
PHOSPHATE SERPL-MCNC: 3.2 MG/DL — SIGNIFICANT CHANGE UP (ref 2.1–4.9)
PLATELET # BLD AUTO: 499 K/UL — HIGH (ref 130–400)
POTASSIUM SERPL-MCNC: 4 MMOL/L — SIGNIFICANT CHANGE UP (ref 3.5–5)
POTASSIUM SERPL-SCNC: 4 MMOL/L — SIGNIFICANT CHANGE UP (ref 3.5–5)
PROTHROM AB SERPL-ACNC: 11.6 SEC — SIGNIFICANT CHANGE UP (ref 9.95–12.87)
RAPID RVP RESULT: SIGNIFICANT CHANGE UP
RBC # BLD: 3.29 M/UL — LOW (ref 4.7–6.1)
RBC # FLD: 13.3 % — SIGNIFICANT CHANGE UP (ref 11.5–14.5)
SARS-COV-2 RNA SPEC QL NAA+PROBE: SIGNIFICANT CHANGE UP
SODIUM SERPL-SCNC: 141 MMOL/L — SIGNIFICANT CHANGE UP (ref 135–146)
WBC # BLD: 21.35 K/UL — HIGH (ref 4.8–10.8)
WBC # FLD AUTO: 21.35 K/UL — HIGH (ref 4.8–10.8)

## 2021-05-07 PROCEDURE — 88305 TISSUE EXAM BY PATHOLOGIST: CPT | Mod: 26

## 2021-05-07 PROCEDURE — 99223 1ST HOSP IP/OBS HIGH 75: CPT

## 2021-05-07 PROCEDURE — 88312 SPECIAL STAINS GROUP 1: CPT | Mod: 26

## 2021-05-07 PROCEDURE — 71045 X-RAY EXAM CHEST 1 VIEW: CPT | Mod: 26,76

## 2021-05-07 PROCEDURE — 43239 EGD BIOPSY SINGLE/MULTIPLE: CPT | Mod: XS

## 2021-05-07 PROCEDURE — 99233 SBSQ HOSP IP/OBS HIGH 50: CPT

## 2021-05-07 PROCEDURE — 45380 COLONOSCOPY AND BIOPSY: CPT

## 2021-05-07 RX ORDER — POLYETHYLENE GLYCOL 3350 17 G/17G
17 POWDER, FOR SOLUTION ORAL EVERY 12 HOURS
Refills: 0 | Status: DISCONTINUED | OUTPATIENT
Start: 2021-05-07 | End: 2021-05-11

## 2021-05-07 RX ORDER — SUCRALFATE 1 G
2 TABLET ORAL EVERY 12 HOURS
Refills: 0 | Status: DISCONTINUED | OUTPATIENT
Start: 2021-05-07 | End: 2021-05-11

## 2021-05-07 RX ORDER — SENNA PLUS 8.6 MG/1
2 TABLET ORAL AT BEDTIME
Refills: 0 | Status: DISCONTINUED | OUTPATIENT
Start: 2021-05-07 | End: 2021-05-11

## 2021-05-07 RX ADMIN — QUETIAPINE FUMARATE 25 MILLIGRAM(S): 200 TABLET, FILM COATED ORAL at 18:42

## 2021-05-07 RX ADMIN — LEVETIRACETAM 1000 MILLIGRAM(S): 250 TABLET, FILM COATED ORAL at 18:42

## 2021-05-07 RX ADMIN — LIDOCAINE 1 PATCH: 4 CREAM TOPICAL at 20:22

## 2021-05-07 RX ADMIN — Medication 50 MILLIGRAM(S): at 05:57

## 2021-05-07 RX ADMIN — LIDOCAINE 1 PATCH: 4 CREAM TOPICAL at 09:33

## 2021-05-07 RX ADMIN — QUETIAPINE FUMARATE 25 MILLIGRAM(S): 200 TABLET, FILM COATED ORAL at 05:57

## 2021-05-07 RX ADMIN — Medication 2 GRAM(S): at 21:14

## 2021-05-07 RX ADMIN — LIDOCAINE 1 PATCH: 4 CREAM TOPICAL at 18:42

## 2021-05-07 RX ADMIN — Medication 50 MILLIGRAM(S): at 18:42

## 2021-05-07 RX ADMIN — LEVETIRACETAM 1000 MILLIGRAM(S): 250 TABLET, FILM COATED ORAL at 05:57

## 2021-05-07 RX ADMIN — LIDOCAINE 1 PATCH: 4 CREAM TOPICAL at 08:08

## 2021-05-07 RX ADMIN — ATORVASTATIN CALCIUM 40 MILLIGRAM(S): 80 TABLET, FILM COATED ORAL at 21:14

## 2021-05-07 RX ADMIN — SENNA PLUS 2 TABLET(S): 8.6 TABLET ORAL at 21:14

## 2021-05-07 NOTE — CONSULT NOTE ADULT - SUBJECTIVE AND OBJECTIVE BOX
Patient is a 75y old  Male who presents with a chief complaint of seizure (07 May 2021 08:15)      HPI:  Patient is non-verbral. Hx taken from wife at bedside.    75 yr old male with hx of Seizure disorder, Hx of CVA with residual right sided weakness, HTN, DM, CKD 3 admitted for witnessed seizure. As per wife, patient was sitting on the bowel and had a seizure for 2 mins. Event was witnessed by son and wife. Wife also notes patient has been falling from the bed multiple time. Wife denies any fever, chills, sob, chest pain, abdominal pain, no urinary or bowel issues.   also was treated on the floor for UTI while on floor yesterday start to complain of fresh blood per rectum   s/pct angio no extravasation       PAST MEDICAL & SURGICAL HISTORY:  Controlled type 2 diabetes mellitus without complication, unspecified long term insulin use status    Essential hypertension    Depression, unspecified depression type    Coronary artery disease without angina pectoris, unspecified vessel or lesion type, unspecified whether native or transplanted heart    Cerebrovascular accident (CVA) due to stenosis of left middle cerebral artery    High blood cholesterol    History of appendectomy    H/O umbilical hernia repair    History of lumbar surgery    History of penile implant    Bradly filter in place    H/O brain surgery      Allergies    No Known Allergies    Intolerances      Family history : no cardiovscular family history   Home Medications:  Aggrenox 25 mg-200 mg oral capsule, extended release: 1 cap(s) orally 2 times a day (27 Nov 2018 15:03)  atorvastatin 40 mg oral tablet: 1 tab(s) orally once a day (at bedtime) (17 Jul 2018 11:16)  levETIRAcetam 750 mg oral tablet: 1 tab(s) orally 2 times a day (01 Apr 2019 11:16)  losartan 50 mg oral tablet: 1 tab(s) orally once a day (17 Jul 2018 11:16)  metFORMIN 1000 mg oral tablet: 1 tab(s) orally 2 times a day (27 Nov 2018 15:05)  metoprolol tartrate 50 mg oral tablet: 1 tab(s) orally 2 times a day (17 Jul 2018 11:16)  NIFEdipine 60 mg oral tablet, extended release: 1 tab(s) orally once a day (17 Jul 2018 11:16)  pantoprazole 40 mg oral delayed release tablet: 1 tab(s) orally once a day (before a meal) (17 Jul 2018 11:16)  SEROquel 25 mg oral tablet: 1 tab(s) orally 2 times a day (27 Nov 2018 15:07)  Toujeo SoloStar 300 units/mL subcutaneous solution: 30 unit(s) subcutaneous once (at bedtime) (14 Jul 2018 17:41)  Valium 10 mg oral tablet: 1 tab(s) orally 2 times a day, As Needed for anxiety (27 Nov 2018 15:08)    Occupation:  Alochol: Denied  Smoking: Denied  Drug Use: Denied  Marital Status:         ROS: as in HPI; All other systems reviewed are negative    ICU Vital Signs Last 24 Hrs  T(C): 36.4 (07 May 2021 07:12), Max: 37.3 (06 May 2021 16:51)  T(F): 97.5 (07 May 2021 07:12), Max: 99.2 (06 May 2021 16:51)  HR: 67 (07 May 2021 07:06) (67 - 114)  BP: 145/63 (07 May 2021 07:06) (118/55 - 146/74)  BP(mean): 90 (07 May 2021 07:06) (83 - 103)  ABP: --  ABP(mean): --  RR: 13 (07 May 2021 07:06) (13 - 22)  SpO2: 100% (07 May 2021 05:00) (97% - 100%)        Physical Examination:    General: awake     HEENT: Pupils equal, reactive to light.  Symmetric.    PULM: Clear to auscultation bilaterally, no significant sputum production    CVS: Regular rate and rhythm, no murmurs, rubs, or gallops    ABD: Soft, nondistended, nontender, normoactive bowel sounds, no masses    EXT: No edema, nontender, no clubbing     SKIN: Warm and well perfused, no rashes noted.    Neurology : no motor or sensory deficit     Musculoskeletal : move all extremity     Lymphatic system: no Palpable node               I&O's Detail    06 May 2021 07:01  -  07 May 2021 07:00  --------------------------------------------------------  IN:    Oral Fluid: 4000 mL    Pantoprazole: 120 mL    sodium chloride 0.9%: 1200 mL  Total IN: 5320 mL    OUT:    Incontinent per Condom Catheter (mL): 60 mL  Total OUT: 60 mL    Total NET: 5260 mL            LABS:                        10.1   21.35 )-----------( 499      ( 07 May 2021 05:50 )             31.0     07 May 2021 05:50    141    |  102    |  13     ----------------------------<  197    4.0     |  26     |  1.1      Ca    8.8        07 May 2021 05:50  Phos  3.2       07 May 2021 05:50  Mg     1.7       07 May 2021 05:50            CAPILLARY BLOOD GLUCOSE      POCT Blood Glucose.: 173 mg/dL (07 May 2021 08:03)    PT/INR - ( 06 May 2021 15:40 )   PT: 11.90 sec;   INR: 1.03 ratio         PTT - ( 06 May 2021 15:40 )  PTT:33.8 sec    Culture    Lactate, Blood: 1.4 mmol/L (05-07-21 @ 05:50)  Lactate, Blood: 1.0 mmol/L (05-06-21 @ 22:20)  Lactate, Blood: 3.9 mmol/L (05-06-21 @ 15:40)      MEDICATIONS  (STANDING):  atorvastatin 40 milliGRAM(s) Oral at bedtime  dextrose 40% Gel 15 Gram(s) Oral once  dextrose 5%. 1000 milliLiter(s) (50 mL/Hr) IV Continuous <Continuous>  dextrose 5%. 1000 milliLiter(s) (100 mL/Hr) IV Continuous <Continuous>  dextrose 50% Injectable 25 Gram(s) IV Push once  dextrose 50% Injectable 12.5 Gram(s) IV Push once  dextrose 50% Injectable 25 Gram(s) IV Push once  glucagon  Injectable 1 milliGRAM(s) IntraMuscular once  insulin lispro (ADMELOG) corrective regimen sliding scale   SubCutaneous three times a day before meals  levETIRAcetam 1000 milliGRAM(s) Oral two times a day  lidocaine   Patch 1 Patch Transdermal every 24 hours  metoprolol tartrate 50 milliGRAM(s) Oral two times a day  pantoprazole Infusion 8 mG/Hr (10 mL/Hr) IV Continuous <Continuous>  QUEtiapine Oral Tab/Cap - Peds 25 milliGRAM(s) Oral two times a day  sodium chloride 0.9%. 1000 milliLiter(s) (100 mL/Hr) IV Continuous <Continuous>    MEDICATIONS  (PRN):  acetaminophen   Tablet .. 650 milliGRAM(s) Oral every 6 hours PRN Temp greater or equal to 38C (100.4F), Mild Pain (1 - 3)  aluminum hydroxide/magnesium hydroxide/simethicone Suspension 30 milliLiter(s) Oral every 4 hours PRN Dyspepsia        RADIOLOGY: ***     CXR: reviewed by me elevated right hemidiaphragm   TLC:  OG:  ET tube:        CAM ICU:  ECHO:

## 2021-05-07 NOTE — PROGRESS NOTE ADULT - SUBJECTIVE AND OBJECTIVE BOX
RISHI HERRERA 75y Male  MRN#: [id number]   CODE STATUS:________      SUBJECTIVE  Patient is a 75y old Male who presents with a chief complaint of seizure (07 May 2021 08:15)  Currently admitted to medicine with the primary diagnosis of Seizure    Hospital course has been complicated by _______.   Today is hospital day 9d, and this morning he is _________ and reports ________ overnight events.     Present Today:           Guerrero Catheter ()No/ ()Yes? Indication:          Central Line ()No/ ()Yes? Indication:          IV Fluids ()No/ ()Yes? Type:  Rate:  Indication:    OBJECTIVE  PAST MEDICAL & SURGICAL HISTORY  Controlled type 2 diabetes mellitus without complication, unspecified long term insulin use status    Essential hypertension    Depression, unspecified depression type    Coronary artery disease without angina pectoris, unspecified vessel or lesion type, unspecified whether native or transplanted heart    Cerebrovascular accident (CVA) due to stenosis of left middle cerebral artery    High blood cholesterol    History of appendectomy    H/O umbilical hernia repair    History of lumbar surgery    History of penile implant    Bradly filter in place    H/O brain surgery      ALLERGIES:  No Known Allergies    MEDICATIONS:  STANDING MEDICATIONS  atorvastatin 40 milliGRAM(s) Oral at bedtime  dextrose 40% Gel 15 Gram(s) Oral once  dextrose 5%. 1000 milliLiter(s) IV Continuous <Continuous>  dextrose 5%. 1000 milliLiter(s) IV Continuous <Continuous>  dextrose 50% Injectable 25 Gram(s) IV Push once  dextrose 50% Injectable 12.5 Gram(s) IV Push once  dextrose 50% Injectable 25 Gram(s) IV Push once  glucagon  Injectable 1 milliGRAM(s) IntraMuscular once  insulin lispro (ADMELOG) corrective regimen sliding scale   SubCutaneous three times a day before meals  levETIRAcetam 1000 milliGRAM(s) Oral two times a day  lidocaine   Patch 1 Patch Transdermal every 24 hours  metoprolol tartrate 50 milliGRAM(s) Oral two times a day  pantoprazole Infusion 8 mG/Hr IV Continuous <Continuous>  QUEtiapine Oral Tab/Cap - Peds 25 milliGRAM(s) Oral two times a day  sodium chloride 0.9%. 1000 milliLiter(s) IV Continuous <Continuous>    PRN MEDICATIONS  acetaminophen   Tablet .. 650 milliGRAM(s) Oral every 6 hours PRN  aluminum hydroxide/magnesium hydroxide/simethicone Suspension 30 milliLiter(s) Oral every 4 hours PRN      Home Medications  Home Medications:  Aggrenox 25 mg-200 mg oral capsule, extended release: 1 cap(s) orally 2 times a day (27 Nov 2018 15:03)  atorvastatin 40 mg oral tablet: 1 tab(s) orally once a day (at bedtime) (17 Jul 2018 11:16)  levETIRAcetam 750 mg oral tablet: 1 tab(s) orally 2 times a day (01 Apr 2019 11:16)  losartan 50 mg oral tablet: 1 tab(s) orally once a day (17 Jul 2018 11:16)  metFORMIN 1000 mg oral tablet: 1 tab(s) orally 2 times a day (27 Nov 2018 15:05)  metoprolol tartrate 50 mg oral tablet: 1 tab(s) orally 2 times a day (17 Jul 2018 11:16)  NIFEdipine 60 mg oral tablet, extended release: 1 tab(s) orally once a day (17 Jul 2018 11:16)  pantoprazole 40 mg oral delayed release tablet: 1 tab(s) orally once a day (before a meal) (17 Jul 2018 11:16)  SEROquel 25 mg oral tablet: 1 tab(s) orally 2 times a day (27 Nov 2018 15:07)  Toujeo SoloStar 300 units/mL subcutaneous solution: 30 unit(s) subcutaneous once (at bedtime) (14 Jul 2018 17:41)  Valium 10 mg oral tablet: 1 tab(s) orally 2 times a day, As Needed for anxiety (27 Nov 2018 15:08)      VITAL SIGNS: Last 24 Hours  T(C): 36.4 (07 May 2021 07:12), Max: 37.3 (06 May 2021 16:51)  T(F): 97.5 (07 May 2021 07:12), Max: 99.2 (06 May 2021 16:51)  HR: 67 (07 May 2021 07:06) (67 - 114)  BP: 145/63 (07 May 2021 07:06) (118/55 - 146/74)  BP(mean): 90 (07 May 2021 07:06) (83 - 103)  RR: 13 (07 May 2021 07:06) (13 - 22)  SpO2: 100% (07 May 2021 05:00) (97% - 100%)    LABS:                        10.1   21.35 )-----------( 499      ( 07 May 2021 05:50 )             31.0     05-07    141  |  102  |  13  ----------------------------<  197<H>  4.0   |  26  |  1.1    Ca    8.8      07 May 2021 05:50  Phos  3.2     05-07  Mg     1.7     05-07      PT/INR - ( 06 May 2021 15:40 )   PT: 11.90 sec;   INR: 1.03 ratio         PTT - ( 06 May 2021 15:40 )  PTT:33.8 sec      Lactate, Blood: 1.4 mmol/L (05-07-21 @ 05:50)  Lactate, Blood: 1.0 mmol/L (05-06-21 @ 22:20)  Lactate, Blood: 3.9 mmol/L *H* (05-06-21 @ 15:40)          RADIOLOGY:    PHYSICAL EXAM:    GENERAL: NAD, well-developed, AAOx3  HEENT:  Atraumatic, Normocephalic. EOMI, PERRLA, conjunctiva and sclera clear, No JVD  PULMONARY: Clear to auscultation bilaterally; No wheeze  CARDIOVASCULAR: Regular rate and rhythm; No murmurs, rubs, or gallops  GASTROINTESTINAL: Soft, Nontender, Nondistended; Bowel sounds present  MUSCULOSKELETAL:  2+ Peripheral Pulses, No clubbing, cyanosis, or edema  NEUROLOGY: R hemiparesis       ADMISSION SUMMARY  Patient is a 75y old Male who presents with a chief complaint of seizure (07 May 2021 08:15)  Currently admitted to medicine with the primary diagnosis of Seizure    Hospital course has been complicated by _______.     Present today:  ( ) Congestive Heart Failure, Yes? ( )Acute / ( )Acute on Chronic / ( )Chronic  :  ( )Systolic / ( )Diastolic               Plan:  ( ) Complicated Pneumonia, Type?  ( )Parapneumonic effusion / ( )Abscess / ( ) Multilobar / ( )Other               Plan:  ( ) Morbid Obesity, Yes? BMI:               Plan:  ( ) Functional Quadriplegia               Plan:  ( ) Encephalopathy               Plan:    ( ) Discussion with patient and/or family regarding goals of care  ( ) Discussed Case and Plan with Medical Attending, Name:    # Planned Disposition: ________ RISHI HERRERA 75y Male  MRN#: [id number]       SUBJECTIVE  Patient is a 75y old Male who presents with a chief complaint of seizure (07 May 2021 08:15)  Currently admitted to medicine with the primary diagnosis of Seizure    Today is hospital day 9d, and this morning he is stable, no more bloody stools overnight. Completed Golytely by NG tube   plan for EGD/colono today         OBJECTIVE  PAST MEDICAL & SURGICAL HISTORY  Controlled type 2 diabetes mellitus without complication, unspecified long term insulin use status    Essential hypertension    Depression, unspecified depression type    Coronary artery disease without angina pectoris, unspecified vessel or lesion type, unspecified whether native or transplanted heart    Cerebrovascular accident (CVA) due to stenosis of left middle cerebral artery    High blood cholesterol    History of appendectomy    H/O umbilical hernia repair    History of lumbar surgery    History of penile implant    [place] filter in place    H/O brain surgery      ALLERGIES:  No Known Allergies    MEDICATIONS:  STANDING MEDICATIONS  atorvastatin 40 milliGRAM(s) Oral at bedtime  dextrose 40% Gel 15 Gram(s) Oral once  dextrose 5%. 1000 milliLiter(s) IV Continuous <Continuous>  dextrose 5%. 1000 milliLiter(s) IV Continuous <Continuous>  dextrose 50% Injectable 25 Gram(s) IV Push once  dextrose 50% Injectable 12.5 Gram(s) IV Push once  dextrose 50% Injectable 25 Gram(s) IV Push once  glucagon  Injectable 1 milliGRAM(s) IntraMuscular once  insulin lispro (ADMELOG) corrective regimen sliding scale   SubCutaneous three times a day before meals  levETIRAcetam 1000 milliGRAM(s) Oral two times a day  lidocaine   Patch 1 Patch Transdermal every 24 hours  metoprolol tartrate 50 milliGRAM(s) Oral two times a day  pantoprazole Infusion 8 mG/Hr IV Continuous <Continuous>  QUEtiapine Oral Tab/Cap - Peds 25 milliGRAM(s) Oral two times a day  sodium chloride 0.9%. 1000 milliLiter(s) IV Continuous <Continuous>    PRN MEDICATIONS  acetaminophen   Tablet .. 650 milliGRAM(s) Oral every 6 hours PRN  aluminum hydroxide/magnesium hydroxide/simethicone Suspension 30 milliLiter(s) Oral every 4 hours PRN      Home Medications  Home Medications:  Aggrenox 25 mg-200 mg oral capsule, extended release: 1 cap(s) orally 2 times a day (27 Nov 2018 15:03)  atorvastatin 40 mg oral tablet: 1 tab(s) orally once a day (at bedtime) (17 Jul 2018 11:16)  levETIRAcetam 750 mg oral tablet: 1 tab(s) orally 2 times a day (01 Apr 2019 11:16)  losartan 50 mg oral tablet: 1 tab(s) orally once a day (17 Jul 2018 11:16)  metFORMIN 1000 mg oral tablet: 1 tab(s) orally 2 times a day (27 Nov 2018 15:05)  metoprolol tartrate 50 mg oral tablet: 1 tab(s) orally 2 times a day (17 Jul 2018 11:16)  NIFEdipine 60 mg oral tablet, extended release: 1 tab(s) orally once a day (17 Jul 2018 11:16)  pantoprazole 40 mg oral delayed release tablet: 1 tab(s) orally once a day (before a meal) (17 Jul 2018 11:16)  SEROquel 25 mg oral tablet: 1 tab(s) orally 2 times a day (27 Nov 2018 15:07)  Toujeo SoloStar 300 units/mL subcutaneous solution: 30 unit(s) subcutaneous once (at bedtime) (14 Jul 2018 17:41)  Valium 10 mg oral tablet: 1 tab(s) orally 2 times a day, As Needed for anxiety (27 Nov 2018 15:08)      VITAL SIGNS: Last 24 Hours  T(C): 36.4 (07 May 2021 07:12), Max: 37.3 (06 May 2021 16:51)  T(F): 97.5 (07 May 2021 07:12), Max: 99.2 (06 May 2021 16:51)  HR: 67 (07 May 2021 07:06) (67 - 114)  BP: 145/63 (07 May 2021 07:06) (118/55 - 146/74)  BP(mean): 90 (07 May 2021 07:06) (83 - 103)  RR: 13 (07 May 2021 07:06) (13 - 22)  SpO2: 100% (07 May 2021 05:00) (97% - 100%)    LABS:                        10.1   21.35 )-----------( 499      ( 07 May 2021 05:50 )             31.0     05-07    141  |  102  |  13  ----------------------------<  197<H>  4.0   |  26  |  1.1    Ca    8.8      07 May 2021 05:50  Phos  3.2     05-07  Mg     1.7     05-07      PT/INR - ( 06 May 2021 15:40 )   PT: 11.90 sec;   INR: 1.03 ratio         PTT - ( 06 May 2021 15:40 )  PTT:33.8 sec      Lactate, Blood: 1.4 mmol/L (05-07-21 @ 05:50)  Lactate, Blood: 1.0 mmol/L (05-06-21 @ 22:20)  Lactate, Blood: 3.9 mmol/L *H* (05-06-21 @ 15:40)      PHYSICAL EXAM:    GENERAL: NAD, AAOx1  HEENT:  Atraumatic, Normocephalic. EOMI, PERRLA, conjunctiva and sclera clear, No JVD  PULMONARY: Clear to auscultation bilaterally; No wheeze  CARDIOVASCULAR: Regular rate and rhythm; No murmurs, rubs, or gallops  GASTROINTESTINAL: Soft, Nontender, Nondistended; Bowel sounds present  MUSCULOSKELETAL:  2+ Peripheral Pulses, No clubbing, cyanosis, or edema  NEUROLOGY: R hemiparesis

## 2021-05-07 NOTE — CONSULT NOTE ADULT - ASSESSMENT
IMPRESSION:  GI bleed   anemia   UTi on trt   hx seizure   DM       PLAN:    CNS: continue keppra switch to IV if NPO     HEENT:oralc are     PULMONARY: keep pox > 92 %     CARDIOVASCULAR: hold AC for now   iv hydration while NPOm    GI: GI prophylaxis.   NPO on PPI drip   follow GI for EGD     RENAL: monitor IS and os     INFECTIOUS DISEASE: abx as per id     HEMATOLOGICAL:  DVT prophylaxis. sereil h/h check INR    ENDOCRINE:  Follow up FS.  hold metformin  Insulin protocol     icu monitoring

## 2021-05-07 NOTE — PROGRESS NOTE ADULT - ASSESSMENT
ASSESSMENT  75 yr old male with hx of Seizure disorder, Hx of CVA with residual right sided weakness, HTN, DM, CKD 3 admitted for witnessed seizure.    IMPRESSION  #GI Bleed 5/6  - CTA Abd 5/6 without evidence of active bleed    #Sepsis during admission (Fevers, WBC>12) secondary to UTI  - CT Abdomen and Pelvis w/ IV Cont (04.28.21 @ 04:46): Urinary bladder increased wall thickening, mucosal hyperenhancement and pericystic stranding; correlate for urinary bladder cystitis.  - WBC Count: 42.36: (04.28.21 @ 11:00) -> improving  - Blood Cx 4/28 NG  - Urine Cx 4/28 NG  - US Dopplers negative  - CXR 5/1 with atelectasis   - Procalcitonin, Serum: 0.30 (05.03.21 @ 06:02)  - s/p course empiric ceftriaxone (4/28-5/5)    #Persistent Leukocytosis   #Seizures  #Fall with acute rib and transverse process fractures  - CT Abdomen and Pelvis w/ IV Cont (04.28.21 @ 04:46):  Acute displaced 10th and minimally displaced 11th right rib fractures. No pneumothorax. Acute minimally displaced fractures of the right transverse processes of L1, L2, and L3.  #Hx of CVA  #CKD III  #DM  #Abx allergy: NKDA    Creatinine, Serum: 1.3 mg/dL (04.30.21 @ 06:47)  Weight (kg): 76.3 (28 Apr 2021 08:12)  CrCl 52    RECOMMENDATIONS  - monitor off antibiotics  - multiple etiologies for leukocytosis including previous UTI and Fall, recently with GI bleed  - trend H/H   - appreciate heme-onc consult  - monitor fever curve    Please call or message on [employer] Teams if with any questions.  Spectra [phone]

## 2021-05-07 NOTE — PROGRESS NOTE ADULT - ASSESSMENT
75 year old male past medical history of CVA with right sided weakness, seizures on keppra, DM, HTN, GERD, Anxiety and CKD2 was  brought in by ambulance for seizure, patient was in bathroom and son was helping him and patient became limp and staring with generalized shaking. patients  last seizure was 2 years ago and this seizure  lasted 2min Keppra was increased and pt was awaiting DC from hospital when he developed bloody BM and was upgraded to ICU    # GI bleed      - ct     # Seizure in setting of seizure d/o -- presented with witnessed seizure. CT head/C-spine with stable chronic infarct of Left MCA and C-spine neg  ·	routine EEG negative for epileptiform activity  ·	Increased keppra to 1000mg bid (from home 750mg bid)    # Rib fracture 10th-11th / L1-2-3 minimally displaced fractures -- in setting of multiple falls. Noted on CT imaging.  ·	Incentive spirometry  ·	pain control with lidoderm patch  ·	Neurosurgery evaluated. No need for emergent surgery. TLSO brace and PT    # Scrotal Swelling  ·	5/4 US scrotum with L>R hydrocele and large volume left sided spermatocele. Penile pump prosthetic  ·	No need for intervention in setting of asxs / no pain.       1. DM2 -- holding home metformin while inpatient (also on Toujeo 30u qhs). A1c 7.8%. Continue Lantus 24qhs, lispro 8u TID, SSI  2. CVA with right sided weakness/RUE contraction / Functional quadriplegia -- Dipyramidole/ASA, atorva 40  3. HTN -- nifedipine 60mg, metoprolol 50mg bid, restarted home losartan 50mg   4. GERD -- pantoprazole  5. Agitation/Anxiety -- Seroquel 25mg bid  6. CKD2 -- baseline Cr 0.9-1.0 with GFR 60-90. At baseline.     75 year old male past medical history of CVA with right sided weakness, seizures on keppra, DM, HTN, GERD, Anxiety and CKD2 was  brought in by ambulance for seizure, patient was in bathroom and son was helping him and patient became limp and staring with generalized shaking. patients  last seizure was 2 years ago and this seizure  lasted 2min Keppra was increased and pt was awaiting DC from hospital when he developed bloody BM and was upgraded to ICU    # GI bleed      - cta abdomen negative for bleed   - NPO for EGD/Colonoscopy today   - monitor H/H   - supplement hypomagnesemia      # Seizure in setting of seizure d/o -- presented with witnessed seizure. CT head/C-spine with stable chronic infarct of Left MCA and C-spine neg  ·	routine EEG negative for epileptiform activity  ·	Increased keppra to 1000mg bid (from home 750mg bid)    # Rib fracture 10th-11th / L1-2-3 minimally displaced fractures -- in setting of multiple falls. Noted on CT imaging.  ·	Incentive spirometry  ·	pain control with lidoderm patch  ·	Neurosurgery evaluated. No need for emergent surgery. TLSO brace and PT    # Scrotal Swelling  ·	5/4 US scrotum with L>R hydrocele and large volume left sided spermatocele. Penile pump prosthetic  ·	No need for intervention in setting of asxs / no pain.       1. DM2 -- holding home metformin while inpatient (also on Toujeo 30u qhs). A1c 7.8%. Continue Lantus 24qhs, lispro 8u TID, SSI  2. CVA with right sided weakness/RUE contraction / Functional quadriplegia -- Dipyramidole/ASA (on hold for gi bleed)  atorva 40  3. HTN -- nifedipine 60mg, metoprolol 50mg bid, restarted home losartan 50mg   4. GERD -- pantoprazole  5. Agitation/Anxiety -- Seroquel 25mg bid  6. CKD2 --  At baseline.

## 2021-05-07 NOTE — CHART NOTE - NSCHARTNOTEFT_GEN_A_CORE
PACU ANESTHESIA ADMISSION NOTE      Procedure:   Post op diagnosis:      ____  Intubated  TV:______       Rate: ______      FiO2: ______    __x__  Patent Airway    __x__  Full return of protective reflexes    ___x  Full recovery from anesthesia / back to baseline     Vitals:   T:   98        R:  14                BP:   178/61               Sat:    93% transferred with O2nc o2sat increased 97%               P: 98      Mental Status:  ___x_ Awake   __x___ Alert   _____ Drowsy   _____ Sedated    Nausea/Vomiting:  __x__ NO  ______Yes,   See Post - Op Orders          Pain Scale (0-10):  __0___    Treatment: ___x_ None    ____ See Post - Op/PCA Orders    Post - Operative Fluids:   ____ Oral   __x__ See Post - Op Orders    Plan: Discharge:   ____Home       _____Floor     _x____Critical Care    _____  Other:_________________    Comments:

## 2021-05-07 NOTE — PROGRESS NOTE ADULT - ASSESSMENT
RISHI HERRERA is a 75y Male with hx of Seizure disorder, Hx of CVA with residual right sided weakness, HTN, DM, CKD 3 admitted for witnessed seizure.Stay complicated by sepsis and active lower GI bleeding requiring upgrade to ICU on 05/06/2021    # Hematochezia  ·	Two 18g IVs, monitor H/H, keep Hg>7  ·	NPO, maintain hemodynamic instability   ·	Pantoprazole 80mg push --> PPI drip   ·	Held home ASA/dipyramidole  ·	plan for EGD/colonoscopy today  ·	GI following     # Leukocytosis   ·	followed by ID, holding Abx  ·	likely reactive   #UTI treated  - fevers/tachycardia/leukocytosis. UA positive but UCx negative. 4/28 Blood culture negative. CXR no acute findings. CT abd/pel/chest with urinary bladder wall thickening and pericystic stranding. No PE. venous duplex negative. COVID negative.  ·	ID following. Continue Abx at this time  ·	Was given dose Rocephin (4/27) --> Vanc/Meropenem (4/28-5/2) -->  Rocephin (as of 5/3-5/5).Completed 9 day course    # Seizure    -- presented with witnessed seizure. CT head/C-spine with stable chronic infarct of Left MCA and C-spine neg  ·	routine EEG negative for epileptiform activity  ·	Increased keppra to 1000mg bid (from home 750mg bid)    # Rib fracture 10th-11th / L1-2-3 minimally displaced fractures -- in setting of multiple falls. Noted on CT imaging.  ·	Incentive spirometry  ·	pain control with lidoderm patch  ·	Neurosurgery evaluated. No need for emergent surgery. TLSO brace and PT    # Scrotal Swelling  ·	5/4 US scrotum with L>R hydrocele and large volume left sided spermatocele. Penile pump prosthetic  ·	No need for intervention in setting of asxs / no pain.     # Onychomycosis   ·	Seen by Podiatry 5/5. aseptic debridement done. Recommend outpt f/u Dr. Bradley     #. Agitation/Anxiety -- Seroquel 25mg bid    #. CKD2 -- baseline Cr 0.9-1.0 with GFR 60-90. At baseline.     DVT -- held due to GIB, sequentials   Code -- Full

## 2021-05-07 NOTE — PROGRESS NOTE ADULT - SUBJECTIVE AND OBJECTIVE BOX
events noted, pt transferred to unit for GI bleed       T(F): 97.4 (05-07-21 @ 10:54), Max: 99.2 (05-06-21 @ 16:51)  HR: 86 (05-07-21 @ 10:20)  BP: 141/60 (05-07-21 @ 10:20)  RR: 16 (05-07-21 @ 10:20)  SpO2: 95% (05-07-21 @ 10:20) (95% - 100%)    PHYSICAL EXAM:  GENERAL: NAD  HEAD:  Atraumatic, Normocephalic  NERVOUS SYSTEM:  R HP  CHEST/LUNG: Clear to percussion bilaterally; No rales, rhonchi, wheezing, or rubs  HEART: Regular rate and rhythm; No murmurs, rubs, or gallops  ABDOMEN: Soft, Nontender, Nondistended; Bowel sounds present  EXTREMITIES:  2+ Peripheral Pulses, No clubbing, cyanosis, or edema  LYMPH: No lymphadenopathy noted  SKIN: No rashes or lesions    LABS  05-07    141  |  102  |  13  ----------------------------<  197<H>  4.0   |  26  |  1.1    Ca    8.8      07 May 2021 05:50  Phos  3.2     05-07  Mg     1.7     05-07                            10.1   21.35 )-----------( 499      ( 07 May 2021 05:50 )             31.0     PT/INR - ( 07 May 2021 05:50 )   PT: 11.60 sec;   INR: 1.01 ratio        PTT - ( 07 May 2021 05:50 )  PTT:28.9 sec    Culture Results:   No Growth Final (04-28-21)  Culture Results:   No growth (04-28-21)  Culture Results:   No Growth Final (04-28-21)  Culture Results:   No Growth Final (04-28-21)  Culture Results:   No Growth Final (04-28-21)    RADIOLOGY  < from: Xray Chest 1 View-PORTABLE IMMEDIATE (Xray Chest 1 View-PORTABLE IMMEDIATE .) (05.07.21 @ 00:42) >  Impression:    No consolidation, effusion or pneumothorax.    < end of copied text >  < from: CT Angio Abdomen and Pelvis w/ IV Cont (05.06.21 @ 20:35) >  IMPRESSION:    No evidence of active upper or lower GI bleeding.    < end of copied text >    MEDICATIONS  (STANDING):  atorvastatin 40 milliGRAM(s) Oral at bedtime  glucagon  Injectable 1 milliGRAM(s) IntraMuscular once  insulin lispro (ADMELOG) corrective regimen sliding scale   SubCutaneous three times a day before meals  levETIRAcetam 1000 milliGRAM(s) Oral two times a day  lidocaine   Patch 1 Patch Transdermal every 24 hours  metoprolol tartrate 50 milliGRAM(s) Oral two times a day  pantoprazole Infusion 8 mG/Hr (10 mL/Hr) IV Continuous <Continuous>  QUEtiapine Oral Tab/Cap - Peds 25 milliGRAM(s) Oral two times a day  sodium chloride 0.9%. 1000 milliLiter(s) (100 mL/Hr) IV Continuous <Continuous>    MEDICATIONS  (PRN):  acetaminophen   Tablet .. 650 milliGRAM(s) Oral every 6 hours PRN Temp greater or equal to 38C (100.4F), Mild Pain (1 - 3)  aluminum hydroxide/magnesium hydroxide/simethicone Suspension 30 milliLiter(s) Oral every 4 hours PRN Dyspepsia

## 2021-05-07 NOTE — PROGRESS NOTE ADULT - SUBJECTIVE AND OBJECTIVE BOX
RISHI HERRERA  75y, Male  Allergy: No Known Allergies      LOS  9d    CHIEF COMPLAINT: seizure (06 May 2021 18:02)      INTERVAL EVENTS/HPI  - transferred to unit for GI Bleed  - T(F): , Max: 99.2 (05-06-21 @ 16:51)  - poor historian - denies any abdominal pain, nausea, vomiting  - CTA Abd reviewed  - WBC Count: 21.35 (05-07-21 @ 05:50)  WBC Count: 24.34 (05-06-21 @ 22:20)     - Creatinine, Serum: 1.1 (05-07-21 @ 05:50)  Creatinine, Serum: 1.0 (05-06-21 @ 15:40)       ROS  General: Denies rigors, nightsweats  HEENT: Denies headache, rhinorrhea, sore throat, eye pain  CV: Denies CP, palpitations  PULM: Denies wheezing, hemoptysis  GI: Denies hematemesis, hematochezia, melena  : Denies discharge, hematuria  MSK: Denies arthralgias, myalgias  SKIN: Denies rash, lesions  NEURO: Denies paresthesias, weakness  PSYCH: Denies depression, anxiety    VITALS:  T(F): 97.5, Max: 99.2 (05-06-21 @ 16:51)  HR: 67  BP: 145/63  RR: 13Vital Signs Last 24 Hrs  T(C): 36.4 (07 May 2021 07:12), Max: 37.3 (06 May 2021 16:51)  T(F): 97.5 (07 May 2021 07:12), Max: 99.2 (06 May 2021 16:51)  HR: 67 (07 May 2021 07:06) (67 - 114)  BP: 145/63 (07 May 2021 07:06) (118/55 - 146/74)  BP(mean): 90 (07 May 2021 07:06) (83 - 103)  RR: 13 (07 May 2021 07:06) (13 - 22)  SpO2: 100% (07 May 2021 05:00) (97% - 100%)    PHYSICAL EXAM:  Gen: NAD, resting in bed  HEENT: Normocephalic, atraumatic  Neck: supple, no lymphadenopathy  CV: Regular rate & regular rhythm  Lungs: decreased BS at bases, no fremitus  Abdomen: Soft, BS present  Ext: Warm, well perfused  Neuro: non focal, awake  Skin: no rash, no erythema  Lines: no phlebitis    FH: Non-contributory  Social Hx: Non-contributory    TESTS & MEASUREMENTS:                        10.1   21.35 )-----------( 499      ( 07 May 2021 05:50 )             31.0     05-07    141  |  102  |  13  ----------------------------<  197<H>  4.0   |  26  |  1.1    Ca    8.8      07 May 2021 05:50  Phos  3.2     05-07  Mg     1.7     05-07      eGFR if Non African American: 65 mL/min/1.73M2 (05-07-21 @ 05:50)  eGFR if African American: 75 mL/min/1.73M2 (05-07-21 @ 05:50)  eGFR if Non African American: 73 mL/min/1.73M2 (05-06-21 @ 15:40)  eGFR if : 84 mL/min/1.73M2 (05-06-21 @ 15:40)          Culture - Blood (collected 04-28-21 @ 18:34)  Source: .Blood None  Final Report (05-04-21 @ 01:01):    No Growth Final    Culture - Urine (collected 04-28-21 @ 17:40)  Source: .Urine Catheterized  Final Report (04-29-21 @ 19:37):    No growth    Culture - Blood (collected 04-28-21 @ 11:00)  Source: .Blood Blood  Final Report (05-03-21 @ 23:01):    No Growth Final    Culture - Blood (collected 04-28-21 @ 04:10)  Source: .Blood Blood  Final Report (05-03-21 @ 18:01):    No Growth Final    Culture - Blood (collected 04-28-21 @ 04:10)  Source: .Blood Blood  Final Report (05-03-21 @ 18:01):    No Growth Final        Lactate, Blood: 1.4 mmol/L (05-07-21 @ 05:50)  Lactate, Blood: 1.0 mmol/L (05-06-21 @ 22:20)  Lactate, Blood: 3.9 mmol/L (05-06-21 @ 15:40)      INFECTIOUS DISEASES TESTING  Procalcitonin, Serum: 0.30 (05-03-21 @ 06:02)  Rapid RVP Result: NotDetec (04-28-21 @ 01:45)      INFLAMMATORY MARKERS      RADIOLOGY & ADDITIONAL TESTS:  I have personally reviewed the last available Chest xray  CXR      CT      CARDIOLOGY TESTING  12 Lead ECG:   Ventricular Rate 110 BPM    Atrial Rate 110 BPM    P-R Interval 172 ms    QRS Duration 82 ms    Q-T Interval 326 ms    QTC Calculation(Bazett) 441 ms    P Axis 57 degrees    R Axis -15 degrees    T Axis 68 degrees    Diagnosis Line Sinus tachycardia  Low voltage QRS  Poor R wave progression    Confirmed by MARIUSZ TIDWELL MD ([id number]) on 5/2/2021 9:24:48 AM (05-02-21 @ 02:08)  12 Lead ECG:   Ventricular Rate 83 BPM    Atrial Rate 83 BPM    P-R Interval 164 ms    QRS Duration 80 ms    Q-T Interval 354 ms    QTC Calculation(Bazett) 415 ms    P Axis 26 degrees    R Axis -4 degrees    T Axis 69 degrees    Diagnosis Line Normal sinus rhythm  Poor R wave progression  Confirmed by MARIUSZ TIDWELL MD ([id number]) on 5/1/2021 9:14:13 AM (04-30-21 @ 20:22)      MEDICATIONS  atorvastatin 40 Oral at bedtime  dextrose 40% Gel 15 Oral once  dextrose 5%. 1000 IV Continuous <Continuous>  dextrose 5%. 1000 IV Continuous <Continuous>  dextrose 50% Injectable 25 IV Push once  dextrose 50% Injectable 12.5 IV Push once  dextrose 50% Injectable 25 IV Push once  glucagon  Injectable 1 IntraMuscular once  insulin lispro (ADMELOG) corrective regimen sliding scale  SubCutaneous three times a day before meals  levETIRAcetam 1000 Oral two times a day  lidocaine   Patch 1 Transdermal every 24 hours  metoprolol tartrate 50 Oral two times a day  pantoprazole Infusion 8 IV Continuous <Continuous>  QUEtiapine Oral Tab/Cap - Peds 25 Oral two times a day  sodium chloride 0.9%. 1000 IV Continuous <Continuous>      WEIGHT  Weight (kg): 73.6 (05-06-21 @ 16:51)  Creatinine, Serum: 1.1 mg/dL (05-07-21 @ 05:50)  Creatinine, Serum: 1.0 mg/dL (05-06-21 @ 15:40)      ANTIBIOTICS:      All available historical records have been reviewed

## 2021-05-08 LAB
ALBUMIN SERPL ELPH-MCNC: 3 G/DL — LOW (ref 3.5–5.2)
ALP SERPL-CCNC: 283 U/L — HIGH (ref 30–115)
ALT FLD-CCNC: 37 U/L — SIGNIFICANT CHANGE UP (ref 0–41)
ANION GAP SERPL CALC-SCNC: 12 MMOL/L — SIGNIFICANT CHANGE UP (ref 7–14)
AST SERPL-CCNC: 25 U/L — SIGNIFICANT CHANGE UP (ref 0–41)
BASOPHILS # BLD AUTO: 0.08 K/UL — SIGNIFICANT CHANGE UP (ref 0–0.2)
BASOPHILS # BLD AUTO: 0.1 K/UL — SIGNIFICANT CHANGE UP (ref 0–0.2)
BASOPHILS NFR BLD AUTO: 0.6 % — SIGNIFICANT CHANGE UP (ref 0–1)
BASOPHILS NFR BLD AUTO: 0.7 % — SIGNIFICANT CHANGE UP (ref 0–1)
BILIRUB SERPL-MCNC: 0.3 MG/DL — SIGNIFICANT CHANGE UP (ref 0.2–1.2)
BUN SERPL-MCNC: 9 MG/DL — LOW (ref 10–20)
CALCIUM SERPL-MCNC: 8.6 MG/DL — SIGNIFICANT CHANGE UP (ref 8.5–10.1)
CHLORIDE SERPL-SCNC: 107 MMOL/L — SIGNIFICANT CHANGE UP (ref 98–110)
CO2 SERPL-SCNC: 25 MMOL/L — SIGNIFICANT CHANGE UP (ref 17–32)
CREAT SERPL-MCNC: 1 MG/DL — SIGNIFICANT CHANGE UP (ref 0.7–1.5)
EOSINOPHIL # BLD AUTO: 0.93 K/UL — HIGH (ref 0–0.7)
EOSINOPHIL # BLD AUTO: 1.12 K/UL — HIGH (ref 0–0.7)
EOSINOPHIL NFR BLD AUTO: 7.4 % — SIGNIFICANT CHANGE UP (ref 0–8)
EOSINOPHIL NFR BLD AUTO: 8.1 % — HIGH (ref 0–8)
GLUCOSE BLDC GLUCOMTR-MCNC: 191 MG/DL — HIGH (ref 70–99)
GLUCOSE BLDC GLUCOMTR-MCNC: 218 MG/DL — HIGH (ref 70–99)
GLUCOSE BLDC GLUCOMTR-MCNC: 227 MG/DL — HIGH (ref 70–99)
GLUCOSE BLDC GLUCOMTR-MCNC: 298 MG/DL — HIGH (ref 70–99)
GLUCOSE SERPL-MCNC: 169 MG/DL — HIGH (ref 70–99)
HCT VFR BLD CALC: 27.7 % — LOW (ref 42–52)
HCT VFR BLD CALC: 28 % — LOW (ref 42–52)
HGB BLD-MCNC: 8.7 G/DL — LOW (ref 14–18)
HGB BLD-MCNC: 9.2 G/DL — LOW (ref 14–18)
IMM GRANULOCYTES NFR BLD AUTO: 2.9 % — HIGH (ref 0.1–0.3)
IMM GRANULOCYTES NFR BLD AUTO: 3 % — HIGH (ref 0.1–0.3)
LYMPHOCYTES # BLD AUTO: 1.92 K/UL — SIGNIFICANT CHANGE UP (ref 1.2–3.4)
LYMPHOCYTES # BLD AUTO: 15.3 % — LOW (ref 20.5–51.1)
LYMPHOCYTES # BLD AUTO: 17.6 % — LOW (ref 20.5–51.1)
LYMPHOCYTES # BLD AUTO: 2.43 K/UL — SIGNIFICANT CHANGE UP (ref 1.2–3.4)
MAGNESIUM SERPL-MCNC: 1.6 MG/DL — LOW (ref 1.8–2.4)
MCHC RBC-ENTMCNC: 30.1 PG — SIGNIFICANT CHANGE UP (ref 27–31)
MCHC RBC-ENTMCNC: 31.1 PG — HIGH (ref 27–31)
MCHC RBC-ENTMCNC: 31.4 G/DL — LOW (ref 32–37)
MCHC RBC-ENTMCNC: 32.9 G/DL — SIGNIFICANT CHANGE UP (ref 32–37)
MCV RBC AUTO: 94.6 FL — HIGH (ref 80–94)
MCV RBC AUTO: 95.8 FL — HIGH (ref 80–94)
MONOCYTES # BLD AUTO: 0.74 K/UL — HIGH (ref 0.1–0.6)
MONOCYTES # BLD AUTO: 0.92 K/UL — HIGH (ref 0.1–0.6)
MONOCYTES NFR BLD AUTO: 5.9 % — SIGNIFICANT CHANGE UP (ref 1.7–9.3)
MONOCYTES NFR BLD AUTO: 6.7 % — SIGNIFICANT CHANGE UP (ref 1.7–9.3)
NEUTROPHILS # BLD AUTO: 8.54 K/UL — HIGH (ref 1.4–6.5)
NEUTROPHILS # BLD AUTO: 8.82 K/UL — HIGH (ref 1.4–6.5)
NEUTROPHILS NFR BLD AUTO: 63.9 % — SIGNIFICANT CHANGE UP (ref 42.2–75.2)
NEUTROPHILS NFR BLD AUTO: 67.9 % — SIGNIFICANT CHANGE UP (ref 42.2–75.2)
NRBC # BLD: 0 /100 WBCS — SIGNIFICANT CHANGE UP (ref 0–0)
NRBC # BLD: 0 /100 WBCS — SIGNIFICANT CHANGE UP (ref 0–0)
PLATELET # BLD AUTO: 434 K/UL — HIGH (ref 130–400)
PLATELET # BLD AUTO: 462 K/UL — HIGH (ref 130–400)
POTASSIUM SERPL-MCNC: 4.1 MMOL/L — SIGNIFICANT CHANGE UP (ref 3.5–5)
POTASSIUM SERPL-SCNC: 4.1 MMOL/L — SIGNIFICANT CHANGE UP (ref 3.5–5)
PROT SERPL-MCNC: 5.7 G/DL — LOW (ref 6–8)
RBC # BLD: 2.89 M/UL — LOW (ref 4.7–6.1)
RBC # BLD: 2.96 M/UL — LOW (ref 4.7–6.1)
RBC # FLD: 13.2 % — SIGNIFICANT CHANGE UP (ref 11.5–14.5)
RBC # FLD: 13.3 % — SIGNIFICANT CHANGE UP (ref 11.5–14.5)
SODIUM SERPL-SCNC: 144 MMOL/L — SIGNIFICANT CHANGE UP (ref 135–146)
WBC # BLD: 12.58 K/UL — HIGH (ref 4.8–10.8)
WBC # BLD: 13.8 K/UL — HIGH (ref 4.8–10.8)
WBC # FLD AUTO: 12.58 K/UL — HIGH (ref 4.8–10.8)
WBC # FLD AUTO: 13.8 K/UL — HIGH (ref 4.8–10.8)

## 2021-05-08 PROCEDURE — 99232 SBSQ HOSP IP/OBS MODERATE 35: CPT

## 2021-05-08 PROCEDURE — 71045 X-RAY EXAM CHEST 1 VIEW: CPT | Mod: 26

## 2021-05-08 RX ORDER — MAGNESIUM SULFATE 500 MG/ML
2 VIAL (ML) INJECTION ONCE
Refills: 0 | Status: COMPLETED | OUTPATIENT
Start: 2021-05-08 | End: 2021-05-08

## 2021-05-08 RX ORDER — PANTOPRAZOLE SODIUM 20 MG/1
40 TABLET, DELAYED RELEASE ORAL EVERY 12 HOURS
Refills: 0 | Status: DISCONTINUED | OUTPATIENT
Start: 2021-05-08 | End: 2021-05-11

## 2021-05-08 RX ADMIN — Medication 50 MILLIGRAM(S): at 17:21

## 2021-05-08 RX ADMIN — PANTOPRAZOLE SODIUM 40 MILLIGRAM(S): 20 TABLET, DELAYED RELEASE ORAL at 17:23

## 2021-05-08 RX ADMIN — Medication 2: at 16:49

## 2021-05-08 RX ADMIN — LIDOCAINE 1 PATCH: 4 CREAM TOPICAL at 06:00

## 2021-05-08 RX ADMIN — LIDOCAINE 1 PATCH: 4 CREAM TOPICAL at 16:50

## 2021-05-08 RX ADMIN — QUETIAPINE FUMARATE 25 MILLIGRAM(S): 200 TABLET, FILM COATED ORAL at 05:43

## 2021-05-08 RX ADMIN — SODIUM CHLORIDE 100 MILLILITER(S): 9 INJECTION INTRAMUSCULAR; INTRAVENOUS; SUBCUTANEOUS at 00:06

## 2021-05-08 RX ADMIN — LEVETIRACETAM 1000 MILLIGRAM(S): 250 TABLET, FILM COATED ORAL at 05:43

## 2021-05-08 RX ADMIN — Medication 1: at 07:59

## 2021-05-08 RX ADMIN — Medication 2 GRAM(S): at 20:34

## 2021-05-08 RX ADMIN — POLYETHYLENE GLYCOL 3350 17 GRAM(S): 17 POWDER, FOR SOLUTION ORAL at 05:43

## 2021-05-08 RX ADMIN — LEVETIRACETAM 1000 MILLIGRAM(S): 250 TABLET, FILM COATED ORAL at 17:21

## 2021-05-08 RX ADMIN — POLYETHYLENE GLYCOL 3350 17 GRAM(S): 17 POWDER, FOR SOLUTION ORAL at 17:21

## 2021-05-08 RX ADMIN — SENNA PLUS 2 TABLET(S): 8.6 TABLET ORAL at 20:33

## 2021-05-08 RX ADMIN — Medication 50 GRAM(S): at 13:22

## 2021-05-08 RX ADMIN — PANTOPRAZOLE SODIUM 10 MG/HR: 20 TABLET, DELAYED RELEASE ORAL at 00:07

## 2021-05-08 RX ADMIN — Medication 2: at 11:46

## 2021-05-08 RX ADMIN — ATORVASTATIN CALCIUM 40 MILLIGRAM(S): 80 TABLET, FILM COATED ORAL at 20:33

## 2021-05-08 RX ADMIN — SODIUM CHLORIDE 100 MILLILITER(S): 9 INJECTION INTRAMUSCULAR; INTRAVENOUS; SUBCUTANEOUS at 10:07

## 2021-05-08 RX ADMIN — Medication 50 MILLIGRAM(S): at 05:43

## 2021-05-08 RX ADMIN — Medication 2 GRAM(S): at 11:47

## 2021-05-08 RX ADMIN — LIDOCAINE 1 PATCH: 4 CREAM TOPICAL at 19:29

## 2021-05-08 RX ADMIN — QUETIAPINE FUMARATE 25 MILLIGRAM(S): 200 TABLET, FILM COATED ORAL at 17:22

## 2021-05-08 NOTE — PROGRESS NOTE ADULT - SUBJECTIVE AND OBJECTIVE BOX
events noted, pt transferred to unit for GI bleed       ICU Vital Signs Last 24 Hrs  T(C): 35.9 (08 May 2021 16:15), Max: 36.6 (07 May 2021 18:37)  T(F): 96.7 (08 May 2021 16:15), Max: 97.9 (07 May 2021 18:37)  HR: 86 (08 May 2021 16:15) (63 - 93)  BP: 145/69 (08 May 2021 16:15) (118/58 - 176/74)  BP(mean): 94 (08 May 2021 15:00) (84 - 107)  ABP: --  ABP(mean): --  RR: 15 (08 May 2021 16:15) (12 - 22)  SpO2: 96% (08 May 2021 16:15) (95% - 98%)      PHYSICAL EXAM:  GENERAL: NAD  HEAD:  Atraumatic, Normocephalic  NERVOUS SYSTEM:  R HP  CHEST/LUNG: Clear to percussion bilaterally; No rales, rhonchi, wheezing, or rubs  HEART: Regular rate and rhythm; No murmurs, rubs, or gallops  ABDOMEN: Soft, Nontender, Nondistended; Bowel sounds present  EXTREMITIES:  2+ Peripheral Pulses, No clubbing, cyanosis, or edema  LYMPH: No lymphadenopathy noted  SKIN: No rashes or lesions    LABS  05-07    141  |  102  |  13  ----------------------------<  197<H>  4.0   |  26  |  1.1    Ca    8.8      07 May 2021 05:50  Phos  3.2     05-07  Mg     1.7     05-07                            10.1   21.35 )-----------( 499      ( 07 May 2021 05:50 )             31.0     PT/INR - ( 07 May 2021 05:50 )   PT: 11.60 sec;   INR: 1.01 ratio        PTT - ( 07 May 2021 05:50 )  PTT:28.9 sec    Culture Results:   No Growth Final (04-28-21)  Culture Results:   No growth (04-28-21)  Culture Results:   No Growth Final (04-28-21)  Culture Results:   No Growth Final (04-28-21)  Culture Results:   No Growth Final (04-28-21)    RADIOLOGY  < from: Xray Chest 1 View-PORTABLE IMMEDIATE (Xray Chest 1 View-PORTABLE IMMEDIATE .) (05.07.21 @ 00:42) >  Impression:    No consolidation, effusion or pneumothorax.    < end of copied text >  < from: CT Angio Abdomen and Pelvis w/ IV Cont (05.06.21 @ 20:35) >  IMPRESSION:    No evidence of active upper or lower GI bleeding.    < end of copied text >    MEDICATIONS  (STANDING):  atorvastatin 40 milliGRAM(s) Oral at bedtime  dextrose 40% Gel 15 Gram(s) Oral once  dextrose 5%. 1000 milliLiter(s) (50 mL/Hr) IV Continuous <Continuous>  dextrose 5%. 1000 milliLiter(s) (100 mL/Hr) IV Continuous <Continuous>  dextrose 50% Injectable 25 Gram(s) IV Push once  dextrose 50% Injectable 12.5 Gram(s) IV Push once  dextrose 50% Injectable 25 Gram(s) IV Push once  glucagon  Injectable 1 milliGRAM(s) IntraMuscular once  insulin lispro (ADMELOG) corrective regimen sliding scale   SubCutaneous three times a day before meals  levETIRAcetam 1000 milliGRAM(s) Oral two times a day  lidocaine   Patch 1 Patch Transdermal every 24 hours  metoprolol tartrate 50 milliGRAM(s) Oral two times a day  pantoprazole    Tablet 40 milliGRAM(s) Oral every 12 hours  polyethylene glycol 3350 17 Gram(s) Oral every 12 hours  QUEtiapine Oral Tab/Cap - Peds 25 milliGRAM(s) Oral two times a day  senna 2 Tablet(s) Oral at bedtime  sodium chloride 0.9%. 1000 milliLiter(s) (100 mL/Hr) IV Continuous <Continuous>  sucralfate suspension 2 Gram(s) Oral every 12 hours    MEDICATIONS  (PRN):  acetaminophen   Tablet .. 650 milliGRAM(s) Oral every 6 hours PRN Temp greater or equal to 38C (100.4F), Mild Pain (1 - 3)  aluminum hydroxide/magnesium hydroxide/simethicone Suspension 30 milliLiter(s) Oral every 4 hours PRN Dyspepsia

## 2021-05-08 NOTE — CHART NOTE - NSCHARTNOTESELECT_GEN_ALL_CORE
Anesthesia/Transfer Note
Event Note
Event Note
PA Note/Event Note
RD @ RISK F/U/Event Note
Event Note
PA Note/Event Note

## 2021-05-08 NOTE — PROGRESS NOTE ADULT - ASSESSMENT
IMPRESSION:  GI bleed   anemia   UTi on trt   hx seizure   DM       PLAN:    CNS: continue keppra switch to IV if NPO     HEENT:oralc are     PULMONARY: keep pox > 92 %     CARDIOVASCULAR: hold AC for now   iv hydration while NPOm    GI: GI prophylaxis.     EGD / colonoscopy report reviewed no active bleeding    RENAL: monitor IS and os     INFECTIOUS DISEASE: abx as per id     HEMATOLOGICAL:  DVT prophylaxis.  serial  h/h       ENDOCRINE:  Follow up FS.    hold metformin    Insulin protocol     check AM ;labs if HH OK can go to [facility]

## 2021-05-08 NOTE — CHART NOTE - NSCHARTNOTEFT_GEN_A_CORE
Registered Dietitian Follow-Up     Patient Profile Reviewed                           Yes [X]   No []     Nutrition History Previously Obtained        Yes []  No [X]  -- Patient sleeping at time of RD visit, no answer from emergency contact, called 3x @7:35 am      Pertinent Subjective Information: Spoke to RN, reports that's patients PO intake varies widely consumes anywhere between % of meals, and sometimes will do better on certain meals. Patient will benefit from nutrition oral supplement to optimize energy and protein intake.     Pertinent Medical Interventions:  Per hospitalist note:   --- GI bleed; NPO for EGD/Colonoscopy ; showed no active bleeding     Diet order: Diet, Regular:   DASH/TLC {Sodium & Cholesterol Restricted} (05-07-21 @ 18:17) [Active]     Anthropometrics:  - Ht. 167.64 cm   - Wt. 73.6 kg   - %wt change -- no new weights will continue to monitor weight tren.   - BMI 26.2 (using dosing weight of 73.6 kg)  - IBW 64.5 kg      Pertinent Lab Data: 5/8 RBC: 2.89, H/H: 8.7/27.7, GLUCOSE: 197, magnesium: 1.7    CAPILLARY BLOOD GLUCOSE  POCT Blood Glucose.: 191 mg/dL (08 May 2021 07:39)  POCT Blood Glucose.: 140 mg/dL (07 May 2021 18:35)  POCT Blood Glucose.: 157 mg/dL (07 May 2021 11:51)  POCT Blood Glucose.: 173 mg/dL (07 May 2021 08:03)    MEDICATIONS  (STANDING):  atorvastatin 40 milliGRAM(s) Oral at bedtime  dextrose 40% Gel 15 Gram(s) Oral once  dextrose 5%. 1000 milliLiter(s) (50 mL/Hr) IV Continuous <Continuous>  dextrose 5%. 1000 milliLiter(s) (100 mL/Hr) IV Continuous <Continuous>  dextrose 50% Injectable 25 Gram(s) IV Push once  dextrose 50% Injectable 12.5 Gram(s) IV Push once  dextrose 50% Injectable 25 Gram(s) IV Push once  glucagon  Injectable 1 milliGRAM(s) IntraMuscular once  insulin lispro (ADMELOG) corrective regimen sliding scale   SubCutaneous three times a day before meals  metoprolol tartrate 50 milliGRAM(s) Oral two times a day  pantoprazole Infusion 8 mG/Hr (10 mL/Hr) IV Continuous <Continuous>  polyethylene glycol 3350 17 Gram(s) Oral every 12 hours  sodium chloride 0.9%. 1000 milliLiter(s) (100 mL/Hr) IV Continuous <Continuous>    MEDICATIONS  (PRN):  aluminum hydroxide/magnesium hydroxide/simethicone Suspension 30 milliLiter(s) Oral every 4 hours PRN Dyspepsia       Physical Findings:  - Appearance: disoriented and garbled per RN flow sheets.   - GI function: wdl, fecal incontinence noted. LBM: 5/7 per RN flow sheets   - Tubes: n/a  - Oral/Mouth cavity: No chewing/swallowing difficulty reproted per staff   - Skin: intact/ scrotum edema +2 per RN flow sheets      Nutrition Requirements  Weight Used: Dosing wt: 168#/76kg --- needs used from initial dietitian assessment on 5/5      Estimated energy needs: 5073-1129 kcal/day (MSJ x 1.2-1.3 AF)  Estimated Protein: 76-91 gm/day (1-1.2 gm/kg CBW)  Estimated  Fluids: per LIP    Nutrient Intake: Patients PO varies from % of meals         [] Previous Nutrition Diagnosis: Inadequate Oral Intake            [x] Ongoing          [] Resolved       Nutrition Intervention Nutrition Interventions: meals and snacks, medical food supplements    RD to monitor diet order, energy intake, glucose profile, NFPF    Goal/Expected Outcome:   Pt to consistently consume >50% of meals, snacks, and supplements within 3 days.     Indicator/Monitoring: RD to monitor diet order, energy intake, glucose profile, NFPF. not at risk will f/u in 7 days.    Recommendations:    1) Will hold off on carbohydrate consistent modifier for now due to poor PO intake, will continue to monitor glucose labs.  2) Add Glucerna BID to optimize energy and protein intake

## 2021-05-08 NOTE — PROGRESS NOTE ADULT - SUBJECTIVE AND OBJECTIVE BOX
Patient is a 75y old  Male who presents with a chief complaint of seizure (07 May 2021 11:10)        HPI:  Patient is non-verbral. Hx taken from wife at bedside.    75 yr old male with hx of Seizure disorder, Hx of CVA with residual right sided weakness, HTN, DM, CKD 3 admitted for witnessed seizure. As per wife, patient was sitting on the bowel and had a seizure for 2 mins. Event was witnessed by son and wife. Wife also notes patient has been falling from the bed multiple time. Wife denies any fever, chills, sob, chest pain, abdominal pain, no urinary or bowel issues.     In ED: V/s stable, UA (+)   (28 Apr 2021 05:55)      Pt evaluated on AM rounds.  I reviewed the radiology tests and hospital record prior to visiting the patient.    Interval Events: No overnight events.    REVIEW OF SYSTEMS:   see HPI      OBJECTIVE:  ICU Vital Signs Last 24 Hrs  T(C): 36.1 (07 May 2021 23:00), Max: 36.6 (07 May 2021 18:37)  T(F): 97 (07 May 2021 23:00), Max: 97.9 (07 May 2021 18:37)  HR: 70 (08 May 2021 05:00) (67 - 93)  BP: 174/70 (08 May 2021 05:00) (118/55 - 176/74)  BP(mean): 101 (08 May 2021 05:00) (79 - 107)  ABP: --  ABP(mean): --  RR: 15 (08 May 2021 05:00) (12 - 22)  SpO2: 97% (08 May 2021 05:00) (95% - 98%)        05-06 @ 07:01  -  05-07 @ 07:00  --------------------------------------------------------  IN: 5320 mL / OUT: 60 mL / NET: 5260 mL    05-07 @ 07:01  -  05-08 @ 05:57  --------------------------------------------------------  IN: 2220 mL / OUT: 1211 mL / NET: 1009 mL      CAPILLARY BLOOD GLUCOSE      POCT Blood Glucose.: 140 mg/dL (07 May 2021 18:35)        PHYSICAL EXAM:     · CONSTITUTIONAL:   not septic appearing,   well nourished,   NAD    · ENMT:   Airway patent,   Nasal mucosa clear.  Mouth with normal mucosa.   No thrush    · EYES:   Clear bilaterally,   pupils equal,   round and reactive to light.    · CARDIAC:   Normal rate,   regular rhythm.    Heart sounds S1, S2.   No murmurs, no rubs or gallops on auscultation  no edema        CAROTID:   normal systolic impulse  no bruits    · RESPIRATORY:   rales  normal chest expansion  no retractions or use of accessory muscles  palpation of chest is normal with no fremitus  percussion of chest demonstrates no hyperresonance or dullness    · GASTROINTESTINAL:  Abdomen soft,   non-tender,   + BS  liver/spleen not palpable    · MUSCULOSKELETAL:   no clubbing, cyanosis      · NEUROLOGICAL:   awake alert oriented  no obvious cranial nerve abnormalities      · SKIN:   Skin normal color for race,   warm, dry   No evidence of rash.        · HEME LYMPH:   no splenomegaly.  No cervical  lymphadenopathy.  no inguinal lymphadenopathy    HOSPITAL MEDICATIONS:  MEDICATIONS  (STANDING):  atorvastatin 40 milliGRAM(s) Oral at bedtime  dextrose 40% Gel 15 Gram(s) Oral once  dextrose 5%. 1000 milliLiter(s) (50 mL/Hr) IV Continuous <Continuous>  dextrose 5%. 1000 milliLiter(s) (100 mL/Hr) IV Continuous <Continuous>  dextrose 50% Injectable 25 Gram(s) IV Push once  dextrose 50% Injectable 12.5 Gram(s) IV Push once  dextrose 50% Injectable 25 Gram(s) IV Push once  glucagon  Injectable 1 milliGRAM(s) IntraMuscular once  insulin lispro (ADMELOG) corrective regimen sliding scale   SubCutaneous three times a day before meals  levETIRAcetam 1000 milliGRAM(s) Oral two times a day  lidocaine   Patch 1 Patch Transdermal every 24 hours  metoprolol tartrate 50 milliGRAM(s) Oral two times a day  pantoprazole Infusion 8 mG/Hr (10 mL/Hr) IV Continuous <Continuous>  polyethylene glycol 3350 17 Gram(s) Oral every 12 hours  QUEtiapine Oral Tab/Cap - Peds 25 milliGRAM(s) Oral two times a day  senna 2 Tablet(s) Oral at bedtime  sodium chloride 0.9%. 1000 milliLiter(s) (100 mL/Hr) IV Continuous <Continuous>  sucralfate suspension 2 Gram(s) Oral every 12 hours    MEDICATIONS  (PRN):  acetaminophen   Tablet .. 650 milliGRAM(s) Oral every 6 hours PRN Temp greater or equal to 38C (100.4F), Mild Pain (1 - 3)  aluminum hydroxide/magnesium hydroxide/simethicone Suspension 30 milliLiter(s) Oral every 4 hours PRN Dyspepsia    lactated ringers.: Solution, 1000 milliLiter(s) infuse at 75 mL/Hr  Provider's Contact #: [phone]  sodium chloride 0.9%.: Solution, 1000 milliLiter(s) infuse at 100 mL/Hr  Special Instructions: for 1 liter  Provider's Contact #: [phone]  sodium chloride 0.9%.: Solution, 1000 milliLiter(s) infuse at 75 mL/Hr  Provider's Contact #: [phone]  sodium chloride 0.9%.: Solution, 1000 milliLiter(s) infuse at 75 mL/Hr  Provider's Contact #: [phone]  sodium chloride 0.9%.: Solution, 1000 milliLiter(s) infuse at 1000 mL/Hr  Provider's Contact #: [phone]      LABS:                        10.1   21.35 )-----------( 499      ( 07 May 2021 05:50 )             31.0     05-07    141  |  102  |  13  ----------------------------<  197<H>  4.0   |  26  |  1.1    Ca    8.8      07 May 2021 05:50  Phos  3.2     05-07  Mg     1.7     05-07      PT/INR - ( 07 May 2021 05:50 )   PT: 11.60 sec;   INR: 1.01 ratio         PTT - ( 07 May 2021 05:50 )  PTT:28.9 sec            SARS-CoV-2: NotDetec (06 May 2021 22:30)  SARS-CoV-2: NotDetec (28 Apr 2021 01:45)              RADIOLOGY: Today I personally reviewed latest CXR and other pertinent films.

## 2021-05-08 NOTE — PROGRESS NOTE ADULT - ASSESSMENT
75 year old male past medical history of CVA with right sided weakness, seizures on keppra, DM, HTN, GERD, Anxiety and CKD2 was  brought in by ambulance for seizure, patient was in bathroom and son was helping him and patient became limp and staring with generalized shaking. patients  last seizure was 2 years ago and this seizure  lasted 2min Keppra was increased and pt was awaiting DC from hospital when he developed bloody BM and was upgraded to ICU    # GI bleed      - cta abdomen negative for bleed   - NPO for EGD/Colonoscopy today   - monitor H/H   - supplement hypomagnesemia      # Seizure in setting of seizure d/o -- presented with witnessed seizure. CT head/C-spine with stable chronic infarct of Left MCA and C-spine neg  ·	routine EEG negative for epileptiform activity  ·	Increased keppra to 1000mg bid (from home 750mg bid)    # Rib fracture 10th-11th / L1-2-3 minimally displaced fractures -- in setting of multiple falls. Noted on CT imaging.  ·	Incentive spirometry  ·	pain control with lidoderm patch  ·	Neurosurgery evaluated. No need for emergent surgery. TLSO brace and PT    # Scrotal Swelling  ·	5/4 US scrotum with L>R hydrocele and large volume left sided spermatocele. Penile pump prosthetic  ·	No need for intervention in setting of asxs / no pain.       1. DM2 -- holding home metformin while inpatient (also on Toujeo 30u qhs). A1c 7.8%. Continue Lantus 24qhs, lispro 8u TID, SSI  2. CVA with right sided weakness/RUE contraction / Functional quadriplegia -- Dipyramidole/ASA (on hold for gi bleed)  atorva 40  3. HTN -- nifedipine 60mg, metoprolol 50mg bid, restarted home losartan 50mg   4. GERD -- pantoprazole  5. Agitation/Anxiety -- Seroquel 25mg bid  6. CKD2 --  At baseline.     75 year old male past medical history of CVA with right sided weakness, seizures on keppra, DM, HTN, GERD, Anxiety and CKD2 was  brought in by ambulance for seizure, patient was in bathroom and son was helping him and patient became limp and staring with generalized shaking. patients  last seizure was 2 years ago and this seizure  lasted 2min Keppra was increased and pt was awaiting DC from hospital when he developed bloody BM and was upgraded to ICU    # GI bleed      - cta abdomen negative for bleed   - NPO for EGD/Colonoscopy    - monitor H/H   - supplement hypomagnesemia      # Seizure in setting of seizure d/o -- presented with witnessed seizure. CT head/C-spine with stable chronic infarct of Left MCA and C-spine neg  ·	routine EEG negative for epileptiform activity  ·	Increased keppra to 1000mg bid (from home 750mg bid)    # Rib fracture 10th-11th / L1-2-3 minimally displaced fractures -- in setting of multiple falls. Noted on CT imaging.  ·	Incentive spirometry  ·	pain control with lidoderm patch  ·	Neurosurgery evaluated. No need for emergent surgery. TLSO brace and PT    # Scrotal Swelling  ·	5/4 US scrotum with L>R hydrocele and large volume left sided spermatocele. Penile pump prosthetic  ·	No need for intervention in setting of asxs / no pain.       1. DM2 -- holding home metformin while inpatient (also on Toujeo 30u qhs). A1c 7.8%. Continue Lantus 24qhs, lispro 8u TID, SSI  2. CVA with right sided weakness/RUE contraction / Functional quadriplegia -- Dipyramidole/ASA (on hold for gi bleed)  atorva 40  3. HTN -- nifedipine 60mg, metoprolol 50mg bid, restarted home losartan 50mg   4. GERD -- pantoprazole  5. Agitation/Anxiety -- Seroquel 25mg bid  6. CKD2 --  At baseline.     75 year old male past medical history of CVA with right sided weakness, seizures on keppra, DM, HTN, GERD, Anxiety and CKD2 was  brought in by ambulance for seizure, patient was in bathroom and son was helping him and patient became limp and staring with generalized shaking. patients  last seizure was 2 years ago and this seizure  lasted 2min Keppra was increased and pt was awaiting DC from hospital when he developed bloody BM and was upgraded to ICU    # GI bleed    cta abdomen negative for bleed   EGD / colonoscopy report reviewed no active    - monitor H/H   - supplement hypomagnesemia      # Seizure in setting of seizure d/o -- presented with witnessed seizure. CT head/C-spine with stable chronic infarct of Left MCA and C-spine neg  ·	routine EEG negative for epileptiform activity  ·	Increased keppra to 1000mg bid (from home 750mg bid)    # Rib fracture 10th-11th / L1-2-3 minimally displaced fractures -- in setting of multiple falls. Noted on CT imaging.  ·	Incentive spirometry  ·	pain control with lidoderm patch  ·	Neurosurgery evaluated. No need for emergent surgery. TLSO brace and PT    # Scrotal Swelling  ·	5/4 US scrotum with L>R hydrocele and large volume left sided spermatocele. Penile pump prosthetic  ·	No need for intervention in setting of asxs / no pain.       1. DM2 -- holding home metformin while inpatient (also on Toujeo 30u qhs). A1c 7.8%. Continue Lantus 24qhs, lispro 8u TID, SSI  2. CVA with right sided weakness/RUE contraction / Functional quadriplegia -- Dipyramidole/ASA (on hold for gi bleed)  atorva 40  3. HTN -- nifedipine 60mg, metoprolol 50mg bid, restarted home losartan 50mg   4. GERD -- pantoprazole  5. Agitation/Anxiety -- Seroquel 25mg bid  6. CKD2 --  At baseline.

## 2021-05-09 LAB
ANION GAP SERPL CALC-SCNC: 10 MMOL/L — SIGNIFICANT CHANGE UP (ref 7–14)
BASOPHILS # BLD AUTO: 0.08 K/UL — SIGNIFICANT CHANGE UP (ref 0–0.2)
BASOPHILS NFR BLD AUTO: 0.6 % — SIGNIFICANT CHANGE UP (ref 0–1)
BLD GP AB SCN SERPL QL: SIGNIFICANT CHANGE UP
BUN SERPL-MCNC: 10 MG/DL — SIGNIFICANT CHANGE UP (ref 10–20)
CALCIUM SERPL-MCNC: 8.2 MG/DL — LOW (ref 8.5–10.1)
CHLORIDE SERPL-SCNC: 108 MMOL/L — SIGNIFICANT CHANGE UP (ref 98–110)
CO2 SERPL-SCNC: 24 MMOL/L — SIGNIFICANT CHANGE UP (ref 17–32)
CREAT SERPL-MCNC: 0.9 MG/DL — SIGNIFICANT CHANGE UP (ref 0.7–1.5)
EOSINOPHIL # BLD AUTO: 0.8 K/UL — HIGH (ref 0–0.7)
EOSINOPHIL NFR BLD AUTO: 6.2 % — SIGNIFICANT CHANGE UP (ref 0–8)
GLUCOSE BLDC GLUCOMTR-MCNC: 179 MG/DL — HIGH (ref 70–99)
GLUCOSE BLDC GLUCOMTR-MCNC: 235 MG/DL — HIGH (ref 70–99)
GLUCOSE BLDC GLUCOMTR-MCNC: 250 MG/DL — HIGH (ref 70–99)
GLUCOSE BLDC GLUCOMTR-MCNC: 299 MG/DL — HIGH (ref 70–99)
GLUCOSE SERPL-MCNC: 238 MG/DL — HIGH (ref 70–99)
HCT VFR BLD CALC: 24.4 % — LOW (ref 42–52)
HCT VFR BLD CALC: 26.1 % — LOW (ref 42–52)
HGB BLD-MCNC: 7.9 G/DL — LOW (ref 14–18)
HGB BLD-MCNC: 8.5 G/DL — LOW (ref 14–18)
IMM GRANULOCYTES NFR BLD AUTO: 1.9 % — HIGH (ref 0.1–0.3)
LYMPHOCYTES # BLD AUTO: 15.8 % — LOW (ref 20.5–51.1)
LYMPHOCYTES # BLD AUTO: 2.04 K/UL — SIGNIFICANT CHANGE UP (ref 1.2–3.4)
MAGNESIUM SERPL-MCNC: 1.8 MG/DL — SIGNIFICANT CHANGE UP (ref 1.8–2.4)
MCHC RBC-ENTMCNC: 30.3 PG — SIGNIFICANT CHANGE UP (ref 27–31)
MCHC RBC-ENTMCNC: 30.7 PG — SIGNIFICANT CHANGE UP (ref 27–31)
MCHC RBC-ENTMCNC: 32.4 G/DL — SIGNIFICANT CHANGE UP (ref 32–37)
MCHC RBC-ENTMCNC: 32.6 G/DL — SIGNIFICANT CHANGE UP (ref 32–37)
MCV RBC AUTO: 93.5 FL — SIGNIFICANT CHANGE UP (ref 80–94)
MCV RBC AUTO: 94.2 FL — HIGH (ref 80–94)
MONOCYTES # BLD AUTO: 0.75 K/UL — HIGH (ref 0.1–0.6)
MONOCYTES NFR BLD AUTO: 5.8 % — SIGNIFICANT CHANGE UP (ref 1.7–9.3)
NEUTROPHILS # BLD AUTO: 9 K/UL — HIGH (ref 1.4–6.5)
NEUTROPHILS NFR BLD AUTO: 69.7 % — SIGNIFICANT CHANGE UP (ref 42.2–75.2)
NRBC # BLD: 0 /100 WBCS — SIGNIFICANT CHANGE UP (ref 0–0)
NRBC # BLD: 0 /100 WBCS — SIGNIFICANT CHANGE UP (ref 0–0)
PLATELET # BLD AUTO: 418 K/UL — HIGH (ref 130–400)
PLATELET # BLD AUTO: 443 K/UL — HIGH (ref 130–400)
POTASSIUM SERPL-MCNC: 3.9 MMOL/L — SIGNIFICANT CHANGE UP (ref 3.5–5)
POTASSIUM SERPL-SCNC: 3.9 MMOL/L — SIGNIFICANT CHANGE UP (ref 3.5–5)
RBC # BLD: 2.61 M/UL — LOW (ref 4.7–6.1)
RBC # BLD: 2.77 M/UL — LOW (ref 4.7–6.1)
RBC # FLD: 13.2 % — SIGNIFICANT CHANGE UP (ref 11.5–14.5)
RBC # FLD: 13.2 % — SIGNIFICANT CHANGE UP (ref 11.5–14.5)
SODIUM SERPL-SCNC: 142 MMOL/L — SIGNIFICANT CHANGE UP (ref 135–146)
WBC # BLD: 12.91 K/UL — HIGH (ref 4.8–10.8)
WBC # BLD: 14.32 K/UL — HIGH (ref 4.8–10.8)
WBC # FLD AUTO: 12.91 K/UL — HIGH (ref 4.8–10.8)
WBC # FLD AUTO: 14.32 K/UL — HIGH (ref 4.8–10.8)

## 2021-05-09 PROCEDURE — 99233 SBSQ HOSP IP/OBS HIGH 50: CPT

## 2021-05-09 RX ADMIN — Medication 2 GRAM(S): at 21:08

## 2021-05-09 RX ADMIN — LEVETIRACETAM 1000 MILLIGRAM(S): 250 TABLET, FILM COATED ORAL at 17:04

## 2021-05-09 RX ADMIN — PANTOPRAZOLE SODIUM 40 MILLIGRAM(S): 20 TABLET, DELAYED RELEASE ORAL at 04:46

## 2021-05-09 RX ADMIN — Medication 3: at 17:01

## 2021-05-09 RX ADMIN — SODIUM CHLORIDE 100 MILLILITER(S): 9 INJECTION INTRAMUSCULAR; INTRAVENOUS; SUBCUTANEOUS at 17:03

## 2021-05-09 RX ADMIN — QUETIAPINE FUMARATE 25 MILLIGRAM(S): 200 TABLET, FILM COATED ORAL at 04:46

## 2021-05-09 RX ADMIN — LIDOCAINE 1 PATCH: 4 CREAM TOPICAL at 19:32

## 2021-05-09 RX ADMIN — POLYETHYLENE GLYCOL 3350 17 GRAM(S): 17 POWDER, FOR SOLUTION ORAL at 04:46

## 2021-05-09 RX ADMIN — LIDOCAINE 1 PATCH: 4 CREAM TOPICAL at 17:06

## 2021-05-09 RX ADMIN — Medication 50 MILLIGRAM(S): at 04:46

## 2021-05-09 RX ADMIN — Medication 50 MILLIGRAM(S): at 17:05

## 2021-05-09 RX ADMIN — PANTOPRAZOLE SODIUM 40 MILLIGRAM(S): 20 TABLET, DELAYED RELEASE ORAL at 17:05

## 2021-05-09 RX ADMIN — LEVETIRACETAM 1000 MILLIGRAM(S): 250 TABLET, FILM COATED ORAL at 04:46

## 2021-05-09 RX ADMIN — ATORVASTATIN CALCIUM 40 MILLIGRAM(S): 80 TABLET, FILM COATED ORAL at 21:08

## 2021-05-09 RX ADMIN — Medication 2: at 08:07

## 2021-05-09 RX ADMIN — SENNA PLUS 2 TABLET(S): 8.6 TABLET ORAL at 21:08

## 2021-05-09 RX ADMIN — LIDOCAINE 1 PATCH: 4 CREAM TOPICAL at 04:35

## 2021-05-09 RX ADMIN — Medication 2 GRAM(S): at 10:02

## 2021-05-09 RX ADMIN — QUETIAPINE FUMARATE 25 MILLIGRAM(S): 200 TABLET, FILM COATED ORAL at 17:06

## 2021-05-09 NOTE — PROGRESS NOTE ADULT - SUBJECTIVE AND OBJECTIVE BOX
S  Pt is alert.   He had Fresh rectal  bleeding today   No new SZ.        PHYSICAL EXAM:  GENERAL: NAD  HEAD:  Atraumatic, Normocephalic  NERVOUS SYSTEM:  Alert, R sided hemiplegia   CHEST/LUNG: Clear to percussion bilaterally  HEART: Regular rate and rhythm; No murmurs, rubs, or gallops  ABDOMEN: Soft, Nontender, Nondistended; Bowel sounds present  EXTREMITIES:  2+ Peripheral Pulses, No clubbing, cyanosis, or edema                                         7.9    12.91 )-----------( 418      ( 09 May 2021 07:47 )             24.4       05-09    142  |  108  |  10  ----------------------------<  238<H>  3.9   |  24  |  0.9    Ca    8.2<L>      09 May 2021 07:47  Mg     1.8     05-09    TPro  5.7<L>  /  Alb  3.0<L>  /  TBili  0.3  /  DBili  x   /  AST  25  /  ALT  37  /  AlkPhos  283<H>  05-08                      Lactate Trend  05-07 @ 05:50 Lactate:1.4   05-06 @ 22:20 Lactate:1.0   05-06 @ 15:40 Lactate:3.9             CAPILLARY BLOOD GLUCOSE      POCT Blood Glucose.: 179 mg/dL (09 May 2021 11:47)                                              RADIOLOGY  < from: Xray Chest 1 View-PORTABLE IMMEDIATE (Xray Chest 1 View-PORTABLE IMMEDIATE .) (05.07.21 @ 00:42) >  Impression:    No consolidation, effusion or pneumothorax.    < end of copied text >  < from: CT Angio Abdomen and Pelvis w/ IV Cont (05.06.21 @ 20:35) >  IMPRESSION:    No evidence of active upper or lower GI bleeding.    < end of copied text >      MEDICATIONS  (STANDING):  atorvastatin 40 milliGRAM(s) Oral at bedtime  dextrose 40% Gel 15 Gram(s) Oral once  dextrose 5%. 1000 milliLiter(s) (50 mL/Hr) IV Continuous <Continuous>  dextrose 5%. 1000 milliLiter(s) (100 mL/Hr) IV Continuous <Continuous>  dextrose 50% Injectable 25 Gram(s) IV Push once  dextrose 50% Injectable 12.5 Gram(s) IV Push once  dextrose 50% Injectable 25 Gram(s) IV Push once  glucagon  Injectable 1 milliGRAM(s) IntraMuscular once  insulin lispro (ADMELOG) corrective regimen sliding scale   SubCutaneous three times a day before meals  levETIRAcetam 1000 milliGRAM(s) Oral two times a day  lidocaine   Patch 1 Patch Transdermal every 24 hours  metoprolol tartrate 50 milliGRAM(s) Oral two times a day  pantoprazole    Tablet 40 milliGRAM(s) Oral every 12 hours  polyethylene glycol 3350 17 Gram(s) Oral every 12 hours  QUEtiapine Oral Tab/Cap - Peds 25 milliGRAM(s) Oral two times a day  senna 2 Tablet(s) Oral at bedtime  sodium chloride 0.9%. 1000 milliLiter(s) (100 mL/Hr) IV Continuous <Continuous>  sucralfate suspension 2 Gram(s) Oral every 12 hours    MEDICATIONS  (PRN):  acetaminophen   Tablet .. 650 milliGRAM(s) Oral every 6 hours PRN Temp greater or equal to 38C (100.4F), Mild Pain (1 - 3)  aluminum hydroxide/magnesium hydroxide/simethicone Suspension 30 milliLiter(s) Oral every 4 hours PRN Dyspepsia

## 2021-05-09 NOTE — PROGRESS NOTE ADULT - ASSESSMENT
75 year old male past medical history of CVA with right sided weakness, seizures on keppra, DM, HTN, GERD, Anxiety and CKD2 was  brought in by ambulance for seizure, patient was in bathroom and son was helping him and patient became limp and staring with generalized shaking. patients  last seizure was 2 years ago and this seizure  lasted 2min Keppra was increased and pt was awaiting DC from hospital when he developed bloody BM and was upgraded to ICU    # Lower GI bleed  He had recurrent bleed this am   Stat H/H ordered   1 U PRBC on hold   CTA  abdomen negative for bleed   EGD / colonoscopy report reviewed no active bleeding  Monitor H/H       # Seizure in setting of seizure d/o -- presented with witnessed seizure. CT head/C-spine with stable chronic infarct of Left MCA and C-spine neg  ·	routine EEG negative for epileptiform activity  ·	Increased keppra to 1000mg bid (from home 750mg bid)    # Rib fracture 10th-11th / L1-2-3 minimally displaced fractures -- in setting of multiple falls. Noted on CT imaging.  ·	Incentive spirometry  ·	pain control with lidoderm patch  ·	Neurosurgery evaluated. No need for emergent surgery. TLSO brace and PT    # Scrotal Swelling  ·	5/4 US scrotum with L>R hydrocele and large volume left sided spermatocele. Penile pump prosthetic  ·	No need for intervention in setting of asxs / no pain.        DM2 -- holding home metformin while inpatient (also on Toujeo 30u qhs). A1c 7.8%. Continue SSI    CVA with right sided weakness/RUE contraction / Functional quadriplegia -- Dipyramidole/ASA (on hold for gi bleed)  atorva 40   GERD -- pantoprazole  Agitation/Anxiety -- Seroquel 25mg bid    Handoff:  Patient had active rectal bleed again. H/H ordered. Will TXF as needed.   DC Home once stable     75 year old male past medical history of CVA with right sided weakness, seizures on keppra, DM, HTN, GERD, Anxiety and CKD2 was  brought in by ambulance for seizure, patient was in bathroom and son was helping him and patient became limp and staring with generalized shaking. patients  last seizure was 2 years ago and this seizure  lasted 2min Keppra was increased and pt was awaiting DC from hospital when he developed bloody BM and was upgraded to ICU    # Lower GI bleed  He had recurrent bleed this am    H/H stable   1 U PRBC on hold   CTA  abdomen negative for bleed   EGD / colonoscopy report reviewed no active bleeding  Monitor H/H       # Seizure in setting of seizure d/o -- presented with witnessed seizure. CT head/C-spine with stable chronic infarct of Left MCA and C-spine neg  ·	routine EEG negative for epileptiform activity  ·	Increased keppra to 1000mg bid (from home 750mg bid)    # Rib fracture 10th-11th / L1-2-3 minimally displaced fractures -- in setting of multiple falls. Noted on CT imaging.  ·	Incentive spirometry  ·	pain control with lidoderm patch  ·	Neurosurgery evaluated. No need for emergent surgery. TLSO brace and PT    # Scrotal Swelling  ·	5/4 US scrotum with L>R hydrocele and large volume left sided spermatocele. Penile pump prosthetic  ·	No need for intervention in setting of asxs / no pain.        DM2 -- holding home metformin while inpatient (also on Toujeo 30u qhs). A1c 7.8%. Continue SSI    CVA with right sided weakness/RUE contraction / Functional quadriplegia -- Dipyramidole/ASA (on hold for gi bleed)  atorva 40   GERD -- pantoprazole  Agitation/Anxiety -- Seroquel 25mg bid    Handoff:  Patient had active rectal bleed again. H/H ordered. Will TXF as needed.   DC Home once stable     75 year old male past medical history of CVA with right sided weakness, seizures on keppra, DM, HTN, GERD, Anxiety and CKD2 was  brought in by ambulance for seizure, patient was in bathroom and son was helping him and patient became limp and staring with generalized shaking. patients  last seizure was 2 years ago and this seizure  lasted 2min Keppra was increased and pt was awaiting DC from hospital when he developed bloody BM and was upgraded to ICU    # Lower GI bleed  He had recurrent bleed this am    H/H stable   1 U PRBC on hold   CTA  abdomen negative for bleed   EGD / colonoscopy report reviewed no active bleeding  Monitor H/H       # Seizure in setting of seizure d/o -- presented with witnessed seizure. CT head/C-spine with stable chronic infarct of Left MCA and C-spine neg  ·	routine EEG negative for epileptiform activity  ·	Increased keppra to 1000mg bid (from home 750mg bid)    # Rib fracture 10th-11th / L1-2-3 minimally displaced fractures -- in setting of multiple falls. Noted on CT imaging.  ·	Incentive spirometry  ·	pain control with lidoderm patch  ·	Neurosurgery evaluated. No need for emergent surgery. TLSO brace and PT    # Scrotal Swelling  ·	5/4 US scrotum with L>R hydrocele and large volume left sided spermatocele. Penile pump prosthetic  ·	No need for intervention in setting of asxs / no pain.        DM2 -- holding home metformin while inpatient (also on Toujeo 30u qhs). A1c 7.8%. Continue SSI    CVA with right sided weakness/RUE contraction / Functional quadriplegia -- Dipyramidole/ASA (on hold for gi bleed)  atorva 40   GERD -- pantoprazole  Agitation/Anxiety -- Seroquel 25mg bid    Handoff:    Patient had active rectal bleed again. H/H stable so far . Will TXF as needed.   I spoke to His wife, her corrected number is [phone] She would like him to go to SNF       Disposition: CM to look into SNF in am please

## 2021-05-10 LAB
ANION GAP SERPL CALC-SCNC: 11 MMOL/L — SIGNIFICANT CHANGE UP (ref 7–14)
BASOPHILS # BLD AUTO: 0.11 K/UL — SIGNIFICANT CHANGE UP (ref 0–0.2)
BASOPHILS NFR BLD AUTO: 0.8 % — SIGNIFICANT CHANGE UP (ref 0–1)
BUN SERPL-MCNC: 7 MG/DL — LOW (ref 10–20)
CALCIUM SERPL-MCNC: 8.9 MG/DL — SIGNIFICANT CHANGE UP (ref 8.5–10.1)
CHLORIDE SERPL-SCNC: 103 MMOL/L — SIGNIFICANT CHANGE UP (ref 98–110)
CO2 SERPL-SCNC: 25 MMOL/L — SIGNIFICANT CHANGE UP (ref 17–32)
CREAT SERPL-MCNC: 0.9 MG/DL — SIGNIFICANT CHANGE UP (ref 0.7–1.5)
EOSINOPHIL # BLD AUTO: 0.68 K/UL — SIGNIFICANT CHANGE UP (ref 0–0.7)
EOSINOPHIL NFR BLD AUTO: 4.9 % — SIGNIFICANT CHANGE UP (ref 0–8)
GLUCOSE BLDC GLUCOMTR-MCNC: 262 MG/DL — HIGH (ref 70–99)
GLUCOSE BLDC GLUCOMTR-MCNC: 327 MG/DL — HIGH (ref 70–99)
GLUCOSE BLDC GLUCOMTR-MCNC: 352 MG/DL — HIGH (ref 70–99)
GLUCOSE BLDC GLUCOMTR-MCNC: 362 MG/DL — HIGH (ref 70–99)
GLUCOSE BLDC GLUCOMTR-MCNC: 384 MG/DL — HIGH (ref 70–99)
GLUCOSE BLDC GLUCOMTR-MCNC: 384 MG/DL — HIGH (ref 70–99)
GLUCOSE BLDC GLUCOMTR-MCNC: 405 MG/DL — HIGH (ref 70–99)
GLUCOSE SERPL-MCNC: 251 MG/DL — HIGH (ref 70–99)
HCT VFR BLD CALC: 28.3 % — LOW (ref 42–52)
HGB BLD-MCNC: 9.2 G/DL — LOW (ref 14–18)
IMM GRANULOCYTES NFR BLD AUTO: 1.6 % — HIGH (ref 0.1–0.3)
LYMPHOCYTES # BLD AUTO: 1.68 K/UL — SIGNIFICANT CHANGE UP (ref 1.2–3.4)
LYMPHOCYTES # BLD AUTO: 12.1 % — LOW (ref 20.5–51.1)
MAGNESIUM SERPL-MCNC: 1.5 MG/DL — LOW (ref 1.8–2.4)
MCHC RBC-ENTMCNC: 30.5 PG — SIGNIFICANT CHANGE UP (ref 27–31)
MCHC RBC-ENTMCNC: 32.5 G/DL — SIGNIFICANT CHANGE UP (ref 32–37)
MCV RBC AUTO: 93.7 FL — SIGNIFICANT CHANGE UP (ref 80–94)
MONOCYTES # BLD AUTO: 0.72 K/UL — HIGH (ref 0.1–0.6)
MONOCYTES NFR BLD AUTO: 5.2 % — SIGNIFICANT CHANGE UP (ref 1.7–9.3)
NEUTROPHILS # BLD AUTO: 10.43 K/UL — HIGH (ref 1.4–6.5)
NEUTROPHILS NFR BLD AUTO: 75.4 % — HIGH (ref 42.2–75.2)
NRBC # BLD: 0 /100 WBCS — SIGNIFICANT CHANGE UP (ref 0–0)
PLATELET # BLD AUTO: 433 K/UL — HIGH (ref 130–400)
POTASSIUM SERPL-MCNC: 3.9 MMOL/L — SIGNIFICANT CHANGE UP (ref 3.5–5)
POTASSIUM SERPL-SCNC: 3.9 MMOL/L — SIGNIFICANT CHANGE UP (ref 3.5–5)
RBC # BLD: 3.02 M/UL — LOW (ref 4.7–6.1)
RBC # FLD: 13.2 % — SIGNIFICANT CHANGE UP (ref 11.5–14.5)
SODIUM SERPL-SCNC: 139 MMOL/L — SIGNIFICANT CHANGE UP (ref 135–146)
WBC # BLD: 13.84 K/UL — HIGH (ref 4.8–10.8)
WBC # FLD AUTO: 13.84 K/UL — HIGH (ref 4.8–10.8)

## 2021-05-10 PROCEDURE — 99232 SBSQ HOSP IP/OBS MODERATE 35: CPT

## 2021-05-10 RX ORDER — MAGNESIUM SULFATE 500 MG/ML
1 VIAL (ML) INJECTION
Refills: 0 | Status: COMPLETED | OUTPATIENT
Start: 2021-05-10 | End: 2021-05-10

## 2021-05-10 RX ORDER — INSULIN LISPRO 100/ML
5 VIAL (ML) SUBCUTANEOUS ONCE
Refills: 0 | Status: COMPLETED | OUTPATIENT
Start: 2021-05-10 | End: 2021-05-10

## 2021-05-10 RX ADMIN — QUETIAPINE FUMARATE 25 MILLIGRAM(S): 200 TABLET, FILM COATED ORAL at 17:14

## 2021-05-10 RX ADMIN — LIDOCAINE 1 PATCH: 4 CREAM TOPICAL at 21:27

## 2021-05-10 RX ADMIN — Medication 2 GRAM(S): at 11:53

## 2021-05-10 RX ADMIN — SENNA PLUS 2 TABLET(S): 8.6 TABLET ORAL at 21:17

## 2021-05-10 RX ADMIN — Medication 50 MILLIGRAM(S): at 05:35

## 2021-05-10 RX ADMIN — LEVETIRACETAM 1000 MILLIGRAM(S): 250 TABLET, FILM COATED ORAL at 05:35

## 2021-05-10 RX ADMIN — Medication 50 MILLIGRAM(S): at 17:13

## 2021-05-10 RX ADMIN — Medication 5 UNIT(S): at 21:17

## 2021-05-10 RX ADMIN — Medication 5: at 11:52

## 2021-05-10 RX ADMIN — POLYETHYLENE GLYCOL 3350 17 GRAM(S): 17 POWDER, FOR SOLUTION ORAL at 05:36

## 2021-05-10 RX ADMIN — Medication 100 GRAM(S): at 18:29

## 2021-05-10 RX ADMIN — QUETIAPINE FUMARATE 25 MILLIGRAM(S): 200 TABLET, FILM COATED ORAL at 05:35

## 2021-05-10 RX ADMIN — LIDOCAINE 1 PATCH: 4 CREAM TOPICAL at 05:26

## 2021-05-10 RX ADMIN — Medication 5: at 16:34

## 2021-05-10 RX ADMIN — PANTOPRAZOLE SODIUM 40 MILLIGRAM(S): 20 TABLET, DELAYED RELEASE ORAL at 05:35

## 2021-05-10 RX ADMIN — Medication 100 GRAM(S): at 16:02

## 2021-05-10 RX ADMIN — SODIUM CHLORIDE 100 MILLILITER(S): 9 INJECTION INTRAMUSCULAR; INTRAVENOUS; SUBCUTANEOUS at 18:30

## 2021-05-10 RX ADMIN — Medication 2 GRAM(S): at 21:18

## 2021-05-10 RX ADMIN — LIDOCAINE 1 PATCH: 4 CREAM TOPICAL at 16:02

## 2021-05-10 RX ADMIN — Medication 4: at 08:52

## 2021-05-10 RX ADMIN — ATORVASTATIN CALCIUM 40 MILLIGRAM(S): 80 TABLET, FILM COATED ORAL at 21:17

## 2021-05-10 RX ADMIN — Medication 100 GRAM(S): at 17:13

## 2021-05-10 RX ADMIN — PANTOPRAZOLE SODIUM 40 MILLIGRAM(S): 20 TABLET, DELAYED RELEASE ORAL at 17:14

## 2021-05-10 RX ADMIN — LEVETIRACETAM 1000 MILLIGRAM(S): 250 TABLET, FILM COATED ORAL at 17:14

## 2021-05-10 NOTE — PROGRESS NOTE ADULT - ASSESSMENT
75 year old male past medical history of CVA with right sided weakness, seizures on keppra, DM, HTN, GERD, Anxiety and CKD2 was  brought in by ambulance for seizure, patient was in bathroom and son was helping him and patient became limp and staring with generalized shaking. patients  last seizure was 2 years ago and this seizure  lasted 2min Keppra was increased and pt was awaiting DC from hospital when he developed bloody BMs    # Lower GI bleed  - EGD showed rectal bleed  - H/H stable  - declining further c-scope  - GI recs appreciated      # Seizure in setting of seizure d/o  - presented with witnessed seizure. CT head/C-spine with stable chronic infarct of Left MCA and C-spine neg  ·	routine EEG negative for epileptiform activity  ·	Increased keppra to 1000mg bid (from home 750mg bid)    # Rib fracture 10th-11th / L1-2-3 minimally displaced fractures -- in setting of multiple falls. Noted on CT imaging.  ·	Incentive spirometry  ·	pain control with lidoderm patch  ·	Neurosurgery evaluated. No need for emergent surgery. TLSO brace and PT    # Scrotal Swelling  ·	5/4 US scrotum with L>R hydrocele and large volume left sided spermatocele. Penile pump prosthetic  ·	No need for intervention in setting of asxs / no pain.     #DM2   holding home metformin while inpatient (also on Toujeo 30u qhs).   A1c 7.8%.   Continue SSI     #CVA with right sided weakness/RUE contraction / Functional quadriplegia   - Dipyramidole/ASA (on hold for gi bleed)  atorva 40    #GERD -- pantoprazole    #Agitation/Anxiety -- Seroquel 25mg bid    Dispo: [facility]us[facility]  - [facility] Wife: [phone]

## 2021-05-10 NOTE — PROGRESS NOTE ADULT - SUBJECTIVE AND OBJECTIVE BOX
75yMale  Being followed for rectal ulcer  Interval history: patient had a bloody bowel movement yesterday, bleeding resolved since. No hematemesis, melena, further blood in stool. patient tolerating dash diet.      PAST MEDICAL & SURGICAL HISTORY:   Controlled type 2 diabetes mellitus without complication, unspecified long term insulin use status    Essential hypertension    Depression, unspecified depression type    Coronary artery disease without angina pectoris, unspecified vessel or lesion type, unspecified whether native or transplanted heart    Cerebrovascular accident (CVA) due to stenosis of left middle cerebral artery    High blood cholesterol    History of appendectomy    H/O umbilical hernia repair    History of lumbar surgery    History of penile implant    [place] filter in place    H/O brain surgery            Social History: No smoking. No alcohol. No illegal drug use.            MEDICATIONS  (STANDING):  atorvastatin 40 milliGRAM(s) Oral at bedtime  dextrose 40% Gel 15 Gram(s) Oral once  dextrose 5%. 1000 milliLiter(s) (50 mL/Hr) IV Continuous <Continuous>  dextrose 5%. 1000 milliLiter(s) (100 mL/Hr) IV Continuous <Continuous>  dextrose 50% Injectable 25 Gram(s) IV Push once  dextrose 50% Injectable 12.5 Gram(s) IV Push once  dextrose 50% Injectable 25 Gram(s) IV Push once  glucagon  Injectable 1 milliGRAM(s) IntraMuscular once  insulin lispro (ADMELOG) corrective regimen sliding scale   SubCutaneous three times a day before meals  levETIRAcetam 1000 milliGRAM(s) Oral two times a day  lidocaine   Patch 1 Patch Transdermal every 24 hours  metoprolol tartrate 50 milliGRAM(s) Oral two times a day  pantoprazole    Tablet 40 milliGRAM(s) Oral every 12 hours  polyethylene glycol 3350 17 Gram(s) Oral every 12 hours  QUEtiapine Oral Tab/Cap - Peds 25 milliGRAM(s) Oral two times a day  senna 2 Tablet(s) Oral at bedtime  sodium chloride 0.9%. 1000 milliLiter(s) (100 mL/Hr) IV Continuous <Continuous>  sucralfate suspension 2 Gram(s) Oral every 12 hours    MEDICATIONS  (PRN):  acetaminophen   Tablet .. 650 milliGRAM(s) Oral every 6 hours PRN Temp greater or equal to 38C (100.4F), Mild Pain (1 - 3)  aluminum hydroxide/magnesium hydroxide/simethicone Suspension 30 milliLiter(s) Oral every 4 hours PRN Dyspepsia      Allergies:   No Known Allergies              REVIEW OF SYSTEMS:  unobtainable       VITAL SIGNS:   T(F): 96.8 (05-10-21 @ 05:00), Max: 98.7 (21 @ 21:02)  HR: 88 (05-10-21 @ 05:00) (78 - 88)  BP: 176/73 (05-10-21 @ 05:00) (164/76 - 176/73)  RR: 18 (05-10-21 @ 05:00) (18 - 18)  SpO2: --    PHYSICAL EXAM:  GENERAL: able to say yes or no to some questions  HEAD:  Atraumatic, Normocephalic  EYES: conjunctiva and sclera clear  NECK: Supple, no JVD or thyromegaly  CHEST/LUNG: Clear to auscultation bilaterally; No wheeze, rhonchi, or rales  HEART: Regular rate and rhythm; normal S1, S2, No murmurs.  ABDOMEN: Soft, nontender, nondistended; Bowel sounds present  NEUROLOGY: No asterixis or tremor.   SKIN: Intact, no jaundice            LABS:                        9.2    13.84 )-----------( 433      ( 10 May 2021 07:38 )             28.3     05-10    139  |  103  |  7<L>  ----------------------------<  251<H>  3.9   |  25  |  0.9    Ca    8.9      10 May 2021 07:38  Mg     1.5     05-10            IMAGING:    < from: CT Angio Abdomen and Pelvis w/ IV Cont (21 @ 20:35) >    EXAM:  CT ANGIO ABD PELV (W)AW IC            PROCEDURE DATE:  2021            INTERPRETATION:  CLINICAL HISTORY / REASON FOR EXAM: GI Rectal bleeding      TECHNIQUE: Multislice helical sections were obtained from the lower chest to the pubic symphysis without contrast. Subsequently, axial CT sections were obtained with IV contrast with CTA protocol from the domes of the diaphragms to the pubic symphysis during the arterial and venous phases. Sagittal and coronal reformatted images were acquired, as well as MIP reconstructed images.    COMPARISON CT: CT scan of the chest, abdomen and pelvis dated 2021.    OTHER STUDIES USED FOR CORRELATION: None.      FINDINGS:      LOWER CHEST: There is a small right pleural effusion with compression atelectasis at the right lung base. Calcified pleural plaques noted at both lung bases. No pericardial effusion.    HEPATIC: The liver is normal in appearance with no evidence of mass or bile duct dilatation. The portal vein is patent. The hepatic veins are opacified.    BILIARY: No calcified gallstones are noted.    SPLEEN: Unremarkable.    PANCREAS: The pancreas is normal in size and configuration. No evidence of mass or pancreatitis.    ADRENAL GLANDS: Unremarkable.    KIDNEYS: No evidenceof hydronephrosis, calcified stones, or solid mass.    ABDOMINOPELVIC NODES: Unremarkable.    PELVIC ORGANS: No evidence of pelvic mass, lymphadenopathy, or fluid collection. Penile implant with suprapubic reservoir.    PERITONEUM/MESENTERY/BOWEL: Noareas of extravasation are identified. No evidence of active upper or lower GI bleeding.    No evidence of bowel obstruction, colitis, inflammatory process, or ascites within the abdomen or pelvis. The appendix is normal in appearance.    BONES/SOFT TISSUES: Degenerative changes of the spine are noted.    OTHER: Vascular calcifications are noted with no evidence of abdominal aortic aneurysm. The celiac axis, superior mesenteric artery, bilateral renal arteries and inferior mesenteric artery are all patent without evidence of flow-limiting stenosis. Two left renal arteries are noted. An IVC filter is noted.      IMPRESSION:    No evidence of active upper or lower GI bleeding.              MARIUSZ REYES MD; Attending Interventional Radiologist  This document has been electronically signed. May  6 2021  9:15PM    < end of copied text >      < from: EGD-Colonoscopy (21 @ 15:00) >    EGD-Colonoscopy Report Date: 2021 3:00 PM   Patient Name: RISHI HERRERA   MRN: [id number]   Account Number: [id number]  Gender: Male    (age): 1945 (75)   EGD Instrument(s): [id number]-H190 ([id number])([id number])  Colonoscopy Instrument(s): [id number]-190 ([id number])([id number])    Attending/Fellow:   Maribel Brandt MD       Referring Physician:   ED PHYSICIAN UNASSIGNED     Nurse(s):   jennifer Miller RN       EGD Indications: GI Bleed: 578.9 - K92.2  Colonoscopy Indications: Rectal bleedin.3 - K62.5    General Procedure:   The procedure, indications, preparation and potential complications were  explained to the patient, who indicated understanding and signed the  corresponding consent forms. MAC was administered. Continuous pulse oximetry and  blood pressure monitoring were used throughout the procedure. Supplemental  oxygen was used.      EGDEGD Procedure:   Patient was placed in left lateral decubitus position. The endoscope was  introduced through mouth and advanced under direct visualization until second  part of the duodenum reached. Patient tolerance to the procedure was good. The  procedure was not difficult.    EGD Findings:   Esophagus Mucosa Normal mucosa was noted in the whole esophagus.   Stomach Mucosa Diffuse erythema of the mucosa was noted in the stomach. These  findings are compatible with non-erosive gastritis. Multiple cold forceps  biopsies were performed for histology.   Duodenum Mucosa Normal mucosa was noted in the whole examined duodenum.     EGD Impressions:    Normal mucosa in the whole esophagus.    Erythema in the stomach compatible with non-erosive gastritis. (Biopsy).    Normal mucosa in the whole examined duodenum.       EGD Limitations/Complications:   There were no apparent limitations or complications  ColonoscopyColonoscopy Procedure:   This is a  average risk patient  The quality of preparation was Good. Patient  was placed in left lateral decubitus position. The colonoscope was introduced  through rectum and advanced under direct visualization until cecum reached. The  appendiceal orifice and the ileo-cecal valve were identified. The colonoscope  was retroflexed within the rectum. Careful visualization was performed as the  instrument was withdrawn. Patient tolerance to the procedure was excellent. The  procedure was not difficult. Digital exam was normal.    Colonoscopy Findings:   Excavated lesions Multiple diverticula were seen in the the left side of the  colon. Diverticulosis appeared to be of mild severity.   Protruding lesions A single sessile 6 mm polyp was found in the ascending colon.  A single-piece polypectomy was performed using a cold forceps. The polyp was  completely removed.   Additional findings Cicumferential ulceration at the anorectum suggestive of  stercoral ulcer with black spots, no visible vessel noted, and no active  bleeding..   Otherwise normal colon.     Colonoscopy Impressions:    Polyp (6 mm) in the ascending colon. (Polypectomy).    Mild diverticulosis of the the left side of the colon.    Cicumferential ulceration at the anorectum suggestive of stercoral ulcer with  black spots, no visible vessel noted, and no active bleeding. .    Otherwise normal colon.     Colonoscopy Limitations/Complications:   There were no apparent limitations or complications    Plan: Await pathology results  Resume diet as per routine  Carafate enemas bid  Bowel regimen: senokot/colace/miralax to taper up aiming for 1 soft BM daily              Maribel Brandt MD    Version 1, Electronically signed on 2021 6:50:04 PM by Maribel Brandt MD    < end of copied text >

## 2021-05-10 NOTE — PROGRESS NOTE ADULT - ATTENDING COMMENTS
74 yo male with stercoral ulcer  Plans as noted above.
I have personally seen and examined this patient.  I have fully participated in the care of this patient.  I have reviewed all pertinent clinical information, including history, physical exam, plan and note.   I have reviewed all pertinent clinical information and reviewed all relevant imaging and diagnostic studies personally.  Pt w/ h/o prior stroke w/ LRE currently with breakthrough seizure back to baseline.  Continue keppra 1g BID.  Has persistent leukocytosis with fever- r/o underlying occult infection.  Recommendations as above.  Agree with above assessment except as noted.

## 2021-05-10 NOTE — PROGRESS NOTE ADULT - SUBJECTIVE AND OBJECTIVE BOX
Patient is a 75y old  Male who presents with a chief complaint of seizure (10 May 2021 11:58)      SUBJECTIVE / OVERNIGHT EVENTS: had a rectal bleed. Not holding enemas. Declining EGD. Pre-auth for Yusef  ADDITIONAL REVIEW OF SYSTEMS:    MEDICATIONS  (STANDING):  atorvastatin 40 milliGRAM(s) Oral at bedtime  dextrose 40% Gel 15 Gram(s) Oral once  dextrose 5%. 1000 milliLiter(s) (50 mL/Hr) IV Continuous <Continuous>  dextrose 5%. 1000 milliLiter(s) (100 mL/Hr) IV Continuous <Continuous>  dextrose 50% Injectable 25 Gram(s) IV Push once  dextrose 50% Injectable 12.5 Gram(s) IV Push once  dextrose 50% Injectable 25 Gram(s) IV Push once  glucagon  Injectable 1 milliGRAM(s) IntraMuscular once  insulin lispro (ADMELOG) corrective regimen sliding scale   SubCutaneous three times a day before meals  levETIRAcetam 1000 milliGRAM(s) Oral two times a day  lidocaine   Patch 1 Patch Transdermal every 24 hours  metoprolol tartrate 50 milliGRAM(s) Oral two times a day  pantoprazole    Tablet 40 milliGRAM(s) Oral every 12 hours  polyethylene glycol 3350 17 Gram(s) Oral every 12 hours  QUEtiapine Oral Tab/Cap - Peds 25 milliGRAM(s) Oral two times a day  senna 2 Tablet(s) Oral at bedtime  sodium chloride 0.9%. 1000 milliLiter(s) (100 mL/Hr) IV Continuous <Continuous>  sucralfate suspension 2 Gram(s) Oral every 12 hours    MEDICATIONS  (PRN):  acetaminophen   Tablet .. 650 milliGRAM(s) Oral every 6 hours PRN Temp greater or equal to 38C (100.4F), Mild Pain (1 - 3)  aluminum hydroxide/magnesium hydroxide/simethicone Suspension 30 milliLiter(s) Oral every 4 hours PRN Dyspepsia      CAPILLARY BLOOD GLUCOSE      POCT Blood Glucose.: 384 mg/dL (10 May 2021 16:25)  POCT Blood Glucose.: 384 mg/dL (10 May 2021 16:25)  POCT Blood Glucose.: 362 mg/dL (10 May 2021 11:41)  POCT Blood Glucose.: 327 mg/dL (10 May 2021 08:48)  POCT Blood Glucose.: 262 mg/dL (10 May 2021 07:40)  POCT Blood Glucose.: 235 mg/dL (09 May 2021 21:16)    I&O's Summary    09 May 2021 07:01  -  10 May 2021 07:00  --------------------------------------------------------  IN: 0 mL / OUT: 1802 mL / NET: -1802 mL    10 May 2021 07:01  -  10 May 2021 18:45  --------------------------------------------------------  IN: 950 mL / OUT: 0 mL / NET: 950 mL        PHYSICAL EXAM:  Vital Signs Last 24 Hrs  T(C): 36.9 (10 May 2021 14:19), Max: 37.1 (09 May 2021 21:02)  T(F): 98.4 (10 May 2021 14:19), Max: 98.7 (09 May 2021 21:02)  HR: 96 (10 May 2021 14:19) (78 - 96)  BP: 160/87 (10 May 2021 14:19) (160/87 - 176/73)  BP(mean): --  RR: 16 (10 May 2021 14:19) (16 - 18)  SpO2: --  CONSTITUTIONAL: NAD, well-developed, well-groomed  RESPIRATORY: Normal respiratory effort; lungs are clear to auscultation bilaterally  CARDIOVASCULAR: Regular rate and rhythm, normal S1 and S2, no murmur/rub/gallop; No lower extremity edema; Peripheral pulses are 2+ bilaterally  ABDOMEN: Nontender to palpation, normoactive bowel sounds, no rebound/guarding; No hepatosplenomegaly  MUSCULOSKELETAL:  Normal gait; no clubbing or cyanosis of digits; no joint swelling or tenderness to palpation  PSYCH: A+O to person, place, and time; affect appropriate  SKIN: No rashes; no palpable lesions    LABS:                        9.2    13.84 )-----------( 433      ( 10 May 2021 07:38 )             28.3     05-10    139  |  103  |  7<L>  ----------------------------<  251<H>  3.9   |  25  |  0.9    Ca    8.9      10 May 2021 07:38  Mg     1.5     05-10                  RADIOLOGY & ADDITIONAL TESTS:  Results Reviewed:   Imaging Personally Reviewed:  Electrocardiogram Personally Reviewed:    COORDINATION OF CARE:  Care Discussed with Consultants/Other Providers [Y/N]:  Prior or Outpatient Records Reviewed [Y/N]:

## 2021-05-10 NOTE — PROGRESS NOTE ADULT - ASSESSMENT
75yMale [facility] CVA last one 2019 on aspirin and dipyridamole, HTN, DM presents for seizure and fall. Patient was getting ready for discharge and then had a large bloody bowel movement with clots. No hematemesis reported. Patient also reports LUQ pain. Questionable melenic stools as well. Last colonoscopy reported by wife to be with DR. palomino 5 years ago and normal.    Problem 1-Rectal bleeding with clots   questionable melenic stools as well reported  ddx diverticulosis, PUD, malignancy  Rec  EGD Impressions:    Normal mucosa in the whole esophagus.    Erythema in the stomach compatible with non-erosive gastritis. (Biopsy).    Normal mucosa in the whole examined duodenum.   Colonoscopy Impressions:    Polyp (6 mm) in the ascending colon. (Polypectomy).    Mild diverticulosis of the the left side of the colon.    Cicumferential ulceration at the anorectum suggestive of stercoral ulcer with  black spots, no visible vessel noted, and no active bleeding. .    Otherwise normal colon.       Plan:   -Await pathology results  -Resume diet as per routine  -Carafate enemas bid  -Bowel regimen: senokot/colace/miralax to taper up aiming for 1 soft BM daily  - Follow up with our [facility] located at [address] [place] [place] [address]. Phone Number: [phone] to follow-up with repeat colonsocopy   -Maintain Hemodynamic Stability   -Monitor CBC  -Negative COVID-19 Test within 3 days for intervention   -Optimize Electrolytes  -Transfuse prn to hgb >8  -Two large bore IV lines  -Monitor Vital Signs  -Monitor Stool For blood, frequency, consistency, melena  -Active Type and Screen    Problem 2-Elevated ALP  Acute displaced 10th and minimally displaced 11th right rib fractures. No pneumothorax.  Acute minimally displaced fractures of the right transverse processes of L1, L2, and L3.  Rec  -can check GGT     Problem 3-Urinary bladder increased wall thickening, mucosal hyperenhancement and pericystic stranding; correlate for urinary bladder cystitis.  Rec  -Care as per primary team    Recall GI if needed

## 2021-05-11 ENCOUNTER — TRANSCRIPTION ENCOUNTER (OUTPATIENT)
Age: 76
End: 2021-05-11

## 2021-05-11 VITALS
TEMPERATURE: 98 F | HEART RATE: 84 BPM | DIASTOLIC BLOOD PRESSURE: 76 MMHG | RESPIRATION RATE: 16 BRPM | SYSTOLIC BLOOD PRESSURE: 139 MMHG

## 2021-05-11 LAB
BASOPHILS # BLD AUTO: 0.1 K/UL — SIGNIFICANT CHANGE UP (ref 0–0.2)
BASOPHILS NFR BLD AUTO: 0.7 % — SIGNIFICANT CHANGE UP (ref 0–1)
EOSINOPHIL # BLD AUTO: 0.78 K/UL — HIGH (ref 0–0.7)
EOSINOPHIL NFR BLD AUTO: 5.5 % — SIGNIFICANT CHANGE UP (ref 0–8)
GLUCOSE BLDC GLUCOMTR-MCNC: 244 MG/DL — HIGH (ref 70–99)
GLUCOSE BLDC GLUCOMTR-MCNC: 261 MG/DL — HIGH (ref 70–99)
GLUCOSE BLDC GLUCOMTR-MCNC: 270 MG/DL — HIGH (ref 70–99)
GLUCOSE BLDC GLUCOMTR-MCNC: 271 MG/DL — HIGH (ref 70–99)
GLUCOSE BLDC GLUCOMTR-MCNC: 348 MG/DL — HIGH (ref 70–99)
HCT VFR BLD CALC: 26.3 % — LOW (ref 42–52)
HGB BLD-MCNC: 8.5 G/DL — LOW (ref 14–18)
IMM GRANULOCYTES NFR BLD AUTO: 1.3 % — HIGH (ref 0.1–0.3)
LYMPHOCYTES # BLD AUTO: 1.75 K/UL — SIGNIFICANT CHANGE UP (ref 1.2–3.4)
LYMPHOCYTES # BLD AUTO: 12.4 % — LOW (ref 20.5–51.1)
MCHC RBC-ENTMCNC: 30.7 PG — SIGNIFICANT CHANGE UP (ref 27–31)
MCHC RBC-ENTMCNC: 32.3 G/DL — SIGNIFICANT CHANGE UP (ref 32–37)
MCV RBC AUTO: 94.9 FL — HIGH (ref 80–94)
MONOCYTES # BLD AUTO: 0.85 K/UL — HIGH (ref 0.1–0.6)
MONOCYTES NFR BLD AUTO: 6 % — SIGNIFICANT CHANGE UP (ref 1.7–9.3)
NEUTROPHILS # BLD AUTO: 10.47 K/UL — HIGH (ref 1.4–6.5)
NEUTROPHILS NFR BLD AUTO: 74.1 % — SIGNIFICANT CHANGE UP (ref 42.2–75.2)
NRBC # BLD: 0 /100 WBCS — SIGNIFICANT CHANGE UP (ref 0–0)
PLATELET # BLD AUTO: 487 K/UL — HIGH (ref 130–400)
RAPID RVP RESULT: SIGNIFICANT CHANGE UP
RBC # BLD: 2.77 M/UL — LOW (ref 4.7–6.1)
RBC # FLD: 13.4 % — SIGNIFICANT CHANGE UP (ref 11.5–14.5)
SARS-COV-2 RNA SPEC QL NAA+PROBE: SIGNIFICANT CHANGE UP
SURGICAL PATHOLOGY STUDY: SIGNIFICANT CHANGE UP
SURGICAL PATHOLOGY STUDY: SIGNIFICANT CHANGE UP
WBC # BLD: 14.13 K/UL — HIGH (ref 4.8–10.8)
WBC # FLD AUTO: 14.13 K/UL — HIGH (ref 4.8–10.8)

## 2021-05-11 PROCEDURE — 99232 SBSQ HOSP IP/OBS MODERATE 35: CPT

## 2021-05-11 RX ORDER — SENNA PLUS 8.6 MG/1
2 TABLET ORAL
Qty: 0 | Refills: 0 | DISCHARGE
Start: 2021-05-11

## 2021-05-11 RX ORDER — NIFEDIPINE 30 MG
30 TABLET, EXTENDED RELEASE 24 HR ORAL DAILY
Refills: 0 | Status: DISCONTINUED | OUTPATIENT
Start: 2021-05-11 | End: 2021-05-11

## 2021-05-11 RX ORDER — LEVETIRACETAM 250 MG/1
1 TABLET, FILM COATED ORAL
Qty: 0 | Refills: 0 | DISCHARGE
Start: 2021-05-11

## 2021-05-11 RX ORDER — LIDOCAINE 4 G/100G
1 CREAM TOPICAL
Qty: 0 | Refills: 0 | DISCHARGE
Start: 2021-05-11

## 2021-05-11 RX ORDER — POLYETHYLENE GLYCOL 3350 17 G/17G
17 POWDER, FOR SOLUTION ORAL
Qty: 0 | Refills: 0 | DISCHARGE
Start: 2021-05-11

## 2021-05-11 RX ORDER — SUCRALFATE 1 G
20 TABLET ORAL
Qty: 0 | Refills: 0 | DISCHARGE
Start: 2021-05-11

## 2021-05-11 RX ORDER — LEVETIRACETAM 250 MG/1
1 TABLET, FILM COATED ORAL
Qty: 0 | Refills: 0 | DISCHARGE

## 2021-05-11 RX ORDER — LEVETIRACETAM 250 MG/1
1 TABLET, FILM COATED ORAL
Qty: 60 | Refills: 2
Start: 2021-05-11 | End: 2021-08-08

## 2021-05-11 RX ADMIN — ATORVASTATIN CALCIUM 40 MILLIGRAM(S): 80 TABLET, FILM COATED ORAL at 21:05

## 2021-05-11 RX ADMIN — LIDOCAINE 1 PATCH: 4 CREAM TOPICAL at 04:47

## 2021-05-11 RX ADMIN — PANTOPRAZOLE SODIUM 40 MILLIGRAM(S): 20 TABLET, DELAYED RELEASE ORAL at 17:46

## 2021-05-11 RX ADMIN — LIDOCAINE 1 PATCH: 4 CREAM TOPICAL at 19:17

## 2021-05-11 RX ADMIN — Medication 3: at 16:57

## 2021-05-11 RX ADMIN — Medication 30 MILLIGRAM(S): at 13:07

## 2021-05-11 RX ADMIN — LEVETIRACETAM 1000 MILLIGRAM(S): 250 TABLET, FILM COATED ORAL at 05:44

## 2021-05-11 RX ADMIN — Medication 50 MILLIGRAM(S): at 17:46

## 2021-05-11 RX ADMIN — SENNA PLUS 2 TABLET(S): 8.6 TABLET ORAL at 21:05

## 2021-05-11 RX ADMIN — Medication 4: at 12:10

## 2021-05-11 RX ADMIN — PANTOPRAZOLE SODIUM 40 MILLIGRAM(S): 20 TABLET, DELAYED RELEASE ORAL at 05:44

## 2021-05-11 RX ADMIN — Medication 3: at 07:55

## 2021-05-11 RX ADMIN — SODIUM CHLORIDE 100 MILLILITER(S): 9 INJECTION INTRAMUSCULAR; INTRAVENOUS; SUBCUTANEOUS at 04:47

## 2021-05-11 RX ADMIN — POLYETHYLENE GLYCOL 3350 17 GRAM(S): 17 POWDER, FOR SOLUTION ORAL at 05:44

## 2021-05-11 RX ADMIN — Medication 2 GRAM(S): at 12:10

## 2021-05-11 RX ADMIN — Medication 50 MILLIGRAM(S): at 05:44

## 2021-05-11 RX ADMIN — LIDOCAINE 1 PATCH: 4 CREAM TOPICAL at 16:58

## 2021-05-11 RX ADMIN — QUETIAPINE FUMARATE 25 MILLIGRAM(S): 200 TABLET, FILM COATED ORAL at 17:46

## 2021-05-11 RX ADMIN — Medication 2 GRAM(S): at 21:05

## 2021-05-11 RX ADMIN — QUETIAPINE FUMARATE 25 MILLIGRAM(S): 200 TABLET, FILM COATED ORAL at 05:44

## 2021-05-11 RX ADMIN — LEVETIRACETAM 1000 MILLIGRAM(S): 250 TABLET, FILM COATED ORAL at 17:46

## 2021-05-11 NOTE — DISCHARGE NOTE PROVIDER - NSDCMRMEDTOKEN_GEN_ALL_CORE_FT
Aggrenox 25 mg-200 mg oral capsule, extended release: 1 cap(s) orally 2 times a day  atorvastatin 40 mg oral tablet: 1 tab(s) orally once a day (at bedtime)  levETIRAcetam 750 mg oral tablet: 1 tab(s) orally 2 times a day  losartan 50 mg oral tablet: 1 tab(s) orally once a day  metFORMIN 1000 mg oral tablet: 1 tab(s) orally 2 times a day  metoprolol tartrate 50 mg oral tablet: 1 tab(s) orally 2 times a day  NIFEdipine 60 mg oral tablet, extended release: 1 tab(s) orally once a day  pantoprazole 40 mg oral delayed release tablet: 1 tab(s) orally once a day (before a meal)  SEROquel 25 mg oral tablet: 1 tab(s) orally 2 times a day  Toujeo SoloStar 300 units/mL subcutaneous solution: 30 unit(s) subcutaneous once (at bedtime)  Valium 10 mg oral tablet: 1 tab(s) orally 2 times a day, As Needed for anxiety   Aggrenox 25 mg-200 mg oral capsule, extended release: 1 cap(s) orally 2 times a day  aluminum hydroxide-magnesium hydroxide 200 mg-200 mg/5 mL oral suspension: 30 milliliter(s) orally every 4 hours, As needed, Dyspepsia  atorvastatin 40 mg oral tablet: 1 tab(s) orally once a day (at bedtime)  levETIRAcetam 1000 mg oral tablet: 1 tab(s) orally 2 times a day  levETIRAcetam 1000 mg oral tablet: 1 tab(s) orally 2 times a day  lidocaine 5% topical film: Apply topically to affected area once a day  losartan 50 mg oral tablet: 1 tab(s) orally once a day  metFORMIN 1000 mg oral tablet: 1 tab(s) orally 2 times a day  metoprolol tartrate 50 mg oral tablet: 1 tab(s) orally 2 times a day  NIFEdipine 60 mg oral tablet, extended release: 1 tab(s) orally once a day  pantoprazole 40 mg oral delayed release tablet: 1 tab(s) orally once a day (before a meal)  polyethylene glycol 3350 oral powder for reconstitution: 17 gram(s) orally every 12 hours  senna oral tablet: 2 tab(s) orally once a day (at bedtime)  SEROquel 25 mg oral tablet: 1 tab(s) orally 2 times a day  sucralfate 1 g/10 mL oral suspension: 20 milliliter(s) orally every 12 hours  Rigoberto SoloStar 300 units/mL subcutaneous solution: 30 unit(s) subcutaneous once (at bedtime)  Valium 10 mg oral tablet: 1 tab(s) orally 2 times a day, As Needed for anxiety

## 2021-05-11 NOTE — DISCHARGE NOTE PROVIDER - PROVIDER TOKENS
PROVIDER:[TOKEN:[[id number]]],PROVIDER:[TOKEN:[[id number]]],PROVIDER:[TOKEN:[[id number]]]

## 2021-05-11 NOTE — PROGRESS NOTE ADULT - ASSESSMENT
75 year old male past medical history of CVA with right sided weakness, seizures on keppra, DM, HTN, GERD, Anxiety and CKD2 was  brought in by ambulance for seizure, patient was in bathroom and son was helping him and patient became limp and staring with generalized shaking. patients  last seizure was 2 years ago and this seizure  lasted 2min Keppra was increased and pt was awaiting DC from hospital when he developed bloody BMs    # Lower GI bleed  - EGD showed rectal bleed  - H/H stable  - declining further c-scope  - GI recs appreciated      # Seizure in setting of seizure d/o  - presented with witnessed seizure. CT head/C-spine with stable chronic infarct of Left MCA and C-spine neg  ·	routine EEG negative for epileptiform activity  ·	Increased keppra to 1000mg bid (from home 750mg bid)    # Rib fracture 10th-11th / L1-2-3 minimally displaced fractures -- in setting of multiple falls. Noted on CT imaging.  ·	Incentive spirometry  ·	pain control with lidoderm patch  ·	Neurosurgery evaluated. No need for emergent surgery. TLSO brace and PT    # Scrotal Swelling  ·	5/4 US scrotum with L>R hydrocele and large volume left sided spermatocele. Penile pump prosthetic  ·	No need for intervention in setting of asxs / no pain.     #DM2   holding home metformin while inpatient (also on Toujeo 30u qhs).   A1c 7.8%.   Continue SSI     #CVA with right sided weakness/RUE contraction / Functional quadriplegia   - Dipyramidole/ASA (on hold for gi bleed)  atorva 40    #GERD -- pantoprazole    #Agitation/Anxiety -- Seroquel 25mg bid    Dispo: [facility]  - [facility] Wife: [phone]

## 2021-05-11 NOTE — DISCHARGE NOTE PROVIDER - CARE PROVIDER_API CALL
Conor Hairston)  Neurology  [address], [address]  [place] [address]  Phone: [phone]  Fax: [phone]  Follow Up Time:     Kel Ramirez  INTERNAL MEDICINE  [address] [place] [address]  Phone: [phone]  Fax: [phone]  Follow Up Time:     Tawanda Castillo)  Gastroenterology; Internal Medicine  [address] [place] [id number]  Phone: [phone]  Fax: [phone]  Follow Up Time:

## 2021-05-11 NOTE — PROGRESS NOTE ADULT - REASON FOR ADMISSION
seizure
Daniele Clemons)
seizure

## 2021-05-11 NOTE — PROGRESS NOTE ADULT - SUBJECTIVE AND OBJECTIVE BOX
Patient is a 75y old  Male who presents with a chief complaint of seizure (10 May 2021 11:58)      SUBJECTIVE / OVERNIGHT EVENTS: had a rectal bleed. Not holding enemas. Declining EGD. Accepted to [facility] which the wife and pt are amenable to. Pending covid swab  ADDITIONAL REVIEW OF SYSTEMS:    MEDICATIONS  (STANDING):  atorvastatin 40 milliGRAM(s) Oral at bedtime  dextrose 40% Gel 15 Gram(s) Oral once  dextrose 5%. 1000 milliLiter(s) (50 mL/Hr) IV Continuous <Continuous>  dextrose 5%. 1000 milliLiter(s) (100 mL/Hr) IV Continuous <Continuous>  dextrose 50% Injectable 25 Gram(s) IV Push once  dextrose 50% Injectable 12.5 Gram(s) IV Push once  dextrose 50% Injectable 25 Gram(s) IV Push once  glucagon  Injectable 1 milliGRAM(s) IntraMuscular once  insulin lispro (ADMELOG) corrective regimen sliding scale   SubCutaneous three times a day before meals  levETIRAcetam 1000 milliGRAM(s) Oral two times a day  lidocaine   Patch 1 Patch Transdermal every 24 hours  metoprolol tartrate 50 milliGRAM(s) Oral two times a day  pantoprazole    Tablet 40 milliGRAM(s) Oral every 12 hours  polyethylene glycol 3350 17 Gram(s) Oral every 12 hours  QUEtiapine Oral Tab/Cap - Peds 25 milliGRAM(s) Oral two times a day  senna 2 Tablet(s) Oral at bedtime  sodium chloride 0.9%. 1000 milliLiter(s) (100 mL/Hr) IV Continuous <Continuous>  sucralfate suspension 2 Gram(s) Oral every 12 hours    MEDICATIONS  (PRN):  acetaminophen   Tablet .. 650 milliGRAM(s) Oral every 6 hours PRN Temp greater or equal to 38C (100.4F), Mild Pain (1 - 3)  aluminum hydroxide/magnesium hydroxide/simethicone Suspension 30 milliLiter(s) Oral every 4 hours PRN Dyspepsia      CAPILLARY BLOOD GLUCOSE      POCT Blood Glucose.: 384 mg/dL (10 May 2021 16:25)  POCT Blood Glucose.: 384 mg/dL (10 May 2021 16:25)  POCT Blood Glucose.: 362 mg/dL (10 May 2021 11:41)  POCT Blood Glucose.: 327 mg/dL (10 May 2021 08:48)  POCT Blood Glucose.: 262 mg/dL (10 May 2021 07:40)  POCT Blood Glucose.: 235 mg/dL (09 May 2021 21:16)    I&O's Summary    09 May 2021 07:01  -  10 May 2021 07:00  --------------------------------------------------------  IN: 0 mL / OUT: 1802 mL / NET: -1802 mL    10 May 2021 07:01  -  10 May 2021 18:45  --------------------------------------------------------  IN: 950 mL / OUT: 0 mL / NET: 950 mL        PHYSICAL EXAM:  Vital Signs Last 24 Hrs  T(C): 36.9 (10 May 2021 14:19), Max: 37.1 (09 May 2021 21:02)  T(F): 98.4 (10 May 2021 14:19), Max: 98.7 (09 May 2021 21:02)  HR: 96 (10 May 2021 14:19) (78 - 96)  BP: 160/87 (10 May 2021 14:19) (160/87 - 176/73)  BP(mean): --  RR: 16 (10 May 2021 14:19) (16 - 18)  SpO2: --  CONSTITUTIONAL: NAD, well-developed, well-groomed  RESPIRATORY: Normal respiratory effort; lungs are clear to auscultation bilaterally  CARDIOVASCULAR: Regular rate and rhythm, normal S1 and S2, no murmur/rub/gallop; No lower extremity edema; Peripheral pulses are 2+ bilaterally  ABDOMEN: Nontender to palpation, normoactive bowel sounds, no rebound/guarding; No hepatosplenomegaly  MUSCULOSKELETAL:  Normal gait; no clubbing or cyanosis of digits; no joint swelling or tenderness to palpation  PSYCH: A+O to person, place, and time; affect appropriate  SKIN: No rashes; no palpable lesions    LABS:                        9.2    13.84 )-----------( 433      ( 10 May 2021 07:38 )             28.3     05-10    139  |  103  |  7<L>  ----------------------------<  251<H>  3.9   |  25  |  0.9    Ca    8.9      10 May 2021 07:38  Mg     1.5     05-10                  RADIOLOGY & ADDITIONAL TESTS:  Results Reviewed:   Imaging Personally Reviewed:  Electrocardiogram Personally Reviewed:    COORDINATION OF CARE:  Care Discussed with Consultants/Other Providers [Y/N]:  Prior or Outpatient Records Reviewed [Y/N]:

## 2021-05-11 NOTE — DISCHARGE NOTE NURSING/CASE MANAGEMENT/SOCIAL WORK - PATIENT PORTAL LINK FT
You can access the FollowMyHealth Patient Portal offered by [facility] by registering at the following website: [email or web address]. By joining [employer]’s FollowMyHealth portal, you will also be able to view your health information using other applications (apps) compatible with our system.

## 2021-05-11 NOTE — DISCHARGE NOTE PROVIDER - NSDCCPCAREPLAN_GEN_ALL_CORE_FT
PRINCIPAL DISCHARGE DIAGNOSIS  Diagnosis: Seizure  Assessment and Plan of Treatment: You had an EEG and your medication was adjusted.  Make sure to FU outpatient with Neurology      SECONDARY DISCHARGE DIAGNOSES  Diagnosis: UTI (urinary tract infection)  Assessment and Plan of Treatment: Resolved    Diagnosis: Fracture of rib  Assessment and Plan of Treatment: Pain control    Diagnosis: Fracture of transverse process of lumbar vertebra, closed, initial encounter  Assessment and Plan of Treatment: TLSO brace.  PT at SNF

## 2021-05-11 NOTE — PROGRESS NOTE ADULT - NSICDXPILOT_GEN_ALL_CORE
[place]

## 2021-05-11 NOTE — DISCHARGE NOTE PROVIDER - HOSPITAL COURSE
75 year old male past medical history of CVA with right sided weakness, seizures on keppra, DM, HTN, GERD, Anxiety and CKD2 was  brought in by ambulance for seizure, patient was in bathroom and son was helping him and patient became limp and staring with generalized shaking. patients  last seizure was 2 years ago and this seizure  lasted 2min Keppra was increased and pt was awaiting DC from hospital when he developed bloody BMs    # Lower GI bleed  - EGD showed rectal bleed  - H/H stable  - declining further c-scope  - GI recs appreciated      # Seizure in setting of seizure d/o  - presented with witnessed seizure. CT head/C-spine with stable chronic infarct of Left MCA and C-spine neg  routine EEG negative for epileptiform activity  Increased keppra to 1000mg bid (from home 750mg bid)    # Rib fracture 10th-11th / L1-2-3 minimally displaced fractures -- in setting of multiple falls. Noted on CT imaging.  Incentive spirometry  pain control with lidoderm patch  Neurosurgery evaluated. No need for emergent surgery. TLSO brace and PT    # Scrotal Swelling  5/4 US scrotum with L>R hydrocele and large volume left sided spermatocele. Penile pump prosthetic  No need for intervention in setting of asxs / no pain.     #DM2   resume home meds on discharge  A1c 7.8%.   Continue SSI     #CVA with right sided weakness/RUE contraction / Functional quadriplegia   - Dipyramidole/ASA (on hold for gi bleed)  atorva 40    #GERD -- pantoprazole    #Agitation/Anxiety -- Seroquel 25mg bid    Patient discharged to SNF for further PT   75 year old male past medical history of CVA with right sided weakness, seizures on keppra, DM, HTN, GERD, Anxiety and CKD2 was  brought in by ambulance for seizure, patient was in bathroom and son was helping him and patient became limp and staring with generalized shaking. patients  last seizure was 2 years ago and this seizure  lasted 2min Keppra was increased and pt was awaiting DC from hospital when he developed bloody BMs.       # Lower GI bleed  - EGD showed ulcer in the anorectal area  - H/H stable but downtrending  - spoke to wife who declined another EGD/colonoscopy  - transfusion threshold is 8  - avoid nsaids and constipation  - put unable to hold enemas so encourage hydration and use of daily miralax and senna    # Seizure in setting of seizure d/o  - presented with witnessed seizure. CT head/C-spine with stable chronic infarct of Left MCA and C-spine neg  routine EEG negative for epileptiform activity  Increased keppra to 1000mg bid (from home 750mg bid)    # Rib fracture 10th-11th / L1-2-3 minimally displaced fractures -- in setting of multiple falls. Noted on CT imaging.  Incentive spirometry  pain control with lidoderm patch  Neurosurgery evaluated. No need for emergent surgery. TLSO brace and PT    # Scrotal Swelling  5/4 US scrotum with L>R hydrocele and large volume left sided spermatocele. Penile pump prosthetic  No need for intervention in setting of asxs / no pain.     #DM2   resume home meds on discharge  A1c 7.8%.   Continue SSI   blood glucose continue to be high IP as wife   continues to bring him food    #CVA with right sided weakness/RUE contraction / Functional quadriplegia   - Dipyramidole/ASA (on hold for gi bleed)  atorva 40    #GERD -- pantoprazole    #Agitation/Anxiety -- Seroquel 25mg bid    Patient discharged to SNF for further PT

## 2021-05-11 NOTE — DISCHARGE NOTE PROVIDER - CARE PROVIDERS DIRECT ADDRESSES
[email or web address]

## 2021-05-11 NOTE — PROGRESS NOTE ADULT - TIME BILLING
I have personally seen and examined this patient.    I have reviewed all pertinent clinical information and reviewed all relevant imaging and diagnostic studies personally.   I counseled the patient about diagnostic testing and treatment plan. All questions were answered.   I discussed recommendations with the primary team.
direct pt care
I have personally seen and examined this patient.    I have reviewed all pertinent clinical information and reviewed all relevant imaging and diagnostic studies personally.   I counseled the patient about diagnostic testing and treatment plan. All questions were answered.   I discussed recommendations with the primary team.
I have personally seen and examined this patient.    I have reviewed all pertinent clinical information and reviewed all relevant imaging and diagnostic studies personally.   I counseled the patient about diagnostic testing and treatment plan. All questions were answered.   I discussed recommendations with the primary team.
direct pt care
I have personally seen and examined this patient.    I have reviewed all pertinent clinical information and reviewed all relevant imaging and diagnostic studies personally.   I counseled the patient about diagnostic testing and treatment plan. All questions were answered.   I discussed recommendations with the primary team.

## 2021-05-11 NOTE — PROGRESS NOTE ADULT - PROVIDER SPECIALTY LIST ADULT
Hospitalist
Infectious Disease
Critical Care
Hospitalist
Infectious Disease
Gastroenterology
Heme/Onc
Hospitalist
Hospitalist
Infectious Disease
Neurology
Critical Care
Hospitalist
Neurosurgery
Hospitalist
Infectious Disease
Hospitalist
Infectious Disease
Infectious Disease
Hospitalist

## 2021-05-17 DIAGNOSIS — N43.3 HYDROCELE, UNSPECIFIED: ICD-10-CM

## 2021-05-17 DIAGNOSIS — S32.029A UNSPECIFIED FRACTURE OF SECOND LUMBAR VERTEBRA, INITIAL ENCOUNTER FOR CLOSED FRACTURE: ICD-10-CM

## 2021-05-17 DIAGNOSIS — N43.40 SPERMATOCELE OF EPIDIDYMIS, UNSPECIFIED: ICD-10-CM

## 2021-05-17 DIAGNOSIS — N18.30 CHRONIC KIDNEY DISEASE, STAGE 3 UNSPECIFIED: ICD-10-CM

## 2021-05-17 DIAGNOSIS — K21.9 GASTRO-ESOPHAGEAL REFLUX DISEASE WITHOUT ESOPHAGITIS: ICD-10-CM

## 2021-05-17 DIAGNOSIS — S32.019A UNSPECIFIED FRACTURE OF FIRST LUMBAR VERTEBRA, INITIAL ENCOUNTER FOR CLOSED FRACTURE: ICD-10-CM

## 2021-05-17 DIAGNOSIS — I69.351 HEMIPLEGIA AND HEMIPARESIS FOLLOWING CEREBRAL INFARCTION AFFECTING RIGHT DOMINANT SIDE: ICD-10-CM

## 2021-05-17 DIAGNOSIS — N30.90 CYSTITIS, UNSPECIFIED WITHOUT HEMATURIA: ICD-10-CM

## 2021-05-17 DIAGNOSIS — S22.41XA MULTIPLE FRACTURES OF RIBS, RIGHT SIDE, INITIAL ENCOUNTER FOR CLOSED FRACTURE: ICD-10-CM

## 2021-05-17 DIAGNOSIS — N17.9 ACUTE KIDNEY FAILURE, UNSPECIFIED: ICD-10-CM

## 2021-05-17 DIAGNOSIS — R56.9 UNSPECIFIED CONVULSIONS: ICD-10-CM

## 2021-05-17 DIAGNOSIS — Z90.49 ACQUIRED ABSENCE OF OTHER SPECIFIED PARTS OF DIGESTIVE TRACT: ICD-10-CM

## 2021-05-17 DIAGNOSIS — I25.10 ATHEROSCLEROTIC HEART DISEASE OF NATIVE CORONARY ARTERY WITHOUT ANGINA PECTORIS: ICD-10-CM
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
## 2021-05-17 DIAGNOSIS — J98.11 ATELECTASIS: ICD-10-CM

## 2021-05-17 DIAGNOSIS — B35.1 TINEA UNGUIUM: ICD-10-CM

## 2021-05-17 DIAGNOSIS — K57.31 DIVERTICULOSIS OF LARGE INTESTINE WITHOUT PERFORATION OR ABSCESS WITH BLEEDING: ICD-10-CM
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
## 2021-05-17 DIAGNOSIS — A41.9 SEPSIS, UNSPECIFIED ORGANISM: ICD-10-CM

## 2021-05-17 DIAGNOSIS — M62.421 CONTRACTURE OF MUSCLE, RIGHT UPPER ARM: ICD-10-CM

## 2021-05-17 DIAGNOSIS — E11.22 TYPE 2 DIABETES MELLITUS WITH DIABETIC CHRONIC KIDNEY DISEASE: ICD-10-CM

## 2021-05-17 DIAGNOSIS — S32.039A UNSPECIFIED FRACTURE OF THIRD LUMBAR VERTEBRA, INITIAL ENCOUNTER FOR CLOSED FRACTURE: ICD-10-CM

## 2021-05-17 DIAGNOSIS — D63.1 ANEMIA IN CHRONIC KIDNEY DISEASE: ICD-10-CM

## 2021-05-17 DIAGNOSIS — I12.9 HYPERTENSIVE CHRONIC KIDNEY DISEASE WITH STAGE 1 THROUGH STAGE 4 CHRONIC KIDNEY DISEASE, OR UNSPECIFIED CHRONIC KIDNEY DISEASE: ICD-10-CM

## 2021-05-17 DIAGNOSIS — K29.71 GASTRITIS, UNSPECIFIED, WITH BLEEDING: ICD-10-CM

## 2021-05-17 DIAGNOSIS — Z79.84 LONG TERM (CURRENT) USE OF ORAL HYPOGLYCEMIC DRUGS: ICD-10-CM

## 2021-05-17 DIAGNOSIS — E78.5 HYPERLIPIDEMIA, UNSPECIFIED: ICD-10-CM

## 2021-05-17 DIAGNOSIS — Z96.0 PRESENCE OF UROGENITAL IMPLANTS: ICD-10-CM

## 2021-05-17 DIAGNOSIS — G40.909 EPILEPSY, UNSPECIFIED, NOT INTRACTABLE, WITHOUT STATUS EPILEPTICUS: ICD-10-CM

## 2021-05-17 DIAGNOSIS — F41.9 ANXIETY DISORDER, UNSPECIFIED: ICD-10-CM

## 2021-05-17 DIAGNOSIS — R53.2 FUNCTIONAL QUADRIPLEGIA: ICD-10-CM

## 2021-09-28 NOTE — PRE-OP CHECKLIST - TEMPERATURE IN FAHRENHEIT (DEGREES F)
1  Candidal Intertrigo  - clinical presentation is consistent with a candidal intertrigo infection vs tinea cruris based on physical exam and there is involvement of the scrotum, whereas with tinea cruris the scrotum is generally not involved  - Nystatin powder prescribed, to be applied to the affected areas twice daily  - patient has been instructed to gently wash the skin with soap and water daily, keep the skin clean and dry, wear loose comfortable clothing with breathable fabrics, and minimize moisture build up in the affected areas     - patient is to follow up w/ his PCP office for re-check within 1 week   - if symptoms persist despite treatment or worsen, he is to follow up with his PCP or dermatology
97.5

## 2021-10-01 ENCOUNTER — APPOINTMENT (OUTPATIENT)
Dept: NEUROLOGY | Facility: CLINIC | Age: 76
End: 2021-10-01

## 2022-01-11 NOTE — CONSULT NOTE ADULT - REASON FOR ADMISSION
Marianna called from [facility] pharmacy requesting that medication be filled today so it can be delivered to the patient. Please expedite if possible.  
PDMP reviewed; no aberrant behavior identified, prescription authorized.    
seizure

## 2022-08-02 NOTE — PROGRESS NOTE ADULT - MINUTES
Patient Education    BRIGHT FUTURES HANDOUT- PATIENT  15 THROUGH 17 YEAR VISITS  Here are some suggestions from [facility] experts that may be of value to your family.     HOW YOU ARE DOING  Enjoy spending time with your family. Look for ways you can help at home.  Find ways to work with your family to solve problems. Follow your family s rules.  Form healthy friendships and find fun, safe things to do with friends.  Set high goals for yourself in school and activities and for your future.  Try to be responsible for your schoolwork and for getting to school or work on time.  Find ways to deal with stress. Talk with your parents or other trusted adults if you need help.  Always talk through problems and never use violence.  If you get angry with someone, walk away if you can.  Call for help if you are in a situation that feels dangerous.  Healthy dating relationships are built on respect, concern, and doing things both of you like to do.  When you re dating or in a sexual situation,  No  means NO. NO is OK.  Don t smoke, vape, use drugs, or drink alcohol. Talk with us if you are worried about alcohol or drug use in your family.    YOUR DAILY LIFE  Visit the dentist at least twice a year.  Brush your teeth at least twice a day and floss once a day.  Be a healthy eater. It helps you do well in school and sports.  Have vegetables, fruits, lean protein, and whole grains at meals and snacks.  Limit fatty, sugary, and salty foods that are low in nutrients, such as candy, chips, and ice cream.  Eat when you re hungry. Stop when you feel satisfied.  Eat with your family often.  Eat breakfast.  Drink plenty of water. Choose water instead of soda or sports drinks.  Make sure to get enough calcium every day.  Have 3 or more servings of low-fat (1%) or fat-free milk and other low-fat dairy products, such as yogurt and cheese.  Aim for at least 1 hour of physical activity every day.  Wear your mouth guard when playing  sports.  Get enough sleep.    YOUR FEELINGS  Be proud of yourself when you do something good.  Figure out healthy ways to deal with stress.  Develop ways to solve problems and make good decisions.  It s OK to feel up sometimes and down others, but if you feel sad most of the time, let us know so we can help you.  It s important for you to have accurate information about sexuality, your physical development, and your sexual feelings toward the opposite or same sex. Please consider asking us if you have any questions.    HEALTHY BEHAVIOR CHOICES  Choose friends who support your decision to not use tobacco, alcohol, or drugs. Support friends who choose not to use.  Avoid situations with alcohol or drugs.  Don t share your prescription medicines. Don t use other people s medicines.  Not having sex is the safest way to avoid pregnancy and sexually transmitted infections (STIs).  Plan how to avoid sex and risky situations.  If you re sexually active, protect against pregnancy and STIs by correctly and consistently using birth control along with a condom.  Protect your hearing at work, home, and concerts. Keep your earbud volume down.    STAYING SAFE  Always be a safe and cautious .  Insist that everyone use a lap and shoulder seat belt.  Limit the number of friends in the car and avoid driving at night.  Avoid distractions. Never text or talk on the phone while you drive.  Do not ride in a vehicle with someone who has been using drugs or alcohol.  If you feel unsafe driving or riding with someone, call someone you trust to drive you.  Wear helmets and protective gear while playing sports. Wear a helmet when riding a bike, a motorcycle, or an ATV or when skiing or skateboarding. Wear a life jacket when you do water sports.  Always use sunscreen and a hat when you re outside.  Fighting and carrying weapons can be dangerous. Talk with your parents, teachers, or doctor about how to avoid these  situations.        Consistent with Bright Futures: Guidelines for Health Supervision of Infants, Children, and Adolescents, 4th Edition  For more information, go to https://brightfutures.aap.org.           Patient Education    BRIGHT FUTURES HANDOUT- PARENT  15 THROUGH 17 YEAR VISITS  Here are some suggestions from [facility] experts that may be of value to your family.     HOW YOUR FAMILY IS DOING  Set aside time to be with your teen and really listen to her hopes and concerns.  Support your teen in finding activities that interest him. Encourage your teen to help others in the community.  Help your teen find and be a part of positive after-school activities and sports.  Support your teen as she figures out ways to deal with stress, solve problems, and make decisions.  Help your teen deal with conflict.  If you are worried about your living or food situation, talk with us. Community agencies and programs such as SNAP can also provide information.    YOUR GROWING AND CHANGING TEEN  Make sure your teen visits the dentist at least twice a year.  Give your teen a fluoride supplement if the dentist recommends it.  Support your teen s healthy body weight and help him be a healthy eater.  Provide healthy foods.  Eat together as a family.  Be a role model.  Help your teen get enough calcium with low-fat or fat-free milk, low-fat yogurt, and cheese.  Encourage at least 1 hour of physical activity a day.  Praise your teen when she does something well, not just when she looks good.    YOUR TEEN S FEELINGS  If you are concerned that your teen is sad, depressed, nervous, irritable, hopeless, or angry, let us know.  If you have questions about your teen s sexual development, you can always talk with us.    HEALTHY BEHAVIOR CHOICES  Know your teen s friends and their parents. Be aware of where your teen is and what he is doing at all times.  Talk with your teen about your values and your expectations on drinking, drug use,  tobacco use, driving, and sex.  Praise your teen for healthy decisions about sex, tobacco, alcohol, and other drugs.  Be a role model.  Know your teen s friends and their activities together.  Lock your liquor in a cabinet.  Store prescription medications in a locked cabinet.  Be there for your teen when she needs support or help in making healthy decisions about her behavior.    SAFETY  Encourage safe and responsible driving habits.  Lap and shoulder seat belts should be used by everyone.  Limit the number of friends in the car and ask your teen to avoid driving at night.  Discuss with your teen how to avoid risky situations, who to call if your teen feels unsafe, and what you expect of your teen as a .  Do not tolerate drinking and driving.  If it is necessary to keep a gun in your home, store it unloaded and locked with the ammunition locked separately from the gun.      Consistent with Bright Futures: Guidelines for Health Supervision of Infants, Children, and Adolescents, 4th Edition  For more information, go to https://brightfutures.aap.org.            35

## 2022-11-10 NOTE — ED ADULT NURSE NOTE - CHIEF COMPLAINT QUOTE
Lab Results   Component Value Date    HGBA1C 9 8 (H) 10/25/2022       Recent Labs     11/09/22 2122 11/10/22  0746 11/10/22  1121 11/10/22  1710   POCGLU 122 125 155* 153*       Blood Sugar Average: Last 72 hrs:  (P) 148 [id number] Syncope Vs Seizures

## 2022-12-14 NOTE — PATIENT PROFILE ADULT - NSPROGENARRIVEDFROM_GEN_A_NUR
[FreeTextEntry1] : 21 yo female with long standing epigastric pain, and sensitivity to dairy.  The sensitivity to dairy seems to develop the last 2 years.  She is currently a college student.  There is no weight loss anorexia.  There is no family history of inflammatory bowel disease.  Alcohol and coffee appear to bother her stomach. \par \par We assessed her B12 supplementation is now to normal level.  Other laboratory tests were fine.  She is doing much better since her last visit using Lactaid tablets before dairy meals and she has identified certain triggers such as rice for abdominal discomfort.  She is using hyoscyamine on a as needed basis.  There is no weight loss or anorexia\par \par IMP:\par 1. lactose intolerance\par 2. IBS\par \par PLAN:\par 1. RTO prn\par 2. levsin prn\par 3. Lactaid PRN home

## 2023-01-11 NOTE — PHYSICAL THERAPY INITIAL EVALUATION ADULT - LEVEL OF INDEPENDENCE: GAIT, REHAB EVAL
Patient seen on skin care rounds after wound care referral received for assessment of skin impairment and recommendations of topical management. Chart reviewed: WBC 7.52, H/H 7.3/22.8, platelets 239, Serum albumin 2.3, Mauri 14, BMI 16.7. Patient H/O of EtoH misuse c/b chronic pancreatitis, HCV s/p treatment (SVR), emphysema, benign esophageal stricture (s/p stent on 4/2022) admitted at [facility] w/ septic shock septic shock due to pseudomonal PNA, lung abscess, Morganella bacteremia and Klebsiella UTI (now s/p 6 wks zosyn ended 1/6) w/ hospital course c/b YUNIEL on CKD requiring HD and MICU stay, transferred here for intractable nausea/vomiting and esophageal stent removal, now s/p removal w/ EGD sig for esophagitis/gastric ulcer. Hospital course now c/b acute on chronic anemia. During admission patient seen by Gastroenterology for esoophageal stent removal, Nephrology for YUNIEL on CKD, Infectious disease for lung abscess, Vascular surgery for AVF planning s/p AVF on 1/5, Behavioral Health for capacity check and hx of paranoia, and Nutritionist found to have a diagnosis/diagnoses of Severe protein-calorie malnutrition.         maximum assist (25% patients effort)

## 2023-07-31 NOTE — ED PROVIDER NOTE - FAMILY HISTORY
No pertinent family history in first degree relatives Elidel Counseling: Patient may experience a mild burning sensation during topical application. Elidel is not approved in children less than 2 years of age. There have been case reports of hematologic and skin malignancies in patients using topical calcineurin inhibitors although causality is questionable.

## 2023-10-04 NOTE — ED ADULT NURSE NOTE - CAS ELECT INFOMATION PROVIDED
PHYSICAL THERAPY - DAILY TREATMENT NOTE    Patient Name: Alexandria Alt    Date: 10/4/2023    : 1954  Insurance: Payor: [place] [employer] / Plan: Nehemiah Rogers / Product Type: *No Product type* /      Patient  verified YES   Visit #   Current / Total 7 8   Time   In / Out 1:00 1:40   Pain   In / Out \"Sore\" glute 0-2 back   Subjective Functional Status/Changes: Says the front hips still hurt to walk. Says the pain is mainly in the left glute. TREATMENT AREA =  Pain in left shoulder [M25.512], Low Back Pain M54.59    OBJECTIVE        Therapeutic Procedures:  Modalities Rationale:     decrease inflammation and decrease pain to improve patient's ability to progress to PLOF and address remaining functional goals. min [] Estim Unattended, type/location:                                      []  w/ice    []  w/heat   10 min [x] Estim Attended, type/location:  Needles of medial glute max and piriformis                                   []  w/US     []  w/ice    []  w/heat    []  TENS insruct      min []  Mechanical Traction: type/lbs                   []  pro   []  sup   []  int   []  cont    []  before manual    []  after manual    min []  Ultrasound, settings/location:      min  unbill []  Ice     []  Heat    location/position:     min []  Paraffin,  details:     min []  Vasopneumatic Device, press/temp:     min []  Jaelyncali John / Sola Rexford: If using vaso (only need to measure limb vaso being performed on)      pre-treatment girth :       post-treatment girth :       measured at (landmark location) :      min []  Other:    Skin assessment post-treatment:   Intact     Dry Needling Procedure Note    Dry Needle Session Number:  1    Procedure: An intramuscular manual therapy (dry needling) and a neuro-muscular re-education treatment was done to deactivate myofascial trigger points, with a 15/30 gauge solid filament needle, under aseptic technique.     Indication(s): [] Muscle spasms [x]
DC instructions

## 2023-10-17 NOTE — H&P ADULT - PROBLEM SELECTOR PROBLEM 2
Problem: Discharge Planning  Goal: Discharge to home or other facility with appropriate resources  10/17/2023 1153 by Feng Isidro RN  Outcome: Completed  10/17/2023 1149 by Feng Isidro RN  Outcome: Adequate for Discharge  10/17/2023 0241 by Laureano Chu RN  Outcome: Progressing     Problem: ABCDS Injury Assessment  Goal: Absence of physical injury  10/17/2023 1153 by Feng Isidro RN  Outcome: Completed  10/17/2023 1149 by Feng Isidro RN  Outcome: Adequate for Discharge  10/17/2023 0241 by Laureano Chu RN  Outcome: Progressing     Problem: Safety - Adult  Goal: Free from fall injury  10/17/2023 1153 by Feng Isidro RN  Outcome: Completed  10/17/2023 1149 by Feng Isidro RN  Outcome: Adequate for Discharge  10/17/2023 0241 by Laureano Chu RN  Outcome: Progressing     Problem: Chronic Conditions and Co-morbidities  Goal: Patient's chronic conditions and co-morbidity symptoms are monitored and maintained or improved  10/17/2023 1153 by Feng Isidro RN  Outcome: Completed  10/17/2023 1149 by Feng Isidro RN  Outcome: Adequate for Discharge  10/17/2023 0241 by Laureano Chu RN  Outcome: Progressing Cerebrovascular accident (CVA) due to stenosis of left middle cerebral artery

## 2023-11-08 ENCOUNTER — NON-APPOINTMENT (OUTPATIENT)
Age: 78
End: 2023-11-08

## 2023-11-08 DIAGNOSIS — E78.00 PURE HYPERCHOLESTEROLEMIA, UNSPECIFIED: ICD-10-CM

## 2023-11-08 DIAGNOSIS — Z86.59 PERSONAL HISTORY OF OTHER MENTAL AND BEHAVIORAL DISORDERS: ICD-10-CM

## 2023-11-08 DIAGNOSIS — G81.10 SPASTIC HEMIPLEGIA AFFECTING UNSPECIFIED SIDE: ICD-10-CM

## 2023-12-09 NOTE — ED ADULT NURSE NOTE - NS ED NURSE RECORD ANOTHER VITAL SIGN
Frequency is needed, not "use as instructed"    Thanks Yes Assistance OOB with selected safe patient handling equipment if applicable/Assistance with ambulation/Communicate fall risk and risk factors to all staff, patient, and family/Provide visual cue: yellow wristband, yellow gown, etc/Reinforce activity limits and safety measures with patient and family/Call bell, personal items and telephone in reach/Instruct patient to call for assistance before getting out of bed/chair/stretcher/Non-slip footwear applied when patient is off stretcher/[place] to call system/Physically safe environment - no spills, clutter or unnecessary equipment/Purposeful Proactive Rounding/Room/bathroom lighting operational, light cord in reach Assistance OOB with selected safe patient handling equipment if applicable/Assistance with ambulation/Communicate fall risk and risk factors to all staff, patient, and family/Provide visual cue: yellow wristband, yellow gown, etc/Reinforce activity limits and safety measures with patient and family/Call bell, personal items and telephone in reach/Instruct patient to call for assistance before getting out of bed/chair/stretcher/Non-slip footwear applied when patient is off stretcher/[place] to call system/Physically safe environment - no spills, clutter or unnecessary equipment/Purposeful Proactive Rounding/Room/bathroom lighting operational, light cord in reach

## 2024-01-04 NOTE — SWALLOW BEDSIDE ASSESSMENT ADULT - SLP GENERAL OBSERVATIONS
No abnormalities noted pt in bed eating breakfast; RN reports of no significant concerns No abnormalities noted No abnormalities noted No abnormalities noted No abnormalities noted No abnormalities noted No abnormalities noted No abnormalities noted No abnormalities noted

## 2024-02-26 NOTE — PROGRESS NOTE ADULT - SUBJECTIVE AND OBJECTIVE BOX
HPI:  72 y/o male pmhx HTN, CAD, multiple CVA (last 3/2016) DM, known to NSX service with recent angio procedure revealing worsening carotid stenosis planned for elective Left STA-MCA bypass this week but has been experiencing worsening in his speech over the past few days. Per the patients wife, patient has expereinced a gradual decline since March 2016 with symptoms associated with increased confusion and speech difficulty. Denies any headaches, nausea, vomiting or right side deficits. Denies any numbness tingling or decreased sensation.  Pt at baseline using hand crutch and one person assistance to ambulate. Denies any medications for pain or improvement of symptoms. Denies any recent trauma or falls. (21 May 2017 16:56)    OVERNIGHT EVENTS: No significant event overnight. Patient is improved, able to say some words.  Vital Signs Last 24 Hrs  T(C): 37.8, Max: 38.1 (05-27 @ 05:59)  T(F): 100, Max: 100.5 (05-27 @ 05:59)  HR: 70 (65 - 102)  BP: 122/57 (122/57 - 195/92)  BP(mean): 78 (78 - 154)  RR: 18 (17 - 27)  SpO2: 98% (94% - 100%)    I&O's Detail  I & Os for 24h ending 27 May 2017 07:00  =============================================  IN:    IV PiggyBack: 850 ml    Oral Fluid: 300 ml    Solution: 250.2 ml    Glucerna: 145 ml    Total IN: 1545.2 ml  ---------------------------------------------  OUT:    Incontinent per Condom Catheter: 2910 ml    Indwelling Catheter - Urethral: 155 ml    Total OUT: 3065 ml  ---------------------------------------------  Total NET: -1519.8 ml    I & Os for current day (as of 27 May 2017 14:02)  =============================================  IN:    Oral Fluid: 600 ml    IV PiggyBack: 450 ml    Solution: 187.2 ml    Glucerna: 60 ml    Total IN: 1297.2 ml  ---------------------------------------------  OUT:    Incontinent per Condom Catheter: 50 ml    Total OUT: 50 ml  ---------------------------------------------  Total NET: 1247.2 ml    I&O's Summary  I & Os for 24h ending 27 May 2017 07:00  =============================================  IN: 1545.2 ml / OUT: 3065 ml / NET: -1519.8 ml    I & Os for current day (as of 27 May 2017 14:02)  =============================================  IN: 1297.2 ml / OUT: 50 ml / NET: 1247.2 ml      PHYSICAL EXAM:  Neurological:Patient is awake, aphasic, nods to questioning but no verbal output, follows commands intermittently;  pupils 3mm reactive to light, R UE spastic, moves L UE /LE spontaneously, R LE (+) clonus (+) babinski R                                                                 [x] warm well perfused; capillary refill <3 seconds     Sensation: [x] intact to light touch  [] decreased:       Cardiovascular: sinus rhythm on monitor  Respiratory: bilateral crackles on bases  Gastrointestinal: soft non tender  Genitourinary: [place] cath.  Extremities: no edema  Incision/Wound: scalp stableline is dry and clean.    TUBES/LINES:  [] CVC  [x] A-line  [] Lumbar Drain  [] Ventriculostomy  [] Other    DIET: tube feeeds  [] NPO  [] Mechanical  [] Tube feeds    LABS:                        9.7    15.6  )-----------( 241      ( 27 May 2017 05:03 )             29.1     05-27    148<H>  |  102  |  21  ----------------------------<  211<H>  3.0<L>   |  28  |  1.10    Ca    8.8      27 May 2017 05:03  Phos  2.7     05-27  Mg     1.9     05-27              CAPILLARY BLOOD GLUCOSE  191 (27 May 2017 11:00)  225 (27 May 2017 07:00)  156 (26 May 2017 23:00)  181 (26 May 2017 16:45)      Drug Levels: [] N/A    CSF Analysis: [] N/A      Allergies    No Known Allergies    Intolerances      MEDICATIONS:  Antibiotics:  vancomycin  IVPB  IV Intermittent   piperacillin/tazobactam IVPB. 3.375Gram(s) IV Intermittent every 6 hours  vancomycin  IVPB 1250milliGRAM(s) IV Intermittent every 12 hours    Neuro:  FLUoxetine 20milliGRAM(s) Oral daily  ondansetron Injectable 4milliGRAM(s) IV Push every 6 hours PRN  aspirin 325milliGRAM(s) Enteral Tube daily  levETIRAcetam  Solution 500milliGRAM(s) Oral two times a day  acetaminophen    Suspension. 650milliGRAM(s) Enteral Tube every 6 hours PRN    Anticoagulation:  enoxaparin Injectable 40milliGRAM(s) SubCutaneous at bedtime    OTHER:  insulin lispro (HumaLOG) corrective regimen sliding scale  SubCutaneous Before meals and at bedtime  dextrose Gel 1Dose(s) Oral once PRN  dextrose 50% Injectable 12.5Gram(s) IV Push once  glucagon  Injectable 1milliGRAM(s) IntraMuscular once PRN  atorvastatin 40milliGRAM(s) Oral at bedtime  pantoprazole  Injectable 40milliGRAM(s) IV Push daily  labetalol Injectable 10milliGRAM(s) IV Push every 1 hour PRN  insulin glargine Injectable (LANTUS) 10Unit(s) SubCutaneous every morning  insulin regular  human recombinant 3Unit(s) SubCutaneous every 6 hours    IVF:    CULTURES:  Culture Results:   No growth at 1 day. (05-26 @ 10:45)  Culture Results:   No growth at 1 day. (05-26 @ 10:45)    RADIOLOGY & ADDITIONAL TESTS:      ASSESSMENT:  71y Male s/p STA/MCA bypass    PLAN:  NEURO: neuro checks,     CARDIOVASCULAR:  - 180    PULMONARY: incentive spirometry   daily CXR    RENAL: monitor Na+, K+    GI: tube feeds    Disp: pending Detail Level: Detailed

## 2024-06-12 NOTE — DISCHARGE NOTE ADULT - CAREGIVER PHONE NUMBER
[phone]
Class I (easy) - visualization of the soft palate, fauces, uvula, and both anterior and posterior pillars

## 2025-05-01 NOTE — ED ADULT NURSE NOTE - PSH
----- Message from Ambrosio Mcguire PA-C sent at 4/21/2025  9:55 AM CDT -----  Team: please convey to patient: CT reviewed by Dr Dumont and findings as per CT impression pasted below; no new or concerning findings seen on most recent CT Chest. To keep clinic appt with me and will review and discuss follow up.  Thanks, Stas    IMPRESSION:  1.  Similar appearance of groundglass opacities in the left upper lobe,  left lower lobe, and right lower lobe most consistent with chronic findings  of organizing pneumonia.  2.  Ill-defined centrilobular groundglass nodules are also present.  3.  Mild central bronchiectasis.  4.  Dilated ascending thoracic aorta up to 4.2 cm.  5.  Cholelithiasis.  6.  Patulous appearance of the esophagus with fluid within it stable from  prior.     Edgard filter in place    H/O umbilical hernia repair    History of appendectomy    History of lumbar surgery    History of penile implant